# Patient Record
Sex: FEMALE | Race: WHITE | NOT HISPANIC OR LATINO | Employment: OTHER | URBAN - NONMETROPOLITAN AREA
[De-identification: names, ages, dates, MRNs, and addresses within clinical notes are randomized per-mention and may not be internally consistent; named-entity substitution may affect disease eponyms.]

---

## 2011-06-10 LAB — HM COLONOSCOPY: NORMAL

## 2017-01-12 ENCOUNTER — TRANSCRIBE ORDERS (OUTPATIENT)
Dept: ADMINISTRATIVE | Facility: HOSPITAL | Age: 62
End: 2017-01-12

## 2017-01-12 DIAGNOSIS — R94.31 ABNORMAL ELECTROCARDIOGRAM: Primary | ICD-10-CM

## 2017-01-12 DIAGNOSIS — I10 ESSENTIAL HYPERTENSION, MALIGNANT: ICD-10-CM

## 2017-01-12 DIAGNOSIS — R06.02 SHORTNESS OF BREATH: ICD-10-CM

## 2017-01-13 ENCOUNTER — OFFICE VISIT (OUTPATIENT)
Dept: FAMILY MEDICINE CLINIC | Facility: CLINIC | Age: 62
End: 2017-01-13

## 2017-01-13 VITALS
BODY MASS INDEX: 41.75 KG/M2 | OXYGEN SATURATION: 98 % | SYSTOLIC BLOOD PRESSURE: 117 MMHG | HEIGHT: 65 IN | DIASTOLIC BLOOD PRESSURE: 77 MMHG | HEART RATE: 98 BPM | WEIGHT: 250.6 LBS

## 2017-01-13 DIAGNOSIS — M17.11 ARTHRITIS OF RIGHT KNEE: Primary | ICD-10-CM

## 2017-01-13 PROCEDURE — 99212 OFFICE O/P EST SF 10 MIN: CPT | Performed by: FAMILY MEDICINE

## 2017-01-13 NOTE — PROGRESS NOTES
Preop visit for knee replacement    Cardiologist are doing a nuclear scan next week.  A1c and hyperglycemia related to steroid injections-will probably need insulin supplementation around surgery.  Okay to stop aspirin 7 days before surgery.    HEENT exam-negative, regular rhythm, chest clear, no lower extremity edema.    Sign consent for surgery if  cardiac clearance okay, and will need DVT prophylaxis.  Will probably need perioperative insulin.  Presently, increase Glucovance 5/500 to 2 twice a day

## 2017-01-13 NOTE — MR AVS SNAPSHOT
Maribeth Isbell   1/13/2017 12:30 PM   Office Visit    Dept Phone:  [phone]   Encounter #:  [id number]    Provider:  Jer Mccray MD   Department:  [facility]                Your Full Care Plan              Today's Medication Changes          These changes are accurate as of: 1/13/17 12:39 PM.  If you have any questions, ask your nurse or doctor.               Stop taking medication(s)listed here:     azithromycin 250 MG tablet   Commonly known as:  ZITHROMAX Z-BEVERLEY   Stopped by:  Jer Mccray MD           metroNIDAZOLE 500 MG tablet   Commonly known as:  FLAGYL   Stopped by:  Jer Mccray MD           vancomycin 125 MG capsule   Commonly known as:  VANCOCIN   Stopped by:  Jer Mccray MD                      Your Updated Medication List          This list is accurate as of: 1/13/17 12:39 PM.  Always use your most recent med list.                amLODIPine 5 MG tablet   Commonly known as:  NORVASC       aspirin 81 MG EC tablet       carbidopa-levodopa  MG per tablet   Commonly known as:  SINEMET       cetirizine 10 MG tablet   Commonly known as:  zyrTEC       clotrimazole-betamethasone 1-0.05 % cream   Commonly known as:  LOTRISONE       folic acid 400 MCG tablet   Commonly known as:  FOLVITE       glyBURIDE-metFORMIN 5-500 MG per tablet   Commonly known as:  GLUCOVANCE       KLOR-CON 10 MEQ CR tablet   Generic drug:  potassium chloride       levothyroxine 100 MCG tablet   Commonly known as:  SYNTHROID, LEVOTHROID       lisinopril 40 MG tablet   Commonly known as:  PRINIVIL,ZESTRIL       PRISTIQ 50 MG 24 hr tablet   Generic drug:  desvenlafaxine       raNITIdine 150 MG tablet   Commonly known as:  ZANTAC       rosuvastatin 10 MG tablet   Commonly known as:  CRESTOR       traMADol 50 MG tablet   Commonly known as:  ULTRAM       VISION-KRISTAL PRESERVE PO               Instructions     None    Patient  Instructions History      Upcoming Appointments     Visit Type Date Time Department    OFFICE VISIT 1/13/2017 12:30 PM [facility] PAD CARD NM INJ 1/16/2017  7:30 AM  PAD CARDIOLOGY    BH PAD CARD NM SCAN 1/16/2017  8:45 AM  PAD CARDIOLOGY    BH PAD CARD NM STRESS 1/16/2017  9:30 AM  PAD CARDIOLOGY    BH PAD CARD NM SCAN 1/16/2017 10:15 AM  PAD CARDIOLOGY    OFFICE VISIT 1/23/2017  1:45 PM [facility] GASTRO PAD      MyChart Signup     Our records indicate that you have an active [facility] [place] account.    You can view your After Visit Summary by going to [facility] and logging in with your [employer] username and password.  If you don't have a [employer] username and password but a parent or guardian has access to your record, the parent or guardian should login with their own [employer] username and password and access your record to view the After Visit Summary.    If you have questions, you can email [email or web address] or call [phone] to talk to our [employer] staff.  Remember, [employer] is NOT to be used for urgent needs.  For medical emergencies, dial 911.               Other Info from Your Visit           Your Appointments     Jan 16, 2017  7:30 AM CST   (Arrive by 7:00 AM CST)   BH PAD CARD NM INJ with PAD NM INJ ROOM   [facility] CARDIOLOGY ([place])    [address] [place] [id number] [phone]           No food or drink after midnight prior to your test. May have only a sip of water with allowed medications. (Please see list below of meds not allowed) No caffeine or chocolate 24 hours prior to you test. (this includes coffee, tea, soda, all decaffeinated beverages, cappuccino flavorings, noooz or vivarian, diet medications, excedrin, anacin or any energy drinks) Wear comfortable clothing and walking shoes. Bring your insurance cards with you. Bring all medications in their original bottles. If you are diabetic, do not take your diabetic  "medications after consulting with your primary care physician. If you take insulin, only take half the dose after consulting with your primary care physician. Please hold the following medications for 48 hours prior to your test after consulting with your primary care physician (acebutolo, aggrenox, atenolol, bisoprolol, betaxolol, betapace, calan, cardizem, carvadilol, coreg, corgard, corzide, dilacor xr, diltiazem, inderal, inderal la, isoptin, karlone, labetolol, levatol, nadolol, normodyne, penbutolol, pindolol, propanolol, sectral, sotalol, tenoretic, tiazic, timolol, bystolic, toprol xl, lopressor, metoprolol, trandata, verapamil, verelan, visken, zebeta, zisc, theophylline, jeanne-dur, sio-phyline, qulbron-t, primatene, persantine, dipyrimadiole)       To allow time for registration            Jan 16, 2017  8:45 AM CST    PAD CARD NM SCAN with PAD NM SCAN ROOM   [facility] CARDIOLOGY ([address] [address] [phone]            Jan 16, 2017  9:30 AM [facility] PAD CARD NM STRESS [place] with PAD STRESS LAB 1   [facility] CARDIOLOGY [address] [address] [phone]            Jan 16, 2017 10:15 AM [facility] PAD CARD NM SCAN with PAD NM SCAN ROOM   [facility] CARDIOLOGY [address] [address] [phone]            Jan 23, 2017  1:45 PM CST   Office Visit with YESSICA Monique   [facility] GASTRO[facility] (--)    [address] [place] [address] [address]  [facility] [address] [phone]           Arrive 15 minutes prior to appointment.              Allergies     No Known Allergies      Reason for Visit     Medical Clearance Right knee replacement      Vital Signs     Blood Pressure Pulse Height Weight Last Menstrual Period Oxygen Saturation    117/77 98 65\" (165.1 cm) 250 lb 9.6 oz (114 kg) (LMP Unknown) 98%    Body Mass Index Smoking Status                41.7 " kg/m2 Former Smoker

## 2017-01-16 ENCOUNTER — HOSPITAL ENCOUNTER (OUTPATIENT)
Dept: CARDIOLOGY | Facility: HOSPITAL | Age: 62
Discharge: HOME | End: 2017-01-16

## 2017-01-16 ENCOUNTER — HOSPITAL ENCOUNTER (OUTPATIENT)
Dept: CARDIOLOGY | Facility: HOSPITAL | Age: 62
Discharge: HOME | End: 2017-01-16
Admitting: INTERNAL MEDICINE

## 2017-01-16 VITALS — DIASTOLIC BLOOD PRESSURE: 76 MMHG | SYSTOLIC BLOOD PRESSURE: 127 MMHG | HEART RATE: 84 BPM

## 2017-01-16 DIAGNOSIS — R06.02 SHORTNESS OF BREATH: ICD-10-CM

## 2017-01-16 DIAGNOSIS — R94.31 ABNORMAL ELECTROCARDIOGRAM: ICD-10-CM

## 2017-01-16 DIAGNOSIS — I10 ESSENTIAL HYPERTENSION, MALIGNANT: ICD-10-CM

## 2017-01-16 PROCEDURE — 0 TECHNETIUM SESTAMIBI: Performed by: INTERNAL MEDICINE

## 2017-01-16 PROCEDURE — 93017 CV STRESS TEST TRACING ONLY: CPT

## 2017-01-16 PROCEDURE — 78452 HT MUSCLE IMAGE SPECT MULT: CPT | Performed by: INTERNAL MEDICINE

## 2017-01-16 PROCEDURE — 93018 CV STRESS TEST I&R ONLY: CPT | Performed by: INTERNAL MEDICINE

## 2017-01-16 PROCEDURE — A9500 TC99M SESTAMIBI: HCPCS | Performed by: INTERNAL MEDICINE

## 2017-01-16 PROCEDURE — 78452 HT MUSCLE IMAGE SPECT MULT: CPT

## 2017-01-16 PROCEDURE — 25010000002 REGADENOSON 0.4 MG/5ML SOLUTION: Performed by: INTERNAL MEDICINE

## 2017-01-16 RX ADMIN — Medication 1 DOSE: at 07:21

## 2017-01-16 RX ADMIN — Medication 1 DOSE: at 09:30

## 2017-01-16 RX ADMIN — REGADENOSON 0.4 MG: 0.08 INJECTION, SOLUTION INTRAVENOUS at 08:50

## 2017-01-18 LAB
BH CV STRESS BP STAGE 1: NORMAL
BH CV STRESS COMMENTS STAGE 1: NORMAL
BH CV STRESS DOSE REGADENOSON STAGE 1: 0.4
BH CV STRESS DURATION MIN STAGE 1: 0
BH CV STRESS DURATION SEC STAGE 1: 10
BH CV STRESS HR STAGE 1: 115
BH CV STRESS PROTOCOL 1: NORMAL
BH CV STRESS RECOVERY BP: NORMAL MMHG
BH CV STRESS RECOVERY HR: 98 BPM
BH CV STRESS STAGE 1: 1
LV EF NUC BP: 88 %
MAXIMAL PREDICTED HEART RATE: 159 BPM
PERCENT MAX PREDICTED HR: 72.33 %
STRESS BASELINE BP: NORMAL MMHG
STRESS BASELINE HR: 89 BPM
STRESS PERCENT HR: 85 %
STRESS POST EXERCISE DUR SEC: 10 SEC
STRESS POST PEAK BP: NORMAL MMHG
STRESS POST PEAK HR: 115 BPM
STRESS TARGET HR: 135 BPM

## 2017-02-17 ENCOUNTER — OFFICE VISIT (OUTPATIENT)
Dept: FAMILY MEDICINE CLINIC | Facility: CLINIC | Age: 62
End: 2017-02-17

## 2017-02-17 VITALS
HEIGHT: 65 IN | BODY MASS INDEX: 39.99 KG/M2 | OXYGEN SATURATION: 98 % | DIASTOLIC BLOOD PRESSURE: 60 MMHG | HEART RATE: 103 BPM | WEIGHT: 240 LBS | TEMPERATURE: 98.5 F | SYSTOLIC BLOOD PRESSURE: 112 MMHG

## 2017-02-17 DIAGNOSIS — R73.9 HYPERGLYCEMIA: Primary | ICD-10-CM

## 2017-02-17 PROCEDURE — 99212 OFFICE O/P EST SF 10 MIN: CPT | Performed by: FAMILY MEDICINE

## 2017-02-17 RX ORDER — OXYCODONE HCL 10 MG/1
10 TABLET, FILM COATED, EXTENDED RELEASE ORAL EVERY 4 HOURS PRN
COMMUNITY
End: 2018-04-18

## 2017-02-17 RX ORDER — MELATONIN 10 MG
TABLET, SUBLINGUAL SUBLINGUAL
COMMUNITY
End: 2018-01-29 | Stop reason: DRUGHIGH

## 2017-02-17 RX ORDER — INSULIN GLARGINE 100 [IU]/ML
INJECTION, SOLUTION SUBCUTANEOUS DAILY
COMMUNITY
End: 2017-03-10

## 2017-02-17 RX ORDER — BUMETANIDE 1 MG/1
1 TABLET ORAL DAILY
COMMUNITY
End: 2017-05-30 | Stop reason: SDUPTHER

## 2017-02-17 RX ORDER — NIFEDIPINE 60 MG/1
60 TABLET, FILM COATED, EXTENDED RELEASE ORAL DAILY
COMMUNITY
End: 2017-08-04 | Stop reason: SDUPTHER

## 2017-02-17 NOTE — PROGRESS NOTES
"Maribeth Aguerosavage    1955    Chief Complaint   Patient presents with   • Diabetes     Elevated blood sugar       Vitals:    02/17/17 1339   BP: 112/60   Pulse: 103   Temp: 98.5 °F (36.9 °C)   SpO2: 98%   Weight: 240 lb (109 kg)   Height: 65\" (165.1 cm)       Diabetes   She presents for her follow-up diabetic visit. She has type 2 diabetes mellitus. MedicAlert identification noted. Her disease course has been stable. There are no hypoglycemic associated symptoms. There are no hypoglycemic complications. Symptoms are stable. Her weight is stable. She is following a generally healthy diet. Home blood sugar record trend: Post right knee surgical hyperglycemia. An ACE inhibitor/angiotensin II receptor blocker is being taken. She does not see a podiatrist.Eye exam is current.       Review of Systems   Constitutional: Negative for fever.   Respiratory: Negative.    Cardiovascular: Negative.    Musculoskeletal:        Recent right knee replacement surgery       Past Medical History   Diagnosis Date   • Bell's palsy    • Cataract    • Colon polyps    • Depression    • Diabetes mellitus      type 2   • E coli infection    • Headache    • Hyperlipidemia    • Hypertension    • Hyperthyroidism    • Hypothyroidism    • Sleep apnea      wears cpap   • Tachycardia    • Visual impairment          Current Outpatient Prescriptions:   •  aspirin 81 MG EC tablet, Take 81 mg by mouth Daily., Disp: , Rfl:   •  bumetanide (BUMEX) 1 MG tablet, Take 1 mg by mouth Daily., Disp: , Rfl:   •  carbidopa-levodopa (SINEMET)  MG per tablet, TAKE 1-2 TABLETS BY MOUTH AT BEDTIME, Disp: , Rfl: 1  •  cetirizine (ZyrTEC) 10 MG tablet, Take 10 mg by mouth Daily., Disp: , Rfl:   •  Cholecalciferol (VITAMIN D3) 33993 UNITS tablet, Take  by mouth., Disp: , Rfl:   •  clotrimazole-betamethasone (LOTRISONE) 1-0.05 % cream, AS DIRECTED TOPICAL, Disp: , Rfl: 2  •  folic acid (FOLVITE) 400 MCG tablet, Take 400 mcg by mouth Daily., Disp: , Rfl:   •  " glyBURIDE-metFORMIN (GLUCOVANCE) 5-500 MG per tablet, TAKE 2 TABLETS BY MOUTH EVERY MORNING, AND 1 TABLET EVERY AFTERNOON OR EVENING AS DIRECTED, Disp: , Rfl: 1  •  insulin glargine (LANTUS) 100 UNIT/ML injection, Inject  under the skin Daily., Disp: , Rfl:   •  levothyroxine (SYNTHROID, LEVOTHROID) 100 MCG tablet, TAKE 1 TABLET BY MOUTH EVERY MORNING, Disp: , Rfl: 1  •  lisinopril (PRINIVIL,ZESTRIL) 40 MG tablet, TAKE 1 TABLET BY MOUTH EVERY DAY, Disp: , Rfl: 1  •  metFORMIN (GLUCOPHAGE) 500 MG tablet, Take 500 mg by mouth 2 (Two) Times a Day With Meals., Disp: , Rfl:   •  Multiple Vitamins-Minerals (VISION-KRISTAL PRESERVE PO), Take  by mouth Daily With Breakfast., Disp: , Rfl:   •  NIFEdipine CC (ADALAT CC) 60 MG 24 hr tablet, Take 60 mg by mouth Daily., Disp: , Rfl:   •  oxyCODONE (oxyCONTIN) 10 MG 12 hr tablet, Take 10 mg by mouth Every 4 (Four) Hours As Needed., Disp: , Rfl:   •  PRISTIQ 50 MG 24 hr tablet, TAKE 1 TABLET BY MOUTH EVERY MORNING, Disp: , Rfl: 3  •  ranitidine (ZANTAC) 150 MG tablet, Take 150 mg by mouth 2 (Two) Times a Day., Disp: , Rfl:   •  rosuvastatin (CRESTOR) 10 MG tablet, TAKE 1 TABLET (10 MG) BY ORAL ROUTE ONCE DAILY, Disp: , Rfl: 2  •  traMADol (ULTRAM) 50 MG tablet, TAKE 1 TABLET BY MOUTH 3 TIMES A DAY AS NEEDED FOR PAIN, Disp: , Rfl: 0    No Known Allergies    Patient Active Problem List   Diagnosis   • Acquired hypothyroidism   • Type 2 diabetes mellitus without complication   • Essential hypertension   • Recurrent major depressive disorder   • Osteoarthritis of multiple joints   • History of colon polyps   • Diarrhea   • Vomiting       Social History     Social History   • Marital status:      Spouse name: N/A   • Number of children: N/A   • Years of education: N/A     Social History Main Topics   • Smoking status: Former Smoker     Packs/day: 1.00     Years: 20.00     Types: Cigarettes     Quit date: 2015   • Smokeless tobacco: Never Used   • Alcohol use No   • Drug use: No   •  "Sexual activity: Not Asked     Other Topics Concern   • None     Social History Narrative       Past Surgical History   Procedure Laterality Date   • Tumor removal     •  section     • Knee surgery Left    • Hysterectomy     • Colonoscopy  06/10/2011   • Endoscopy N/A 2016     Procedure: ESOPHAGOGASTRODUODENOSCOPY WITH ANESTHESIA;  Surgeon: Kathy Berry MD;  Location: [facility] ENDOSCOPY;  Service:    • Colonoscopy N/A 2016     Procedure: COLONOSCOPY WITH ANESTHESIA;  Surgeon: Kathy Berry MD;  Location: [facility] ENDOSCOPY;  Service:        Physical Exam   Constitutional:   Morbidly obese   Cardiovascular: Normal rate and regular rhythm.    Pulmonary/Chest: Effort normal and breath sounds normal.   Vitals reviewed.      Vitals:    17 1339   BP: 112/60   Pulse: 103   Temp: 98.5 °F (36.9 °C)   SpO2: 98%   Weight: 240 lb (109 kg)   Height: 65\" (165.1 cm)       Maribeth was seen today for diabetes.    Diagnoses and all orders for this visit:    Hyperglycemia        Problem List Items Addressed This Visit     None      Visit Diagnoses     Hyperglycemia    -  Primary          3 weeks post right knee replacement-increase Glucovance 5 500 to 2 in the morning and 1 in the afternoon-continue Levemir 10 units daily at bedtime call sugars Monday      Sugar today is 213                  Jer Mccray MD  2017  "

## 2017-03-02 ENCOUNTER — OFFICE VISIT (OUTPATIENT)
Dept: FAMILY MEDICINE CLINIC | Facility: CLINIC | Age: 62
End: 2017-03-02

## 2017-03-02 VITALS
SYSTOLIC BLOOD PRESSURE: 118 MMHG | HEIGHT: 65 IN | TEMPERATURE: 98.1 F | HEART RATE: 100 BPM | DIASTOLIC BLOOD PRESSURE: 82 MMHG | WEIGHT: 241.4 LBS | BODY MASS INDEX: 40.22 KG/M2 | OXYGEN SATURATION: 98 %

## 2017-03-02 DIAGNOSIS — Z12.12 SCREENING FOR COLORECTAL CANCER: ICD-10-CM

## 2017-03-02 DIAGNOSIS — E55.9 UNSPECIFIED VITAMIN D DEFICIENCY: ICD-10-CM

## 2017-03-02 DIAGNOSIS — Z00.00 ROUTINE GENERAL MEDICAL EXAMINATION AT A HEALTH CARE FACILITY: Primary | ICD-10-CM

## 2017-03-02 DIAGNOSIS — Z23 NEED FOR PROPHYLACTIC VACCINATION AGAINST STREPTOCOCCUS PNEUMONIAE (PNEUMOCOCCUS): ICD-10-CM

## 2017-03-02 DIAGNOSIS — Z12.11 SCREENING FOR COLORECTAL CANCER: ICD-10-CM

## 2017-03-02 DIAGNOSIS — R73.9 HYPERGLYCEMIA: ICD-10-CM

## 2017-03-02 LAB
25(OH)D3+25(OH)D2 SERPL-MCNC: 29.8 NG/ML (ref 30–100)
ALBUMIN SERPL-MCNC: 3.9 G/DL (ref 3.5–5)
ALBUMIN/GLOB SERPL: 1.3 G/DL (ref 1.1–2.5)
ALP SERPL-CCNC: 97 U/L (ref 24–120)
ALT SERPL-CCNC: 29 U/L (ref 0–54)
AST SERPL-CCNC: 24 U/L (ref 7–45)
BASOPHILS # BLD AUTO: 0.03 10*3/MM3 (ref 0–0.2)
BASOPHILS NFR BLD AUTO: 0.4 % (ref 0–2)
BILIRUB BLD-MCNC: NEGATIVE MG/DL
BILIRUB SERPL-MCNC: 0.6 MG/DL (ref 0.1–1)
BUN SERPL-MCNC: 14 MG/DL (ref 5–21)
BUN/CREAT SERPL: 15.4 (ref 7–25)
CALCIUM SERPL-MCNC: 9.4 MG/DL (ref 8.4–10.4)
CHLORIDE SERPL-SCNC: 98 MMOL/L (ref 98–110)
CHOLEST SERPL-MCNC: 160 MG/DL (ref 130–200)
CLARITY, POC: CLEAR
CO2 SERPL-SCNC: 30 MMOL/L (ref 24–31)
COLOR UR: YELLOW
CREAT SERPL-MCNC: 0.91 MG/DL (ref 0.5–1.4)
EOSINOPHIL # BLD AUTO: 0.28 10*3/MM3 (ref 0–0.7)
EOSINOPHIL NFR BLD AUTO: 4.2 % (ref 0–4)
ERYTHROCYTE [DISTWIDTH] IN BLOOD BY AUTOMATED COUNT: 14 % (ref 12–15)
EXPIRATION DATE 2: NORMAL
EXPIRATION DATE 3: NORMAL
EXPIRATION DATE: NORMAL
GASTROCULT GAST QL: NEGATIVE
GLOBULIN SER CALC-MCNC: 2.9 GM/DL
GLUCOSE SERPL-MCNC: 187 MG/DL (ref 70–100)
GLUCOSE UR STRIP-MCNC: NEGATIVE MG/DL
HBA1C MFR BLD: 9.2 %
HCT VFR BLD AUTO: 43.2 % (ref 37–47)
HDLC SERPL-MCNC: 42 MG/DL
HEMOCCULT SP2 STL QL: NEGATIVE
HEMOCCULT SP3 STL QL: NEGATIVE
HGB BLD-MCNC: 13.7 G/DL (ref 12–16)
IMM GRANULOCYTES # BLD: 0.04 10*3/MM3 (ref 0–0.03)
IMM GRANULOCYTES NFR BLD: 0.6 % (ref 0–5)
KETONES UR QL: NEGATIVE
LDLC SERPL CALC-MCNC: 81 MG/DL (ref 0–99)
LEUKOCYTE EST, POC: NEGATIVE
LYMPHOCYTES # BLD AUTO: 2.16 10*3/MM3 (ref 0.72–4.86)
LYMPHOCYTES NFR BLD AUTO: 32 % (ref 15–45)
Lab: NORMAL
MCH RBC QN AUTO: 27.4 PG (ref 28–32)
MCHC RBC AUTO-ENTMCNC: 31.7 G/DL (ref 33–36)
MCV RBC AUTO: 86.4 FL (ref 82–98)
MONOCYTES # BLD AUTO: 0.49 10*3/MM3 (ref 0.19–1.3)
MONOCYTES NFR BLD AUTO: 7.3 % (ref 4–12)
NEUTROPHILS # BLD AUTO: 3.74 10*3/MM3 (ref 1.87–8.4)
NEUTROPHILS NFR BLD AUTO: 55.5 % (ref 39–78)
NITRITE UR-MCNC: NEGATIVE MG/ML
PH UR: 6 [PH] (ref 5–8)
PLATELET # BLD AUTO: 317 10*3/MM3 (ref 130–400)
POTASSIUM SERPL-SCNC: 3.9 MMOL/L (ref 3.5–5.3)
PROT SERPL-MCNC: 6.8 G/DL (ref 6.3–8.7)
PROT UR STRIP-MCNC: NEGATIVE MG/DL
RBC # BLD AUTO: 5 10*6/MM3 (ref 4.2–5.4)
RBC # UR STRIP: NEGATIVE /UL
SODIUM SERPL-SCNC: 138 MMOL/L (ref 135–145)
SP GR UR: 1.02 (ref 1–1.03)
T4 FREE SERPL-MCNC: 1.25 NG/DL (ref 0.78–2.19)
TRIGL SERPL-MCNC: 187 MG/DL (ref 0–149)
TSH SERPL DL<=0.005 MIU/L-ACNC: 4.33 MIU/ML (ref 0.47–4.68)
UROBILINOGEN UR QL: NORMAL
VLDLC SERPL CALC-MCNC: 37.4 MG/DL
WBC # BLD AUTO: 6.74 10*3/MM3 (ref 4.8–10.8)

## 2017-03-02 PROCEDURE — 99396 PREV VISIT EST AGE 40-64: CPT | Performed by: FAMILY MEDICINE

## 2017-03-02 PROCEDURE — 90670 PCV13 VACCINE IM: CPT | Performed by: FAMILY MEDICINE

## 2017-03-02 PROCEDURE — 90471 IMMUNIZATION ADMIN: CPT | Performed by: FAMILY MEDICINE

## 2017-03-02 PROCEDURE — 82270 OCCULT BLOOD FECES: CPT | Performed by: FAMILY MEDICINE

## 2017-03-02 PROCEDURE — 81003 URINALYSIS AUTO W/O SCOPE: CPT | Performed by: FAMILY MEDICINE

## 2017-03-02 NOTE — PATIENT INSTRUCTIONS
Diabetes Mellitus and Food  It is important for you to manage your blood sugar (glucose) level. Your blood glucose level can be greatly affected by what you eat. Eating healthier foods in the appropriate amounts throughout the day at about the same time each day will help you control your blood glucose level. It can also help slow or prevent worsening of your diabetes mellitus. Healthy eating may even help you improve the level of your blood pressure and reach or maintain a healthy weight.   General recommendations for healthful eating and cooking habits include:  · Eating meals and snacks regularly. Avoid going long periods of time without eating to lose weight.  · Eating a diet that consists mainly of plant-based foods, such as fruits, vegetables, nuts, legumes, and whole grains.  · Using low-heat cooking methods, such as baking, instead of high-heat cooking methods, such as deep frying.  Work with your dietitian to make sure you understand how to use the Nutrition Facts information on food labels.  HOW CAN FOOD AFFECT ME?  Carbohydrates  Carbohydrates affect your blood glucose level more than any other type of food. Your dietitian will help you determine how many carbohydrates to eat at each meal and teach you how to count carbohydrates. Counting carbohydrates is important to keep your blood glucose at a healthy level, especially if you are using insulin or taking certain medicines for diabetes mellitus.  Alcohol  Alcohol can cause sudden decreases in blood glucose (hypoglycemia), especially if you use insulin or take certain medicines for diabetes mellitus. Hypoglycemia can be a life-threatening condition. Symptoms of hypoglycemia (sleepiness, dizziness, and disorientation) are similar to symptoms of having too much alcohol.   If your health care provider has given you approval to drink alcohol, do so in moderation and use the following guidelines:  · Women should not have more than one drink per day, and men  should not have more than two drinks per day. One drink is equal to:    12 oz of beer.    5 oz of wine.    1½ oz of hard liquor.  · Do not drink on an empty stomach.  · Keep yourself hydrated. Have water, diet soda, or unsweetened iced tea.  · Regular soda, juice, and other mixers might contain a lot of carbohydrates and should be counted.  WHAT FOODS ARE NOT RECOMMENDED?  As you make food choices, it is important to remember that all foods are not the same. Some foods have fewer nutrients per serving than other foods, even though they might have the same number of calories or carbohydrates. It is difficult to get your body what it needs when you eat foods with fewer nutrients. Examples of foods that you should avoid that are high in calories and carbohydrates but low in nutrients include:  · Trans fats (most processed foods list trans fats on the Nutrition Facts label).  · Regular soda.  · Juice.  · Candy.  · Sweets, such as cake, pie, doughnuts, and cookies.  · Fried foods.  WHAT FOODS CAN I EAT?  Eat nutrient-rich foods, which will nourish your body and keep you healthy. The food you should eat also will depend on several factors, including:  · The calories you need.  · The medicines you take.  · Your weight.  · Your blood glucose level.  · Your blood pressure level.  · Your cholesterol level.  You should eat a variety of foods, including:  · Protein.    Lean cuts of meat.    Proteins low in saturated fats, such as fish, egg whites, and beans. Avoid processed meats.  · Fruits and vegetables.    Fruits and vegetables that may help control blood glucose levels, such as apples, mangoes, and yams.  · Dairy products.    Choose fat-free or low-fat dairy products, such as milk, yogurt, and cheese.  · Grains, bread, pasta, and rice.    Choose whole grain products, such as multigrain bread, whole oats, and brown rice. These foods may help control blood pressure.  · Fats.    Foods containing healthful fats, such as nuts,  avocado, olive oil, canola oil, and fish.  DOES EVERYONE WITH DIABETES MELLITUS HAVE THE SAME MEAL PLAN?  Because every person with diabetes mellitus is different, there is not one meal plan that works for everyone. It is very important that you meet with a dietitian who will help you create a meal plan that is just right for you.     This information is not intended to replace advice given to you by your health care provider. Make sure you discuss any questions you have with your health care provider.     Document Released: 09/14/2006 Document Revised: 01/08/2016 Document Reviewed: 11/14/2014  [employer] Interactive Patient Education ©2016 [employer] Inc[employer]  Exercising to Lose Weight  Exercising can help you to lose weight. In order to lose weight through exercise, you need to do vigorous-intensity exercise. You can tell that you are exercising with vigorous intensity if you are breathing very hard and fast and cannot hold a conversation while exercising.  Moderate-intensity exercise helps to maintain your current weight. You can tell that you are exercising at a moderate level if you have a higher heart rate and faster breathing, but you are still able to hold a conversation.  HOW OFTEN SHOULD I EXERCISE?  Choose an activity that you enjoy and set realistic goals. Your health care provider can help you to make an activity plan that works for you. Exercise regularly as directed by your health care provider. This may include:  · Doing resistance training twice each week, such as:    Push-ups.    Sit-ups.    Lifting weights.    Using resistance bands.  · Doing a given intensity of exercise for a given amount of time. Choose from these options:    150 minutes of moderate-intensity exercise every week.    75 minutes of vigorous-intensity exercise every week.    A mix of moderate-intensity and vigorous-intensity exercise every week.  Children, pregnant women, people who are out of shape, people who are overweight, and older  adults may need to consult a health care provider for individual recommendations. If you have any sort of medical condition, be sure to consult your health care provider before starting a new exercise program.  WHAT ARE SOME ACTIVITIES THAT CAN HELP ME TO LOSE WEIGHT?   · Walking at a rate of at least 4.5 miles an hour.  · Jogging or running at a rate of 5 miles per hour.  · Biking at a rate of at least 10 miles per hour.  · Lap swimming.  · Roller-skating or in-line skating.  · Cross-country skiing.  · Vigorous competitive sports, such as football, basketball, and soccer.  · Jumping rope.  · Aerobic dancing.  HOW CAN I BE MORE ACTIVE IN MY DAY-TO-DAY ACTIVITIES?  · Use the stairs instead of the elevator.  · Take a walk during your lunch break.  · If you drive, park your car farther away from work or school.  · If you take public transportation, get off one stop early and walk the rest of the way.  · Make all of your phone calls while standing up and walking around.  · Get up, stretch, and walk around every 30 minutes throughout the day.  WHAT GUIDELINES SHOULD I FOLLOW WHILE EXERCISING?  · Do not exercise so much that you hurt yourself, feel dizzy, or get very short of breath.  · Consult your health care provider prior to starting a new exercise program.  · Wear comfortable clothes and shoes with good support.  · Drink plenty of water while you exercise to prevent dehydration or heat stroke. Body water is lost during exercise and must be replaced.  · Work out until you breathe faster and your heart beats faster.     This information is not intended to replace advice given to you by your health care provider. Make sure you discuss any questions you have with your health care provider.     Document Released: 01/20/2012 Document Revised: 01/08/2016 Document Reviewed: 05/21/2015  [employer] Interactive Patient Education ©2016 Elsevier Inc.

## 2017-03-02 NOTE — PROGRESS NOTES
Subjective   Maribeth Isbell is a 61 y.o. female.     Diabetes   She presents for her follow-up diabetic visit. She has type 2 diabetes mellitus. MedicAlert identification noted. Her disease course has been stable. There are no hypoglycemic associated symptoms. There are no diabetic associated symptoms. There are no hypoglycemic complications. Symptoms are stable. There are no diabetic complications. Risk factors for coronary artery disease include dyslipidemia, family history, hypertension, post-menopausal and sedentary lifestyle. Current diabetic treatment includes insulin injections and oral agent (dual therapy). She is compliant with treatment all of the time. Her weight is stable. She is following a diabetic and generally healthy diet. When asked about meal planning, she reported none. She has not had a previous visit with a dietitian. She participates in exercise intermittently. There is no change in her home blood glucose trend. An ACE inhibitor/angiotensin II receptor blocker is being taken. She does not see a podiatrist.Eye exam is not current.        The following portions of the patient's history were reviewed and updated as appropriate: allergies, current medications, past family history, past medical history, past social history, past surgical history and problem list.    Review of Systems   Constitutional:        Recent knee replacement surgery   HENT:        Getting I exam   Eyes: Negative.    Respiratory: Negative.    Cardiovascular:        Sees cardiologist regularly   Gastrointestinal:        Colonoscopy is up-to-date   Endocrine: Negative.    Genitourinary: Negative.    Musculoskeletal:        Recent knee replacement   Skin: Negative.    Allergic/Immunologic: Negative.    Neurological: Negative.    Hematological: Negative.    Psychiatric/Behavioral:        Mood stable at this point       Objective   Physical Exam   Constitutional: She is oriented to person, place, and time.   Morbidly obese    HENT:   Head: Normocephalic.   Right Ear: External ear normal.   Left Ear: External ear normal.   Mouth/Throat: Oropharynx is clear and moist.   Eyes: Conjunctivae and EOM are normal. Pupils are equal, round, and reactive to light.   Neck: Normal range of motion. No thyromegaly present.   Cardiovascular: Normal rate, regular rhythm and normal heart sounds.    Pulmonary/Chest: Effort normal and breath sounds normal.   Abdominal: Soft. Bowel sounds are normal.   No enlargement of spleen or liver   Genitourinary:   Genitourinary Comments: Breasts no masses noted   Musculoskeletal: She exhibits no edema.   Lymphadenopathy:     She has no cervical adenopathy.   Neurological: She is alert and oriented to person, place, and time.   Skin: Skin is warm.   Psychiatric: She has a normal mood and affect. Her behavior is normal. Thought content normal.   Vitals reviewed.      Assessment/Plan   Problems Addressed this Visit     None      Visit Diagnoses     Routine general medical examination at a health care facility    -  Primary    Relevant Orders    TSH    T4, free    CBC & Differential    Comprehensive Metabolic Panel    Hemoglobin A1c    Lipid Panel    Hyperglycemia        Relevant Orders    Hemoglobin A1c    Screening for colorectal cancer        Relevant Orders    POC Occult Blood X 3, Stool (Completed)    Unspecified vitamin D deficiency        Relevant Orders    Vitamin D 25 Hydroxy    Need for prophylactic vaccination against Streptococcus pneumoniae (pneumococcus)        Relevant Medications    pneumococcal conj. 13-valent (PREVNAR-13) vaccine 0.5 mL (Start on 3/2/2017  9:30 AM)          Plan   mammogram, stools, Prevnar 13-C3 months

## 2017-03-03 LAB — MICROALBUMIN UR-MCNC: 13.6 UG/ML

## 2017-03-03 RX ORDER — LEVOTHYROXINE SODIUM 125 UG/1
125 CAPSULE ORAL DAILY
Qty: 90 CAPSULE | Refills: 1 | Status: SHIPPED | OUTPATIENT
Start: 2017-03-03 | End: 2017-08-04 | Stop reason: SDUPTHER

## 2017-03-03 NOTE — TELEPHONE ENCOUNTER
----- Message from Jer Mccray MD sent at 3/3/2017  7:30 AM CST -----  Sugar weight 2 high increase Lantus to 30 units daily at bedtime, stop Synthroid 100 go to 112–2 months fasting; call sugars in one week a.m. and 4 PM

## 2017-04-17 ENCOUNTER — OFFICE VISIT (OUTPATIENT)
Dept: GASTROENTEROLOGY | Facility: CLINIC | Age: 62
End: 2017-04-17

## 2017-04-17 VITALS
HEIGHT: 65 IN | WEIGHT: 241 LBS | HEART RATE: 85 BPM | SYSTOLIC BLOOD PRESSURE: 122 MMHG | BODY MASS INDEX: 40.15 KG/M2 | TEMPERATURE: 97.8 F | OXYGEN SATURATION: 90 % | DIASTOLIC BLOOD PRESSURE: 70 MMHG

## 2017-04-17 DIAGNOSIS — R11.2 NON-INTRACTABLE VOMITING WITH NAUSEA, UNSPECIFIED VOMITING TYPE: Primary | ICD-10-CM

## 2017-04-17 DIAGNOSIS — R19.7 DIARRHEA, UNSPECIFIED TYPE: ICD-10-CM

## 2017-04-17 PROCEDURE — 99213 OFFICE O/P EST LOW 20 MIN: CPT | Performed by: NURSE PRACTITIONER

## 2017-04-17 NOTE — PROGRESS NOTES
Chief Complaint:   Chief Complaint   Patient presents with   • Follow-up     Patient is following up after endo.         Patient ID: Maribeth Isbell is a 61 y.o. female     History of Present Illness:    This is a very pleasant 61-year-old female who is here to follow up today status post upper endoscopy performed on 16 for diarrhea, nausea with vomiting.  The patient was found to have normal findings.  Biopsies were taken and found to be normal.  Prior to the endoscopy the patient had received multiple antibiotics prior to her onset of symptomatology.  She had had C. difficile and was treated with vancomycin along with Amoxil and Flagyl it also showed Escherichia coli.     The patient tells me today that the last round of antibiotics seemed to have cleared everything up.  She states that she has no nausea or vomiting at all.  She states that the diarrhea has stopped.  She states that she is feeling much better she denies any dysphagia or epigastric pain she denies any fever or chills.  She denies any bright red blood per rectum or black tarry stools.  She denies any unintentional weight loss or loss of appetite    Past Medical History:   Diagnosis Date   • Bell's palsy    • Cataract    • Colon polyps    • Depression    • Diabetes mellitus     type 2   • E coli infection    • Headache    • Hyperlipidemia    • Hypertension    • Hyperthyroidism    • Hypothyroidism    • Sleep apnea     wears cpap   • Tachycardia    • Visual impairment        Past Surgical History:   Procedure Laterality Date   •  SECTION     • COLONOSCOPY  06/10/2011   • COLONOSCOPY N/A 2016    Procedure: COLONOSCOPY WITH ANESTHESIA;  Surgeon: Kathy Berry MD;  Location: [facility] ENDOSCOPY;  Service:    • ENDOSCOPY N/A 2016    Procedure: ESOPHAGOGASTRODUODENOSCOPY WITH ANESTHESIA;  Surgeon: Kathy Berry MD;  Location: [facility] ENDOSCOPY;  Service:    • HYSTERECTOMY     • KNEE SURGERY Left    • TUMOR REMOVAL           Current  Outpatient Prescriptions:   •  aspirin 81 MG EC tablet, Take 81 mg by mouth Daily., Disp: , Rfl:   •  bumetanide (BUMEX) 1 MG tablet, Take 1 mg by mouth Daily., Disp: , Rfl:   •  carbidopa-levodopa (SINEMET)  MG per tablet, TAKE 1-2 TABLETS BY MOUTH AT BEDTIME, Disp: , Rfl: 1  •  cetirizine (ZyrTEC) 10 MG tablet, Take 10 mg by mouth Daily., Disp: , Rfl:   •  Cholecalciferol (VITAMIN D3) 23680 UNITS tablet, Take  by mouth., Disp: , Rfl:   •  clotrimazole-betamethasone (LOTRISONE) 1-0.05 % cream, AS DIRECTED TOPICAL, Disp: , Rfl: 2  •  folic acid (FOLVITE) 400 MCG tablet, Take 400 mcg by mouth Daily., Disp: , Rfl:   •  glyBURIDE-metFORMIN (GLUCOVANCE) 5-500 MG per tablet, TAKE 2 TABLETS BY MOUTH EVERY MORNING, AND 1 TABLET EVERY AFTERNOON OR EVENING AS DIRECTED, Disp: , Rfl: 1  •  insulin detemir (LEVEMIR FLEXTOUCH) 100 UNIT/ML injection, Inject 40 Units under the skin Every Night., Disp: 3 pen, Rfl: 3  •  levothyroxine sodium (TIROSINT) 125 MCG capsule capsule, Take 1 capsule by mouth Daily., Disp: 90 capsule, Rfl: 1  •  lisinopril (PRINIVIL,ZESTRIL) 40 MG tablet, TAKE 1 TABLET BY MOUTH EVERY DAY, Disp: , Rfl: 1  •  metFORMIN (GLUCOPHAGE) 500 MG tablet, Take 500 mg by mouth 2 (Two) Times a Day With Meals., Disp: , Rfl:   •  Multiple Vitamins-Minerals (VISION-KRISTAL PRESERVE PO), Take  by mouth Daily With Breakfast., Disp: , Rfl:   •  NIFEdipine CC (ADALAT CC) 60 MG 24 hr tablet, Take 60 mg by mouth Daily., Disp: , Rfl:   •  oxyCODONE (oxyCONTIN) 10 MG 12 hr tablet, Take 10 mg by mouth Every 4 (Four) Hours As Needed., Disp: , Rfl:   •  PRISTIQ 50 MG 24 hr tablet, TAKE 1 TABLET BY MOUTH EVERY MORNING, Disp: , Rfl: 3  •  ranitidine (ZANTAC) 150 MG tablet, Take 150 mg by mouth 2 (Two) Times a Day., Disp: , Rfl:   •  rosuvastatin (CRESTOR) 10 MG tablet, TAKE 1 TABLET (10 MG) BY ORAL ROUTE ONCE DAILY, Disp: , Rfl: 2  •  traMADol (ULTRAM) 50 MG tablet, TAKE 1 TABLET BY MOUTH 3 TIMES A DAY AS NEEDED FOR PAIN, Disp: ,  "Rfl: 0    No Known Allergies    Social History     Social History   • Marital status:      Spouse name: N/A   • Number of children: N/A   • Years of education: N/A     Occupational History   • Not on file.     Social History Main Topics   • Smoking status: Former Smoker     Packs/day: 1.00     Years: 20.00     Types: Cigarettes     Quit date: 2015   • Smokeless tobacco: Never Used   • Alcohol use No   • Drug use: No   • Sexual activity: Not on file     Other Topics Concern   • Not on file     Social History Narrative       Family History   Problem Relation Age of Onset   • Heart disease Mother    • Diabetes Mother        Vitals:    04/17/17 0958   BP: 122/70   Pulse: 85   Temp: 97.8 °F (36.6 °C)   SpO2: 90%   Weight: 241 lb (109 kg)   Height: 65\" (165.1 cm)       Review of Systems:    General:    Present -feeling well   Skin:    Not Present-Rash   HEENT:     Not Present-Acute visual changes or Acute hearing changes   Neck :    Not Present- swollen glands   Genitourinary:      Not Present- burning, frequency, urgency hematuria, dysuria,   Cardiovascular:   Not Present-chest pain, palpitations, or pressure   Respiratory:   Not Present- shortness of breath or cough   Gastrointestinal:  Musculoskeletal:  Neurological:  Psychiatric:   Present as mentioned in the HP    Not Present. Recent gait disturbances.    Not Present-Seizures and weakness in extremities.    Not Present- Anxiety or Depression.       Physical Exam:    General Appearance:    Alert, cooperative, in no acute distress   Psych:    Mood appropriate    Eyes:          conjunctivae and sclerae normal, no   icterus, no pallor   ENMT:    Ears appear intact with no abnormalities noted oral mucosa moist   Neck:   No adenopathy, supple, trachea midline, no thyromegaly, no   carotid bruit, no JVD    Cardiovascular:    Regular rhythm and normal rate, normal S1 and S2, no            murmur, no gallop, no rub, no click   Gastrointestinal:     Inspection normal.  " Normal bowel sounds, no masses, no organomegaly, soft round non-tender, non-distended, no guarding, no rebound or tenderness. No hepatosplenomegaly.   Skin:   No bleeding, bruising or rash   Neurologic:   nonfocal       Lab Results   Component Value Date    WBC 6.74 03/02/2017    WBC 4.69 (L) 11/04/2016    HGB 13.7 03/02/2017    HGB 14.2 11/04/2016    HCT 43.2 03/02/2017    HCT 42.6 11/04/2016     03/02/2017     11/04/2016        Lab Results   Component Value Date     03/02/2017     11/04/2016    K 3.9 03/02/2017    K 4.2 11/04/2016    CL 98 03/02/2017    CL 99 11/04/2016    CO2 30.0 03/02/2017    CO2 30.0 11/04/2016    BUN 14 03/02/2017    BUN 14 11/04/2016    CREATININE 0.91 03/02/2017    CREATININE 0.79 11/04/2016    BILITOT 0.6 03/02/2017    BILITOT 0.6 11/04/2016    ALKPHOS 97 03/02/2017    ALKPHOS 75 11/04/2016    ALT 29 03/02/2017    ALT 35 11/04/2016    AST 24 03/02/2017    AST 29 11/04/2016    GLUCOSE 231 (H) 11/04/2016       No results found for: INR    Assessment and Plan:    Maribeth was seen today for follow-up.    Diagnoses and all orders for this visit:    Non-intractable vomiting with nausea, unspecified vomiting type  Resolved  Diarrhea, unspecified type  Resolved      There are no Patient Instructions on file for this visit.    Next follow-up appointment      EMR Dragon/Transcription disclaimer:  Much of this encounter note is an electronic transcription/translation of spoken language to printed text. The electronic translation of spoken language may permit erroneous, or at times, nonsensical words or phrases to be inadvertently transcribed; although I have reviewed the note for such errors, some may still exist.

## 2017-05-30 RX ORDER — BUMETANIDE 1 MG/1
1 TABLET ORAL DAILY
Qty: 90 TABLET | Refills: 1 | Status: SHIPPED | OUTPATIENT
Start: 2017-05-30 | End: 2017-12-08 | Stop reason: SDUPTHER

## 2017-06-05 ENCOUNTER — OFFICE VISIT (OUTPATIENT)
Dept: FAMILY MEDICINE CLINIC | Facility: CLINIC | Age: 62
End: 2017-06-05

## 2017-06-05 VITALS
SYSTOLIC BLOOD PRESSURE: 124 MMHG | DIASTOLIC BLOOD PRESSURE: 62 MMHG | HEART RATE: 89 BPM | OXYGEN SATURATION: 98 % | BODY MASS INDEX: 40.75 KG/M2 | WEIGHT: 244.6 LBS | HEIGHT: 65 IN | TEMPERATURE: 99.1 F

## 2017-06-05 DIAGNOSIS — R53.83 FATIGUE, UNSPECIFIED TYPE: ICD-10-CM

## 2017-06-05 DIAGNOSIS — E55.9 UNSPECIFIED VITAMIN D DEFICIENCY: ICD-10-CM

## 2017-06-05 DIAGNOSIS — E78.2 MIXED HYPERLIPIDEMIA: Primary | ICD-10-CM

## 2017-06-05 DIAGNOSIS — R73.9 HYPERGLYCEMIA: ICD-10-CM

## 2017-06-05 PROCEDURE — 99213 OFFICE O/P EST LOW 20 MIN: CPT | Performed by: FAMILY MEDICINE

## 2017-06-05 NOTE — PROGRESS NOTES
"Maribeth Isbell    1955    Chief Complaint   Patient presents with   • Follow-up     3 month       Vitals:    06/05/17 0824   BP: 124/62   BP Location: Right arm   Patient Position: Sitting   Cuff Size: Adult   Pulse: 89   Temp: 99.1 °F (37.3 °C)   TempSrc: Oral   SpO2: 98%   Weight: 244 lb 9.6 oz (111 kg)   Height: 65\" (165.1 cm)       Diabetes   She presents for her follow-up diabetic visit. She has type 2 diabetes mellitus. No MedicAlert identification noted. Her disease course has been fluctuating. There are no hypoglycemic associated symptoms. There are no diabetic associated symptoms. Pertinent negatives for diabetes include no polydipsia, no polyphagia and no polyuria. There are no hypoglycemic complications. Symptoms are stable. There are no diabetic complications. Risk factors for coronary artery disease include dyslipidemia, family history, obesity, post-menopausal and sedentary lifestyle. Current diabetic treatment includes oral agent (dual therapy) and insulin injections. She is compliant with treatment most of the time. Her weight is stable. She is following a generally healthy diet. When asked about meal planning, she reported none. She has not had a previous visit with a dietitian. She rarely participates in exercise. Her home blood glucose trend is fluctuating minimally. An ACE inhibitor/angiotensin II receptor blocker is being taken. She does not see a podiatrist.Eye exam is current.       Review of Systems   Endocrine: Negative for polydipsia, polyphagia and polyuria.       Past Medical History:   Diagnosis Date   • Bell's palsy    • Cataract    • Colon polyps    • Depression    • Diabetes mellitus     type 2   • E coli infection    • Headache    • Hyperlipidemia    • Hypertension    • Hyperthyroidism    • Hypothyroidism    • Sleep apnea     wears cpap   • Tachycardia    • Visual impairment          Current Outpatient Prescriptions:   •  aspirin 81 MG EC tablet, Take 81 mg by mouth Daily., " Disp: , Rfl:   •  bumetanide (BUMEX) 1 MG tablet, Take 1 tablet by mouth Daily., Disp: 90 tablet, Rfl: 1  •  carbidopa-levodopa (SINEMET)  MG per tablet, TAKE 1-2 TABLETS BY MOUTH AT BEDTIME, Disp: , Rfl: 1  •  cetirizine (ZyrTEC) 10 MG tablet, Take 10 mg by mouth Daily., Disp: , Rfl:   •  Cholecalciferol (VITAMIN D3) 71545 UNITS tablet, Take  by mouth., Disp: , Rfl:   •  clotrimazole-betamethasone (LOTRISONE) 1-0.05 % cream, AS DIRECTED TOPICAL, Disp: , Rfl: 2  •  folic acid (FOLVITE) 400 MCG tablet, Take 400 mcg by mouth Daily., Disp: , Rfl:   •  glyBURIDE-metFORMIN (GLUCOVANCE) 5-500 MG per tablet, TAKE 2 TABLETS BY MOUTH EVERY MORNING, AND 1 TABLET EVERY AFTERNOON OR EVENING AS DIRECTED, Disp: , Rfl: 1  •  insulin detemir (LEVEMIR FLEXTOUCH) 100 UNIT/ML injection, Inject 40 Units under the skin Every Night., Disp: 3 pen, Rfl: 3  •  levothyroxine sodium (TIROSINT) 125 MCG capsule capsule, Take 1 capsule by mouth Daily., Disp: 90 capsule, Rfl: 1  •  lisinopril (PRINIVIL,ZESTRIL) 40 MG tablet, TAKE 1 TABLET BY MOUTH EVERY DAY, Disp: , Rfl: 1  •  metFORMIN (GLUCOPHAGE) 500 MG tablet, Take 500 mg by mouth 2 (Two) Times a Day With Meals., Disp: , Rfl:   •  Multiple Vitamins-Minerals (VISION-KRISTAL PRESERVE PO), Take  by mouth Daily With Breakfast., Disp: , Rfl:   •  NIFEdipine CC (ADALAT CC) 60 MG 24 hr tablet, Take 60 mg by mouth Daily., Disp: , Rfl:   •  oxyCODONE (oxyCONTIN) 10 MG 12 hr tablet, Take 10 mg by mouth Every 4 (Four) Hours As Needed., Disp: , Rfl:   •  PRISTIQ 50 MG 24 hr tablet, TAKE 1 TABLET BY MOUTH EVERY MORNING, Disp: , Rfl: 3  •  ranitidine (ZANTAC) 150 MG tablet, Take 150 mg by mouth 2 (Two) Times a Day., Disp: , Rfl:   •  rosuvastatin (CRESTOR) 10 MG tablet, TAKE 1 TABLET (10 MG) BY ORAL ROUTE ONCE DAILY, Disp: , Rfl: 2  •  traMADol (ULTRAM) 50 MG tablet, TAKE 1 TABLET BY MOUTH 3 TIMES A DAY AS NEEDED FOR PAIN, Disp: , Rfl: 0    No Known Allergies    Patient Active Problem List   Diagnosis  "  • Acquired hypothyroidism   • Type 2 diabetes mellitus without complication   • Essential hypertension   • Recurrent major depressive disorder   • Osteoarthritis of multiple joints   • History of colon polyps   • Diarrhea   • Vomiting       Social History     Social History   • Marital status:      Spouse name: N/A   • Number of children: N/A   • Years of education: N/A     Social History Main Topics   • Smoking status: Former Smoker     Packs/day: 1.00     Years: 20.00     Types: Cigarettes     Quit date:    • Smokeless tobacco: Never Used   • Alcohol use No   • Drug use: No   • Sexual activity: Not Asked     Other Topics Concern   • None     Social History Narrative       Past Surgical History:   Procedure Laterality Date   •  SECTION     • COLONOSCOPY  06/10/2011   • COLONOSCOPY N/A 2016    Procedure: COLONOSCOPY WITH ANESTHESIA;  Surgeon: Kathy Berry MD;  Location: [facility] ENDOSCOPY;  Service:    • ENDOSCOPY N/A 2016    Procedure: ESOPHAGOGASTRODUODENOSCOPY WITH ANESTHESIA;  Surgeon: Kathy Berry MD;  Location: [facility] ENDOSCOPY;  Service:    • HYSTERECTOMY     • KNEE SURGERY Left    • TUMOR REMOVAL         Physical Exam   Constitutional: She is oriented to person, place, and time.   Morbidly obese   Cardiovascular: Normal rate and regular rhythm.    Pulmonary/Chest: Effort normal and breath sounds normal.   Musculoskeletal: She exhibits no edema.   Neurological: She is alert and oriented to person, place, and time.   Psychiatric: She has a normal mood and affect. Her behavior is normal. Judgment and thought content normal.   Vitals reviewed.      Vitals:    17 0824   BP: 124/62   BP Location: Right arm   Patient Position: Sitting   Cuff Size: Adult   Pulse: 89   Temp: 99.1 °F (37.3 °C)   TempSrc: Oral   SpO2: 98%   Weight: 244 lb 9.6 oz (111 kg)   Height: 65\" (165.1 cm)       Maribeth was seen today for follow-up.    Diagnoses and all orders for this visit:    Mixed " hyperlipidemia  -     Comprehensive Metabolic Panel  -     Lipid Panel    Fatigue, unspecified type  -     TSH  -     T4, free  -     CBC & Differential    Hyperglycemia  -     Hemoglobin A1c    Unspecified vitamin D deficiency  -     Vitamin D 25 Hydroxy        Problem List Items Addressed This Visit     None      Visit Diagnoses     Mixed hyperlipidemia    -  Primary    Relevant Orders    Comprehensive Metabolic Panel    Lipid Panel    Fatigue, unspecified type        Relevant Orders    TSH    T4, free    CBC & Differential    Hyperglycemia        Relevant Orders    Hemoglobin A1c    Unspecified vitamin D deficiency        Relevant Orders    Vitamin D 25 Hydroxy                  Yearly lab-complete physical in return                    Jer Mccray MD  6/5/2017

## 2017-06-06 LAB
25(OH)D3+25(OH)D2 SERPL-MCNC: 34.3 NG/ML (ref 30–100)
ALBUMIN SERPL-MCNC: 3.7 G/DL (ref 3.5–5)
ALBUMIN/GLOB SERPL: 1.4 G/DL (ref 1.1–2.5)
ALP SERPL-CCNC: 76 U/L (ref 24–120)
ALT SERPL-CCNC: 30 U/L (ref 0–54)
AST SERPL-CCNC: 26 U/L (ref 7–45)
BASOPHILS # BLD AUTO: 0.06 10*3/MM3 (ref 0–0.2)
BASOPHILS NFR BLD AUTO: 1 % (ref 0–2)
BILIRUB SERPL-MCNC: 1 MG/DL (ref 0.1–1)
BUN SERPL-MCNC: 16 MG/DL (ref 5–21)
BUN/CREAT SERPL: 17.4 (ref 7–25)
CALCIUM SERPL-MCNC: 9.3 MG/DL (ref 8.4–10.4)
CHLORIDE SERPL-SCNC: 102 MMOL/L (ref 98–110)
CHOLEST SERPL-MCNC: 137 MG/DL (ref 130–200)
CO2 SERPL-SCNC: 28 MMOL/L (ref 24–31)
CREAT SERPL-MCNC: 0.92 MG/DL (ref 0.5–1.4)
EOSINOPHIL # BLD AUTO: 0.81 10*3/MM3 (ref 0–0.7)
EOSINOPHIL NFR BLD AUTO: 14 % (ref 0–4)
ERYTHROCYTE [DISTWIDTH] IN BLOOD BY AUTOMATED COUNT: 14.1 % (ref 12–15)
GLOBULIN SER CALC-MCNC: 2.6 GM/DL
GLUCOSE SERPL-MCNC: 128 MG/DL (ref 70–100)
HBA1C MFR BLD: 7.1 %
HCT VFR BLD AUTO: 41.4 % (ref 37–47)
HDLC SERPL-MCNC: 41 MG/DL
HGB BLD-MCNC: 13.2 G/DL (ref 12–16)
IMM GRANULOCYTES # BLD: 0.01 10*3/MM3 (ref 0–0.03)
IMM GRANULOCYTES NFR BLD: 0.2 % (ref 0–5)
LDLC SERPL CALC-MCNC: 54 MG/DL (ref 0–99)
LYMPHOCYTES # BLD AUTO: 2.29 10*3/MM3 (ref 0.72–4.86)
LYMPHOCYTES NFR BLD AUTO: 39.6 % (ref 15–45)
MCH RBC QN AUTO: 26.7 PG (ref 28–32)
MCHC RBC AUTO-ENTMCNC: 31.9 G/DL (ref 33–36)
MCV RBC AUTO: 83.8 FL (ref 82–98)
MONOCYTES # BLD AUTO: 0.53 10*3/MM3 (ref 0.19–1.3)
MONOCYTES NFR BLD AUTO: 9.2 % (ref 4–12)
NEUTROPHILS # BLD AUTO: 2.09 10*3/MM3 (ref 1.87–8.4)
NEUTROPHILS NFR BLD AUTO: 36 % (ref 39–78)
PLATELET # BLD AUTO: 263 10*3/MM3 (ref 130–400)
POTASSIUM SERPL-SCNC: 4.9 MMOL/L (ref 3.5–5.3)
PROT SERPL-MCNC: 6.3 G/DL (ref 6.3–8.7)
RBC # BLD AUTO: 4.94 10*6/MM3 (ref 4.2–5.4)
SODIUM SERPL-SCNC: 140 MMOL/L (ref 135–145)
T4 FREE SERPL-MCNC: 1.41 NG/DL (ref 0.78–2.19)
TRIGL SERPL-MCNC: 210 MG/DL (ref 0–149)
TSH SERPL DL<=0.005 MIU/L-ACNC: 0.55 MIU/ML (ref 0.47–4.68)
VLDLC SERPL CALC-MCNC: 42 MG/DL
WBC # BLD AUTO: 5.79 10*3/MM3 (ref 4.8–10.8)

## 2017-06-09 RX ORDER — CARBIDOPA/LEVODOPA 25MG-250MG
TABLET ORAL
Qty: 180 TABLET | Refills: 1 | Status: SHIPPED | OUTPATIENT
Start: 2017-06-09 | End: 2017-12-07 | Stop reason: SDUPTHER

## 2017-07-24 DIAGNOSIS — Z79.4 TYPE 2 DIABETES MELLITUS WITHOUT COMPLICATION, WITH LONG-TERM CURRENT USE OF INSULIN (HCC): Primary | ICD-10-CM

## 2017-07-24 DIAGNOSIS — E11.9 TYPE 2 DIABETES MELLITUS WITHOUT COMPLICATION, WITH LONG-TERM CURRENT USE OF INSULIN (HCC): Primary | ICD-10-CM

## 2017-08-04 RX ORDER — LEVOTHYROXINE SODIUM 0.12 MG/1
125 TABLET ORAL DAILY
Qty: 90 TABLET | Refills: 1 | Status: SHIPPED | OUTPATIENT
Start: 2017-08-04 | End: 2018-01-26 | Stop reason: SDUPTHER

## 2017-08-04 RX ORDER — NIFEDIPINE 60 MG/1
60 TABLET, FILM COATED, EXTENDED RELEASE ORAL DAILY
Qty: 90 TABLET | Refills: 1 | Status: SHIPPED | OUTPATIENT
Start: 2017-08-04 | End: 2018-01-31 | Stop reason: SDUPTHER

## 2017-08-04 NOTE — TELEPHONE ENCOUNTER
PT IS REQUESTING 90 DAYS SUPPLY     NIFEDIPINE ER 60MG (1) QD    [facility]    SHE PICKED UP 30 DAYS SUPPLY THIS AM BUT WANTS QUANTITY CHANGED.

## 2017-08-28 RX ORDER — LISINOPRIL 40 MG/1
40 TABLET ORAL DAILY
Qty: 90 TABLET | Refills: 1 | Status: SHIPPED | OUTPATIENT
Start: 2017-08-28 | End: 2018-02-22 | Stop reason: SDUPTHER

## 2017-08-29 RX ORDER — DESVENLAFAXINE SUCCINATE 50 MG/1
50 TABLET, EXTENDED RELEASE ORAL DAILY
Qty: 90 TABLET | Refills: 3 | Status: SHIPPED | OUTPATIENT
Start: 2017-08-29 | End: 2018-08-20 | Stop reason: SDUPTHER

## 2017-10-12 ENCOUNTER — OFFICE VISIT (OUTPATIENT)
Dept: FAMILY MEDICINE CLINIC | Facility: CLINIC | Age: 62
End: 2017-10-12

## 2017-10-12 VITALS
HEIGHT: 65 IN | WEIGHT: 246.8 LBS | DIASTOLIC BLOOD PRESSURE: 68 MMHG | TEMPERATURE: 98.5 F | BODY MASS INDEX: 41.12 KG/M2 | SYSTOLIC BLOOD PRESSURE: 120 MMHG | OXYGEN SATURATION: 98 % | HEART RATE: 90 BPM

## 2017-10-12 DIAGNOSIS — Z23 NEED FOR INFLUENZA VACCINATION: ICD-10-CM

## 2017-10-12 DIAGNOSIS — E78.2 MIXED HYPERLIPIDEMIA: Primary | ICD-10-CM

## 2017-10-12 DIAGNOSIS — R73.9 HYPERGLYCEMIA: ICD-10-CM

## 2017-10-12 LAB
ALBUMIN SERPL-MCNC: 4.1 G/DL (ref 3.5–5)
ALBUMIN/GLOB SERPL: 1.6 G/DL (ref 1.1–2.5)
ALP SERPL-CCNC: 84 U/L (ref 24–120)
ALT SERPL-CCNC: 34 U/L (ref 0–54)
AST SERPL-CCNC: 32 U/L (ref 7–45)
BILIRUB SERPL-MCNC: 0.6 MG/DL (ref 0.1–1)
BUN SERPL-MCNC: 11 MG/DL (ref 5–21)
BUN/CREAT SERPL: 12.1 (ref 7–25)
CALCIUM SERPL-MCNC: 9.6 MG/DL (ref 8.4–10.4)
CHLORIDE SERPL-SCNC: 101 MMOL/L (ref 98–110)
CHOLEST SERPL-MCNC: 156 MG/DL (ref 130–200)
CO2 SERPL-SCNC: 29 MMOL/L (ref 24–31)
CREAT SERPL-MCNC: 0.91 MG/DL (ref 0.5–1.4)
GLOBULIN SER CALC-MCNC: 2.6 GM/DL
GLUCOSE SERPL-MCNC: 158 MG/DL (ref 70–100)
HBA1C MFR BLD: 7.2 %
HDLC SERPL-MCNC: 44 MG/DL
LDLC SERPL CALC-MCNC: 73 MG/DL (ref 0–99)
POTASSIUM SERPL-SCNC: 4.4 MMOL/L (ref 3.5–5.3)
PROT SERPL-MCNC: 6.7 G/DL (ref 6.3–8.7)
SODIUM SERPL-SCNC: 141 MMOL/L (ref 135–145)
TRIGL SERPL-MCNC: 197 MG/DL (ref 0–149)
VLDLC SERPL CALC-MCNC: 39.4 MG/DL

## 2017-10-12 PROCEDURE — G0008 ADMIN INFLUENZA VIRUS VAC: HCPCS | Performed by: FAMILY MEDICINE

## 2017-10-12 PROCEDURE — 99213 OFFICE O/P EST LOW 20 MIN: CPT | Performed by: FAMILY MEDICINE

## 2017-10-12 PROCEDURE — 90630 INFLUENZA VAC INTRADERMAL QUADRIVALENT: CPT | Performed by: FAMILY MEDICINE

## 2017-10-12 NOTE — PROGRESS NOTES
Subjective   Maribeth Isbell is a 62 y.o. female.     Hyperlipidemia   This is a chronic problem. The current episode started more than 1 year ago. The problem is uncontrolled. Recent lipid tests were reviewed and are high. Exacerbating diseases include diabetes and obesity. Pertinent negatives include no chest pain or myalgias. Current antihyperlipidemic treatment includes statins. The current treatment provides mild improvement of lipids. Compliance problems include adherence to diet and adherence to exercise.  Risk factors for coronary artery disease include diabetes mellitus, dyslipidemia, family history, obesity and post-menopausal.       The following portions of the patient's history were reviewed and updated as appropriate: allergies, current medications, past family history, past medical history, past social history, past surgical history and problem list.    Review of Systems   Cardiovascular: Negative for chest pain.   Musculoskeletal: Negative for myalgias.       Objective   Physical Exam   Constitutional: She is oriented to person, place, and time.   Morbidly obese   Cardiovascular: Normal rate and regular rhythm.    Pulmonary/Chest: Effort normal and breath sounds normal.   Neurological: She is alert and oriented to person, place, and time.   Psychiatric: She has a normal mood and affect. Her behavior is normal.   Vitals reviewed.      Assessment/Plan   Maribeth was seen today for follow-up.    Diagnoses and all orders for this visit:    Mixed hyperlipidemia  -     Comprehensive Metabolic Panel  -     Lipid Panel    Hyperglycemia  -     Hemoglobin A1c           Plan above plus flu shot

## 2017-12-07 RX ORDER — CARBIDOPA/LEVODOPA 25MG-250MG
TABLET ORAL
Qty: 180 TABLET | Refills: 1 | Status: SHIPPED | OUTPATIENT
Start: 2017-12-07 | End: 2018-06-01 | Stop reason: SDUPTHER

## 2017-12-08 RX ORDER — BUMETANIDE 1 MG/1
TABLET ORAL
Qty: 90 TABLET | Refills: 1 | Status: SHIPPED | OUTPATIENT
Start: 2017-12-08 | End: 2018-06-01 | Stop reason: SDUPTHER

## 2017-12-27 ENCOUNTER — OFFICE VISIT (OUTPATIENT)
Dept: FAMILY MEDICINE CLINIC | Facility: CLINIC | Age: 62
End: 2017-12-27

## 2017-12-27 VITALS
HEART RATE: 89 BPM | WEIGHT: 247.6 LBS | OXYGEN SATURATION: 98 % | DIASTOLIC BLOOD PRESSURE: 88 MMHG | TEMPERATURE: 98 F | BODY MASS INDEX: 41.25 KG/M2 | HEIGHT: 65 IN | SYSTOLIC BLOOD PRESSURE: 140 MMHG

## 2017-12-27 DIAGNOSIS — L03.012 CELLULITIS OF FINGER OF LEFT HAND: Primary | ICD-10-CM

## 2017-12-27 PROCEDURE — 99212 OFFICE O/P EST SF 10 MIN: CPT | Performed by: FAMILY MEDICINE

## 2017-12-27 RX ORDER — DOXYCYCLINE HYCLATE 100 MG/1
CAPSULE ORAL
Refills: 0 | COMMUNITY
Start: 2017-12-24 | End: 2018-01-02 | Stop reason: SDUPTHER

## 2017-12-27 NOTE — PROGRESS NOTES
Subjective   Maribeth Isbell is a 62 y.o. female.     Upper Extremity Issue   This is a new problem. The current episode started in the past 7 days. The problem occurs daily. The problem has been gradually improving. Associated symptoms comments: Cat bite left ring finger-cellulitis improving. She has tried heat (Antibiotics) for the symptoms. The treatment provided moderate relief.       The following portions of the patient's history were reviewed and updated as appropriate: allergies, current medications, past family history, past medical history, past social history, past surgical history and problem list.    Review of Systems   Musculoskeletal:        Left ring finger pain secondary to cat bite       Objective   Physical Exam   Constitutional: She is oriented to person, place, and time.   Morbidly obese   Neurological: She is alert and oriented to person, place, and time.   Skin:   Resolving cellulitis left ring finger-major improvement historically   Psychiatric: She has a normal mood and affect. Her behavior is normal.   Nursing note and vitals reviewed.      Assessment/Plan   Maribeth was seen today for follow-up.    Diagnoses and all orders for this visit:    Cellulitis of finger of left hand              plan continue doxycycline-may need a weeks more-call after therapy completed

## 2018-01-02 RX ORDER — DOXYCYCLINE HYCLATE 100 MG/1
100 CAPSULE ORAL 2 TIMES DAILY
Qty: 20 CAPSULE | Refills: 0 | Status: SHIPPED | OUTPATIENT
Start: 2018-01-02 | End: 2018-04-18

## 2018-01-02 NOTE — TELEPHONE ENCOUNTER
PT WAS ADVISED TO CONTINUE MED THERAPY & RX TO BE SENT TO PHARMACY THIS DATE. VOICED UNDERSTANDING.

## 2018-01-02 NOTE — TELEPHONE ENCOUNTER
LEFT RING FINGER INFECTION SOME BETTER-WILL TAKE LAST ANTIBIOTIC TOMORROW-SKIN IS FLUFFING OFF.    CAN YOU SEND IN RX?    DOXYCYCLINE HYCLATE 100MG (1)BID IS CURRENT MED THERAPY.       [facility]

## 2018-01-02 NOTE — TELEPHONE ENCOUNTER
MD CONTACTED OFFICE-ADVISED MESSAGE. PER MD PT TO CONTINUE MED THERAPY 10 MORE DAYS OF DOXYCYCLINE.    RX  SENT TO [facility].

## 2018-01-22 RX ORDER — GLYBURIDE-METFORMIN HYDROCHLORIDE 5; 500 MG/1; MG/1
TABLET ORAL
Qty: 270 TABLET | Refills: 0 | Status: SHIPPED | OUTPATIENT
Start: 2018-01-22 | End: 2018-04-20 | Stop reason: SDUPTHER

## 2018-01-26 RX ORDER — LEVOTHYROXINE SODIUM 0.12 MG/1
TABLET ORAL
Qty: 90 TABLET | Refills: 0 | Status: SHIPPED | OUTPATIENT
Start: 2018-01-26 | End: 2018-04-26 | Stop reason: SDUPTHER

## 2018-01-31 RX ORDER — NIFEDIPINE 60 MG/1
TABLET, FILM COATED, EXTENDED RELEASE ORAL
Qty: 90 TABLET | Refills: 0 | Status: SHIPPED | OUTPATIENT
Start: 2018-01-31 | End: 2018-02-05 | Stop reason: SDUPTHER

## 2018-02-05 RX ORDER — NIFEDIPINE 60 MG/1
TABLET, FILM COATED, EXTENDED RELEASE ORAL
Qty: 90 TABLET | Refills: 0 | Status: SHIPPED | OUTPATIENT
Start: 2018-02-05 | End: 2019-05-31 | Stop reason: SDUPTHER

## 2018-02-22 RX ORDER — LISINOPRIL 40 MG/1
TABLET ORAL
Qty: 30 TABLET | Refills: 0 | Status: SHIPPED | OUTPATIENT
Start: 2018-02-22 | End: 2018-03-25 | Stop reason: SDUPTHER

## 2018-02-22 NOTE — TELEPHONE ENCOUNTER
Left message advising patient that a 30 day supply of requested medication will be sent in however she needs to contact office and schedule a physical.  Physical is due 3/2/18.

## 2018-03-26 RX ORDER — LISINOPRIL 40 MG/1
TABLET ORAL
Qty: 30 TABLET | Refills: 0 | Status: SHIPPED | OUTPATIENT
Start: 2018-03-26 | End: 2018-04-21 | Stop reason: SDUPTHER

## 2018-04-15 DIAGNOSIS — E11.9 TYPE 2 DIABETES MELLITUS WITHOUT COMPLICATION, WITH LONG-TERM CURRENT USE OF INSULIN (HCC): ICD-10-CM

## 2018-04-15 DIAGNOSIS — Z79.4 TYPE 2 DIABETES MELLITUS WITHOUT COMPLICATION, WITH LONG-TERM CURRENT USE OF INSULIN (HCC): ICD-10-CM

## 2018-04-16 RX ORDER — FLURBIPROFEN SODIUM 0.3 MG/ML
SOLUTION/ DROPS OPHTHALMIC
Qty: 100 EACH | Refills: 2 | Status: SHIPPED | OUTPATIENT
Start: 2018-04-16 | End: 2018-09-17

## 2018-04-18 ENCOUNTER — OFFICE VISIT (OUTPATIENT)
Dept: FAMILY MEDICINE CLINIC | Facility: CLINIC | Age: 63
End: 2018-04-18

## 2018-04-18 VITALS
HEIGHT: 65 IN | SYSTOLIC BLOOD PRESSURE: 126 MMHG | HEART RATE: 90 BPM | DIASTOLIC BLOOD PRESSURE: 70 MMHG | BODY MASS INDEX: 42.69 KG/M2 | TEMPERATURE: 98.5 F | OXYGEN SATURATION: 98 % | WEIGHT: 256.2 LBS

## 2018-04-18 DIAGNOSIS — R73.9 HYPERGLYCEMIA: ICD-10-CM

## 2018-04-18 DIAGNOSIS — E55.9 MILD VITAMIN D DEFICIENCY: ICD-10-CM

## 2018-04-18 DIAGNOSIS — Z00.00 ANNUAL PHYSICAL EXAM: ICD-10-CM

## 2018-04-18 DIAGNOSIS — N95.1 MENOPAUSAL STATE: ICD-10-CM

## 2018-04-18 DIAGNOSIS — Z00.00 ROUTINE GENERAL MEDICAL EXAMINATION AT A HEALTH CARE FACILITY: Primary | ICD-10-CM

## 2018-04-18 DIAGNOSIS — Z12.12 SCREENING FOR MALIGNANT NEOPLASM OF THE RECTUM: ICD-10-CM

## 2018-04-18 DIAGNOSIS — Z12.39 BREAST SCREENING: ICD-10-CM

## 2018-04-18 LAB
BILIRUB BLD-MCNC: ABNORMAL MG/DL
CLARITY, POC: CLEAR
COLOR UR: YELLOW
GLUCOSE UR STRIP-MCNC: ABNORMAL MG/DL
KETONES UR QL: ABNORMAL
LEUKOCYTE EST, POC: NEGATIVE
NITRITE UR-MCNC: NEGATIVE MG/ML
PH UR: 6 [PH] (ref 5–8)
PROT UR STRIP-MCNC: ABNORMAL MG/DL
RBC # UR STRIP: NEGATIVE /UL
SP GR UR: 1.02 (ref 1–1.03)
UROBILINOGEN UR QL: NORMAL

## 2018-04-18 PROCEDURE — 81003 URINALYSIS AUTO W/O SCOPE: CPT | Performed by: FAMILY MEDICINE

## 2018-04-18 PROCEDURE — 99396 PREV VISIT EST AGE 40-64: CPT | Performed by: FAMILY MEDICINE

## 2018-04-18 RX ORDER — LAMOTRIGINE 25 MG/1
TABLET ORAL
Qty: 90 TABLET | Refills: 2 | Status: SHIPPED | OUTPATIENT
Start: 2018-04-18 | End: 2018-05-18 | Stop reason: SDUPTHER

## 2018-04-18 NOTE — PROGRESS NOTES
Emiliano Isbell is a 62 y.o. female.     Hypertension   This is a chronic problem. The current episode started more than 1 year ago. The problem has been resolved since onset. The problem is controlled. Associated symptoms include anxiety and malaise/fatigue. Pertinent negatives include no chest pain. There are no associated agents to hypertension. Risk factors for coronary artery disease include diabetes mellitus, dyslipidemia, obesity, post-menopausal state and sedentary lifestyle. Past treatments include ACE inhibitors, diuretics and calcium channel blockers. Current antihypertension treatment includes ACE inhibitors, calcium channel blockers and diuretics. The current treatment provides significant improvement. Compliance problems include exercise and diet.         The following portions of the patient's history were reviewed and updated as appropriate: allergies, current medications, past family history, past medical history, past social history, past surgical history and problem list.    Review of Systems   Constitutional: Positive for malaise/fatigue.   Eyes:        Advised she needs eye exam   Cardiovascular: Negative for chest pain and leg swelling.   Endocrine: Negative for polydipsia, polyphagia and polyuria.   Musculoskeletal: Negative for arthralgias and myalgias.   Psychiatric/Behavioral:        Mood is down-had anesthesia 6 weeks ago   All other systems reviewed and are negative.      Objective   Physical Exam   Constitutional: She is oriented to person, place, and time.   Morbid obesity   HENT:   Right Ear: External ear normal.   Left Ear: External ear normal.   Mouth/Throat: Oropharynx is clear and moist.   Eyes: EOM are normal. Pupils are equal, round, and reactive to light.   Neck: No thyromegaly present.   Good carotid pulses   Cardiovascular: Normal rate and regular rhythm.    Pulmonary/Chest: Effort normal and breath sounds normal.   No masses in breast noted   Abdominal: Soft.  Bowel sounds are normal. She exhibits no mass.   Musculoskeletal: She exhibits no edema.    Maribeth had a diabetic foot exam performed today.   During the foot exam she had a monofilament test not performed.  Vascular Status -  Her right foot exhibits abnormal foot vasculature . Her right foot exhibits no edema. Her left foot exhibits abnormal foot vasculature . Her left foot exhibits no edema.  Skin Integrity  -  Her right foot skin is not intact.  Her left foot skin is not intact..   Foot Structure and Biomechanics -  Her right foot has no hallux valgus, no pes cavus, no claw toes and no hammer toes present. Her left foot has no hallux valgus, no pes cavus, no claw toes and no hammer toes.  Lymphadenopathy:     She has no cervical adenopathy.   Neurological: She is alert and oriented to person, place, and time.   Skin: Skin is warm and dry. Capillary refill takes less than 2 seconds.   Psychiatric: Her behavior is normal. Judgment and thought content normal.   Mood is down   Nursing note and vitals reviewed.      Assessment/Plan   Problems Addressed this Visit     None      Visit Diagnoses     Routine general medical examination at a health care facility    -  Primary    Relevant Orders    TSH    T4, free    CBC & Differential    Comprehensive Metabolic Panel    Lipid Panel    Hepatitis C Antibody    Mild vitamin D deficiency        Relevant Orders    Vitamin D 25 Hydroxy    Hyperglycemia        Relevant Orders    Hemoglobin A1c    Annual physical exam                Plan-above-add Lamictal-return 1 month-go from 25-75 mg

## 2018-04-19 LAB
25(OH)D3+25(OH)D2 SERPL-MCNC: 32.3 NG/ML (ref 30–100)
ALBUMIN SERPL-MCNC: 4.3 G/DL (ref 3.5–5)
ALBUMIN/GLOB SERPL: 1.5 G/DL (ref 1.1–2.5)
ALP SERPL-CCNC: 92 U/L (ref 24–120)
ALT SERPL-CCNC: 34 U/L (ref 0–54)
AST SERPL-CCNC: 34 U/L (ref 7–45)
BASOPHILS # BLD AUTO: 0.06 10*3/MM3 (ref 0–0.2)
BASOPHILS NFR BLD AUTO: 0.8 % (ref 0–2)
BILIRUB SERPL-MCNC: 0.7 MG/DL (ref 0.1–1)
BUN SERPL-MCNC: 12 MG/DL (ref 5–21)
BUN/CREAT SERPL: 15.4 (ref 7–25)
CALCIUM SERPL-MCNC: 9.2 MG/DL (ref 8.4–10.4)
CHLORIDE SERPL-SCNC: 97 MMOL/L (ref 98–110)
CHOLEST SERPL-MCNC: 176 MG/DL (ref 130–200)
CO2 SERPL-SCNC: 25 MMOL/L (ref 24–31)
CREAT SERPL-MCNC: 0.78 MG/DL (ref 0.5–1.4)
EOSINOPHIL # BLD AUTO: 0.34 10*3/MM3 (ref 0–0.7)
EOSINOPHIL NFR BLD AUTO: 4.8 % (ref 0–4)
ERYTHROCYTE [DISTWIDTH] IN BLOOD BY AUTOMATED COUNT: 13.2 % (ref 12–15)
GFR SERPLBLD CREATININE-BSD FMLA CKD-EPI: 75 ML/MIN/1.73
GFR SERPLBLD CREATININE-BSD FMLA CKD-EPI: 91 ML/MIN/1.73
GLOBULIN SER CALC-MCNC: 2.8 GM/DL
GLUCOSE SERPL-MCNC: 217 MG/DL (ref 70–100)
HBA1C MFR BLD: 10.8 %
HCT VFR BLD AUTO: 46.9 % (ref 37–47)
HCV AB S/CO SERPL IA: <0.1 S/CO RATIO (ref 0–0.9)
HDLC SERPL-MCNC: 51 MG/DL
HGB BLD-MCNC: 14.9 G/DL (ref 12–16)
IMM GRANULOCYTES # BLD: 0.07 10*3/MM3 (ref 0–0.03)
IMM GRANULOCYTES NFR BLD: 1 % (ref 0–5)
LDLC SERPL CALC-MCNC: 82 MG/DL (ref 0–99)
LYMPHOCYTES # BLD AUTO: 2.65 10*3/MM3 (ref 0.72–4.86)
LYMPHOCYTES NFR BLD AUTO: 37.2 % (ref 15–45)
MCH RBC QN AUTO: 27.1 PG (ref 28–32)
MCHC RBC AUTO-ENTMCNC: 31.8 G/DL (ref 33–36)
MCV RBC AUTO: 85.3 FL (ref 82–98)
MICROALBUMIN UR-MCNC: 106 UG/ML
MONOCYTES # BLD AUTO: 0.46 10*3/MM3 (ref 0.19–1.3)
MONOCYTES NFR BLD AUTO: 6.5 % (ref 4–12)
NEUTROPHILS # BLD AUTO: 3.54 10*3/MM3 (ref 1.87–8.4)
NEUTROPHILS NFR BLD AUTO: 49.7 % (ref 39–78)
NRBC BLD AUTO-RTO: 0 /100 WBC (ref 0–0)
PLATELET # BLD AUTO: 324 10*3/MM3 (ref 130–400)
POTASSIUM SERPL-SCNC: 3.8 MMOL/L (ref 3.5–5.3)
PROT SERPL-MCNC: 7.1 G/DL (ref 6.3–8.7)
RBC # BLD AUTO: 5.5 10*6/MM3 (ref 4.2–5.4)
SODIUM SERPL-SCNC: 137 MMOL/L (ref 135–145)
T4 FREE SERPL-MCNC: 1.89 NG/DL (ref 0.78–2.19)
TRIGL SERPL-MCNC: 214 MG/DL (ref 0–149)
TSH SERPL DL<=0.005 MIU/L-ACNC: 4.71 MIU/ML (ref 0.47–4.68)
VLDLC SERPL CALC-MCNC: 42.8 MG/DL
WBC # BLD AUTO: 7.12 10*3/MM3 (ref 4.8–10.8)

## 2018-04-20 RX ORDER — GLYBURIDE-METFORMIN HYDROCHLORIDE 5; 500 MG/1; MG/1
2 TABLET ORAL 2 TIMES DAILY WITH MEALS
Qty: 360 TABLET | Refills: 1 | Status: SHIPPED | OUTPATIENT
Start: 2018-04-20 | End: 2018-08-16

## 2018-04-23 RX ORDER — INSULIN DETEMIR 100 [IU]/ML
40 INJECTION, SOLUTION SUBCUTANEOUS NIGHTLY
Qty: 27 PEN | Refills: 3 | Status: SHIPPED | OUTPATIENT
Start: 2018-04-23 | End: 2018-06-25 | Stop reason: SDUPTHER

## 2018-04-23 RX ORDER — LISINOPRIL 40 MG/1
TABLET ORAL
Qty: 90 TABLET | Refills: 3 | Status: SHIPPED | OUTPATIENT
Start: 2018-04-23 | End: 2018-06-05 | Stop reason: SDUPTHER

## 2018-04-25 DIAGNOSIS — Z12.12 SCREENING FOR MALIGNANT NEOPLASM OF THE RECTUM: ICD-10-CM

## 2018-04-26 LAB — HEMOCCULT STL QL IA: POSITIVE

## 2018-04-26 RX ORDER — LEVOTHYROXINE SODIUM 0.12 MG/1
TABLET ORAL
Qty: 90 TABLET | Refills: 3 | Status: SHIPPED | OUTPATIENT
Start: 2018-04-26 | End: 2018-05-01 | Stop reason: SDUPTHER

## 2018-04-30 DIAGNOSIS — Z12.39 BREAST SCREENING: ICD-10-CM

## 2018-04-30 DIAGNOSIS — N95.1 MENOPAUSAL STATE: ICD-10-CM

## 2018-05-01 RX ORDER — LEVOTHYROXINE SODIUM 0.12 MG/1
125 TABLET ORAL DAILY
Qty: 90 TABLET | Refills: 3 | Status: SHIPPED | OUTPATIENT
Start: 2018-05-01 | End: 2018-10-22 | Stop reason: SDUPTHER

## 2018-05-03 RX ORDER — NIFEDIPINE 60 MG/1
TABLET, FILM COATED, EXTENDED RELEASE ORAL
Qty: 90 TABLET | Refills: 3 | Status: SHIPPED | OUTPATIENT
Start: 2018-05-03 | End: 2018-05-18 | Stop reason: SDUPTHER

## 2018-05-18 ENCOUNTER — OFFICE VISIT (OUTPATIENT)
Dept: FAMILY MEDICINE CLINIC | Facility: CLINIC | Age: 63
End: 2018-05-18

## 2018-05-18 VITALS
TEMPERATURE: 98.7 F | HEIGHT: 65 IN | WEIGHT: 254.6 LBS | OXYGEN SATURATION: 92 % | BODY MASS INDEX: 42.42 KG/M2 | DIASTOLIC BLOOD PRESSURE: 74 MMHG | HEART RATE: 93 BPM | SYSTOLIC BLOOD PRESSURE: 116 MMHG

## 2018-05-18 DIAGNOSIS — R10.13 EPIGASTRIC PAIN: Primary | ICD-10-CM

## 2018-05-18 DIAGNOSIS — F33.9 RECURRENT MAJOR DEPRESSIVE DISORDER, REMISSION STATUS UNSPECIFIED (HCC): ICD-10-CM

## 2018-05-18 PROCEDURE — 99213 OFFICE O/P EST LOW 20 MIN: CPT | Performed by: FAMILY MEDICINE

## 2018-05-18 RX ORDER — LAMOTRIGINE 100 MG/1
TABLET ORAL
Qty: 90 TABLET | Refills: 3 | Status: SHIPPED | OUTPATIENT
Start: 2018-05-18 | End: 2018-09-17 | Stop reason: SDUPTHER

## 2018-05-18 NOTE — PROGRESS NOTES
Subjective   Maribeth Isbell is a 62 y.o. female.     Abdominal Pain   This is a recurrent problem. The current episode started yesterday. The onset quality is sudden. The problem occurs every several days. The problem has been gradually worsening. The pain is located in the epigastric region. The pain is moderate. The quality of the pain is colicky. The abdominal pain radiates to the epigastric region. Associated symptoms include vomiting. The pain is aggravated by certain positions. The pain is relieved by nothing. She has tried nothing for the symptoms.       The following portions of the patient's history were reviewed and updated as appropriate: allergies, current medications, past family history, past medical history, past social history, past surgical history and problem list.    Review of Systems   Gastrointestinal: Positive for abdominal pain and vomiting.        Endoscopy by Dr. Nunes showed normal esophagus stomach etc.   Endocrine:        Finger stick sugars better-in 2 months needs A1c and BMP   Psychiatric/Behavioral:        Lability of emotions better-increase Lamictal 200 mg daily at bedtime       Objective   Physical Exam   Constitutional: She is oriented to person, place, and time.   Morbidly obese   Cardiovascular: Normal rate and regular rhythm.    Pulmonary/Chest: Effort normal and breath sounds normal.   Abdominal: There is tenderness.   Neurological: She is alert and oriented to person, place, and time.   Psychiatric:   Mood is still labile   Nursing note and vitals reviewed.      Assessment/Plan   Maribeth was seen today for follow-up.    Diagnoses and all orders for this visit:    Epigastric pain  -     CBC & Differential  -     Comprehensive Metabolic Panel  -     Amylase  -     Lipase    Recurrent major depressive disorder, remission status unspecified    Other orders  -     lamoTRIgine (LaMICtal) 100 MG tablet; 1 daily at bedtime ×1 week then 2 daily at bedtime times one week then 3  daily at bedtime         Plan above plus discussed possibility of second opinion for electroshock therapy

## 2018-05-19 LAB
ALBUMIN SERPL-MCNC: 4.1 G/DL (ref 3.5–5)
ALBUMIN/GLOB SERPL: 1.6 G/DL (ref 1.1–2.5)
ALP SERPL-CCNC: 77 U/L (ref 24–120)
ALT SERPL-CCNC: 24 U/L (ref 0–54)
AMYLASE SERPL-CCNC: 47 U/L (ref 30–110)
AST SERPL-CCNC: 29 U/L (ref 7–45)
BASOPHILS # BLD AUTO: 0.1 10*3/MM3 (ref 0–0.2)
BASOPHILS NFR BLD AUTO: 1.3 % (ref 0–2)
BILIRUB SERPL-MCNC: 0.4 MG/DL (ref 0.1–1)
BUN SERPL-MCNC: 16 MG/DL (ref 5–21)
BUN/CREAT SERPL: 16.3 (ref 7–25)
CALCIUM SERPL-MCNC: 9.8 MG/DL (ref 8.4–10.4)
CHLORIDE SERPL-SCNC: 101 MMOL/L (ref 98–110)
CO2 SERPL-SCNC: 26 MMOL/L (ref 24–31)
CREAT SERPL-MCNC: 0.98 MG/DL (ref 0.5–1.4)
EOSINOPHIL # BLD AUTO: 0.69 10*3/MM3 (ref 0–0.7)
EOSINOPHIL NFR BLD AUTO: 9.2 % (ref 0–4)
ERYTHROCYTE [DISTWIDTH] IN BLOOD BY AUTOMATED COUNT: 13.7 % (ref 12–15)
GFR SERPLBLD CREATININE-BSD FMLA CKD-EPI: 58 ML/MIN/1.73
GFR SERPLBLD CREATININE-BSD FMLA CKD-EPI: 70 ML/MIN/1.73
GLOBULIN SER CALC-MCNC: 2.6 GM/DL
GLUCOSE SERPL-MCNC: 211 MG/DL (ref 70–100)
HCT VFR BLD AUTO: 46.8 % (ref 37–47)
HGB BLD-MCNC: 14.6 G/DL (ref 12–16)
IMM GRANULOCYTES # BLD: 0.04 10*3/MM3 (ref 0–0.03)
IMM GRANULOCYTES NFR BLD: 0.5 % (ref 0–5)
LIPASE SERPL-CCNC: 145 U/L (ref 23–203)
LYMPHOCYTES # BLD AUTO: 2.98 10*3/MM3 (ref 0.72–4.86)
LYMPHOCYTES NFR BLD AUTO: 39.8 % (ref 15–45)
MCH RBC QN AUTO: 27.3 PG (ref 28–32)
MCHC RBC AUTO-ENTMCNC: 31.2 G/DL (ref 33–36)
MCV RBC AUTO: 87.5 FL (ref 82–98)
MONOCYTES # BLD AUTO: 0.63 10*3/MM3 (ref 0.19–1.3)
MONOCYTES NFR BLD AUTO: 8.4 % (ref 4–12)
NEUTROPHILS # BLD AUTO: 3.04 10*3/MM3 (ref 1.87–8.4)
NEUTROPHILS NFR BLD AUTO: 40.8 % (ref 39–78)
NRBC BLD AUTO-RTO: 0 /100 WBC (ref 0–0)
PLATELET # BLD AUTO: 336 10*3/MM3 (ref 130–400)
POTASSIUM SERPL-SCNC: 4.7 MMOL/L (ref 3.5–5.3)
PROT SERPL-MCNC: 6.7 G/DL (ref 6.3–8.7)
RBC # BLD AUTO: 5.35 10*6/MM3 (ref 4.2–5.4)
SODIUM SERPL-SCNC: 141 MMOL/L (ref 135–145)
WBC # BLD AUTO: 7.48 10*3/MM3 (ref 4.8–10.8)

## 2018-05-21 RX ORDER — LISINOPRIL 40 MG/1
TABLET ORAL
Qty: 90 TABLET | Refills: 3 | Status: SHIPPED | OUTPATIENT
Start: 2018-05-21 | End: 2019-05-31 | Stop reason: SDUPTHER

## 2018-05-22 DIAGNOSIS — R10.13 EPIGASTRIC PAIN: ICD-10-CM

## 2018-05-30 ENCOUNTER — TELEPHONE (OUTPATIENT)
Dept: FAMILY MEDICINE CLINIC | Facility: CLINIC | Age: 63
End: 2018-05-30

## 2018-06-01 RX ORDER — BUMETANIDE 1 MG/1
TABLET ORAL
Qty: 90 TABLET | Refills: 3 | Status: SHIPPED | OUTPATIENT
Start: 2018-06-01 | End: 2019-05-23 | Stop reason: SDUPTHER

## 2018-06-01 RX ORDER — CARBIDOPA/LEVODOPA 25MG-250MG
TABLET ORAL
Qty: 180 TABLET | Refills: 3 | Status: SHIPPED | OUTPATIENT
Start: 2018-06-01 | End: 2019-05-08 | Stop reason: SDUPTHER

## 2018-06-05 ENCOUNTER — OFFICE VISIT (OUTPATIENT)
Dept: FAMILY MEDICINE CLINIC | Facility: CLINIC | Age: 63
End: 2018-06-05

## 2018-06-05 VITALS
HEIGHT: 65 IN | DIASTOLIC BLOOD PRESSURE: 68 MMHG | OXYGEN SATURATION: 98 % | HEART RATE: 76 BPM | TEMPERATURE: 98 F | WEIGHT: 246 LBS | SYSTOLIC BLOOD PRESSURE: 114 MMHG | BODY MASS INDEX: 40.98 KG/M2

## 2018-06-05 DIAGNOSIS — F41.9 ANXIETY: Primary | ICD-10-CM

## 2018-06-05 PROCEDURE — 99213 OFFICE O/P EST LOW 20 MIN: CPT | Performed by: FAMILY MEDICINE

## 2018-06-05 NOTE — PROGRESS NOTES
Subjective   Maribeth Isbell is a 62 y.o. female.     Anxiety   Presents for follow-up visit. Symptoms include depressed mood, nervous/anxious behavior and obsessions. Patient reports no suicidal ideas. Symptoms occur constantly. The severity of symptoms is moderate. The quality of sleep is fair. Nighttime awakenings: occasional.     Compliance with medications is %. Side effects of treatment include GI discomfort.       The following portions of the patient's history were reviewed and updated as appropriate: allergies, current medications, past family history, past medical history, past social history, past surgical history and problem list.    Review of Systems   Gastrointestinal: Positive for diarrhea and vomiting.        Patient has history of Escherichia coli infection-documented by endoscopy to 2 years ago in [place]-sent out for culture sensitivity and C. difficile   Psychiatric/Behavioral: Positive for dysphoric mood. Negative for suicidal ideas. The patient is nervous/anxious.        Objective   Physical Exam   Constitutional: She is oriented to person, place, and time.   Obese   Abdominal: Soft. She exhibits no mass. There is no tenderness.   Neurological: She is alert and oriented to person, place, and time.   Psychiatric: She has a normal mood and affect. Her behavior is normal. Judgment and thought content normal.    is with patient and notes there is some improvement in her mood the extremes are not as wide   Nursing note and vitals reviewed.      Assessment/Plan   Maribeth was seen today for follow-up.    Diagnoses and all orders for this visit:    Anxiety     Plan continue same medicine Lamictal 100+ Pristiq 50-sent out for stool samples for culture and C. difficile

## 2018-06-13 DIAGNOSIS — R19.7 DIARRHEA, UNSPECIFIED TYPE: Primary | ICD-10-CM

## 2018-07-06 ENCOUNTER — OFFICE VISIT (OUTPATIENT)
Dept: FAMILY MEDICINE CLINIC | Facility: CLINIC | Age: 63
End: 2018-07-06

## 2018-07-06 VITALS
TEMPERATURE: 98.9 F | WEIGHT: 240 LBS | SYSTOLIC BLOOD PRESSURE: 120 MMHG | HEART RATE: 95 BPM | DIASTOLIC BLOOD PRESSURE: 72 MMHG | BODY MASS INDEX: 39.99 KG/M2 | OXYGEN SATURATION: 96 % | HEIGHT: 65 IN

## 2018-07-06 DIAGNOSIS — F41.9 ANXIETY: Primary | ICD-10-CM

## 2018-07-06 PROCEDURE — 99213 OFFICE O/P EST LOW 20 MIN: CPT | Performed by: FAMILY MEDICINE

## 2018-07-06 RX ORDER — BUDESONIDE AND FORMOTEROL FUMARATE DIHYDRATE 160; 4.5 UG/1; UG/1
2 AEROSOL RESPIRATORY (INHALATION)
Qty: 1 INHALER | Refills: 12 | Status: SHIPPED | OUTPATIENT
Start: 2018-07-06 | End: 2019-05-31 | Stop reason: SDUPTHER

## 2018-07-06 NOTE — PATIENT INSTRUCTIONS
Low-Fiber Diet  Fiber is found in fruits, vegetables, and whole grains. A low-fiber diet restricts fibrous foods that are not digested in the small intestine. A diet containing about 10-15 grams of fiber per day is considered low fiber. Low-fiber diets may be used to:  · Promote healing and rest the bowel during intestinal flare-ups.  · Prevent blockage of a partially obstructed or narrowed gastrointestinal tract.  · Reduce fecal weight and volume.  · Slow the movement of feces.    You may be on a low-fiber diet as a transitional diet following surgery, after an injury (trauma), or because of a short (acute) or lifelong (chronic) illness. Your health care provider will determine the length of time you need to stay on this diet.  What do I need to know about a low-fiber diet?  Always check the fiber content on the packaging's Nutrition Facts label, especially on foods from the grains list. Ask your dietitian if you have questions about specific foods that are related to your condition, especially if the food is not listed below. In general, a low-fiber food will have less than 2 g of fiber.  What foods can I eat?  Grains  All breads and crackers made with white flour. Sweet rolls, doughnuts, waffles, pancakes, Swedish toast, bagels. Pretzels, Gissel toast, zwieback. Well-cooked cereals, such as cornmeal, farina, or cream cereals. Dry cereals that do not contain whole grains, fruit, or nuts, such as refined corn, wheat, rice, and oat cereals. Potatoes prepared any way without skins, plain pastas and noodles, refined white rice. Use white flour for baking and making sauces. Use allowed list of grains for casseroles, dumplings, and puddings.  Vegetables  Strained tomato and vegetable juices. Fresh lettuce, cucumber, spinach. Well-cooked (no skin or pulp) or canned vegetables, such as asparagus, bean sprouts, beets, carrots, green beans, mushrooms, potatoes, pumpkin, spinach, yellow squash, tomato sauce/puree, turnips,  yams, and zucchini. Keep servings limited to ½ cup.  Fruits  All fruit juices except prune juice. Cooked or canned fruits without skin and seeds, such as applesauce, apricots, cherries, fruit cocktail, grapefruit, grapes, mandarin oranges, melons, peaches, pears, pineapple, and plums. Fresh fruits without skin, such as apricots, avocados, bananas, melons, pineapple, nectarines, and peaches. Keep servings limited to ½ cup or 1 piece.  Meat and Other Protein Sources  Ground or well-cooked tender beef, ham, veal, lamb, pork, or poultry. Eggs, plain cheese. Fish, oysters, shrimp, lobster, and other seafood. Liver, organ meats. Smooth nut butters.  Dairy  All milk products and alternative dairy substitutes, such as soy, rice, almond, and coconut, not containing added whole nuts, seeds, or added fruit.  Beverages  Decaf coffee, fruit, and vegetable juices or smoothies (small amounts, with no pulp or skins, and with fruits from allowed list), sports drinks, herbal tea.  Condiments  Ketchup, mustard, vinegar, cream sauce, cheese sauce, cocoa powder. Spices in moderation, such as allspice, basil, bay leaves, celery powder or leaves, cinnamon, cumin powder, leslie powder, kalen, mace, marjoram, onion or garlic powder, oregano, paprika, parsley flakes, ground pepper, rosemary, maxine, savory, tarragon, thyme, and turmeric.  Sweets and Desserts  Plain cakes and cookies, pie made with allowed fruit, pudding, custard, cream pie. Gelatin, fruit, ice, sherbet, frozen ice pops. Ice cream, ice milk without nuts. Plain hard candy, honey, jelly, molasses, syrup, sugar, chocolate syrup, gumdrops, marshmallows. Limit overall sugar intake.  Fats and Oil  Margarine, butter, cream, mayonnaise, salad oils, plain salad dressings made from allowed foods. Choose healthy fats such as olive oil, canola oil, and omega-3 fatty acids (such as found in salmon or tuna) when possible.  Other  Bouillon, broth, or cream soups made from allowed foods. Any  strained soup. Casseroles or mixed dishes made with allowed foods.  The items listed above may not be a complete list of recommended foods or beverages. Contact your dietitian for more options.  What foods are not recommended?  Grains  All whole wheat and whole grain breads and crackers. Multigrains, rye, bran seeds, nuts, or coconut. Cereals containing whole grains, multigrains, bran, coconut, nuts, raisins. Cooked or dry oatmeal, steel-cut oats. Coarse wheat cereals, granola. Cereals advertised as high fiber. Potato skins. Whole grain pasta, wild or brown rice. Popcorn. Coconut flour. Bran, buckwheat, corn bread, multigrains, rye, wheat germ.  Vegetables  Fresh, cooked or canned vegetables, such as artichokes, asparagus, beet greens, broccoli, [place] sprouts, cabbage, celery, cauliflower, corn, eggplant, kale, legumes or beans, okra, peas, and tomatoes. Avoid large servings of any vegetables, especially raw vegetables.  Fruits  Fresh fruits, such as apples with or without skin, berries, cherries, figs, grapes, grapefruit, guavas, kiwis, mangoes, oranges, papayas, pears, persimmons, pineapple, and pomegranate. Prune juice and juices with pulp, stewed or dried prunes. Dried fruits, dates, raisins. Fruit seeds or skins. Avoid large servings of all fresh fruits.  Meats and Other Protein Sources  Tough, fibrous meats with gristle. [place] nut butter. Cheese made with seeds, nuts, or other foods not recommended. Nuts, seeds, legumes (beans, including baked beans), dried peas, beans, lentils.  Dairy  Yogurt or cheese that contains nuts, seeds, or added fruit.  Beverages  Fruit juices with high pulp, prune juice. Caffeinated coffee and teas.  Condiments  Coconut, maple syrup, pickles, olives.  Sweets and Desserts  Desserts, cookies, or candies that contain nuts or coconut, chunky peanut butter, dried fruits. Jams, preserves with seeds, marmalade. Large amounts of sugar and sweets. Any other dessert made with fruits from  the not recommended list.  Other  Soups made from vegetables that are not recommended or that contain other foods not recommended.  The items listed above may not be a complete list of foods and beverages to avoid. Contact your dietitian for more information.  This information is not intended to replace advice given to you by your health care provider. Make sure you discuss any questions you have with your health care provider.  Document Released: 06/09/2003 Document Revised: 05/25/2017 Document Reviewed: 11/10/2014  [employer] Interactive Patient Education © 2017 [employer]

## 2018-07-06 NOTE — PROGRESS NOTES
Subjective   Maribeth Isbell is a 62 y.o. female.     Anxiety   Presents for follow-up visit. Symptoms include depressed mood, excessive worry and nervous/anxious behavior. Patient reports no chest pain, shortness of breath or suicidal ideas. Symptoms occur occasionally. The severity of symptoms is mild. The quality of sleep is fair.     Compliance with medications is %.       The following portions of the patient's history were reviewed and updated as appropriate: allergies, current medications, past family history, past medical history, past social history, past surgical history and problem list.    Review of Systems   Respiratory: Positive for cough. Negative for shortness of breath.    Cardiovascular: Negative for chest pain.   Psychiatric/Behavioral: Negative for suicidal ideas. The patient is nervous/anxious.         Mood is improving on the above regimen       Objective   Physical Exam   Constitutional: She is oriented to person, place, and time.   Morbidly obese   HENT:   Mouth/Throat: Oropharynx is clear and moist.   Cardiovascular: Normal rate and regular rhythm.    Pulmonary/Chest: Effort normal. She has wheezes. She has rales.   Neurological: She is alert and oriented to person, place, and time.   Psychiatric: She has a normal mood and affect. Her behavior is normal. Judgment and thought content normal.   Nursing note and vitals reviewed.      Assessment/Plan   Maribeth was seen today for follow-up.    Diagnoses and all orders for this visit:    Anxiety    Other orders  -     budesonide-formoterol (SYMBICORT) 160-4.5 MCG/ACT inhaler; Inhale 2 puffs 2 (Two) Times a Day.         Mrs. better-having allergic to bronchial congestion C above-return 2 months for lab after the GI docs are through with her in July

## 2018-07-16 ENCOUNTER — OFFICE VISIT (OUTPATIENT)
Dept: GASTROENTEROLOGY | Facility: CLINIC | Age: 63
End: 2018-07-16

## 2018-07-16 VITALS
WEIGHT: 251 LBS | BODY MASS INDEX: 41.82 KG/M2 | DIASTOLIC BLOOD PRESSURE: 88 MMHG | HEART RATE: 92 BPM | HEIGHT: 65 IN | OXYGEN SATURATION: 99 % | SYSTOLIC BLOOD PRESSURE: 128 MMHG | TEMPERATURE: 98.1 F

## 2018-07-16 DIAGNOSIS — E08.00 DIABETES MELLITUS DUE TO UNDERLYING CONDITION, UNCONTROLLED, WITH HYPEROSMOLARITY WITHOUT COMA, WITH LONG-TERM CURRENT USE OF INSULIN (HCC): ICD-10-CM

## 2018-07-16 DIAGNOSIS — R19.7 DIARRHEA, UNSPECIFIED TYPE: ICD-10-CM

## 2018-07-16 DIAGNOSIS — R11.2 NON-INTRACTABLE VOMITING WITH NAUSEA, UNSPECIFIED VOMITING TYPE: Primary | ICD-10-CM

## 2018-07-16 DIAGNOSIS — Z79.4 DIABETES MELLITUS DUE TO UNDERLYING CONDITION, UNCONTROLLED, WITH HYPEROSMOLARITY WITHOUT COMA, WITH LONG-TERM CURRENT USE OF INSULIN (HCC): ICD-10-CM

## 2018-07-16 DIAGNOSIS — K31.84 GASTROPARESIS: ICD-10-CM

## 2018-07-16 DIAGNOSIS — K21.9 GASTROESOPHAGEAL REFLUX DISEASE, ESOPHAGITIS PRESENCE NOT SPECIFIED: ICD-10-CM

## 2018-07-16 PROCEDURE — 99214 OFFICE O/P EST MOD 30 MIN: CPT | Performed by: NURSE PRACTITIONER

## 2018-07-16 NOTE — PROGRESS NOTES
Chief Complaint:   Chief Complaint   Patient presents with   • Diarrhea     Patient is here today with complaints of diarrhea and vomiting.   • Vomiting         Patient ID: Maribeth Isbell is a 62 y.o. female     History of Present Illness:   4/17/17  This is a very pleasant 61-year-old female who is here to follow up today status post upper endoscopy performed on 12/1/16 for diarrhea, nausea with vomiting.  The EGD was found to have normal findings.  Biopsies were taken and found to be normal.  Prior to the endoscopy the patient had received multiple antibiotics prior to her onset of symptomatology.  She had had C. difficile and was treated with vancomycin along with Amoxil and Flagyl it also showed Escherichia coli.      The patient tells me today that the last round of antibiotics seemed to have cleared everything up.  She states that she has no nausea or vomiting at all.  She states that the diarrhea has stopped.  She states that she is feeling much better she denies any dysphagia or epigastric pain she denies any fever or chills.  She denies any bright red blood per rectum or black tarry stools.  She denies any unintentional weight loss or loss of appetite      The patient's last colonoscopy was performed on 6/10/11 by Dr. Cuauhtemoc Mahmood with findings of hemorrhoids only.    7/16/18  The patient returns today with complaints of the same things and she was seen for in 2016/2017.  She states that her symptoms seem to began to start again approximately 3 months after her last noted appointment here.  She states that she went to see her family physician Dr. mishra with complaints of nausea, vomiting and diarrhea.  She states that her diet was changed.  The patient states that her symptoms continued on and seem to worsen and in the previous 6 weeks she went back to see him and states that she had an ultrasound performed of her liver, gallbladder, kidneys, spleen and pancreas and were all found to be normal.  I do  not have results of this.  She does tell me that stool samples were taken and were found to be negative for pathogens.  I was able to review labs CBC and CMP along with amylase and lipase were found to be normal.  However the patient last hemoglobin A1c was 10.    The patient states that her nausea and vomiting is intermittent.  She states that she may have one to 2 episodes one day and the next day not have any at all.  She states that the diarrhea is of the same manner and occurs intermittently.  She states that she can note that eating is usually when her symptoms do occur however.  She states that she has taken quite a bit of Pepto-Bismol but with no relief.    The patient denies any  epigastric pain, dysphagia, pyrosis or hematemesis.  The patient denies any fever or chills.  Denies any melena or hematochezia.  Denies any unintentional weight loss or loss of appetite.      Past Medical History:   Diagnosis Date   • Bell's palsy    • Cataract    • Colon polyps    • Depression    • Diabetes mellitus (CMS/HCC)     type 2   • E coli infection    • Headache    • Hyperlipidemia    • Hypertension    • Hyperthyroidism    • Hypothyroidism    • Sleep apnea     wears cpap   • Tachycardia    • Visual impairment        Past Surgical History:   Procedure Laterality Date   •  SECTION     • COLONOSCOPY  06/10/2011    SCREENING-[facility] [place]DR DARLING   • COLONOSCOPY N/A 2016    Procedure: COLONOSCOPY WITH ANESTHESIA;  Surgeon: Kathy Berry MD;  Location: [facility] ENDOSCOPY;  Service:    • ENDOSCOPY N/A 2016    Procedure: ESOPHAGOGASTRODUODENOSCOPY WITH ANESTHESIA;  Surgeon: Kathy Berry MD;  Location: [facility] ENDOSCOPY;  Service:    • HYSTERECTOMY     • KNEE SURGERY Left    • TUMOR REMOVAL           Current Outpatient Prescriptions:   •  aspirin 81 MG EC tablet, Take 81 mg by mouth Daily., Disp: , Rfl:   •  B-D UF III MINI PEN NEEDLES 31G X 5 MM misc, USE TO TEST DAILY, Disp: 100 each, Rfl: 2  •  budesonide-formoterol  (SYMBICORT) 160-4.5 MCG/ACT inhaler, Inhale 2 puffs 2 (Two) Times a Day., Disp: 1 inhaler, Rfl: 12  •  bumetanide (BUMEX) 1 MG tablet, TAKE 1 TABLET BY MOUTH DAILY., Disp: 90 tablet, Rfl: 3  •  carbidopa-levodopa (SINEMET)  MG per tablet, TAKE 1-2 TABLETS BY MOUTH AT BEDTIME, Disp: 180 tablet, Rfl: 3  •  cetirizine (ZyrTEC) 10 MG tablet, Take 10 mg by mouth Daily., Disp: , Rfl:   •  cholecalciferol (VITAMIN D3) 1000 units tablet, Take 1,000 Units by mouth Daily., Disp: , Rfl:   •  desvenlafaxine (PRISTIQ) 50 MG 24 hr tablet, Take 1 tablet by mouth Daily., Disp: 90 tablet, Rfl: 3  •  folic acid (FOLVITE) 400 MCG tablet, Take 400 mcg by mouth Daily., Disp: , Rfl:   •  glyBURIDE-metFORMIN (GLUCOVANCE) 5-500 MG per tablet, Take 2 tablets by mouth 2 (Two) Times a Day With Meals., Disp: 360 tablet, Rfl: 1  •  insulin detemir (LEVEMIR FLEXTOUCH) 100 UNIT/ML injection, Inject 50 Units under the skin Every Night., Disp: 27 pen, Rfl: 3  •  lamoTRIgine (LaMICtal) 100 MG tablet, 1 daily at bedtime ×1 week then 2 daily at bedtime times one week then 3 daily at bedtime (Patient taking differently: Take 100 mg by mouth Every Night. 1 daily at bedtime ×1 week then 2 daily at bedtime times one week then 3 daily at bedtime), Disp: 90 tablet, Rfl: 3  •  levothyroxine (SYNTHROID, LEVOTHROID) 125 MCG tablet, Take 1 tablet by mouth Daily., Disp: 90 tablet, Rfl: 3  •  lisinopril (PRINIVIL,ZESTRIL) 40 MG tablet, TAKE 1 TABLET BY MOUTH EVERY DAY, Disp: 90 tablet, Rfl: 3  •  Multiple Vitamins-Minerals (VISION-KRISTAL PRESERVE PO), Take  by mouth Daily With Breakfast., Disp: , Rfl:   •  NIFEdipine CC (ADALAT CC) 60 MG 24 hr tablet, TAKE 1 TABLET BY MOUTH DAILY., Disp: 90 tablet, Rfl: 0  •  ranitidine (ZANTAC) 150 MG tablet, Take 150 mg by mouth 2 (Two) Times a Day., Disp: , Rfl:   •  rosuvastatin (CRESTOR) 10 MG tablet, TAKE 1 TABLET (10 MG) BY ORAL ROUTE ONCE DAILY, Disp: , Rfl: 2  •  traMADol (ULTRAM) 50 MG tablet, TAKE 1 TABLET BY MOUTH  "3 TIMES A DAY AS NEEDED FOR PAIN, Disp: , Rfl: 0    No Known Allergies    Social History     Social History   • Marital status:      Spouse name: N/A   • Number of children: N/A   • Years of education: N/A     Occupational History   • Not on file.     Social History Main Topics   • Smoking status: Former Smoker     Packs/day: 1.00     Years: 20.00     Types: Cigarettes     Quit date: 2015   • Smokeless tobacco: Never Used   • Alcohol use No   • Drug use: No   • Sexual activity: Defer     Other Topics Concern   • Not on file     Social History Narrative   • No narrative on file       Family History   Problem Relation Age of Onset   • Heart disease Mother    • Diabetes Mother    • No Known Problems Maternal Grandmother    • No Known Problems Maternal Grandfather    • No Known Problems Paternal Grandmother    • No Known Problems Paternal Grandfather    • Colon cancer Neg Hx    • Colon polyps Neg Hx        Vitals:    07/16/18 1333   BP: 128/88   Pulse: 92   Temp: 98.1 °F (36.7 °C)   SpO2: 99%   Weight: 114 kg (251 lb)   Height: 165.1 cm (65\")       Review of Systems:    General:    Present -feeling well   Skin:    Not Present-Rash   HEENT:     Not Present-Acute visual changes or Acute hearing changes   Neck :    Not Present- swollen glands   Genitourinary:      Not Present- burning, frequency, urgency hematuria, dysuria,   Cardiovascular:   Not Present-chest pain, palpitations, or pressure   Respiratory:   Not Present- shortness of breath or cough   Gastrointestinal:  Musculoskeletal:  Neurological:  Psychiatric:   Present as mentioned in the HP    Not Present. Recent gait disturbances.    Not Present-Seizures and weakness in extremities.    Not Present- Anxiety or Depression.       Physical Exam:    General Appearance:    Alert, cooperative, in no acute distress   Psych:    Mood appropriate    Eyes:          conjunctivae and sclerae normal, no   icterus, no pallor   ENMT:    Ears appear intact with no " abnormalities noted oral mucosa moist   Neck:   No adenopathy, supple, trachea midline, no thyromegaly, no   carotid bruit, no JVD    Cardiovascular:    Regular rhythm and normal rate, normal S1 and S2, no            murmur, no gallop, no rub, no click   Gastrointestinal:     Inspection normal.  Normal bowel sounds, no masses, no organomegaly, soft round non-tender, non-distended, no guarding, no rebound or tenderness. No hepatosplenomegaly.   Skin:   No bleeding, bruising or rash   Neurologic:   nonfocal       Lab Results   Component Value Date    WBC 7.12 04/18/2018    WBC 5.79 06/05/2017    WBC 6.74 03/02/2017    HGB 14.6 05/18/2018    HGB 14.9 04/18/2018    HGB 13.2 06/05/2017    HCT 46.8 05/18/2018    HCT 46.9 04/18/2018    HCT 41.4 06/05/2017     05/18/2018     04/18/2018     06/05/2017        Lab Results   Component Value Date     05/18/2018     04/18/2018     10/12/2017    K 4.7 05/18/2018    K 3.8 04/18/2018    K 4.4 10/12/2017     05/18/2018    CL 97 (L) 04/18/2018     10/12/2017    CO2 26.0 05/18/2018    CO2 25.0 04/18/2018    CO2 29.0 10/12/2017    BUN 16 05/18/2018    BUN 12 04/18/2018    BUN 11 10/12/2017    CREATININE 0.98 05/18/2018    CREATININE 0.78 04/18/2018    CREATININE 0.91 10/12/2017    BILITOT 0.4 05/18/2018    BILITOT 0.7 04/18/2018    BILITOT 0.6 10/12/2017    ALKPHOS 77 05/18/2018    ALKPHOS 92 04/18/2018    ALKPHOS 84 10/12/2017    ALT 24 05/18/2018    ALT 34 04/18/2018    ALT 34 10/12/2017    AST 29 05/18/2018    AST 34 04/18/2018    AST 32 10/12/2017    GLUCOSE 231 (H) 11/04/2016       No results found for: INR    Body mass index is 41.77 kg/m². Patient's Body mass index is 41.77 kg/m². BMI is above normal parameters. Recommendations include: nutrition counseling.    Assessment and Plan:  Assessment/Plan   Maribeth was seen today for diarrhea and vomiting.    Diagnoses and all orders for this visit:    Non-intractable vomiting with  nausea, unspecified vomiting type  -     Case Request; Standing  -     Case Request    Gastroesophageal reflux disease, esophagitis presence not specified  -     Case Request; Standing  -     Case Request    Diarrhea, unspecified type  -     Case Request; Standing  -     Case Request    Gastroparesis    Diabetes mellitus due to underlying condition, uncontrolled, with hyperosmolarity without coma, with long-term current use of insulin (CMS/[facility])    Other orders  -     Follow Anesthesia Guidelines / Standing Orders; Future  -     Implement Anesthesia Orders Day of Procedure; Standing  -     Obtain Informed Consent; Standing    Have scheduled the patient for an EGD for further evaluation.  I do however think that the patient's symptoms could be caused by her uncontrolled diabetes mellitus and cause gastroparesis.  I have advised her to follow-up with her PCP to possibly be referred to an endocrinologist as she states that her blood sugar has been very hard to control and she is on a very large dose of metformin..        The risks, benefits, and alternatives of endoscopy were reviewed with the patient today.  Risks including perforation, with or without dilation, possibly requiring surgery.  Additional risks include risk of bleeding.  There is also the risk of a drug reaction or problems with anesthesia.  This will be discussed with the further by the anesthesia team on the day of the procedure. The benefits include the diagnosis and management of disease of the upper digestive tract.  Alternatives to endoscopy include upper GI series, laboratory testing, radiographic evaluation, or no intervention.  The patient verbalizes understanding and agrees.       Patient Instructions   Gastroparesis  Gastroparesis, also called delayed gastric emptying, is a condition in which food takes longer than normal to empty from the stomach. The condition is usually long-lasting (chronic).  What are the causes?  This condition may be  caused by:  · An endocrine disorder, such as hypothyroidism or diabetes. Diabetes is the most common cause of this condition.  · A nervous system disease, such as Parkinson disease or multiple sclerosis.  · Cancer, infection, or surgery of the stomach or vagus nerve.  · A connective tissue disorder, such as scleroderma.  · Certain medicines.    In most cases, the cause is not known.  What increases the risk?  This condition is more likely to develop in:  · People with certain disorders, including endocrine disorders, eating disorders, amyloidosis, and scleroderma.  · People with certain diseases, including Parkinson disease or multiple sclerosis.  · People with cancer or infection of the stomach or vagus nerve.  · People who have had surgery on the stomach or vagus nerve.  · People who take certain medicines.  · Women.    What are the signs or symptoms?  Symptoms of this condition include:  · An early feeling of fullness when eating.  · Nausea.  · Weight loss.  · Vomiting.  · Heartburn.  · Abdominal bloating.  · Inconsistent blood glucose levels.  · Lack of appetite.  · Acid from the stomach coming up into the esophagus (gastroesophageal reflux).  · Spasms of the stomach.    Symptoms may come and go.  How is this diagnosed?  This condition is diagnosed with tests, such as:  · Tests that check how long it takes food to move through the stomach and intestines. These tests include:  ? Upper gastrointestinal (GI) series. In this test, X-rays of the intestines are taken after you drink a liquid. The liquid makes the intestines show up better on the X-rays.  ? Gastric emptying scintigraphy. In this test, scans are taken after you eat food that contains a small amount of radioactive material.  ? Wireless capsule GI monitoring system. This test involves swallowing a capsule that records information about movement through the stomach.  · Gastric manometry. This test measures electrical and muscular activity in the stomach.  It is done with a thin tube that is passed down the throat and into the stomach.  · Endoscopy. This test checks for abnormalities in the lining of the stomach. It is done with a long, thin tube that is passed down the throat and into the stomach.  · An ultrasound. This test can help rule out gallbladder disease or pancreatitis as a cause of your symptoms. It uses sound waves to take pictures of the inside of your body.    How is this treated?  There is no cure for gastroparesis. This condition may be managed with:  · Treatment of the underlying condition causing the gastroparesis.  · Lifestyle changes, including exercise and dietary changes. Dietary changes can include:  ? Changes in what and when you eat.  ? Eating smaller meals more often.  ? Eating low-fat foods.  ? Eating low-fiber forms of high-fiber foods, such as cooked vegetables instead of raw vegetables.  ? Having liquid foods in place of solid foods. Liquid foods are easier to digest.  · Medicines. These may be given to control nausea and vomiting and to stimulate stomach muscles.  · Getting food through a feeding tube. This may be done in severe cases.  · A gastric neurostimulator. This is a device that is inserted into the body with surgery. It helps improve stomach emptying and control nausea and vomiting.    Follow these instructions at home:  · Follow your health care provider's instructions about exercise and diet.  · Take medicines only as directed by your health care provider.  Contact a health care provider if:  · Your symptoms do not improve with treatment.  · You have new symptoms.  Get help right away if:  · You have severe abdominal pain that does not improve with treatment.  · You have nausea that does not go away.  · You cannot keep fluids down.  This information is not intended to replace advice given to you by your health care provider. Make sure you discuss any questions you have with your health care provider.  Document Released:  12/18/2006 Document Revised: 05/25/2017 Document Reviewed: 12/14/2015  [employer] Interactive Patient Education © 2018 [employer]        Next follow-up appointment          EMR Dragon/Transcription disclaimer:  Much of this encounter note is an electronic transcription/translation of spoken language to printed text. The electronic translation of spoken language may permit erroneous, or at times, nonsensical words or phrases to be inadvertently transcribed; although I have reviewed the note for such errors, some may still exist.

## 2018-07-16 NOTE — PATIENT INSTRUCTIONS
Gastroparesis  Gastroparesis, also called delayed gastric emptying, is a condition in which food takes longer than normal to empty from the stomach. The condition is usually long-lasting (chronic).  What are the causes?  This condition may be caused by:  · An endocrine disorder, such as hypothyroidism or diabetes. Diabetes is the most common cause of this condition.  · A nervous system disease, such as Parkinson disease or multiple sclerosis.  · Cancer, infection, or surgery of the stomach or vagus nerve.  · A connective tissue disorder, such as scleroderma.  · Certain medicines.    In most cases, the cause is not known.  What increases the risk?  This condition is more likely to develop in:  · People with certain disorders, including endocrine disorders, eating disorders, amyloidosis, and scleroderma.  · People with certain diseases, including Parkinson disease or multiple sclerosis.  · People with cancer or infection of the stomach or vagus nerve.  · People who have had surgery on the stomach or vagus nerve.  · People who take certain medicines.  · Women.    What are the signs or symptoms?  Symptoms of this condition include:  · An early feeling of fullness when eating.  · Nausea.  · Weight loss.  · Vomiting.  · Heartburn.  · Abdominal bloating.  · Inconsistent blood glucose levels.  · Lack of appetite.  · Acid from the stomach coming up into the esophagus (gastroesophageal reflux).  · Spasms of the stomach.    Symptoms may come and go.  How is this diagnosed?  This condition is diagnosed with tests, such as:  · Tests that check how long it takes food to move through the stomach and intestines. These tests include:  ? Upper gastrointestinal (GI) series. In this test, X-rays of the intestines are taken after you drink a liquid. The liquid makes the intestines show up better on the X-rays.  ? Gastric emptying scintigraphy. In this test, scans are taken after you eat food that contains a small amount of radioactive  material.  ? Wireless capsule GI monitoring system. This test involves swallowing a capsule that records information about movement through the stomach.  · Gastric manometry. This test measures electrical and muscular activity in the stomach. It is done with a thin tube that is passed down the throat and into the stomach.  · Endoscopy. This test checks for abnormalities in the lining of the stomach. It is done with a long, thin tube that is passed down the throat and into the stomach.  · An ultrasound. This test can help rule out gallbladder disease or pancreatitis as a cause of your symptoms. It uses sound waves to take pictures of the inside of your body.    How is this treated?  There is no cure for gastroparesis. This condition may be managed with:  · Treatment of the underlying condition causing the gastroparesis.  · Lifestyle changes, including exercise and dietary changes. Dietary changes can include:  ? Changes in what and when you eat.  ? Eating smaller meals more often.  ? Eating low-fat foods.  ? Eating low-fiber forms of high-fiber foods, such as cooked vegetables instead of raw vegetables.  ? Having liquid foods in place of solid foods. Liquid foods are easier to digest.  · Medicines. These may be given to control nausea and vomiting and to stimulate stomach muscles.  · Getting food through a feeding tube. This may be done in severe cases.  · A gastric neurostimulator. This is a device that is inserted into the body with surgery. It helps improve stomach emptying and control nausea and vomiting.    Follow these instructions at home:  · Follow your health care provider's instructions about exercise and diet.  · Take medicines only as directed by your health care provider.  Contact a health care provider if:  · Your symptoms do not improve with treatment.  · You have new symptoms.  Get help right away if:  · You have severe abdominal pain that does not improve with treatment.  · You have nausea that does  not go away.  · You cannot keep fluids down.  This information is not intended to replace advice given to you by your health care provider. Make sure you discuss any questions you have with your health care provider.  Document Released: 12/18/2006 Document Revised: 05/25/2017 Document Reviewed: 12/14/2015  [employer] Interactive Patient Education © 2018 [employer]

## 2018-07-17 PROBLEM — R11.2 NON-INTRACTABLE VOMITING WITH NAUSEA: Status: ACTIVE | Noted: 2018-07-17

## 2018-07-17 PROBLEM — K21.9 GASTROESOPHAGEAL REFLUX DISEASE: Status: ACTIVE | Noted: 2018-07-17

## 2018-07-30 ENCOUNTER — ANESTHESIA (OUTPATIENT)
Dept: GASTROENTEROLOGY | Facility: HOSPITAL | Age: 63
End: 2018-07-30

## 2018-07-30 ENCOUNTER — HOSPITAL ENCOUNTER (OUTPATIENT)
Facility: HOSPITAL | Age: 63
Setting detail: HOSPITAL OUTPATIENT SURGERY
Discharge: HOME | End: 2018-07-30
Attending: INTERNAL MEDICINE | Admitting: INTERNAL MEDICINE

## 2018-07-30 ENCOUNTER — ANESTHESIA EVENT (OUTPATIENT)
Dept: GASTROENTEROLOGY | Facility: HOSPITAL | Age: 63
End: 2018-07-30

## 2018-07-30 VITALS
OXYGEN SATURATION: 97 % | SYSTOLIC BLOOD PRESSURE: 121 MMHG | BODY MASS INDEX: 41.65 KG/M2 | HEART RATE: 80 BPM | HEIGHT: 65 IN | DIASTOLIC BLOOD PRESSURE: 73 MMHG | TEMPERATURE: 97.7 F | RESPIRATION RATE: 17 BRPM | WEIGHT: 250 LBS

## 2018-07-30 DIAGNOSIS — R19.7 DIARRHEA, UNSPECIFIED TYPE: ICD-10-CM

## 2018-07-30 DIAGNOSIS — R11.2 NON-INTRACTABLE VOMITING WITH NAUSEA, UNSPECIFIED VOMITING TYPE: ICD-10-CM

## 2018-07-30 DIAGNOSIS — K21.9 GASTROESOPHAGEAL REFLUX DISEASE, ESOPHAGITIS PRESENCE NOT SPECIFIED: ICD-10-CM

## 2018-07-30 PROCEDURE — 25010000002 PROPOFOL 10 MG/ML EMULSION: Performed by: NURSE ANESTHETIST, CERTIFIED REGISTERED

## 2018-07-30 PROCEDURE — 43239 EGD BIOPSY SINGLE/MULTIPLE: CPT | Performed by: INTERNAL MEDICINE

## 2018-07-30 PROCEDURE — 88305 TISSUE EXAM BY PATHOLOGIST: CPT | Performed by: INTERNAL MEDICINE

## 2018-07-30 RX ORDER — SODIUM CHLORIDE 0.9 % (FLUSH) 0.9 %
1-10 SYRINGE (ML) INJECTION AS NEEDED
Status: CANCELLED | OUTPATIENT
Start: 2018-07-30

## 2018-07-30 RX ORDER — PROPOFOL 10 MG/ML
VIAL (ML) INTRAVENOUS AS NEEDED
Status: DISCONTINUED | OUTPATIENT
Start: 2018-07-30 | End: 2018-07-30 | Stop reason: SURG

## 2018-07-30 RX ORDER — LIDOCAINE HYDROCHLORIDE 20 MG/ML
INJECTION, SOLUTION INFILTRATION; PERINEURAL AS NEEDED
Status: DISCONTINUED | OUTPATIENT
Start: 2018-07-30 | End: 2018-07-30 | Stop reason: SURG

## 2018-07-30 RX ORDER — SODIUM CHLORIDE 9 MG/ML
500 INJECTION, SOLUTION INTRAVENOUS CONTINUOUS PRN
Status: DISCONTINUED | OUTPATIENT
Start: 2018-07-30 | End: 2018-07-30 | Stop reason: HOSPADM

## 2018-07-30 RX ORDER — SODIUM CHLORIDE 9 MG/ML
100 INJECTION, SOLUTION INTRAVENOUS CONTINUOUS
Status: CANCELLED | OUTPATIENT
Start: 2018-07-30

## 2018-07-30 RX ORDER — SODIUM CHLORIDE 0.9 % (FLUSH) 0.9 %
3 SYRINGE (ML) INJECTION AS NEEDED
Status: DISCONTINUED | OUTPATIENT
Start: 2018-07-30 | End: 2018-07-30 | Stop reason: HOSPADM

## 2018-07-30 RX ADMIN — PROPOFOL 50 MG: 10 INJECTION, EMULSION INTRAVENOUS at 12:45

## 2018-07-30 RX ADMIN — PROPOFOL 50 MG: 10 INJECTION, EMULSION INTRAVENOUS at 12:43

## 2018-07-30 RX ADMIN — PROPOFOL 50 MG: 10 INJECTION, EMULSION INTRAVENOUS at 12:47

## 2018-07-30 RX ADMIN — LIDOCAINE HYDROCHLORIDE 0.5 ML: 10 INJECTION, SOLUTION EPIDURAL; INFILTRATION; INTRACAUDAL; PERINEURAL at 11:52

## 2018-07-30 RX ADMIN — LIDOCAINE HYDROCHLORIDE 40 MG: 20 INJECTION, SOLUTION INFILTRATION; PERINEURAL at 12:43

## 2018-07-30 RX ADMIN — SODIUM CHLORIDE 500 ML: 9 INJECTION, SOLUTION INTRAVENOUS at 11:52

## 2018-07-30 NOTE — ANESTHESIA POSTPROCEDURE EVALUATION
Patient: Maribeth Isbell    Procedure Summary     Date:  07/30/18 Room / Location:  [facility] ENDOSCOPY 6 / [facility] END[facility]COPY    Anesthesia Start:  1241 Anesthesia Stop:  1252    Procedure:  ESOPHAGOGASTRODUODENOSCOPY WITH ANESTHESIA (N/A ) Diagnosis:       Gastroesophageal reflux disease, esophagitis presence not specified      Diarrhea, unspecified type      Non-intractable vomiting with nausea, unspecified vomiting type      (Gastroesophageal reflux disease, esophagitis presence not specified [K21.9])      (Diarrhea, unspecified type [R19.7])      (Non-intractable vomiting with nausea, unspecified vomiting type [R11.2])    Surgeon:  Kathy Berry MD Provider:  Josh Banks CRNA    Anesthesia Type:  general ASA Status:  3          Anesthesia Type: general  Last vitals  BP   122/72 (07/30/18 1252)   Temp   97.7 °F (36.5 °C) (07/30/18 1134)   Pulse   90 (07/30/18 1252)   Resp   19 (07/30/18 1252)     SpO2   94 % (07/30/18 1252)     Post Anesthesia Care and Evaluation    Patient location during evaluation: PHASE II  Patient participation: complete - patient participated  Level of consciousness: awake  Pain score: 0  Pain management: adequate  Airway patency: patent  Anesthetic complications: No anesthetic complications  PONV Status: none  Cardiovascular status: acceptable  Respiratory status: acceptable  Hydration status: acceptable  No anesthesia care post op

## 2018-07-30 NOTE — ANESTHESIA PREPROCEDURE EVALUATION
Anesthesia Evaluation     no history of anesthetic complications:  NPO Solid Status: > 8 hours  NPO Liquid Status: > 8 hours           Airway   Mallampati: I  TM distance: >3 FB  Neck ROM: full  Dental          Pulmonary - normal exam    breath sounds clear to auscultation  (+) sleep apnea on CPAP,   (-) asthma, recent URI, not a smoker  Cardiovascular - normal exam  Exercise tolerance: good (4-7 METS)    Rhythm: regular  Rate: normal    (+) hypertension well controlled, hyperlipidemia,   (-) pacemaker, past MI, angina, cardiac stents, CABG      Neuro/Psych  (+) TIA (2010), psychiatric history Depression,     (-) seizures, CVA  GI/Hepatic/Renal/Endo    (+) morbid obesity, GERD,  diabetes mellitus, hypothyroidism,   (-) liver disease, no renal disease, hyperthyroidism    Musculoskeletal     Abdominal    Substance History      OB/GYN          Other                        Anesthesia Plan    ASA 3     general   total IV anesthesia  intravenous induction   Anesthetic plan and risks discussed with patient.

## 2018-07-31 LAB
CYTO UR: NORMAL
LAB AP CASE REPORT: NORMAL
LAB AP CLINICAL INFORMATION: NORMAL
PATH REPORT.FINAL DX SPEC: NORMAL
PATH REPORT.GROSS SPEC: NORMAL

## 2018-08-02 ENCOUNTER — TELEPHONE (OUTPATIENT)
Dept: GASTROENTEROLOGY | Facility: CLINIC | Age: 63
End: 2018-08-02

## 2018-08-02 NOTE — TELEPHONE ENCOUNTER
----- Message from Kathy Berry MD sent at 8/2/2018  7:19 AM CDT -----  I am writing to inform you that your endoscopy biopsies were good.     If you have any questions, please call our office.      Sincerely,        Kathy Berry MD

## 2018-08-10 ENCOUNTER — OFFICE VISIT (OUTPATIENT)
Dept: FAMILY MEDICINE CLINIC | Facility: CLINIC | Age: 63
End: 2018-08-10

## 2018-08-10 VITALS
DIASTOLIC BLOOD PRESSURE: 74 MMHG | BODY MASS INDEX: 39.99 KG/M2 | TEMPERATURE: 99 F | HEIGHT: 65 IN | WEIGHT: 240 LBS | OXYGEN SATURATION: 97 % | SYSTOLIC BLOOD PRESSURE: 120 MMHG | HEART RATE: 105 BPM

## 2018-08-10 DIAGNOSIS — R73.9 HYPERGLYCEMIA: Primary | ICD-10-CM

## 2018-08-10 DIAGNOSIS — R19.7 DIARRHEA, UNSPECIFIED TYPE: ICD-10-CM

## 2018-08-10 PROCEDURE — 99214 OFFICE O/P EST MOD 30 MIN: CPT | Performed by: FAMILY MEDICINE

## 2018-08-10 NOTE — PROGRESS NOTES
Subjective   Maribeth Isbell is a 62 y.o. female.     Diarrhea    This is a chronic problem. The current episode started more than 1 month ago. The problem occurs 5 to 10 times per day. The problem has been unchanged. The stool consistency is described as watery. The patient states that diarrhea does not awaken her from sleep. Pertinent negatives include no abdominal pain. Nothing aggravates the symptoms. Risk factors: Possible metformin. She has tried nothing for the symptoms. Recent gastroenterology evaluation-EGD negative       The following portions of the patient's history were reviewed and updated as appropriate: allergies, current medications, past family history, past medical history, past social history, past surgical history and problem list.    Review of Systems   Gastrointestinal: Positive for diarrhea. Negative for abdominal pain, anal bleeding and nausea.   Endocrine: Negative for polydipsia, polyphagia and polyuria.       Objective   Physical Exam   Constitutional: She is oriented to person, place, and time.   Obesity   Cardiovascular: Normal rate and regular rhythm.    Pulmonary/Chest: Effort normal.   Abdominal: She exhibits no distension and no mass. There is no tenderness.   Neurological: She is alert and oriented to person, place, and time.   Skin: Skin is warm and dry.   Psychiatric: She has a normal mood and affect. Her behavior is normal. Judgment and thought content normal.   Nursing note and vitals reviewed.      Assessment/Plan   Maribeth was seen today for follow-up.    Diagnoses and all orders for this visit:    Hyperglycemia  -     Ambulatory Referral to Endocrinology    Diarrhea, unspecified type       Plan-stop metformin-yogurt-diabetic referral

## 2018-08-16 ENCOUNTER — OFFICE VISIT (OUTPATIENT)
Dept: FAMILY MEDICINE CLINIC | Facility: CLINIC | Age: 63
End: 2018-08-16

## 2018-08-16 VITALS
HEIGHT: 65 IN | SYSTOLIC BLOOD PRESSURE: 120 MMHG | BODY MASS INDEX: 39.99 KG/M2 | HEART RATE: 67 BPM | OXYGEN SATURATION: 98 % | DIASTOLIC BLOOD PRESSURE: 70 MMHG | TEMPERATURE: 98.2 F | WEIGHT: 240 LBS

## 2018-08-16 DIAGNOSIS — R60.9 EDEMA, UNSPECIFIED TYPE: Primary | ICD-10-CM

## 2018-08-16 PROCEDURE — 99213 OFFICE O/P EST LOW 20 MIN: CPT | Performed by: FAMILY MEDICINE

## 2018-08-20 RX ORDER — DESVENLAFAXINE SUCCINATE 50 MG/1
50 TABLET, EXTENDED RELEASE ORAL DAILY
Qty: 90 TABLET | Refills: 3 | Status: SHIPPED | OUTPATIENT
Start: 2018-08-20 | End: 2019-05-31 | Stop reason: SDUPTHER

## 2018-08-29 RX ORDER — ROSUVASTATIN CALCIUM 10 MG/1
10 TABLET, COATED ORAL NIGHTLY
Qty: 90 TABLET | Refills: 0 | Status: SHIPPED | OUTPATIENT
Start: 2018-08-29 | End: 2018-11-26 | Stop reason: SDUPTHER

## 2018-08-29 NOTE — TELEPHONE ENCOUNTER
MED REFILL REQUEST      DR LITTLEJOHN ORIGINAL RX    CRESTOR 10MG (1) QHS    CPE 04.18.18-DUE 6 MOS MID October      [facility]

## 2018-09-07 RX ORDER — FLUCONAZOLE 150 MG/1
TABLET ORAL
Qty: 2 TABLET | Refills: 0 | Status: SHIPPED | OUTPATIENT
Start: 2018-09-07 | End: 2018-09-17 | Stop reason: ALTCHOICE

## 2018-09-07 NOTE — TELEPHONE ENCOUNTER
Patient is having vaginal itching, burning and swelling, no discharge.  Can you call in prescription or need to see?        Diflucan 151 now and 1 Monday-Monistat for the outside Over-The-Counter

## 2018-09-17 ENCOUNTER — OFFICE VISIT (OUTPATIENT)
Dept: ENDOCRINOLOGY | Facility: CLINIC | Age: 63
End: 2018-09-17

## 2018-09-17 VITALS
DIASTOLIC BLOOD PRESSURE: 76 MMHG | HEART RATE: 91 BPM | HEIGHT: 65 IN | BODY MASS INDEX: 40.08 KG/M2 | WEIGHT: 240.6 LBS | SYSTOLIC BLOOD PRESSURE: 124 MMHG | OXYGEN SATURATION: 94 %

## 2018-09-17 DIAGNOSIS — I15.2 HYPERTENSION ASSOCIATED WITH DIABETES (HCC): ICD-10-CM

## 2018-09-17 DIAGNOSIS — E11.65 TYPE 2 DIABETES MELLITUS WITH HYPERGLYCEMIA, WITH LONG-TERM CURRENT USE OF INSULIN (HCC): Primary | ICD-10-CM

## 2018-09-17 DIAGNOSIS — E11.65 UNCONTROLLED TYPE 2 DIABETES MELLITUS WITH HYPERGLYCEMIA, WITH LONG-TERM CURRENT USE OF INSULIN (HCC): Primary | ICD-10-CM

## 2018-09-17 DIAGNOSIS — Z79.4 TYPE 2 DIABETES MELLITUS WITH HYPERGLYCEMIA, WITH LONG-TERM CURRENT USE OF INSULIN (HCC): Primary | ICD-10-CM

## 2018-09-17 DIAGNOSIS — E10.69 MIXED DIABETIC HYPERLIPIDEMIA ASSOCIATED WITH TYPE 1 DIABETES MELLITUS (HCC): ICD-10-CM

## 2018-09-17 DIAGNOSIS — E78.2 MIXED DIABETIC HYPERLIPIDEMIA ASSOCIATED WITH TYPE 1 DIABETES MELLITUS (HCC): ICD-10-CM

## 2018-09-17 DIAGNOSIS — E11.59 HYPERTENSION ASSOCIATED WITH DIABETES (HCC): ICD-10-CM

## 2018-09-17 DIAGNOSIS — Z79.4 UNCONTROLLED TYPE 2 DIABETES MELLITUS WITH HYPERGLYCEMIA, WITH LONG-TERM CURRENT USE OF INSULIN (HCC): Primary | ICD-10-CM

## 2018-09-17 PROBLEM — R11.2 NON-INTRACTABLE VOMITING WITH NAUSEA: Status: RESOLVED | Noted: 2018-07-17 | Resolved: 2018-09-17

## 2018-09-17 PROCEDURE — 99204 OFFICE O/P NEW MOD 45 MIN: CPT | Performed by: INTERNAL MEDICINE

## 2018-09-17 PROCEDURE — 95250 CONT GLUC MNTR PHYS/QHP EQP: CPT | Performed by: INTERNAL MEDICINE

## 2018-09-17 RX ORDER — OMEPRAZOLE 40 MG/1
20 CAPSULE, DELAYED RELEASE ORAL DAILY
COMMUNITY
End: 2019-05-31 | Stop reason: SDUPTHER

## 2018-09-17 RX ORDER — LAMOTRIGINE 100 MG/1
100 TABLET ORAL NIGHTLY
Qty: 90 TABLET | Refills: 2 | Status: SHIPPED | OUTPATIENT
Start: 2018-09-17 | End: 2019-05-31 | Stop reason: SDUPTHER

## 2018-09-17 NOTE — PROGRESS NOTES
Maribeth Isbell is a 63 y.o. female seen by diabetes educator 09/17/2018 for insertion of Ann-Marie diagnostic continuous glucose monitor.     Sensor inserted in office. Instructed patient to wear sensor up to 14 days. Patient is to return to clinic in 2 weeks for sensor removal and results. Instructed patient to remove sensor if she has a CT scan, MRI, or X-ray, or if skin irritation occurs.     1. Carbohydrate Counting   I. Reviewed carbohydrate-containing foods, standard serving sizes, how to measure foods, and nutrition label reading   II. Provided patient with carbohydrate counting booklet   III. Patient demonstrated understanding by correctly calculating carbohydrate amounts for various meals    2. Humalog Dosing   I. 2 units for every 15 grams of carbohydrate eaten + 2:50 sliding scale   II. Patient demonstrated understanding by calculating correct dosage for example meals and blood sugars.     3. Levemir Dosing   I. Continue 50 units qd    II Reviewed titration: if fasting blood sugar is within 40 points of nighttime reading, no change to dose. If rising more than 40 points, increase by 3 units. If dropping more than 40 points, decrease by 3 units.    III. Switch to Toujeo if insurance covers it.     4. Trulicity   I. Explained this is a once weekly injection with a single dose pen.    II. Reviewed storage of Trulicity pens.    III. Demonstrated how to use pen.    IV. Discussed side effects (decreased appetite, nausea, vomiting). If vomiting occurs, stop Trulicity and call us.     Provided contact information.       Maye Garces MS, RD, LD, CDE

## 2018-09-17 NOTE — PROGRESS NOTES
" Maribeth Isbell is a 63 y.o. female who presents for  evaluation of   Chief Complaint   Patient presents with   • Diabetes     bs 343       Referring provider    Jer Mccray MD  [address] [phone]    Primary Care Provider    Jer Mccray MD    Duration 5 years    Timing - Diabetes is Constant    Quality -  poorly controlled    Severity -  moderate    Complications - TIA    Current symptoms/problems  increase appetite     Alleviating Factors: Compliance      Aggravating Factors :  None    Side Effects  metformin and sulfonylurea    Current diet  in general, a \"healthy\" diet      Current exercise none    Current monitoring regimen: home blood tests - checking 4 x daily   Home blood sugar records: 300-400    Hypoglycemia none    Past Medical History:   Diagnosis Date   • Bell's palsy    • Cataract    • Colon polyps    • Depression    • Diabetes mellitus (CMS/HCC)     type 2   • E coli infection    • Gastroesophageal reflux disease 7/17/2018   • Headache    • Hyperlipidemia    • Hypertension    • Hyperthyroidism    • Hypothyroidism    • Macular degeneration    • Sleep apnea     wears cpap   • Tachycardia    • Visual impairment      Family History   Problem Relation Age of Onset   • Heart disease Mother    • Diabetes Mother    • No Known Problems Maternal Grandmother    • No Known Problems Maternal Grandfather    • No Known Problems Paternal Grandmother    • No Known Problems Paternal Grandfather    • Colon cancer Neg Hx    • Colon polyps Neg Hx      Social History   Substance Use Topics   • Smoking status: Former Smoker     Packs/day: 1.00     Years: 20.00     Types: Cigarettes     Quit date: 2015   • Smokeless tobacco: Never Used   • Alcohol use No         Current Outpatient Prescriptions:   •  aspirin 81 MG EC tablet, Take 81 mg by mouth Daily., Disp: , Rfl:   •  B-D UF III MINI PEN NEEDLES 31G X 5 MM misc, USE TO TEST DAILY, Disp: 100 each, Rfl: 2  •  " budesonide-formoterol (SYMBICORT) 160-4.5 MCG/ACT inhaler, Inhale 2 puffs 2 (Two) Times a Day., Disp: 1 inhaler, Rfl: 12  •  bumetanide (BUMEX) 1 MG tablet, TAKE 1 TABLET BY MOUTH DAILY., Disp: 90 tablet, Rfl: 3  •  carbidopa-levodopa (SINEMET)  MG per tablet, TAKE 1-2 TABLETS BY MOUTH AT BEDTIME, Disp: 180 tablet, Rfl: 3  •  cetirizine (ZyrTEC) 10 MG tablet, Take 10 mg by mouth Daily., Disp: , Rfl:   •  cholecalciferol (VITAMIN D3) 1000 units tablet, Take 1,000 Units by mouth Daily., Disp: , Rfl:   •  desvenlafaxine (PRISTIQ) 50 MG 24 hr tablet, TAKE 1 TABLET BY MOUTH DAILY., Disp: 90 tablet, Rfl: 3  •  folic acid (FOLVITE) 400 MCG tablet, Take 400 mcg by mouth Daily., Disp: , Rfl:   •  insulin detemir (LEVEMIR FLEXTOUCH) 100 UNIT/ML injection, Inject 50 Units under the skin into the appropriate area as directed Every Night., Disp: 15 pen, Rfl: 3  •  lamoTRIgine (LaMICtal) 100 MG tablet, 1 daily at bedtime ×1 week then 2 daily at bedtime times one week then 3 daily at bedtime (Patient taking differently: Take 100 mg by mouth Every Night. 1 daily at bedtime ×1 week then 2 daily at bedtime times one week then 3 daily at bedtime), Disp: 90 tablet, Rfl: 3  •  levothyroxine (SYNTHROID, LEVOTHROID) 125 MCG tablet, Take 1 tablet by mouth Daily., Disp: 90 tablet, Rfl: 3  •  lisinopril (PRINIVIL,ZESTRIL) 40 MG tablet, TAKE 1 TABLET BY MOUTH EVERY DAY, Disp: 90 tablet, Rfl: 3  •  Multiple Vitamins-Minerals (VISION-KRISTAL PRESERVE PO), Take  by mouth Daily With Breakfast., Disp: , Rfl:   •  NIFEdipine CC (ADALAT CC) 60 MG 24 hr tablet, TAKE 1 TABLET BY MOUTH DAILY., Disp: 90 tablet, Rfl: 0  •  omeprazole (priLOSEC) 40 MG capsule, Take 20 mg by mouth Daily., Disp: , Rfl:   •  rosuvastatin (CRESTOR) 10 MG tablet, Take 1 tablet by mouth Every Night., Disp: 90 tablet, Rfl: 0    Review of Systems    Review of Systems   Constitutional: Positive for fatigue. Negative for activity change, appetite change, chills, diaphoresis,  "fever and unexpected weight change.   HENT: Negative for congestion, dental problem, drooling, ear discharge, ear pain, facial swelling, mouth sores, postnasal drip, rhinorrhea, sinus pressure, sore throat, tinnitus, trouble swallowing and voice change.    Eyes: Negative for photophobia, pain, discharge, redness, itching and visual disturbance.   Respiratory: Negative for apnea, cough, choking, chest tightness, shortness of breath, wheezing and stridor.    Cardiovascular: Negative for chest pain, palpitations and leg swelling.   Gastrointestinal: Negative for abdominal distention, abdominal pain, constipation, diarrhea, nausea and vomiting.   Endocrine: Negative for cold intolerance, heat intolerance, polydipsia, polyphagia and polyuria.   Genitourinary: Negative for decreased urine volume, difficulty urinating, dysuria, flank pain, frequency, hematuria and urgency.   Musculoskeletal: Negative for arthralgias, back pain, gait problem, joint swelling, myalgias, neck pain and neck stiffness.   Skin: Negative for color change, pallor, rash and wound.   Allergic/Immunologic: Negative for immunocompromised state.   Neurological: Positive for weakness. Negative for dizziness, tremors, seizures, syncope, facial asymmetry, speech difficulty, light-headedness, numbness and headaches.   Hematological: Negative for adenopathy.   Psychiatric/Behavioral: Negative for agitation, behavioral problems, confusion, decreased concentration, dysphoric mood, hallucinations, self-injury, sleep disturbance and suicidal ideas. The patient is not nervous/anxious and is not hyperactive.         Objective:   /76   Pulse 91   Ht 165.1 cm (65\")   Wt 109 kg (240 lb 9.6 oz)   LMP  (LMP Unknown)   SpO2 94%   BMI 40.04 kg/m²     Physical Exam   Constitutional: She is oriented to person, place, and time. She appears well-developed.   HENT:   Head: Normocephalic.   Right Ear: External ear normal.   Left Ear: External ear normal.   Nose: " Nose normal.   Eyes: Conjunctivae and EOM are normal. No scleral icterus.   Neck: Normal range of motion. Neck supple. No tracheal deviation present. No thyromegaly present.   Cardiovascular: Normal rate, regular rhythm, normal heart sounds and intact distal pulses.  Exam reveals no gallop and no friction rub.    No murmur heard.  Pulmonary/Chest: Effort normal and breath sounds normal. No stridor. No respiratory distress. She has no wheezes. She has no rales. She exhibits no tenderness.   Abdominal: Soft. Bowel sounds are normal. She exhibits no distension and no mass. There is no tenderness. There is no rebound and no guarding.   Musculoskeletal: Normal range of motion. She exhibits no tenderness or deformity.   Lymphadenopathy:     She has no cervical adenopathy.   Neurological: She is alert and oriented to person, place, and time. She displays normal reflexes. She exhibits normal muscle tone. Coordination normal.   Skin: No rash noted. No erythema. No pallor.   Psychiatric: She has a normal mood and affect. Her behavior is normal. Judgment and thought content normal.       Lab Review          Assessment/Plan       ICD-10-CM ICD-9-CM   1. Type 2 diabetes mellitus with hyperglycemia, with long-term current use of insulin (CMS/[facility]) E11.65 250.00    Z79.4 790.29     V58.67   2. Hypertension associated with diabetes (CMS/[facility]) E11.59 250.80    I10 401.9   3. Mixed diabetic hyperlipidemia associated with type 1 diabetes mellitus (CMS/[facility]) E10.69 250.81    E78.2 272.2         I reviewed and summarized records from Jer Mccray MD from 2018 and I reviewed / ordered labs.   From review of records :    Patient has type 2 diabetes with hyperglycemia    Glycemic Management:   Lab Results   Component Value Date    HGBA1C 10.80 04/18/2018    HGBA1C 7.20 10/12/2017    HGBA1C 7.10 06/05/2017     Lab Results   Component Value Date    GLUCOSE 231 (H) 11/04/2016    BUN 16 05/18/2018    CREATININE 0.98 05/18/2018     EGFRIFNONA 58 (L) 05/18/2018    EGFRIFAFRI 70 05/18/2018    BCR 16.3 05/18/2018    K 4.7 05/18/2018    CO2 26.0 05/18/2018    CALCIUM 9.8 05/18/2018    PROTENTOTREF 6.7 05/18/2018    ALBUMIN 4.10 05/18/2018    LABIL2 1.6 05/18/2018    AST 29 05/18/2018    ALT 24 05/18/2018    ANIONGAP 11.0 11/04/2016     Lab Results   Component Value Date    WBC 7.12 04/18/2018    HGB 14.6 05/18/2018    HCT 46.8 05/18/2018    MCV 87.5 05/18/2018     05/18/2018     Levemir 50 - change to Toujeo Max   Once your bedtime is in the 100s you should wake up within 40 points of what you went to bed     Start Trulicity 0.75 mg weekly     Start Humalog 2 units per carb and 2 per 50       Uncontrolled diabetes   Hyperglycemia    Insertion of continuous glucose monitor to define pattern    The continuous glucose monitor that was inserted is a moises glucose monitor        Lipid Management  No results found for: CHOL  Lab Results   Component Value Date    TRIG 214 (H) 04/18/2018    TRIG 197 (H) 10/12/2017    TRIG 210 (H) 06/05/2017     Lab Results   Component Value Date    HDL 51 04/18/2018    HDL 44 (L) 10/12/2017    HDL 41 (L) 06/05/2017     No components found for: LDLCALC  Lab Results   Component Value Date    LDL 82 04/18/2018    LDL 73 10/12/2017    LDL 54 06/05/2017     No results found for: LDLDIRECT    On crestor     Blood Pressure Management:    Vitals:    09/17/18 0757   BP: 124/76   Pulse: 91   SpO2: 94%     Lab Results   Component Value Date    GLUCOSE 231 (H) 11/04/2016    CALCIUM 9.8 05/18/2018     05/18/2018    K 4.7 05/18/2018    CO2 26.0 05/18/2018     05/18/2018    BUN 16 05/18/2018    CREATININE 0.98 05/18/2018    EGFRIFAFRI 70 05/18/2018    EGFRIFNONA 58 (L) 05/18/2018    BCR 16.3 05/18/2018    ANIONGAP 11.0 11/04/2016       On lisinopril         Microvascular Complication Monitoring:      Eye Exam Evaluation  Within 1 year   -----------    Last Microalbumin-Proteinuria Assessment    No results found for:  MALBCRERATIO    No results found for: UTPCR    -----------      Neuropathy, none          Weight Related:   Wt Readings from Last 3 Encounters:   09/17/18 109 kg (240 lb 9.6 oz)   08/16/18 109 kg (240 lb)   08/10/18 109 kg (240 lb)     Body mass index is 40.04 kg/m².        Diet interventions: moderate (500 kCal/d) deficit diet.      Bone Health    Lab Results   Component Value Date    CALCIUM 9.8 05/18/2018    QCKL82IK 32.3 04/18/2018       Thyroid Health    Lab Results   Component Value Date    TSH 4.710 (H) 04/18/2018    TSH 0.553 06/05/2017    TSH 4.330 03/02/2017      on levothyroxine 125 mcgs daily       Other Diabetes Related Aspects       No results found for: [id number]         No orders of the defined types were placed in this encounter.        A copy of my note was sent to Jer Mccray MD    Please see my above opinion and suggestions.

## 2018-09-18 ENCOUNTER — TELEPHONE (OUTPATIENT)
Dept: FAMILY MEDICINE CLINIC | Facility: CLINIC | Age: 63
End: 2018-09-18

## 2018-09-18 RX ORDER — INSULIN GLARGINE 100 [IU]/ML
100 INJECTION, SOLUTION SUBCUTANEOUS DAILY
Qty: 30 ML | Refills: 11 | Status: SHIPPED | OUTPATIENT
Start: 2018-09-18 | End: 2019-02-04

## 2018-09-18 NOTE — TELEPHONE ENCOUNTER
Lukas has called and said that her insurance will not cover the Insulin Lispro (HUMALOG  But will cover the Novolog. He needs a new RX sent in to [facility]. Also states they will not cover the Insulin Glargine (TOUJEO  But will cover either the Basaglar or the Levemir. She is already on the Levemir. So she needs a new Rx sent in for that as well. Call them at [phone]

## 2018-10-01 ENCOUNTER — OFFICE VISIT (OUTPATIENT)
Dept: ENDOCRINOLOGY | Facility: CLINIC | Age: 63
End: 2018-10-01

## 2018-10-01 VITALS
HEIGHT: 65 IN | DIASTOLIC BLOOD PRESSURE: 70 MMHG | BODY MASS INDEX: 39.99 KG/M2 | HEART RATE: 84 BPM | SYSTOLIC BLOOD PRESSURE: 124 MMHG | WEIGHT: 240 LBS

## 2018-10-01 DIAGNOSIS — E78.2 MIXED DIABETIC HYPERLIPIDEMIA ASSOCIATED WITH TYPE 1 DIABETES MELLITUS (HCC): ICD-10-CM

## 2018-10-01 DIAGNOSIS — E11.65 TYPE 2 DIABETES MELLITUS WITH HYPERGLYCEMIA, WITH LONG-TERM CURRENT USE OF INSULIN (HCC): Primary | ICD-10-CM

## 2018-10-01 DIAGNOSIS — E11.59 HYPERTENSION ASSOCIATED WITH DIABETES (HCC): ICD-10-CM

## 2018-10-01 DIAGNOSIS — E55.9 VITAMIN D DEFICIENCY: ICD-10-CM

## 2018-10-01 DIAGNOSIS — Z79.4 TYPE 2 DIABETES MELLITUS WITH HYPERGLYCEMIA, WITH LONG-TERM CURRENT USE OF INSULIN (HCC): Primary | ICD-10-CM

## 2018-10-01 DIAGNOSIS — E10.69 MIXED DIABETIC HYPERLIPIDEMIA ASSOCIATED WITH TYPE 1 DIABETES MELLITUS (HCC): ICD-10-CM

## 2018-10-01 DIAGNOSIS — I15.2 HYPERTENSION ASSOCIATED WITH DIABETES (HCC): ICD-10-CM

## 2018-10-01 DIAGNOSIS — E03.9 ACQUIRED HYPOTHYROIDISM: ICD-10-CM

## 2018-10-01 PROCEDURE — 95251 CONT GLUC MNTR ANALYSIS I&R: CPT | Performed by: NURSE PRACTITIONER

## 2018-10-01 PROCEDURE — 99214 OFFICE O/P EST MOD 30 MIN: CPT | Performed by: NURSE PRACTITIONER

## 2018-10-01 NOTE — PROGRESS NOTES
"  Subjective    Maribeth Isbell is a 63 y.o. female. she is here today for follow-up.    History of Present Illness       Primary Care Provider     Jer Mccray MD     Duration 5 years     Timing - Diabetes is Constant     Quality -  poorly controlled     Severity -  moderate     Complications - TIA     Current symptoms/problems  increase appetite      Alleviating Factors: Compliance       Aggravating Factors :  None     Side Effects  metformin and sulfonylurea     Current diet  in general, a \"healthy\" diet       Current exercise none     Current monitoring regimen: home blood tests - checking 4 x daily     Lab Results   Component Value Date    HGBA1C 10.80 2018       Home blood sugar records:     Ann-Marie average 180    No low sugar      Hypoglycemia none         The following portions of the patient's history were reviewed and updated as appropriate:   Past Medical History:   Diagnosis Date   • Bell's palsy    • Cataract    • Colon polyps    • Depression    • Diabetes mellitus (CMS/HCC)     type 2   • E coli infection    • Gastroesophageal reflux disease 2018   • Headache    • Hyperlipidemia    • Hypertension    • Hyperthyroidism    • Hypothyroidism    • Macular degeneration    • Sleep apnea     wears cpap   • Tachycardia    • Visual impairment      Past Surgical History:   Procedure Laterality Date   •  SECTION     • COLONOSCOPY  06/10/2011    SCREENING-[facility] [place]-DR DARLING   • COLONOSCOPY N/A 2016    Procedure: COLONOSCOPY WITH ANESTHESIA;  Surgeon: Kathy Berry MD;  Location: [facility] ENDOSCOPY;  Service:    • ENDOSCOPY N/A 2016    Procedure: ESOPHAGOGASTRODUODENOSCOPY WITH ANESTHESIA;  Surgeon: Kathy Berry MD;  Location: [facility] ENDOSCOPY;  Service:    • ENDOSCOPY N/A 2018    Procedure: ESOPHAGOGASTRODUODENOSCOPY WITH ANESTHESIA;  Surgeon: Kathy Berry MD;  Location: [facility] ENDOSCOPY;  Service: Gastroenterology   • HYSTERECTOMY     • KNEE SURGERY Left    • TUMOR REMOVAL "       Family History   Problem Relation Age of Onset   • Heart disease Mother    • Diabetes Mother    • No Known Problems Maternal Grandmother    • No Known Problems Maternal Grandfather    • No Known Problems Paternal Grandmother    • No Known Problems Paternal Grandfather    • Colon cancer Neg Hx    • Colon polyps Neg Hx      OB History     No data available        Current Outpatient Prescriptions   Medication Sig Dispense Refill   • aspirin 81 MG EC tablet Take 81 mg by mouth Daily.     • budesonide-formoterol (SYMBICORT) 160-4.5 MCG/ACT inhaler Inhale 2 puffs 2 (Two) Times a Day. 1 inhaler 12   • bumetanide (BUMEX) 1 MG tablet TAKE 1 TABLET BY MOUTH DAILY. 90 tablet 3   • carbidopa-levodopa (SINEMET)  MG per tablet TAKE 1-2 TABLETS BY MOUTH AT BEDTIME 180 tablet 3   • cetirizine (ZyrTEC) 10 MG tablet Take 10 mg by mouth Daily.     • cholecalciferol (VITAMIN D3) 1000 units tablet Take 1,000 Units by mouth Daily.     • desvenlafaxine (PRISTIQ) 50 MG 24 hr tablet TAKE 1 TABLET BY MOUTH DAILY. 90 tablet 3   • Dulaglutide 0.75 MG/0.5ML solution pen-injector Inject 0.75 mg under the skin into the appropriate area as directed 1 (One) Time Per Week. 4 pen 11   • folic acid (FOLVITE) 400 MCG tablet Take 400 mcg by mouth Daily.     • insulin aspart (novoLOG FLEXPEN) 100 UNIT/ML solution pen-injector sc pen Inject 30 Units under the skin into the appropriate area as directed 3 (Three) Times a Day With Meals. 30 mL 11   • Insulin Glargine (BASAGLAR KWIKPEN) 100 UNIT/ML injection pen Inject 100 Units under the skin into the appropriate area as directed Daily. 30 mL 11   • Insulin Pen Needle (B-D UF III MINI PEN NEEDLES) 31G X 5 MM misc Use 4 times daily  , ICD10 code is E11.9 120 each 11   • lamoTRIgine (LaMICtal) 100 MG tablet Take 1 tablet by mouth Every Night. 90 tablet 2   • levothyroxine (SYNTHROID, LEVOTHROID) 125 MCG tablet Take 1 tablet by mouth Daily. 90 tablet 3   • lisinopril (PRINIVIL,ZESTRIL) 40 MG tablet  TAKE 1 TABLET BY MOUTH EVERY DAY 90 tablet 3   • Multiple Vitamins-Minerals (VISION-KRISTAL PRESERVE PO) Take  by mouth Daily With Breakfast.     • NIFEdipine CC (ADALAT CC) 60 MG 24 hr tablet TAKE 1 TABLET BY MOUTH DAILY. 90 tablet 0   • omeprazole (priLOSEC) 40 MG capsule Take 20 mg by mouth Daily.     • rosuvastatin (CRESTOR) 10 MG tablet Take 1 tablet by mouth Every Night. 90 tablet 0     No current facility-administered medications for this visit.      No Known Allergies  Social History     Social History   • Marital status:      Social History Main Topics   • Smoking status: Former Smoker     Packs/day: 1.00     Years: 20.00     Types: Cigarettes     Quit date: 2015   • Smokeless tobacco: Never Used   • Alcohol use No   • Drug use: No   • Sexual activity: Defer     Other Topics Concern   • Not on file       Review of Systems  Review of Systems   Constitutional: Negative for activity change, appetite change, diaphoresis and fatigue.   HENT: Negative for facial swelling, sneezing, sore throat, tinnitus, trouble swallowing and voice change.    Eyes: Negative for photophobia, pain, discharge, redness, itching and visual disturbance.   Respiratory: Negative for apnea, cough, choking, chest tightness and shortness of breath.    Cardiovascular: Negative for chest pain, palpitations and leg swelling.   Gastrointestinal: Negative for abdominal distention, abdominal pain, constipation, diarrhea, nausea and vomiting.   Endocrine: Negative for cold intolerance, heat intolerance, polydipsia, polyphagia and polyuria.   Genitourinary: Negative for difficulty urinating, dysuria, frequency, hematuria and urgency.   Musculoskeletal: Negative for arthralgias, back pain, gait problem, joint swelling, myalgias, neck pain and neck stiffness.   Skin: Negative for color change, pallor, rash and wound.   Neurological: Negative for dizziness, tremors, weakness, light-headedness, numbness and headaches.   Hematological: Negative for  "adenopathy. Does not bruise/bleed easily.   Psychiatric/Behavioral: Negative for behavioral problems, confusion and sleep disturbance.        Objective    /70 (BP Location: Left arm, Patient Position: Sitting, Cuff Size: Adult)   Pulse 84   Ht 165.1 cm (65\")   Wt 109 kg (240 lb)   LMP  (LMP Unknown)   BMI 39.94 kg/m²   Physical Exam   Constitutional: She is oriented to person, place, and time. She appears well-developed and well-nourished. No distress.   HENT:   Head: Normocephalic and atraumatic.   Right Ear: External ear normal.   Left Ear: External ear normal.   Nose: Nose normal.   Eyes: Pupils are equal, round, and reactive to light. Conjunctivae and EOM are normal.   Neck: Normal range of motion. Neck supple. No tracheal deviation present. No thyromegaly present.   Cardiovascular: Normal rate, regular rhythm and normal heart sounds.    No murmur heard.  Pulmonary/Chest: Effort normal and breath sounds normal. No respiratory distress. She has no wheezes.   Abdominal: Soft. Bowel sounds are normal. There is no tenderness. There is no rebound and no guarding.   Musculoskeletal: Normal range of motion. She exhibits no edema, tenderness or deformity.   Neurological: She is alert and oriented to person, place, and time. No cranial nerve deficit.   Skin: Skin is warm and dry. No rash noted.   Psychiatric: She has a normal mood and affect. Her behavior is normal. Judgment and thought content normal.       Lab Review  Glucose (mg/dL)   Date Value   11/04/2016 231 (H)     Sodium (mmol/L)   Date Value   05/18/2018 141   04/18/2018 137   10/12/2017 141   11/04/2016 140     Potassium (mmol/L)   Date Value   05/18/2018 4.7   04/18/2018 3.8   10/12/2017 4.4   11/04/2016 4.2     Chloride (mmol/L)   Date Value   05/18/2018 101   04/18/2018 97 (L)   10/12/2017 101   11/04/2016 99     CO2 (mmol/L)   Date Value   11/04/2016 30.0     Total CO2 (mmol/L)   Date Value   05/18/2018 26.0   04/18/2018 25.0   10/12/2017 29.0 "     BUN (mg/dL)   Date Value   05/18/2018 16   04/18/2018 12   10/12/2017 11   11/04/2016 14     Creatinine (mg/dL)   Date Value   05/18/2018 0.98   04/18/2018 0.78   10/12/2017 0.91   11/04/2016 0.79     Hemoglobin A1C (%)   Date Value   04/18/2018 10.80   10/12/2017 7.20   06/05/2017 7.10     Triglycerides (mg/dL)   Date Value   04/18/2018 214 (H)   10/12/2017 197 (H)   06/05/2017 210 (H)     LDL Cholesterol  (mg/dL)   Date Value   04/18/2018 82   10/12/2017 73   06/05/2017 54       Assessment/Plan      1. Type 2 diabetes mellitus with hyperglycemia, with long-term current use of insulin (CMS/[facility])    2. Hypertension associated with diabetes (CMS/[facility])    3. Mixed diabetic hyperlipidemia associated with type 1 diabetes mellitus (CMS/[facility])    4. Acquired hypothyroidism    5. Vitamin D deficiency     .    Medications prescribed:  Outpatient Encounter Prescriptions as of 10/1/2018   Medication Sig Dispense Refill   • aspirin 81 MG EC tablet Take 81 mg by mouth Daily.     • budesonide-formoterol (SYMBICORT) 160-4.5 MCG/ACT inhaler Inhale 2 puffs 2 (Two) Times a Day. 1 inhaler 12   • bumetanide (BUMEX) 1 MG tablet TAKE 1 TABLET BY MOUTH DAILY. 90 tablet 3   • carbidopa-levodopa (SINEMET)  MG per tablet TAKE 1-2 TABLETS BY MOUTH AT BEDTIME 180 tablet 3   • cetirizine (ZyrTEC) 10 MG tablet Take 10 mg by mouth Daily.     • cholecalciferol (VITAMIN D3) 1000 units tablet Take 1,000 Units by mouth Daily.     • desvenlafaxine (PRISTIQ) 50 MG 24 hr tablet TAKE 1 TABLET BY MOUTH DAILY. 90 tablet 3   • Dulaglutide 0.75 MG/0.5ML solution pen-injector Inject 0.75 mg under the skin into the appropriate area as directed 1 (One) Time Per Week. 4 pen 11   • folic acid (FOLVITE) 400 MCG tablet Take 400 mcg by mouth Daily.     • insulin aspart (novoLOG FLEXPEN) 100 UNIT/ML solution pen-injector sc pen Inject 30 Units under the skin into the appropriate area as directed 3 (Three) Times a Day With Meals. 30 mL 11   • Insulin Glargine  (BASAGLAR KWIKPEN) 100 UNIT/ML injection pen Inject 100 Units under the skin into the appropriate area as directed Daily. 30 mL 11   • Insulin Pen Needle (B-D UF III MINI PEN NEEDLES) 31G X 5 MM misc Use 4 times daily  , ICD10 code is E11.9 120 each 11   • lamoTRIgine (LaMICtal) 100 MG tablet Take 1 tablet by mouth Every Night. 90 tablet 2   • levothyroxine (SYNTHROID, LEVOTHROID) 125 MCG tablet Take 1 tablet by mouth Daily. 90 tablet 3   • lisinopril (PRINIVIL,ZESTRIL) 40 MG tablet TAKE 1 TABLET BY MOUTH EVERY DAY 90 tablet 3   • Multiple Vitamins-Minerals (VISION-KRISTAL PRESERVE PO) Take  by mouth Daily With Breakfast.     • NIFEdipine CC (ADALAT CC) 60 MG 24 hr tablet TAKE 1 TABLET BY MOUTH DAILY. 90 tablet 0   • omeprazole (priLOSEC) 40 MG capsule Take 20 mg by mouth Daily.     • rosuvastatin (CRESTOR) 10 MG tablet Take 1 tablet by mouth Every Night. 90 tablet 0     No facility-administered encounter medications on file as of 10/1/2018.        Orders placed during this encounter include:  Orders Placed This Encounter   Procedures   • Comprehensive Metabolic Panel   • Hemoglobin A1c   • Lipid Panel   • Vitamin D 25 Hydroxy   • Vitamin B12   • Protein / Creatinine Ratio, Urine - Urine, Clean Catch   • Microalbumin / Creatinine Urine Ratio - Urine, Clean Catch   • TSH   • CBC & Differential     Order Specific Question:   Manual Differential     Answer:   No   Patient has type 2 diabetes with hyperglycemia     Glycemic Management:     Lab Results   Component Value Date    HGBA1C 10.80 04/18/2018            Levemir 50 - change to Toujeo Max ---- not covered by insurance     Once your bedtime is in the 100s you should wake up within 40 points of what you went to bed       Trulicity 0.75 mg weekly --- taking       Novolog  2 units per carb and 2 per 50         Ann-Marie downloaded and reviewed     Dated from Sept 17 - October 1, 2018     Average was 180    The last week average 136     No low sugar    Keep current regimen                 Lipid Management          Lab Results   Component Value Date     TRIG 214 (H) 04/18/2018     TRIG 197 (H) 10/12/2017     TRIG 210 (H) 06/05/2017            Lab Results   Component Value Date     HDL 51 04/18/2018     HDL 44 (L) 10/12/2017     HDL 41 (L) 06/05/2017              Lab Results   Component Value Date     LDL 82 04/18/2018     LDL 73 10/12/2017     LDL 54 06/05/2017           On crestor      Blood Pressure Management:            On lisinopril            Microvascular Complication Monitoring:       Eye Exam Evaluation  Within 1 year   -----------     Last Microalbumin-Proteinuria Assessment     No results found for: MALBCRERATIO     No results found for: UTPCR     -----------        Neuropathy, none            Weight Related:       Wt Readings from Last 3 Encounters:   09/17/18 109 kg (240 lb 9.6 oz)   08/16/18 109 kg (240 lb)   08/10/18 109 kg (240 lb)      Body mass index is 40.04 kg/m².           Diet interventions: moderate (500 kCal/d) deficit diet.        Bone Health           Lab Results   Component Value Date     CALCIUM 9.8 05/18/2018     OXNE11FH 32.3 04/18/2018         Thyroid Health           Lab Results   Component Value Date     TSH 4.710 (H) 04/18/2018     TSH 0.553 06/05/2017     TSH 4.330 03/02/2017       on levothyroxine 125 mcgs daily         Other Diabetes Related Aspects         No results found for: [id number]             4. Follow-up: Return in about 3 months (around 1/1/2019) for Recheck.

## 2018-10-10 RX ORDER — GLYBURIDE-METFORMIN HYDROCHLORIDE 5; 500 MG/1; MG/1
TABLET ORAL
Qty: 360 TABLET | Refills: 1 | OUTPATIENT
Start: 2018-10-10

## 2018-10-22 RX ORDER — LEVOTHYROXINE SODIUM 0.12 MG/1
125 TABLET ORAL DAILY
Qty: 90 TABLET | Refills: 1 | Status: SHIPPED | OUTPATIENT
Start: 2018-10-22 | End: 2019-05-31 | Stop reason: SDUPTHER

## 2018-11-26 RX ORDER — ROSUVASTATIN CALCIUM 10 MG/1
TABLET, COATED ORAL
Qty: 90 TABLET | Refills: 1 | Status: SHIPPED | OUTPATIENT
Start: 2018-11-26 | End: 2019-05-23 | Stop reason: SDUPTHER

## 2019-02-04 ENCOUNTER — OFFICE VISIT (OUTPATIENT)
Dept: ENDOCRINOLOGY | Facility: CLINIC | Age: 64
End: 2019-02-04

## 2019-02-04 VITALS
HEIGHT: 65 IN | DIASTOLIC BLOOD PRESSURE: 72 MMHG | BODY MASS INDEX: 42.49 KG/M2 | WEIGHT: 255 LBS | SYSTOLIC BLOOD PRESSURE: 126 MMHG

## 2019-02-04 DIAGNOSIS — E78.2 MIXED DIABETIC HYPERLIPIDEMIA ASSOCIATED WITH TYPE 2 DIABETES MELLITUS (HCC): ICD-10-CM

## 2019-02-04 DIAGNOSIS — E11.59 HYPERTENSION ASSOCIATED WITH DIABETES (HCC): ICD-10-CM

## 2019-02-04 DIAGNOSIS — E11.65 TYPE 2 DIABETES MELLITUS WITH HYPERGLYCEMIA, WITH LONG-TERM CURRENT USE OF INSULIN (HCC): Primary | ICD-10-CM

## 2019-02-04 DIAGNOSIS — E03.9 ACQUIRED HYPOTHYROIDISM: ICD-10-CM

## 2019-02-04 DIAGNOSIS — E11.69 MIXED DIABETIC HYPERLIPIDEMIA ASSOCIATED WITH TYPE 2 DIABETES MELLITUS (HCC): ICD-10-CM

## 2019-02-04 DIAGNOSIS — I15.2 HYPERTENSION ASSOCIATED WITH DIABETES (HCC): ICD-10-CM

## 2019-02-04 DIAGNOSIS — Z79.4 TYPE 2 DIABETES MELLITUS WITH HYPERGLYCEMIA, WITH LONG-TERM CURRENT USE OF INSULIN (HCC): Primary | ICD-10-CM

## 2019-02-04 DIAGNOSIS — E55.9 VITAMIN D DEFICIENCY: ICD-10-CM

## 2019-02-04 LAB
GLUCOSE BLDC GLUCOMTR-MCNC: 130 MG/DL (ref 70–130)
HBA1C MFR BLD: 5.8 %

## 2019-02-04 PROCEDURE — 99214 OFFICE O/P EST MOD 30 MIN: CPT | Performed by: INTERNAL MEDICINE

## 2019-02-04 PROCEDURE — 83036 HEMOGLOBIN GLYCOSYLATED A1C: CPT | Performed by: INTERNAL MEDICINE

## 2019-02-04 PROCEDURE — 82962 GLUCOSE BLOOD TEST: CPT | Performed by: INTERNAL MEDICINE

## 2019-02-04 RX ORDER — INSULIN GLARGINE 100 [IU]/ML
50 INJECTION, SOLUTION SUBCUTANEOUS DAILY
Qty: 5 PEN | Refills: 11 | Status: SHIPPED | OUTPATIENT
Start: 2019-02-04 | End: 2020-03-30 | Stop reason: SDUPTHER

## 2019-02-04 NOTE — PROGRESS NOTES
"  Subjective    Maribeth Isbell is a 63 y.o. female. she is here today for follow-up.    Diabetes   Pertinent negatives for hypoglycemia include no confusion, dizziness, headaches, pallor or tremors. Pertinent negatives for diabetes include no chest pain, no fatigue, no polydipsia, no polyphagia, no polyuria and no weakness.          Primary Care Provider     Jer Mccray MD     Duration 5 years     Timing - Diabetes is Constant     Quality -  now at [facility]al      Severity -  moderate     Complications - TIA     Current symptoms/problems  increase appetite      Alleviating Factors: Compliance       Aggravating Factors :  None     Side Effects  metformin and sulfonylurea     Current diet  in general, a \"healthy\" diet       Current exercise none     Current monitoring regimen: home blood tests - checking 4 x daily     Lab Results   Component Value Date    HGBA1C 5.8 2019       Home blood sugar records:     At goal      Hypoglycemia none         The following portions of the patient's history were reviewed and updated as appropriate:   Past Medical History:   Diagnosis Date   • Bell's palsy    • Cataract    • Colon polyps    • Depression    • Diabetes mellitus (CMS/HCC)     type 2   • E coli infection    • Gastroesophageal reflux disease 2018   • Headache    • Hyperlipidemia    • Hypertension    • Hyperthyroidism    • Hypothyroidism    • Macular degeneration    • Sleep apnea     wears cpap   • Tachycardia    • Visual impairment      Past Surgical History:   Procedure Laterality Date   •  SECTION     • COLONOSCOPY  06/10/2011    SCREENING-[facility]-DR DARLING   • COLONOSCOPY N/A 2016    Procedure: COLONOSCOPY WITH ANESTHESIA;  Surgeon: Kathy Berry MD;  Location: [facility] ENDOSCOPY;  Service:    • ENDOSCOPY N/A 2016    Procedure: ESOPHAGOGASTRODUODENOSCOPY WITH ANESTHESIA;  Surgeon: Kathy Berry MD;  Location: [facility] ENDOSCOPY;  Service:    • ENDOSCOPY N/A 2018    Procedure: " ESOPHAGOGASTRODUODENOSCOPY WITH ANESTHESIA;  Surgeon: Kathy Berry MD;  Location: [facility] ENDOSCOPY;  Service: Gastroenterology   • HYSTERECTOMY     • KNEE SURGERY Left    • TUMOR REMOVAL       Family History   Problem Relation Age of Onset   • Heart disease Mother    • Diabetes Mother    • No Known Problems Maternal Grandmother    • No Known Problems Maternal Grandfather    • No Known Problems Paternal Grandmother    • No Known Problems Paternal Grandfather    • Colon cancer Neg Hx    • Colon polyps Neg Hx      OB History     No data available        Current Outpatient Medications   Medication Sig Dispense Refill   • aspirin 81 MG EC tablet Take 81 mg by mouth Daily.     • budesonide-formoterol (SYMBICORT) 160-4.5 MCG/ACT inhaler Inhale 2 puffs 2 (Two) Times a Day. 1 inhaler 12   • bumetanide (BUMEX) 1 MG tablet TAKE 1 TABLET BY MOUTH DAILY. 90 tablet 3   • carbidopa-levodopa (SINEMET)  MG per tablet TAKE 1-2 TABLETS BY MOUTH AT BEDTIME 180 tablet 3   • cetirizine (ZyrTEC) 10 MG tablet Take 10 mg by mouth Daily.     • cholecalciferol (VITAMIN D3) 1000 units tablet Take 1,000 Units by mouth Daily.     • desvenlafaxine (PRISTIQ) 50 MG 24 hr tablet TAKE 1 TABLET BY MOUTH DAILY. 90 tablet 3   • Dulaglutide 0.75 MG/0.5ML solution pen-injector Inject 0.75 mg under the skin into the appropriate area as directed 1 (One) Time Per Week. 4 pen 11   • folic acid (FOLVITE) 400 MCG tablet Take 400 mcg by mouth Daily.     • insulin aspart (novoLOG FLEXPEN) 100 UNIT/ML solution pen-injector sc pen Inject 30 Units under the skin into the appropriate area as directed 3 (Three) Times a Day With Meals. 30 mL 11   • Insulin Glargine (BASAGLAR KWIKPEN) 100 UNIT/ML injection pen Inject 50 Units under the skin into the appropriate area as directed Daily. 5 pen 11   • Insulin Pen Needle (B-D UF III MINI PEN NEEDLES) 31G X 5 MM misc Use 4 times daily  , ICD10 code is E11.9 120 each 11   • lamoTRIgine (LaMICtal) 100 MG tablet Take 1  tablet by mouth Every Night. 90 tablet 2   • levothyroxine (SYNTHROID, LEVOTHROID) 125 MCG tablet Take 1 tablet by mouth Daily. 90 tablet 1   • lisinopril (PRINIVIL,ZESTRIL) 40 MG tablet TAKE 1 TABLET BY MOUTH EVERY DAY 90 tablet 3   • Multiple Vitamins-Minerals (VISION-KRISTAL PRESERVE PO) Take  by mouth Daily With Breakfast.     • NIFEdipine CC (ADALAT CC) 60 MG 24 hr tablet TAKE 1 TABLET BY MOUTH DAILY. 90 tablet 0   • omeprazole (priLOSEC) 40 MG capsule Take 20 mg by mouth Daily.     • rosuvastatin (CRESTOR) 10 MG tablet TAKE 1 TABLET BY MOUTH EVERY DAY AT NIGHT 90 tablet 1     No current facility-administered medications for this visit.      No Known Allergies  Social History     Socioeconomic History   • Marital status:      Spouse name: Not on file   • Number of children: Not on file   • Years of education: Not on file   • Highest education level: Not on file   Tobacco Use   • Smoking status: Former Smoker     Packs/day: 1.00     Years: 20.00     Pack years: 20.00     Types: Cigarettes     Last attempt to quit: 2015     Years since quittin.0   • Smokeless tobacco: Never Used   Substance and Sexual Activity   • Alcohol use: No   • Drug use: No   • Sexual activity: Defer       Review of Systems  Review of Systems   Constitutional: Negative for activity change, appetite change, diaphoresis and fatigue.   HENT: Negative for facial swelling, sneezing, sore throat, tinnitus, trouble swallowing and voice change.    Eyes: Negative for photophobia, pain, discharge, redness, itching and visual disturbance.   Respiratory: Negative for apnea, cough, choking, chest tightness and shortness of breath.    Cardiovascular: Negative for chest pain, palpitations and leg swelling.   Gastrointestinal: Negative for abdominal distention, abdominal pain, constipation, diarrhea, nausea and vomiting.   Endocrine: Negative for cold intolerance, heat intolerance, polydipsia, polyphagia and polyuria.   Genitourinary: Negative for  "difficulty urinating, dysuria, frequency, hematuria and urgency.   Musculoskeletal: Negative for arthralgias, back pain, gait problem, joint swelling, myalgias, neck pain and neck stiffness.   Skin: Negative for color change, pallor, rash and wound.   Neurological: Negative for dizziness, tremors, weakness, light-headedness, numbness and headaches.   Hematological: Negative for adenopathy. Does not bruise/bleed easily.   Psychiatric/Behavioral: Negative for behavioral problems, confusion and sleep disturbance.        Objective    /72   Ht 165.1 cm (65\")   Wt 116 kg (255 lb)   LMP  (LMP Unknown)   BMI 42.43 kg/m²   Physical Exam   Constitutional: She is oriented to person, place, and time. She appears well-developed and well-nourished. No distress.   HENT:   Head: Normocephalic and atraumatic.   Right Ear: External ear normal.   Left Ear: External ear normal.   Nose: Nose normal.   Eyes: Conjunctivae and EOM are normal. Pupils are equal, round, and reactive to light.   Neck: Normal range of motion. Neck supple. No tracheal deviation present. No thyromegaly present.   Cardiovascular: Normal rate, regular rhythm and normal heart sounds.   No murmur heard.  Pulmonary/Chest: Effort normal and breath sounds normal. No respiratory distress. She has no wheezes.   Abdominal: Soft. Bowel sounds are normal. There is no tenderness. There is no rebound and no guarding.   Musculoskeletal: Normal range of motion. She exhibits no edema, tenderness or deformity.   Neurological: She is alert and oriented to person, place, and time. No cranial nerve deficit.   Skin: Skin is warm and dry. No rash noted.   Psychiatric: She has a normal mood and affect. Her behavior is normal. Judgment and thought content normal.       Lab Review  Glucose (mg/dL)   Date Value   11/04/2016 231 (H)     Sodium (mmol/L)   Date Value   05/18/2018 141   04/18/2018 137   10/12/2017 141   11/04/2016 140     Potassium (mmol/L)   Date Value   05/18/2018 " 4.7   04/18/2018 3.8   10/12/2017 4.4   11/04/2016 4.2     Chloride (mmol/L)   Date Value   05/18/2018 101   04/18/2018 97 (L)   10/12/2017 101   11/04/2016 99     CO2 (mmol/L)   Date Value   11/04/2016 30.0     Total CO2 (mmol/L)   Date Value   05/18/2018 26.0   04/18/2018 25.0   10/12/2017 29.0     BUN (mg/dL)   Date Value   05/18/2018 16   04/18/2018 12   10/12/2017 11   11/04/2016 14     Creatinine (mg/dL)   Date Value   05/18/2018 0.98   04/18/2018 0.78   10/12/2017 0.91   11/04/2016 0.79     Hemoglobin A1C (%)   Date Value   02/04/2019 5.8   04/18/2018 10.80   10/12/2017 7.20   06/05/2017 7.10     Triglycerides (mg/dL)   Date Value   04/18/2018 214 (H)   10/12/2017 197 (H)   06/05/2017 210 (H)     LDL Cholesterol  (mg/dL)   Date Value   04/18/2018 82   10/12/2017 73   06/05/2017 54       Assessment/Plan      1. Type 2 diabetes mellitus with hyperglycemia, with long-term current use of insulin (CMS/[facility])    2. Hypertension associated with diabetes (CMS/[facility])    3. Mixed diabetic hyperlipidemia associated with type 2 diabetes mellitus (CMS/[facility])    4. Vitamin D deficiency     5. Acquired hypothyroidism    .    Medications prescribed:  Outpatient Encounter Medications as of 2/4/2019   Medication Sig Dispense Refill   • aspirin 81 MG EC tablet Take 81 mg by mouth Daily.     • budesonide-formoterol (SYMBICORT) 160-4.5 MCG/ACT inhaler Inhale 2 puffs 2 (Two) Times a Day. 1 inhaler 12   • bumetanide (BUMEX) 1 MG tablet TAKE 1 TABLET BY MOUTH DAILY. 90 tablet 3   • carbidopa-levodopa (SINEMET)  MG per tablet TAKE 1-2 TABLETS BY MOUTH AT BEDTIME 180 tablet 3   • cetirizine (ZyrTEC) 10 MG tablet Take 10 mg by mouth Daily.     • cholecalciferol (VITAMIN D3) 1000 units tablet Take 1,000 Units by mouth Daily.     • desvenlafaxine (PRISTIQ) 50 MG 24 hr tablet TAKE 1 TABLET BY MOUTH DAILY. 90 tablet 3   • Dulaglutide 0.75 MG/0.5ML solution pen-injector Inject 0.75 mg under the skin into the appropriate area as directed 1  (One) Time Per Week. 4 pen 11   • folic acid (FOLVITE) 400 MCG tablet Take 400 mcg by mouth Daily.     • insulin aspart (novoLOG FLEXPEN) 100 UNIT/ML solution pen-injector sc pen Inject 30 Units under the skin into the appropriate area as directed 3 (Three) Times a Day With Meals. 30 mL 11   • Insulin Glargine (BASAGLAR KWIKPEN) 100 UNIT/ML injection pen Inject 50 Units under the skin into the appropriate area as directed Daily. 5 pen 11   • Insulin Pen Needle (B-D UF III MINI PEN NEEDLES) 31G X 5 MM misc Use 4 times daily  , ICD10 code is E11.9 120 each 11   • lamoTRIgine (LaMICtal) 100 MG tablet Take 1 tablet by mouth Every Night. 90 tablet 2   • levothyroxine (SYNTHROID, LEVOTHROID) 125 MCG tablet Take 1 tablet by mouth Daily. 90 tablet 1   • lisinopril (PRINIVIL,ZESTRIL) 40 MG tablet TAKE 1 TABLET BY MOUTH EVERY DAY 90 tablet 3   • Multiple Vitamins-Minerals (VISION-KRISTAL PRESERVE PO) Take  by mouth Daily With Breakfast.     • NIFEdipine CC (ADALAT CC) 60 MG 24 hr tablet TAKE 1 TABLET BY MOUTH DAILY. 90 tablet 0   • omeprazole (priLOSEC) 40 MG capsule Take 20 mg by mouth Daily.     • rosuvastatin (CRESTOR) 10 MG tablet TAKE 1 TABLET BY MOUTH EVERY DAY AT NIGHT 90 tablet 1   • [DISCONTINUED] Insulin Glargine (BASAGLAR KWIKPEN) 100 UNIT/ML injection pen Inject 100 Units under the skin into the appropriate area as directed Daily. 30 mL 11     No facility-administered encounter medications on file as of 2/4/2019.        Orders placed during this encounter include:  Orders Placed This Encounter   Procedures   • POC Glucose   • POC Glycosylated Hemoglobin (Hb A1C)   Patient has type 2 diabetes with hyperglycemia     Glycemic Management:     Lab Results   Component Value Date    HGBA1C 5.8 02/04/2019    HGBA1C 10.80 04/18/2018    HGBA1C 7.20 10/12/2017     Lab Results   Component Value Date    MICROALBUR 106.0 04/18/2018    CREATININE 0.98 05/18/2018            Basaglar 50 - ( toujeo not covered )  This is holding  glucose steady while not eating        Trulicity 0.75 mg weekly --- taking       Novolog doing 20 units with meals, if low carb 15 units         Ann-Marie hurts so not using              Lipid Management    Lab Results   Component Value Date    CHLPL 176 04/18/2018    CHLPL 156 10/12/2017    CHLPL 137 06/05/2017     Lab Results   Component Value Date    TRIG 214 (H) 04/18/2018    TRIG 197 (H) 10/12/2017    TRIG 210 (H) 06/05/2017     Lab Results   Component Value Date    HDL 51 04/18/2018    HDL 44 (L) 10/12/2017    HDL 41 (L) 06/05/2017     Lab Results   Component Value Date    LDL 82 04/18/2018    LDL 73 10/12/2017    LDL 54 06/05/2017          On crestor 10 mg qhs       Blood Pressure Management:            On lisinopril            Microvascular Complication Monitoring:       Eye Exam Evaluation  Within 1 year   -----------   No results found for: MALBCRERATIO      -----------        Neuropathy, none            Weight Related:        Diet interventions: moderate (500 kCal/d) deficit diet.        Bone Health     Lab Results   Component Value Date    WGQZ36MN 32.3 04/18/2018    TGKQ56SF 34.3 06/05/2017    [id number] 29.8 (L) 03/02/2017          Thyroid Health     Lab Results   Component Value Date    TSH 4.710 (H) 04/18/2018    TSH 0.553 06/05/2017    TSH 4.330 03/02/2017        on levothyroxine 125 mcgs daily       No results found for: [id number]             4. Follow-up: No Follow-up on file.

## 2019-03-20 RX ORDER — ROSUVASTATIN CALCIUM 10 MG/1
10 TABLET, COATED ORAL DAILY
Status: ON HOLD | COMMUNITY
End: 2020-09-10

## 2019-03-20 RX ORDER — DESVENLAFAXINE SUCCINATE 50 MG/1
50 TABLET, EXTENDED RELEASE ORAL DAILY
Status: ON HOLD | COMMUNITY
End: 2020-09-10

## 2019-03-20 RX ORDER — CETIRIZINE HYDROCHLORIDE 10 MG/1
10 TABLET ORAL DAILY
COMMUNITY

## 2019-03-20 RX ORDER — GLYBURIDE-METFORMIN HYDROCHLORIDE 5; 500 MG/1; MG/1
1 TABLET ORAL 2 TIMES DAILY WITH MEALS
COMMUNITY
End: 2020-08-21 | Stop reason: SINTOL

## 2019-03-20 RX ORDER — BUMETANIDE 1 MG/1
1 TABLET ORAL DAILY
COMMUNITY
End: 2021-08-31 | Stop reason: SDUPTHER

## 2019-03-20 RX ORDER — LISINOPRIL 40 MG/1
40 TABLET ORAL DAILY
Status: ON HOLD | COMMUNITY
End: 2020-09-10

## 2019-03-20 RX ORDER — CARBIDOPA/LEVODOPA 25MG-250MG
1 TABLET ORAL 3 TIMES DAILY
Status: ON HOLD | COMMUNITY
End: 2020-09-10

## 2019-03-20 RX ORDER — LANOLIN ALCOHOL/MO/W.PET/CERES
400 CREAM (GRAM) TOPICAL DAILY
COMMUNITY

## 2019-03-20 RX ORDER — RANITIDINE 150 MG/1
150 TABLET ORAL 2 TIMES DAILY
COMMUNITY
End: 2020-08-21

## 2019-03-20 RX ORDER — NIFEDIPINE 60 MG/1
60 TABLET, FILM COATED, EXTENDED RELEASE ORAL DAILY
Status: ON HOLD | COMMUNITY
End: 2020-09-10

## 2019-03-20 RX ORDER — M-VIT,TX,IRON,MINS/CALC/FOLIC 27MG-0.4MG
1 TABLET ORAL DAILY
COMMUNITY

## 2019-05-09 RX ORDER — CARBIDOPA/LEVODOPA 25MG-250MG
TABLET ORAL
Qty: 180 TABLET | Refills: 0 | Status: SHIPPED | OUTPATIENT
Start: 2019-05-09 | End: 2019-05-31 | Stop reason: SDUPTHER

## 2019-05-23 RX ORDER — ROSUVASTATIN CALCIUM 10 MG/1
TABLET, COATED ORAL
Qty: 90 TABLET | Refills: 0 | Status: SHIPPED | OUTPATIENT
Start: 2019-05-23 | End: 2019-05-31 | Stop reason: SDUPTHER

## 2019-05-23 RX ORDER — BUMETANIDE 1 MG/1
TABLET ORAL
Qty: 90 TABLET | Refills: 0 | Status: SHIPPED | OUTPATIENT
Start: 2019-05-23 | End: 2019-05-31 | Stop reason: SDUPTHER

## 2019-05-31 ENCOUNTER — OFFICE VISIT (OUTPATIENT)
Dept: FAMILY MEDICINE CLINIC | Facility: CLINIC | Age: 64
End: 2019-05-31

## 2019-05-31 VITALS
WEIGHT: 261.4 LBS | BODY MASS INDEX: 43.55 KG/M2 | OXYGEN SATURATION: 95 % | DIASTOLIC BLOOD PRESSURE: 84 MMHG | HEIGHT: 65 IN | HEART RATE: 80 BPM | SYSTOLIC BLOOD PRESSURE: 126 MMHG

## 2019-05-31 DIAGNOSIS — E55.9 VITAMIN D DEFICIENCY: ICD-10-CM

## 2019-05-31 DIAGNOSIS — I15.2 HYPERTENSION ASSOCIATED WITH DIABETES (HCC): ICD-10-CM

## 2019-05-31 DIAGNOSIS — J30.2 SEASONAL ALLERGIES: ICD-10-CM

## 2019-05-31 DIAGNOSIS — G25.81 RLS (RESTLESS LEGS SYNDROME): ICD-10-CM

## 2019-05-31 DIAGNOSIS — R60.9 EDEMA, UNSPECIFIED TYPE: ICD-10-CM

## 2019-05-31 DIAGNOSIS — E11.65 TYPE 2 DIABETES MELLITUS WITH HYPERGLYCEMIA, WITH LONG-TERM CURRENT USE OF INSULIN (HCC): ICD-10-CM

## 2019-05-31 DIAGNOSIS — Z00.00 ANNUAL PHYSICAL EXAM: Primary | ICD-10-CM

## 2019-05-31 DIAGNOSIS — E03.9 ACQUIRED HYPOTHYROIDISM: ICD-10-CM

## 2019-05-31 DIAGNOSIS — E11.59 HYPERTENSION ASSOCIATED WITH DIABETES (HCC): ICD-10-CM

## 2019-05-31 DIAGNOSIS — K21.9 GASTROESOPHAGEAL REFLUX DISEASE WITHOUT ESOPHAGITIS: ICD-10-CM

## 2019-05-31 DIAGNOSIS — F33.1 MODERATE EPISODE OF RECURRENT MAJOR DEPRESSIVE DISORDER (HCC): ICD-10-CM

## 2019-05-31 DIAGNOSIS — E10.69 MIXED DIABETIC HYPERLIPIDEMIA ASSOCIATED WITH TYPE 1 DIABETES MELLITUS (HCC): ICD-10-CM

## 2019-05-31 DIAGNOSIS — E78.2 MIXED DIABETIC HYPERLIPIDEMIA ASSOCIATED WITH TYPE 1 DIABETES MELLITUS (HCC): ICD-10-CM

## 2019-05-31 DIAGNOSIS — Z79.4 TYPE 2 DIABETES MELLITUS WITH HYPERGLYCEMIA, WITH LONG-TERM CURRENT USE OF INSULIN (HCC): ICD-10-CM

## 2019-05-31 DIAGNOSIS — Z12.31 SCREENING MAMMOGRAM, ENCOUNTER FOR: ICD-10-CM

## 2019-05-31 LAB
BILIRUB BLD-MCNC: NEGATIVE MG/DL
CLARITY, POC: CLEAR
COLOR UR: YELLOW
GLUCOSE UR STRIP-MCNC: NEGATIVE MG/DL
KETONES UR QL: NEGATIVE
LEUKOCYTE EST, POC: NEGATIVE
NITRITE UR-MCNC: NEGATIVE MG/ML
PH UR: 6 [PH] (ref 5–8)
PROT UR STRIP-MCNC: ABNORMAL MG/DL
RBC # UR STRIP: NEGATIVE /UL
SP GR UR: 1.02 (ref 1–1.03)
UROBILINOGEN UR QL: NORMAL

## 2019-05-31 PROCEDURE — 99396 PREV VISIT EST AGE 40-64: CPT | Performed by: NURSE PRACTITIONER

## 2019-05-31 PROCEDURE — 81003 URINALYSIS AUTO W/O SCOPE: CPT | Performed by: NURSE PRACTITIONER

## 2019-05-31 RX ORDER — CARBIDOPA/LEVODOPA 25MG-250MG
1-2 TABLET ORAL
Qty: 180 TABLET | Refills: 0 | Status: SHIPPED | OUTPATIENT
Start: 2019-05-31 | End: 2019-11-06 | Stop reason: SDUPTHER

## 2019-05-31 RX ORDER — ERGOCALCIFEROL 1.25 MG/1
50000 CAPSULE ORAL WEEKLY
Qty: 12 CAPSULE | Refills: 1 | Status: SHIPPED | OUTPATIENT
Start: 2019-05-31 | End: 2019-05-31 | Stop reason: SDUPTHER

## 2019-05-31 RX ORDER — CETIRIZINE HYDROCHLORIDE 10 MG/1
10 TABLET ORAL DAILY
Qty: 90 TABLET | Refills: 1 | Status: SHIPPED | OUTPATIENT
Start: 2019-05-31

## 2019-05-31 RX ORDER — ERGOCALCIFEROL 1.25 MG/1
50000 CAPSULE ORAL WEEKLY
Qty: 12 CAPSULE | Refills: 1 | Status: SHIPPED | OUTPATIENT
Start: 2019-05-31 | End: 2020-05-19

## 2019-05-31 RX ORDER — LISINOPRIL 40 MG/1
40 TABLET ORAL DAILY
Qty: 90 TABLET | Refills: 1 | Status: SHIPPED | OUTPATIENT
Start: 2019-05-31 | End: 2020-01-10

## 2019-05-31 RX ORDER — LEVOTHYROXINE SODIUM 0.12 MG/1
125 TABLET ORAL DAILY
Qty: 90 TABLET | Refills: 1 | Status: SHIPPED | OUTPATIENT
Start: 2019-05-31 | End: 2020-01-10

## 2019-05-31 RX ORDER — BUDESONIDE AND FORMOTEROL FUMARATE DIHYDRATE 160; 4.5 UG/1; UG/1
2 AEROSOL RESPIRATORY (INHALATION)
Qty: 1 INHALER | Refills: 12 | Status: SHIPPED | OUTPATIENT
Start: 2019-05-31 | End: 2020-06-08

## 2019-05-31 RX ORDER — ROSUVASTATIN CALCIUM 10 MG/1
10 TABLET, COATED ORAL
Qty: 90 TABLET | Refills: 1 | Status: SHIPPED | OUTPATIENT
Start: 2019-05-31 | End: 2019-12-16

## 2019-05-31 RX ORDER — DESVENLAFAXINE SUCCINATE 50 MG/1
50 TABLET, EXTENDED RELEASE ORAL DAILY
Qty: 90 TABLET | Refills: 3 | Status: SHIPPED | OUTPATIENT
Start: 2019-05-31 | End: 2020-08-04

## 2019-05-31 RX ORDER — NIFEDIPINE 60 MG/1
60 TABLET, FILM COATED, EXTENDED RELEASE ORAL DAILY
Qty: 90 TABLET | Refills: 0 | Status: SHIPPED | OUTPATIENT
Start: 2019-05-31 | End: 2019-10-14 | Stop reason: SDUPTHER

## 2019-05-31 RX ORDER — OMEPRAZOLE 40 MG/1
40 CAPSULE, DELAYED RELEASE ORAL DAILY
Qty: 90 CAPSULE | Refills: 1 | Status: SHIPPED | OUTPATIENT
Start: 2019-05-31 | End: 2020-10-27 | Stop reason: SDUPTHER

## 2019-05-31 RX ORDER — LAMOTRIGINE 100 MG/1
100 TABLET ORAL NIGHTLY
Qty: 90 TABLET | Refills: 2 | Status: SHIPPED | OUTPATIENT
Start: 2019-05-31 | End: 2020-04-16

## 2019-05-31 RX ORDER — BUMETANIDE 1 MG/1
1 TABLET ORAL DAILY
Qty: 90 TABLET | Refills: 1 | Status: SHIPPED | OUTPATIENT
Start: 2019-05-31 | End: 2020-07-13

## 2019-05-31 NOTE — PROGRESS NOTES
Chief Complaint   Patient presents with   • Annual Exam     Fasting, Diabetic foot exam     Allergies   Allergen Reactions   • Metformin GI Intolerance       HPI: Maribeth Isbell is a 63 y.o. female presents today for an annual exam.  Has not had any hospitalizations, denies any falls, no suicidal or homicidal ideas.  Does admit to having depression every day. Denies chest pain, shortness of breath or headache.  Has a living will, says we have a copy but I can not find it. Says that she is having some back pain, denies injury.     Chronic Problems:  Seasonal allergies, edema stable with bumex, type 2 diabetes stable with basaglar and novolog.    Past Medical History:   Diagnosis Date   • Bell's palsy    • Cataract    • Colon polyps    • Depression    • Diabetes mellitus (CMS/HCC)     type 2   • E coli infection    • Gastroesophageal reflux disease 2018   • Headache    • Hyperlipidemia    • Hypertension    • Hyperthyroidism    • Hypothyroidism    • Macular degeneration    • Sleep apnea     wears cpap   • Tachycardia    • Visual impairment      Past Surgical History:   Procedure Laterality Date   •  SECTION     • COLONOSCOPY  06/10/2011    SCREENING-[facility] [place]-DR DARLING   • COLONOSCOPY N/A 2016    Procedure: COLONOSCOPY WITH ANESTHESIA;  Surgeon: Kathy Berry MD;  Location: [facility] ENDOSCOPY;  Service:    • ENDOSCOPY N/A 2016    Procedure: ESOPHAGOGASTRODUODENOSCOPY WITH ANESTHESIA;  Surgeon: Kathy Berry MD;  Location: [facility] ENDOSCOPY;  Service:    • ENDOSCOPY N/A 2018    Procedure: ESOPHAGOGASTRODUODENOSCOPY WITH ANESTHESIA;  Surgeon: Kathy Berry MD;  Location: [facility] ENDOSCOPY;  Service: Gastroenterology   • HYSTERECTOMY     • KNEE SURGERY Left    • TUMOR REMOVAL       Social History     Socioeconomic History   • Marital status:      Spouse name: Not on file   • Number of children: Not on file   • Years of education: Not on file   • Highest education level: Not on file    Tobacco Use   • Smoking status: Former Smoker     Packs/day: 1.00     Years: 20.00     Pack years: 20.00     Types: Cigarettes     Last attempt to quit: 2015     Years since quittin.4   • Smokeless tobacco: Never Used   Substance and Sexual Activity   • Alcohol use: No   • Drug use: No   • Sexual activity: Defer     Family History   Problem Relation Age of Onset   • Heart disease Mother    • Diabetes Mother    • No Known Problems Maternal Grandmother    • No Known Problems Maternal Grandfather    • No Known Problems Paternal Grandmother    • No Known Problems Paternal Grandfather    • Colon cancer Neg Hx    • Colon polyps Neg Hx        Current Outpatient Medications on File Prior to Visit   Medication Sig Dispense Refill   • aspirin 81 MG EC tablet Take 81 mg by mouth Daily.     • budesonide-formoterol (SYMBICORT) 160-4.5 MCG/ACT inhaler Inhale 2 puffs 2 (Two) Times a Day. 1 inhaler 12   • bumetanide (BUMEX) 1 MG tablet TAKE 1 TABLET BY MOUTH DAILY. 90 tablet 0   • carbidopa-levodopa (SINEMET)  MG per tablet TAKE 1-2 TABLETS BY MOUTH AT BEDTIME 180 tablet 0   • cetirizine (ZyrTEC) 10 MG tablet Take 10 mg by mouth Daily.     • cholecalciferol (VITAMIN D3) 1000 units tablet Take 1,000 Units by mouth Daily.     • desvenlafaxine (PRISTIQ) 50 MG 24 hr tablet TAKE 1 TABLET BY MOUTH DAILY. 90 tablet 3   • Dulaglutide 0.75 MG/0.5ML solution pen-injector Inject 0.75 mg under the skin into the appropriate area as directed 1 (One) Time Per Week. 4 pen 11   • folic acid (FOLVITE) 400 MCG tablet Take 400 mcg by mouth Daily.     • insulin aspart (novoLOG FLEXPEN) 100 UNIT/ML solution pen-injector sc pen Inject 30 Units under the skin into the appropriate area as directed 3 (Three) Times a Day With Meals. 30 mL 11   • Insulin Glargine (BASAGLAR KWIKPEN) 100 UNIT/ML injection pen Inject 50 Units under the skin into the appropriate area as directed Daily. 5 pen 11   • Insulin Pen Needle (B-D UF III MINI PEN NEEDLES) 31G  "X 5 MM misc Use 4 times daily  , ICD10 code is E11.9 120 each 11   • lamoTRIgine (LaMICtal) 100 MG tablet Take 1 tablet by mouth Every Night. 90 tablet 2   • levothyroxine (SYNTHROID, LEVOTHROID) 125 MCG tablet Take 1 tablet by mouth Daily. 90 tablet 1   • lisinopril (PRINIVIL,ZESTRIL) 40 MG tablet TAKE 1 TABLET BY MOUTH EVERY DAY 90 tablet 3   • Multiple Vitamins-Minerals (VISION-KRISTAL PRESERVE PO) Take  by mouth Daily With Breakfast.     • NIFEdipine CC (ADALAT CC) 60 MG 24 hr tablet TAKE 1 TABLET BY MOUTH DAILY. 90 tablet 0   • omeprazole (priLOSEC) 40 MG capsule Take 20 mg by mouth Daily.     • rosuvastatin (CRESTOR) 10 MG tablet TAKE 1 TABLET BY MOUTH EVERY DAY AT NIGHT 90 tablet 0     No current facility-administered medications on file prior to visit.         REVIEW OF SYMPTOMS: (Positives bolded)  General:  weight loss, fever, chills, night sweats, fatigue, appetite loss  HEENT:  blurry vision, eye pain, eye discharge, dry eyes, decreased vision,   Respiratory: shortness of breath, cough, hemoptysis, wheezing, pleurisy,   Cardiovascular:  chest pain, PND, palpitation, edema, orthopnea, syncope, swelling of extremities  Gastro: Nausea, vomiting, diarrhea, hematemesis, abdominal pain, constipation  Genito: hematuria, dysuria, glycosuria, hesitancy, frequency, incontinence  .Musco: arthralgia, myalgia  \"All other systems reviewed and negative, except as listed above.”    The following portions of the patient's history were reviewed and updated as appropriate: allergies, current medications, past family history, past medical history, past social history, past surgical history and problem list.    OBJECTIVE:  OBJECTIVE:  Constitutional:  NAD,  Oriented x 3, alert Posture-Not doubled over, Well developed and well nourished.  Patient is pleasant and cooperative with the interview and exam.    Integumentary: no rashes, ulcers or lesions. No edema.       Head/Neck: normocephalic and atraumatic.  No visible/palpable " lumps or pulsations.  Normal cervical ROM. Neck Supple.  Thyroid-No thyromegaly, no nodules    Eye: Bilaterally PERRLA, EOMI.  No discharge.  Upper and lower eyelids are normal. Sclera/conjunctiva normal without discharge. Cornea is normal and clear. Lens is normal.  Eyeball appears normal. No ciliary flushing, no conjunctival injection.    ENMT: Bilateral canals normal without erythema or discharge, no excessive cerumen. TM'S bilateral grey/pearly, normal light reflex and anatomy, No sinus tenderness along frontal/maxillary region, oral mucosa pink and moist. Dentition assessed and discussed appropriate oral care. Tongue normal midline.  no pharyngeal erythema, Uvula midline. No post nasal drip. No exudate.     BREAST:      CHEST/LUNG: Breath sounds normal throughout all lung fields.  No wheezes, rales, rhonchi.     CARDIOVASCULAR: normal, no bruits or abnormal pulsations. Regular rate and rhythm. No murmur noted in sitting, supine positions. no digital clubbing, cyanosis, edema, increased warmth.    ABDOMEN: normal and no visible pulsations. Normal contour. Bowel sounds normal, no abdominal bruits. Abdomen soft, non-tender, no rebound tenderness, no rigidity (guarding), no jar tenderness, no masses.  No hepatomegaly, no splenomegaly, no hernias.     Musculoskeletal: Generalized-No generalized swelling or edema of extremities, no digital clubbing or cyanosis, neurovascularly intact all four extremities.  Upper extremity- Symmetrical posture.  No visible deformity.  Normal sensation along medial and lateral upper extremity proximally and distally.  NO tenderness overlying shoulder, lateral/medial epicondyle.  5/5 and strength 5/5 bilateral UE.  Elbow palpated, no tenderness overlying olecranon.  Normal supination, pronation to active/passive ROM and to resisted rotation. Bicep insertion/tricep insertion appear normal without obvious pathology. Rotator cuff evaluated and intact.  Normal wrist ROM bilaterally.  "Normal hand movement, intrinsic muscles of hands normal. No tenderness to palpation of hands/wrists/elbows.  Lower extremity- Not tender to palpation, no pain, no swelling, edema or erythema of surrounding tissue, normal strength and tone.  Normal appearing hip ROM bilaterally without pain.  Knee ROM normal at 0-120 degrees. No tenderness overlying trochanters, no tenderness about patella, quad tendon, patellar tendon.  No tenderness at tibial tuberosity. Ankle normal ROM not tender to palpation along medial/lateral malleolus. Normal movement of toes, no tenderness bilateral feet/toes.  Normal foot type. Calves symmetrical.  Stretching demonstrated today.  Spine/Ribs- No deformities, masses or tenderness, no known fractures, normal strength, Normal ROM. Normal stability No tenderness along C/T/L spine.  Normal appearing ROM about spine.      Neuropsych:  Able to articulate well. Normal speech, normal rate, normal tone, normal use of language, volume and coherence.  no SI/HI, no hallucinations, delusions, obsessions.  Memory l intact, remote and recent memory intact.  Age appropriate fund of knowledge, concentration and attention span normal.    Lymphatic: Head/Neck- normal size and non tender to palpation. Axillary- Head and neck LN are normal size and non tender to palpation. Femoral and Inguinal- normal size and non tender to palpation.  /84 (BP Location: Right arm, Patient Position: Sitting, Cuff Size: Large Adult)   Pulse 80   Ht 165.1 cm (65\")   Wt 119 kg (261 lb 6.4 oz)   LMP  (LMP Unknown)   SpO2 95%   BMI 43.50 kg/m²          ASSESSMENT/PLAN  Maribeth was seen today for annual exam.    Diagnoses and all orders for this visit:    Annual physical exam  -     POC Urinalysis Dipstick, Multipro    Type 2 diabetes mellitus with hyperglycemia, with long-term current use of insulin (CMS/[facility])  -     Dulaglutide 0.75 MG/0.5ML solution pen-injector; Inject 0.75 mg under the skin into the appropriate area as " directed 1 (One) Time Per Week.    Hypertension associated with diabetes (CMS/[facility])  -     CBC & Differential  -     Comprehensive metabolic panel  -     lisinopril (PRINIVIL,ZESTRIL) 40 MG tablet; Take 1 tablet by mouth Daily.  -     NIFEdipine CC (ADALAT CC) 60 MG 24 hr tablet; Take 1 tablet by mouth Daily.    Acquired hypothyroidism  -     TSH  -     T4, free  -     levothyroxine (SYNTHROID, LEVOTHROID) 125 MCG tablet; Take 1 tablet by mouth Daily.    Mixed diabetic hyperlipidemia associated with type 1 diabetes mellitus (CMS/HCC)  -     Lipid panel  -     Microalbumin / Creatinine Urine Ratio - Urine, Clean Catch  -     rosuvastatin (CRESTOR) 10 MG tablet; Take 1 tablet by mouth every night at bedtime.    Moderate episode of recurrent major depressive disorder (CMS/[facility])  -     desvenlafaxine (PRISTIQ) 50 MG 24 hr tablet; Take 1 tablet by mouth Daily.  -     lamoTRIgine (LaMICtal) 100 MG tablet; Take 1 tablet by mouth Every Night.    Vitamin D deficiency  -     Discontinue: ergocalciferol (ERGOCALCIFEROL) 75195 units capsule; Take 1 capsule by mouth 1 (One) Time Per Week.  -     ergocalciferol (ERGOCALCIFEROL) 78797 units capsule; Take 1 capsule by mouth 1 (One) Time Per Week.    Gastroesophageal reflux disease without esophagitis  -     omeprazole (priLOSEC) 40 MG capsule; Take 1 capsule by mouth Daily.    Seasonal allergies  -     budesonide-formoterol (SYMBICORT) 160-4.5 MCG/ACT inhaler; Inhale 2 puffs 2 (Two) Times a Day.  -     cetirizine (zyrTEC) 10 MG tablet; Take 1 tablet by mouth Daily.    Edema, unspecified type  -     bumetanide (BUMEX) 1 MG tablet; Take 1 tablet by mouth Daily.    RLS (restless legs syndrome)  -     carbidopa-levodopa (SINEMET)  MG per tablet; Take 1-2 tablets by mouth every night at bedtime.    Screening mammogram, encounter for  -     Mammo Screening Digital Tomosynthesis Bilateral With CAD; Future    Health:  Exercise daily at least 30 minutes 3x week  Lose weight- decrease  calories in diet, eat healthier      Preventive:      Health Maintenance Summary      MAMMOGRAM: due today (Yearly)   04/30/2018  Completion MAMMO SCREENING BILATERAL W CAD   01/14/2016  Completion Done     Postponed - MEDICARE ANNUAL WELLNESS; postponed   (Yearly)   04/18/2018  Completion Done   03/02/2017  Completion Done     URINE MICROALBUMIN  (Yearly)   04/18/2018  Completion Done   04/18/2018  Completion MICROALBUMIN, URINE, RANDOM   03/02/2017  Completion MICROALBUMIN, URINE, RANDOM     Postponed - ZOSTER VACCINE: Postponed until 5/31/2020     No completion history exists for this topic.     INFLUENZA VACCINE   Next due on 8/1/2019    (Yearly - August to March)   11/30/2018  Completion Imm Admin: Flu Vaccine Quad PF >18YRS   11/30/2018  Completion SCANNED - INFLUENZA   10/12/2017  Completion Done   10/12/2017  Completion Imm Admin: Flu Vaccine Intradermal Quad 18-64YR   09/30/2016  Completion Done        HEMOGLOBIN A1C: Next due on 8/4/2019 (Every 6 Months)   02/04/2019  Completion POCT GLYCOSYLATED HEMOGLOBIN (HGB A1C)   04/18/2018  Completion Done   04/18/2018  Completion HEMOGLOBIN A1C   10/12/2017  Completion HEMOGLOBIN A1C   06/05/2017  Completion HEMOGLOBIN A1C        DIABETIC EYE EXAM: Next due on 5/28/2020 (Yearly)   05/28/2019  Completion SCANNED - EYE EXAM   04/27/2018  Completion Done   04/27/2018  Completion SCANNED - EYE EXAM   03/24/2017  Completion SCANNED - EYE EXAM     DIABETIC FOOT EXAM: today  Next due on 5/31/2020 (Yearly)   05/31/2019  Completion Done   04/18/2018  Completion Done   04/18/2018  Completion SmartData: WORKFLOW - DIABETES - DIABETIC FOOT EXAM PERFORMED   04/18/2018  Completion SmartData: WORKFLOW - DIABETES - DIABETIC FOOT EXAM PERFORMED     LIPID PANEL : today (Yearly)   05/31/2019  Completion Done   04/18/2018  Completion LIPID PANEL   10/12/2017  Completion LIPID PANEL   06/05/2017  Completion LIPID PANEL   03/02/2017  Completion LIPID PANEL     TDAP/TD VACCINES  next due  on 6/28/2023 06/28/2013  Completion Done   06/28/2013  Completion Imm Admin: Tdap   COLONOSCOPY   Next due on 12/1/2026    (Every 10 Years)   12/01/2016  Completion Surgical Procedure: COLONOSCOPY   06/10/2011  Completion Done - DR DARLING   06/10/2011  Completion SCANNED - COLONOSCOPY   06/10/2011  Completion HM COLONOSCOPY     PNEUMOCOCCAL VACCINE (19-64 MEDIUM RISK)  completed    (Series Information)   03/02/2017  Completion Imm Admin: Pneumococcal Conjugate 13-Valent (PCV13)   06/28/2013  Completion Done   06/28/2013  Completion Imm Admin: Pneumococcal Polysaccharide (PPSV23)     HEPATITIS C SCREENING   Completed    04/18/2018  Completion Done   04/18/2018  Completion HEPATITIS C ANTIBODY   Discontinued - PAP SMEAR   Discontinued    No completion history exists for this topic.      Patient Counseling:  --Nutrition: Stressed importance of moderation in sodium/caffeine intake, saturated fat and cholesterol, caloric balance, sufficient intake of fresh fruits, vegetables, fiber, calcium, iron,   --Exercise: Stressed the importance of regular exercise.   --Sexuality: Briefly discussed.  Not sexually active. Patient safety discussed.   --Injury prevention: Discussed safety belts, safety helmets, smoke detector, smoking near bedding or upholstery.   --Dental health: Discussed importance of regular tooth brushing, flossing, and dental visits.  --Immunizations reviewed.  --After hours service discussed with patient    Return in about 1 year (around 5/31/2020) for Annual physical.      Kimi Harris, DNP, APRN-BC  05/31/2019

## 2019-06-01 LAB
ALBUMIN SERPL-MCNC: 4.3 G/DL (ref 3.5–5.2)
ALBUMIN/CREAT UR: 1.6 MG/G CREAT (ref 0–30)
ALBUMIN/GLOB SERPL: 1.7 G/DL
ALP SERPL-CCNC: 79 U/L (ref 39–117)
ALT SERPL-CCNC: 14 U/L (ref 1–33)
AST SERPL-CCNC: 21 U/L (ref 1–32)
BASOPHILS # BLD AUTO: 0.06 10*3/MM3 (ref 0–0.2)
BASOPHILS NFR BLD AUTO: 1.1 % (ref 0–1.5)
BILIRUB SERPL-MCNC: 0.6 MG/DL (ref 0.2–1.2)
BUN SERPL-MCNC: 15 MG/DL (ref 8–23)
BUN/CREAT SERPL: 16.3 (ref 7–25)
CALCIUM SERPL-MCNC: 10 MG/DL (ref 8.6–10.5)
CHLORIDE SERPL-SCNC: 99 MMOL/L (ref 98–107)
CHOLEST SERPL-MCNC: 170 MG/DL (ref 0–200)
CO2 SERPL-SCNC: 27.2 MMOL/L (ref 22–29)
CREAT SERPL-MCNC: 0.92 MG/DL (ref 0.57–1)
CREAT UR-MCNC: 251.6 MG/DL
EOSINOPHIL # BLD AUTO: 0.24 10*3/MM3 (ref 0–0.4)
EOSINOPHIL NFR BLD AUTO: 4.2 % (ref 0.3–6.2)
ERYTHROCYTE [DISTWIDTH] IN BLOOD BY AUTOMATED COUNT: 13.7 % (ref 12.3–15.4)
GLOBULIN SER CALC-MCNC: 2.6 GM/DL
GLUCOSE SERPL-MCNC: 119 MG/DL (ref 65–99)
HCT VFR BLD AUTO: 49.5 % (ref 34–46.6)
HDLC SERPL-MCNC: 46 MG/DL (ref 40–60)
HGB BLD-MCNC: 14.9 G/DL (ref 12–15.9)
IMM GRANULOCYTES # BLD AUTO: 0.02 10*3/MM3 (ref 0–0.05)
IMM GRANULOCYTES NFR BLD AUTO: 0.4 % (ref 0–0.5)
LDLC SERPL CALC-MCNC: 77 MG/DL (ref 0–100)
LYMPHOCYTES # BLD AUTO: 2.4 10*3/MM3 (ref 0.7–3.1)
LYMPHOCYTES NFR BLD AUTO: 42.3 % (ref 19.6–45.3)
MCH RBC QN AUTO: 28.5 PG (ref 26.6–33)
MCHC RBC AUTO-ENTMCNC: 30.1 G/DL (ref 31.5–35.7)
MCV RBC AUTO: 94.8 FL (ref 79–97)
MICROALBUMIN UR-MCNC: 4.1 UG/ML
MONOCYTES # BLD AUTO: 0.6 10*3/MM3 (ref 0.1–0.9)
MONOCYTES NFR BLD AUTO: 10.6 % (ref 5–12)
NEUTROPHILS # BLD AUTO: 2.36 10*3/MM3 (ref 1.7–7)
NEUTROPHILS NFR BLD AUTO: 41.4 % (ref 42.7–76)
NRBC BLD AUTO-RTO: 0.2 /100 WBC (ref 0–0.2)
PLATELET # BLD AUTO: 298 10*3/MM3 (ref 140–450)
POTASSIUM SERPL-SCNC: 4.8 MMOL/L (ref 3.5–5.2)
PROT SERPL-MCNC: 6.9 G/DL (ref 6–8.5)
RBC # BLD AUTO: 5.22 10*6/MM3 (ref 3.77–5.28)
SODIUM SERPL-SCNC: 140 MMOL/L (ref 136–145)
T4 FREE SERPL-MCNC: 1.56 NG/DL (ref 0.93–1.7)
TRIGL SERPL-MCNC: 236 MG/DL (ref 0–150)
TSH SERPL DL<=0.005 MIU/L-ACNC: 0.71 MIU/ML (ref 0.27–4.2)
VLDLC SERPL CALC-MCNC: 47.2 MG/DL
WBC # BLD AUTO: 5.68 10*3/MM3 (ref 3.4–10.8)

## 2019-06-03 DIAGNOSIS — Z12.31 SCREENING MAMMOGRAM, ENCOUNTER FOR: ICD-10-CM

## 2019-06-05 DIAGNOSIS — E10.69 MIXED DIABETIC HYPERLIPIDEMIA ASSOCIATED WITH TYPE 1 DIABETES MELLITUS (HCC): Primary | ICD-10-CM

## 2019-06-05 DIAGNOSIS — E78.2 MIXED DIABETIC HYPERLIPIDEMIA ASSOCIATED WITH TYPE 1 DIABETES MELLITUS (HCC): Primary | ICD-10-CM

## 2019-07-29 ENCOUNTER — OFFICE VISIT (OUTPATIENT)
Dept: CARDIOLOGY | Age: 64
End: 2019-07-29
Payer: COMMERCIAL

## 2019-07-29 VITALS
BODY MASS INDEX: 44.48 KG/M2 | DIASTOLIC BLOOD PRESSURE: 76 MMHG | WEIGHT: 267 LBS | RESPIRATION RATE: 18 BRPM | HEIGHT: 65 IN | SYSTOLIC BLOOD PRESSURE: 118 MMHG

## 2019-07-29 DIAGNOSIS — I47.1 SVT (SUPRAVENTRICULAR TACHYCARDIA) (HCC): ICD-10-CM

## 2019-07-29 DIAGNOSIS — E78.2 MIXED DYSLIPIDEMIA: ICD-10-CM

## 2019-07-29 DIAGNOSIS — G47.33 OSA (OBSTRUCTIVE SLEEP APNEA): ICD-10-CM

## 2019-07-29 DIAGNOSIS — I10 ESSENTIAL HYPERTENSION: ICD-10-CM

## 2019-07-29 DIAGNOSIS — Z87.891 HISTORY OF TOBACCO ABUSE: ICD-10-CM

## 2019-07-29 DIAGNOSIS — E78.2 MIXED HYPERLIPIDEMIA: ICD-10-CM

## 2019-07-29 DIAGNOSIS — R94.31 ABNORMAL EKG: Primary | ICD-10-CM

## 2019-07-29 PROBLEM — E66.09 OBESITY DUE TO EXCESS CALORIES: Status: ACTIVE | Noted: 2019-07-29

## 2019-07-29 PROBLEM — I47.10 SVT (SUPRAVENTRICULAR TACHYCARDIA): Status: ACTIVE | Noted: 2019-07-29

## 2019-07-29 PROBLEM — E11.9 TYPE 2 DIABETES MELLITUS (HCC): Status: ACTIVE | Noted: 2019-07-29

## 2019-07-29 PROCEDURE — 93000 ELECTROCARDIOGRAM COMPLETE: CPT | Performed by: INTERNAL MEDICINE

## 2019-07-29 PROCEDURE — G8417 CALC BMI ABV UP PARAM F/U: HCPCS | Performed by: INTERNAL MEDICINE

## 2019-07-29 PROCEDURE — 4004F PT TOBACCO SCREEN RCVD TLK: CPT | Performed by: INTERNAL MEDICINE

## 2019-07-29 PROCEDURE — 99213 OFFICE O/P EST LOW 20 MIN: CPT | Performed by: INTERNAL MEDICINE

## 2019-07-29 PROCEDURE — G8427 DOCREV CUR MEDS BY ELIG CLIN: HCPCS | Performed by: INTERNAL MEDICINE

## 2019-07-29 PROCEDURE — 3017F COLORECTAL CA SCREEN DOC REV: CPT | Performed by: INTERNAL MEDICINE

## 2019-07-29 RX ORDER — DULAGLUTIDE 0.75 MG/.5ML
INJECTION, SOLUTION SUBCUTANEOUS
Status: ON HOLD | COMMUNITY
Start: 2019-05-17 | End: 2020-09-10

## 2019-07-29 RX ORDER — OMEPRAZOLE 40 MG/1
CAPSULE, DELAYED RELEASE ORAL
Refills: 1 | Status: ON HOLD | COMMUNITY
Start: 2019-05-31 | End: 2020-09-10

## 2019-07-29 RX ORDER — INSULIN ASPART 100 [IU]/ML
INJECTION, SOLUTION INTRAVENOUS; SUBCUTANEOUS
Refills: 11 | Status: ON HOLD | COMMUNITY
Start: 2019-07-18 | End: 2020-09-10

## 2019-07-29 RX ORDER — LAMOTRIGINE 100 MG/1
TABLET ORAL PRN
Refills: 2 | COMMUNITY
Start: 2019-07-19

## 2019-07-29 RX ORDER — LEVOTHYROXINE SODIUM 0.12 MG/1
TABLET ORAL
Refills: 1 | Status: ON HOLD | COMMUNITY
Start: 2019-07-19 | End: 2020-09-10

## 2019-09-04 ENCOUNTER — TELEPHONE (OUTPATIENT)
Dept: FAMILY MEDICINE CLINIC | Facility: CLINIC | Age: 64
End: 2019-09-04

## 2019-09-05 ENCOUNTER — RESULTS ENCOUNTER (OUTPATIENT)
Dept: FAMILY MEDICINE CLINIC | Facility: CLINIC | Age: 64
End: 2019-09-05

## 2019-09-05 DIAGNOSIS — E10.69 MIXED DIABETIC HYPERLIPIDEMIA ASSOCIATED WITH TYPE 1 DIABETES MELLITUS (HCC): ICD-10-CM

## 2019-09-05 DIAGNOSIS — E78.2 MIXED DIABETIC HYPERLIPIDEMIA ASSOCIATED WITH TYPE 1 DIABETES MELLITUS (HCC): ICD-10-CM

## 2019-09-17 LAB
BASOPHILS # BLD AUTO: 0.1 X10E3/UL (ref 0–0.2)
BASOPHILS NFR BLD AUTO: 1 %
CHOLEST SERPL-MCNC: NORMAL MG/DL
EOSINOPHIL # BLD AUTO: 0.2 X10E3/UL (ref 0–0.4)
EOSINOPHIL NFR BLD AUTO: 4 %
ERYTHROCYTE [DISTWIDTH] IN BLOOD BY AUTOMATED COUNT: 14.1 % (ref 12.3–15.4)
HCT VFR BLD AUTO: 42.5 % (ref 34–46.6)
HDLC SERPL-MCNC: NORMAL MG/DL
HGB BLD-MCNC: 14.6 G/DL (ref 11.1–15.9)
IMM GRANULOCYTES # BLD AUTO: 0 X10E3/UL (ref 0–0.1)
IMM GRANULOCYTES NFR BLD AUTO: 0 %
LYMPHOCYTES # BLD AUTO: 2.5 X10E3/UL (ref 0.7–3.1)
LYMPHOCYTES NFR BLD AUTO: 49 %
MCH RBC QN AUTO: 29.5 PG (ref 26.6–33)
MCHC RBC AUTO-ENTMCNC: 34.4 G/DL (ref 31.5–35.7)
MCV RBC AUTO: 86 FL (ref 79–97)
MONOCYTES # BLD AUTO: 0.4 X10E3/UL (ref 0.1–0.9)
MONOCYTES NFR BLD AUTO: 8 %
NEUTROPHILS # BLD AUTO: 2 X10E3/UL (ref 1.4–7)
NEUTROPHILS NFR BLD AUTO: 38 %
PLATELET # BLD AUTO: 309 X10E3/UL (ref 150–450)
RBC # BLD AUTO: 4.95 X10E6/UL (ref 3.77–5.28)
SPECIMEN STATUS: NORMAL
TRIGL SERPL-MCNC: NORMAL MG/DL
WBC # BLD AUTO: 5.1 X10E3/UL (ref 3.4–10.8)

## 2019-09-24 LAB
CHOLEST SERPL-MCNC: 183 MG/DL (ref 100–199)
HDLC SERPL-MCNC: 48 MG/DL
LDLC SERPL CALC-MCNC: 85 MG/DL (ref 0–99)
TRIGL SERPL-MCNC: 248 MG/DL (ref 0–149)
VLDLC SERPL CALC-MCNC: 50 MG/DL (ref 5–40)

## 2019-10-14 DIAGNOSIS — E11.59 HYPERTENSION ASSOCIATED WITH DIABETES (HCC): ICD-10-CM

## 2019-10-14 DIAGNOSIS — I15.2 HYPERTENSION ASSOCIATED WITH DIABETES (HCC): ICD-10-CM

## 2019-10-15 RX ORDER — NIFEDIPINE 60 MG/1
TABLET, FILM COATED, EXTENDED RELEASE ORAL
Qty: 90 TABLET | Refills: 0 | Status: SHIPPED | OUTPATIENT
Start: 2019-10-15 | End: 2020-01-10

## 2019-10-17 RX ORDER — INSULIN ASPART 100 [IU]/ML
INJECTION, SOLUTION INTRAVENOUS; SUBCUTANEOUS
Qty: 90 ML | Refills: 0 | Status: SHIPPED | OUTPATIENT
Start: 2019-10-17 | End: 2020-04-20 | Stop reason: SDUPTHER

## 2019-11-01 RX ORDER — DULAGLUTIDE 0.75 MG/.5ML
INJECTION, SOLUTION SUBCUTANEOUS
Qty: 6 PEN | Refills: 1 | Status: SHIPPED | OUTPATIENT
Start: 2019-11-01 | End: 2020-04-20 | Stop reason: SDUPTHER

## 2019-11-06 DIAGNOSIS — G25.81 RLS (RESTLESS LEGS SYNDROME): ICD-10-CM

## 2019-11-06 RX ORDER — CARBIDOPA/LEVODOPA 25MG-250MG
TABLET ORAL
Qty: 180 TABLET | Refills: 0 | Status: SHIPPED | OUTPATIENT
Start: 2019-11-06 | End: 2020-01-29

## 2019-12-02 DIAGNOSIS — E55.9 VITAMIN D DEFICIENCY: ICD-10-CM

## 2019-12-02 RX ORDER — ERGOCALCIFEROL 1.25 MG/1
CAPSULE ORAL
Qty: 12 CAPSULE | Refills: 1 | Status: SHIPPED | OUTPATIENT
Start: 2019-12-02 | End: 2020-02-17

## 2019-12-16 ENCOUNTER — OFFICE VISIT (OUTPATIENT)
Dept: ENDOCRINOLOGY | Facility: CLINIC | Age: 64
End: 2019-12-16

## 2019-12-16 VITALS
OXYGEN SATURATION: 96 % | SYSTOLIC BLOOD PRESSURE: 122 MMHG | DIASTOLIC BLOOD PRESSURE: 78 MMHG | HEART RATE: 91 BPM | BODY MASS INDEX: 44.98 KG/M2 | WEIGHT: 270.3 LBS

## 2019-12-16 DIAGNOSIS — E55.9 VITAMIN D DEFICIENCY: ICD-10-CM

## 2019-12-16 DIAGNOSIS — E11.65 TYPE 2 DIABETES MELLITUS WITH HYPERGLYCEMIA, WITH LONG-TERM CURRENT USE OF INSULIN (HCC): Primary | ICD-10-CM

## 2019-12-16 DIAGNOSIS — Z79.4 TYPE 2 DIABETES MELLITUS WITH HYPERGLYCEMIA, WITH LONG-TERM CURRENT USE OF INSULIN (HCC): Primary | ICD-10-CM

## 2019-12-16 DIAGNOSIS — I15.2 HYPERTENSION ASSOCIATED WITH DIABETES (HCC): ICD-10-CM

## 2019-12-16 DIAGNOSIS — N18.2 CKD (CHRONIC KIDNEY DISEASE), STAGE II: ICD-10-CM

## 2019-12-16 DIAGNOSIS — E78.2 MIXED DIABETIC HYPERLIPIDEMIA ASSOCIATED WITH TYPE 2 DIABETES MELLITUS (HCC): ICD-10-CM

## 2019-12-16 DIAGNOSIS — E03.9 ACQUIRED HYPOTHYROIDISM: ICD-10-CM

## 2019-12-16 DIAGNOSIS — E11.59 HYPERTENSION ASSOCIATED WITH DIABETES (HCC): ICD-10-CM

## 2019-12-16 DIAGNOSIS — E11.69 MIXED DIABETIC HYPERLIPIDEMIA ASSOCIATED WITH TYPE 2 DIABETES MELLITUS (HCC): ICD-10-CM

## 2019-12-16 PROCEDURE — 99214 OFFICE O/P EST MOD 30 MIN: CPT | Performed by: INTERNAL MEDICINE

## 2019-12-16 RX ORDER — PEN NEEDLE, DIABETIC 30 GX3/16"
1 NEEDLE, DISPOSABLE MISCELLANEOUS 4 TIMES DAILY
Qty: 360 EACH | Refills: 3 | Status: SHIPPED | OUTPATIENT
Start: 2019-12-16 | End: 2020-04-20 | Stop reason: SDUPTHER

## 2019-12-16 RX ORDER — ROSUVASTATIN CALCIUM 20 MG/1
20 TABLET, COATED ORAL NIGHTLY
Qty: 30 TABLET | Refills: 11 | Status: SHIPPED | OUTPATIENT
Start: 2019-12-16 | End: 2020-04-20 | Stop reason: SDUPTHER

## 2019-12-16 NOTE — PROGRESS NOTES
"  Subjective    Maribeth Isbell is a 64 y.o. female. she is here today for follow-up.    Diabetes   Pertinent negatives for hypoglycemia include no confusion, dizziness, headaches, pallor or tremors. Pertinent negatives for diabetes include no chest pain, no fatigue, no polydipsia, no polyphagia, no polyuria and no weakness.          Primary Care Provider     Jer Mccray MD     Duration 5 years     Timing - Diabetes is Constant     Quality -  now at [facility]      Severity -  moderate     Complications - TIA     Current symptoms/problems  increase appetite      Alleviating Factors: Compliance       Aggravating Factors :  None     Side Effects  metformin and sulfonylurea     Current diet  in general, a \"healthy\" diet       Current exercise none     Current monitoring regimen: home blood tests - checking 4 x daily     Lab Results   Component Value Date    HGBA1C 5.8 2019       Home blood sugar records:     At goal      Hypoglycemia none         The following portions of the patient's history were reviewed and updated as appropriate:   Past Medical History:   Diagnosis Date   • Bell's palsy    • Cataract    • Colon polyps    • Depression    • Diabetes mellitus (CMS/HCC)     type 2   • E coli infection    • Gastroesophageal reflux disease 2018   • Headache    • Hyperlipidemia    • Hypertension    • Hyperthyroidism    • Hypothyroidism    • Macular degeneration    • Sleep apnea     wears cpap   • Tachycardia    • Visual impairment      Past Surgical History:   Procedure Laterality Date   •  SECTION     • COLONOSCOPY  06/10/2011    SCREENING-[facility] [place]DR DARLING   • COLONOSCOPY N/A 2016    Procedure: COLONOSCOPY WITH ANESTHESIA;  Surgeon: Kathy Berry MD;  Location: [facility] ENDOSCOPY;  Service:    • ENDOSCOPY N/A 2016    Procedure: ESOPHAGOGASTRODUODENOSCOPY WITH ANESTHESIA;  Surgeon: Kathy Berry MD;  Location: [facility] ENDOSCOPY;  Service:    • ENDOSCOPY N/A 2018    Procedure: " ESOPHAGOGASTRODUODENOSCOPY WITH ANESTHESIA;  Surgeon: Kathy Berry MD;  Location: [facility] ENDOSCOPY;  Service: Gastroenterology   • HYSTERECTOMY     • KNEE SURGERY Left    • TUMOR REMOVAL       Family History   Problem Relation Age of Onset   • Heart disease Mother    • Diabetes Mother    • No Known Problems Maternal Grandmother    • No Known Problems Maternal Grandfather    • No Known Problems Paternal Grandmother    • No Known Problems Paternal Grandfather    • Colon cancer Neg Hx    • Colon polyps Neg Hx      OB History    None       Current Outpatient Medications   Medication Sig Dispense Refill   • aspirin 81 MG EC tablet Take 81 mg by mouth Daily.     • budesonide-formoterol (SYMBICORT) 160-4.5 MCG/ACT inhaler Inhale 2 puffs 2 (Two) Times a Day. 1 inhaler 12   • bumetanide (BUMEX) 1 MG tablet Take 1 tablet by mouth Daily. 90 tablet 1   • carbidopa-levodopa (SINEMET)  MG per tablet TAKE 1 TO 2 TABLETS BY MOUTH AT BEDTIME 180 tablet 0   • cetirizine (zyrTEC) 10 MG tablet Take 1 tablet by mouth Daily. 90 tablet 1   • cholecalciferol (VITAMIN D3) 1000 units tablet Take 1,000 Units by mouth Daily.     • desvenlafaxine (PRISTIQ) 50 MG 24 hr tablet Take 1 tablet by mouth Daily. 90 tablet 3   • ergocalciferol (ERGOCALCIFEROL) 95781 units capsule Take 1 capsule by mouth 1 (One) Time Per Week. 12 capsule 1   • folic acid (FOLVITE) 400 MCG tablet Take 400 mcg by mouth Daily.     • Insulin Glargine (BASAGLAR KWIKPEN) 100 UNIT/ML injection pen Inject 50 Units under the skin into the appropriate area as directed Daily. 5 pen 11   • Insulin Pen Needle (B-D UF III MINI PEN NEEDLES) 31G X 5 MM misc Use 4 times daily  , ICD10 code is E11.9 120 each 11   • lamoTRIgine (LaMICtal) 100 MG tablet Take 1 tablet by mouth Every Night. 90 tablet 2   • levothyroxine (SYNTHROID, LEVOTHROID) 125 MCG tablet Take 1 tablet by mouth Daily. 90 tablet 1   • lisinopril (PRINIVIL,ZESTRIL) 40 MG tablet Take 1 tablet by mouth Daily. 90 tablet  1   • Multiple Vitamins-Minerals (VISION-KRISTAL PRESERVE PO) Take  by mouth Daily With Breakfast.     • NIFEdipine CC (ADALAT CC) 60 MG 24 hr tablet TAKE 1 TABLET BY MOUTH EVERY DAY 90 tablet 0   • NOVOLOG FLEXPEN 100 UNIT/ML solution pen-injector sc pen INJECT 30 UNITS UNDER SKIN IN APPROPRIATE AREA AS DIRECTED THREE TIMES DAILY WITH MEALS 90 mL 0   • omeprazole (priLOSEC) 40 MG capsule Take 1 capsule by mouth Daily. 90 capsule 1   • rosuvastatin (CRESTOR) 10 MG tablet Take 1 tablet by mouth every night at bedtime. (Patient taking differently: Take 20 mg by mouth every night at bedtime.) 90 tablet 1   • TRULICITY 0.75 MG/0.5ML solution pen-injector INJECT 0.75 MG UNDER THE SKIN INTO THE APPROPRIATE AREA AS DIRECTED 1 (ONE) TIME PER WEEK. 6 pen 1   • vitamin D (ERGOCALCIFEROL) 1.25 MG (02927 UT) capsule capsule TAKE 1 CAPSULE BY MOUTH 1 (ONE) TIME PER WEEK. 12 capsule 1     No current facility-administered medications for this visit.      Allergies   Allergen Reactions   • Metformin GI Intolerance     Social History     Socioeconomic History   • Marital status:      Spouse name: Not on file   • Number of children: Not on file   • Years of education: Not on file   • Highest education level: Not on file   Tobacco Use   • Smoking status: Former Smoker     Packs/day: 1.00     Years: 20.00     Pack years: 20.00     Types: Cigarettes     Last attempt to quit: 2015     Years since quittin.9   • Smokeless tobacco: Never Used   Substance and Sexual Activity   • Alcohol use: No   • Drug use: No   • Sexual activity: Defer       Review of Systems  Review of Systems   Constitutional: Negative for activity change, appetite change, diaphoresis and fatigue.   HENT: Negative for facial swelling, sneezing, sore throat, tinnitus, trouble swallowing and voice change.    Eyes: Negative for photophobia, pain, discharge, redness, itching and visual disturbance.   Respiratory: Negative for apnea, cough, choking, chest tightness  and shortness of breath.    Cardiovascular: Negative for chest pain, palpitations and leg swelling.   Gastrointestinal: Negative for abdominal distention, abdominal pain, constipation, diarrhea, nausea and vomiting.   Endocrine: Negative for cold intolerance, heat intolerance, polydipsia, polyphagia and polyuria.   Genitourinary: Negative for difficulty urinating, dysuria, frequency, hematuria and urgency.   Musculoskeletal: Negative for arthralgias, back pain, gait problem, joint swelling, myalgias, neck pain and neck stiffness.   Skin: Negative for color change, pallor, rash and wound.   Neurological: Negative for dizziness, tremors, weakness, light-headedness, numbness and headaches.   Hematological: Negative for adenopathy. Does not bruise/bleed easily.   Psychiatric/Behavioral: Negative for behavioral problems, confusion and sleep disturbance.        Objective    /78   Pulse 91   Wt 123 kg (270 lb 4.8 oz)   LMP  (LMP Unknown)   SpO2 96%   BMI 44.98 kg/m²   Physical Exam   Constitutional: She is oriented to person, place, and time. She appears well-developed and well-nourished. No distress.   HENT:   Head: Normocephalic and atraumatic.   Right Ear: External ear normal.   Left Ear: External ear normal.   Nose: Nose normal.   Eyes: Pupils are equal, round, and reactive to light. Conjunctivae and EOM are normal.   Neck: Normal range of motion. Neck supple. No tracheal deviation present. No thyromegaly present.   Cardiovascular: Normal rate, regular rhythm and normal heart sounds.   No murmur heard.  Pulmonary/Chest: Effort normal and breath sounds normal. No respiratory distress. She has no wheezes.   Abdominal: Soft. Bowel sounds are normal. There is no tenderness. There is no rebound and no guarding.   Musculoskeletal: Normal range of motion. She exhibits no edema, tenderness or deformity.   Neurological: She is alert and oriented to person, place, and time. No cranial nerve deficit.   Skin: Skin is  warm and dry. No rash noted.   Psychiatric: She has a normal mood and affect. Her behavior is normal. Judgment and thought content normal.       Lab Review  Glucose (mg/dL)   Date Value   11/04/2016 231 (H)     Sodium (mmol/L)   Date Value   05/31/2019 140   05/18/2018 141   04/18/2018 137   11/04/2016 140     Potassium (mmol/L)   Date Value   05/31/2019 4.8   05/18/2018 4.7   04/18/2018 3.8   11/04/2016 4.2     Chloride (mmol/L)   Date Value   05/31/2019 99   05/18/2018 101   04/18/2018 97 (L)   11/04/2016 99     CO2 (mmol/L)   Date Value   11/04/2016 30.0     Total CO2 (mmol/L)   Date Value   05/31/2019 27.2   05/18/2018 26.0   04/18/2018 25.0     BUN (mg/dL)   Date Value   05/31/2019 15   05/18/2018 16   04/18/2018 12   11/04/2016 14     Creatinine (mg/dL)   Date Value   05/31/2019 0.92   05/18/2018 0.98   04/18/2018 0.78   11/04/2016 0.79     Hemoglobin A1C (%)   Date Value   02/04/2019 5.8   04/18/2018 10.80   10/12/2017 7.20   06/05/2017 7.10     Triglycerides   Date Value   09/23/2019 248 mg/dL (H)   09/16/2019 CANCELED   05/31/2019 236 mg/dL (H)     LDL Cholesterol  (mg/dL)   Date Value   09/23/2019 85   05/31/2019 77   04/18/2018 82       Assessment/Plan      1. Type 2 diabetes mellitus with hyperglycemia, with long-term current use of insulin (CMS/[facility])    2. Hypertension associated with diabetes (CMS/[facility])    3. Mixed diabetic hyperlipidemia associated with type 2 diabetes mellitus (CMS/[facility])    4. Vitamin D deficiency     5. Acquired hypothyroidism    .    Medications prescribed:  Outpatient Encounter Medications as of 12/16/2019   Medication Sig Dispense Refill   • aspirin 81 MG EC tablet Take 81 mg by mouth Daily.     • budesonide-formoterol (SYMBICORT) 160-4.5 MCG/ACT inhaler Inhale 2 puffs 2 (Two) Times a Day. 1 inhaler 12   • bumetanide (BUMEX) 1 MG tablet Take 1 tablet by mouth Daily. 90 tablet 1   • carbidopa-levodopa (SINEMET)  MG per tablet TAKE 1 TO 2 TABLETS BY MOUTH AT BEDTIME 180 tablet 0    • cetirizine (zyrTEC) 10 MG tablet Take 1 tablet by mouth Daily. 90 tablet 1   • cholecalciferol (VITAMIN D3) 1000 units tablet Take 1,000 Units by mouth Daily.     • desvenlafaxine (PRISTIQ) 50 MG 24 hr tablet Take 1 tablet by mouth Daily. 90 tablet 3   • ergocalciferol (ERGOCALCIFEROL) 60530 units capsule Take 1 capsule by mouth 1 (One) Time Per Week. 12 capsule 1   • folic acid (FOLVITE) 400 MCG tablet Take 400 mcg by mouth Daily.     • Insulin Glargine (BASAGLAR KWIKPEN) 100 UNIT/ML injection pen Inject 50 Units under the skin into the appropriate area as directed Daily. 5 pen 11   • Insulin Pen Needle (B-D UF III MINI PEN NEEDLES) 31G X 5 MM misc Use 4 times daily  , ICD10 code is E11.9 120 each 11   • lamoTRIgine (LaMICtal) 100 MG tablet Take 1 tablet by mouth Every Night. 90 tablet 2   • levothyroxine (SYNTHROID, LEVOTHROID) 125 MCG tablet Take 1 tablet by mouth Daily. 90 tablet 1   • lisinopril (PRINIVIL,ZESTRIL) 40 MG tablet Take 1 tablet by mouth Daily. 90 tablet 1   • Multiple Vitamins-Minerals (VISION-KRISTAL PRESERVE PO) Take  by mouth Daily With Breakfast.     • NIFEdipine CC (ADALAT CC) 60 MG 24 hr tablet TAKE 1 TABLET BY MOUTH EVERY DAY 90 tablet 0   • NOVOLOG FLEXPEN 100 UNIT/ML solution pen-injector sc pen INJECT 30 UNITS UNDER SKIN IN APPROPRIATE AREA AS DIRECTED THREE TIMES DAILY WITH MEALS 90 mL 0   • omeprazole (priLOSEC) 40 MG capsule Take 1 capsule by mouth Daily. 90 capsule 1   • rosuvastatin (CRESTOR) 10 MG tablet Take 1 tablet by mouth every night at bedtime. (Patient taking differently: Take 20 mg by mouth every night at bedtime.) 90 tablet 1   • TRULICITY 0.75 MG/0.5ML solution pen-injector INJECT 0.75 MG UNDER THE SKIN INTO THE APPROPRIATE AREA AS DIRECTED 1 (ONE) TIME PER WEEK. 6 pen 1   • vitamin D (ERGOCALCIFEROL) 1.25 MG (78514 UT) capsule capsule TAKE 1 CAPSULE BY MOUTH 1 (ONE) TIME PER WEEK. 12 capsule 1     No facility-administered encounter medications on file as of 12/16/2019.         Orders placed during this encounter include:  No orders of the defined types were placed in this encounter.  Patient has type 2 diabetes with hyperglycemia     Glycemic Management:     Lab Results   Component Value Date    HGBA1C 5.8 02/04/2019    HGBA1C 10.80 04/18/2018    HGBA1C 7.20 10/12/2017     Lab Results   Component Value Date    MICROALBUR 4.1 05/31/2019    CREATININE 0.92 05/31/2019            Basaglar 50 - ( toujeo not covered )  This is holding glucose steady while not eating        Trulicity 0.75 mg weekly --- taking       Novolog doing 20 units with meals, if low carb 15 units         Ann-Marie hurts so not using     Add invokana based on credence            Lipid Management    Lab Results   Component Value Date    CHLPL 183 09/23/2019    CHLPL CANCELED 09/16/2019    CHLPL 170 05/31/2019     Lab Results   Component Value Date    TRIG 248 (H) 09/23/2019    TRIG CANCELED 09/16/2019    TRIG 236 (H) 05/31/2019     Lab Results   Component Value Date    HDL 48 09/23/2019    HDL CANCELED 09/16/2019    HDL 46 05/31/2019     Lab Results   Component Value Date    LDL 85 09/23/2019    LDL 77 05/31/2019    LDL 82 04/18/2018          On crestor 10 mg qhs - increase to 20        Blood Pressure Management:            On lisinopril            Microvascular Complication Monitoring:       Eye Exam Evaluation  Within 1 year   -----------     Lab Results   Component Value Date    MALBCRERATIO 1.6 05/31/2019         -----------        Neuropathy, none            Weight Related:        Diet interventions: moderate (500 kCal/d) deficit diet.        Bone Health     Lab Results   Component Value Date    TNLC83NR 32.3 04/18/2018    EHGP37EV 34.3 06/05/2017    FTEE88NE 29.8 (L) 03/02/2017          Thyroid Health     Lab Results   Component Value Date    TSH 0.713 05/31/2019    TSH 4.710 (H) 04/18/2018    TSH 0.553 06/05/2017        on levothyroxine 125 mcgs daily       No results found for: [id number]             4. Follow-up:  No follow-ups on file.

## 2020-01-10 DIAGNOSIS — E11.59 HYPERTENSION ASSOCIATED WITH DIABETES (HCC): ICD-10-CM

## 2020-01-10 DIAGNOSIS — I15.2 HYPERTENSION ASSOCIATED WITH DIABETES (HCC): ICD-10-CM

## 2020-01-10 DIAGNOSIS — E03.9 ACQUIRED HYPOTHYROIDISM: ICD-10-CM

## 2020-01-10 RX ORDER — LEVOTHYROXINE SODIUM 0.12 MG/1
TABLET ORAL
Qty: 90 TABLET | Refills: 1 | Status: SHIPPED | OUTPATIENT
Start: 2020-01-10 | End: 2020-07-08

## 2020-01-10 RX ORDER — NIFEDIPINE 60 MG/1
TABLET, FILM COATED, EXTENDED RELEASE ORAL
Qty: 90 TABLET | Refills: 1 | Status: SHIPPED | OUTPATIENT
Start: 2020-01-10 | End: 2020-07-28

## 2020-01-10 RX ORDER — LISINOPRIL 40 MG/1
TABLET ORAL
Qty: 90 TABLET | Refills: 1 | Status: SHIPPED | OUTPATIENT
Start: 2020-01-10 | End: 2020-09-04

## 2020-01-29 DIAGNOSIS — G25.81 RLS (RESTLESS LEGS SYNDROME): ICD-10-CM

## 2020-01-29 RX ORDER — CARBIDOPA/LEVODOPA 25MG-250MG
TABLET ORAL
Qty: 180 TABLET | Refills: 1 | Status: SHIPPED | OUTPATIENT
Start: 2020-01-29 | End: 2020-08-03

## 2020-02-17 ENCOUNTER — OFFICE VISIT (OUTPATIENT)
Dept: FAMILY MEDICINE CLINIC | Facility: CLINIC | Age: 65
End: 2020-02-17

## 2020-02-17 VITALS
TEMPERATURE: 97.3 F | SYSTOLIC BLOOD PRESSURE: 130 MMHG | WEIGHT: 268.4 LBS | DIASTOLIC BLOOD PRESSURE: 57 MMHG | HEIGHT: 65 IN | BODY MASS INDEX: 44.72 KG/M2 | OXYGEN SATURATION: 97 % | HEART RATE: 88 BPM

## 2020-02-17 DIAGNOSIS — J01.00 ACUTE NON-RECURRENT MAXILLARY SINUSITIS: Primary | ICD-10-CM

## 2020-02-17 PROCEDURE — 99213 OFFICE O/P EST LOW 20 MIN: CPT | Performed by: NURSE PRACTITIONER

## 2020-02-17 PROCEDURE — 96372 THER/PROPH/DIAG INJ SC/IM: CPT | Performed by: NURSE PRACTITIONER

## 2020-02-17 RX ORDER — METHYLPREDNISOLONE ACETATE 40 MG/ML
40 INJECTION, SUSPENSION INTRA-ARTICULAR; INTRALESIONAL; INTRAMUSCULAR; SOFT TISSUE ONCE
Status: DISCONTINUED | OUTPATIENT
Start: 2020-02-17 | End: 2020-02-17

## 2020-02-17 RX ORDER — METHYLPREDNISOLONE SODIUM SUCCINATE 40 MG/ML
40 INJECTION, POWDER, LYOPHILIZED, FOR SOLUTION INTRAMUSCULAR; INTRAVENOUS ONCE
Status: DISCONTINUED | OUTPATIENT
Start: 2020-02-17 | End: 2020-02-17

## 2020-02-17 RX ORDER — METHYLPREDNISOLONE SODIUM SUCCINATE 40 MG/ML
40 INJECTION, POWDER, LYOPHILIZED, FOR SOLUTION INTRAMUSCULAR; INTRAVENOUS ONCE
Status: COMPLETED | OUTPATIENT
Start: 2020-02-17 | End: 2020-02-17

## 2020-02-17 RX ORDER — PREDNISONE 20 MG/1
TABLET ORAL
Qty: 7 TABLET | Refills: 0 | Status: SHIPPED | OUTPATIENT
Start: 2020-02-17 | End: 2020-06-16

## 2020-02-17 RX ORDER — AZITHROMYCIN 250 MG/1
TABLET, FILM COATED ORAL
Qty: 6 TABLET | Refills: 0 | Status: SHIPPED | OUTPATIENT
Start: 2020-02-17 | End: 2020-06-16

## 2020-02-17 RX ADMIN — METHYLPREDNISOLONE SODIUM SUCCINATE 40 MG: 40 INJECTION, POWDER, LYOPHILIZED, FOR SOLUTION INTRAMUSCULAR; INTRAVENOUS at 14:35

## 2020-02-17 NOTE — PROGRESS NOTES
CC: cough and congestion     History:  Maribeth Isbell is a 64 y.o. female who presents today for evaluation of the above problems.    URI    This is a new problem. The current episode started 1 to 4 weeks ago. The problem has been gradually worsening. There has been no fever. Associated symptoms include congestion, coughing, headaches, a sore throat and wheezing (with cough). She has tried nothing for the symptoms.     ROS:  Review of Systems   Constitutional: Positive for chills. Negative for fever.   HENT: Positive for congestion, sinus pressure and sore throat.    Respiratory: Positive for cough and wheezing (with cough).    Neurological: Positive for headaches.       Allergies   Allergen Reactions   • Metformin GI Intolerance     Past Medical History:   Diagnosis Date   • Bell's palsy    • Cataract    • Colon polyps    • Depression    • Diabetes mellitus (CMS/HCC)     type 2   • E coli infection    • Gastroesophageal reflux disease 2018   • Headache    • Hyperlipidemia    • Hypertension    • Hyperthyroidism    • Hypothyroidism    • Macular degeneration    • Sleep apnea     wears cpap   • Tachycardia    • Visual impairment      Past Surgical History:   Procedure Laterality Date   •  SECTION     • COLONOSCOPY  06/10/2011    SCREENING-[facility] [place]   • COLONOSCOPY N/A 2016    Procedure: COLONOSCOPY WITH ANESTHESIA;  Surgeon: Kathy Berry MD;  Location: [facility] ENDOSCOPY;  Service:    • ENDOSCOPY N/A 2016    Procedure: ESOPHAGOGASTRODUODENOSCOPY WITH ANESTHESIA;  Surgeon: Kathy Berry MD;  Location: [facility] ENDOSCOPY;  Service:    • ENDOSCOPY N/A 2018    Procedure: ESOPHAGOGASTRODUODENOSCOPY WITH ANESTHESIA;  Surgeon: Kathy Berry MD;  Location: [facility] ENDOSCOPY;  Service: Gastroenterology   • HYSTERECTOMY     • KNEE SURGERY Left    • TUMOR REMOVAL       Family History   Problem Relation Age of Onset   • Heart disease Mother    • Diabetes Mother    • No Known Problems Maternal  Grandmother    • No Known Problems Maternal Grandfather    • No Known Problems Paternal Grandmother    • No Known Problems Paternal Grandfather    • Colon cancer Neg Hx    • Colon polyps Neg Hx       reports that she quit smoking about 5 years ago. Her smoking use included cigarettes. She has a 20.00 pack-year smoking history. She has never used smokeless tobacco. She reports that she does not drink alcohol or use drugs.      Current Outpatient Medications:   •  aspirin 81 MG EC tablet, Take 81 mg by mouth Daily., Disp: , Rfl:   •  budesonide-formoterol (SYMBICORT) 160-4.5 MCG/ACT inhaler, Inhale 2 puffs 2 (Two) Times a Day., Disp: 1 inhaler, Rfl: 12  •  bumetanide (BUMEX) 1 MG tablet, Take 1 tablet by mouth Daily., Disp: 90 tablet, Rfl: 1  •  carbidopa-levodopa (SINEMET)  MG per tablet, TAKE 1 TO 2 TABLETS BY MOUTH EVERY DAY AT BEDTIME, Disp: 180 tablet, Rfl: 1  •  cetirizine (zyrTEC) 10 MG tablet, Take 1 tablet by mouth Daily., Disp: 90 tablet, Rfl: 1  •  desvenlafaxine (PRISTIQ) 50 MG 24 hr tablet, Take 1 tablet by mouth Daily., Disp: 90 tablet, Rfl: 3  •  ergocalciferol (ERGOCALCIFEROL) 00296 units capsule, Take 1 capsule by mouth 1 (One) Time Per Week., Disp: 12 capsule, Rfl: 1  •  folic acid (FOLVITE) 400 MCG tablet, Take 400 mcg by mouth Daily., Disp: , Rfl:   •  Insulin Glargine (BASAGLAR KWIKPEN) 100 UNIT/ML injection pen, Inject 50 Units under the skin into the appropriate area as directed Daily., Disp: 5 pen, Rfl: 11  •  Insulin Pen Needle (PEN NEEDLES) 32G X 4 MM misc, 1 each 4 (Four) Times a Day., Disp: 360 each, Rfl: 3  •  lamoTRIgine (LaMICtal) 100 MG tablet, Take 1 tablet by mouth Every Night., Disp: 90 tablet, Rfl: 2  •  levothyroxine (SYNTHROID, LEVOTHROID) 125 MCG tablet, TAKE 1 TABLET BY MOUTH EVERY DAY, Disp: 90 tablet, Rfl: 1  •  lisinopril (PRINIVIL,ZESTRIL) 40 MG tablet, TAKE 1 TABLET BY MOUTH EVERY DAY, Disp: 90 tablet, Rfl: 1  •  Multiple Vitamins-Minerals (VISION-KRISTAL PRESERVE PO),  "Take  by mouth Daily With Breakfast., Disp: , Rfl:   •  NIFEdipine CC (ADALAT CC) 60 MG 24 hr tablet, TAKE 1 TABLET BY MOUTH EVERY DAY, Disp: 90 tablet, Rfl: 1  •  NOVOLOG FLEXPEN 100 UNIT/ML solution pen-injector sc pen, INJECT 30 UNITS UNDER SKIN IN APPROPRIATE AREA AS DIRECTED THREE TIMES DAILY WITH MEALS, Disp: 90 mL, Rfl: 0  •  omeprazole (priLOSEC) 40 MG capsule, Take 1 capsule by mouth Daily., Disp: 90 capsule, Rfl: 1  •  rosuvastatin (CRESTOR) 20 MG tablet, Take 1 tablet by mouth Every Night., Disp: 30 tablet, Rfl: 11  •  TRULICITY 0.75 MG/0.5ML solution pen-injector, INJECT 0.75 MG UNDER THE SKIN INTO THE APPROPRIATE AREA AS DIRECTED 1 (ONE) TIME PER WEEK., Disp: 6 pen, Rfl: 1  •  azithromycin (ZITHROMAX) 250 MG tablet, Take 2 tablets the first day, then 1 tablet daily for 4 days., Disp: 6 tablet, Rfl: 0  •  predniSONE (DELTASONE) 20 MG tablet, Start on 2/18.  Take two tablets daily for 2 days and then one tablet daily until gone., Disp: 7 tablet, Rfl: 0    Current Facility-Administered Medications:   •  methylPREDNISolone acetate (DEPO-medrol) injection 40 mg, 40 mg, Intramuscular, Once, Gillian Mullen, APRN    OBJECTIVE:  /57 (BP Location: Right arm, Patient Position: Sitting, Cuff Size: Large Adult)   Pulse 88   Temp 97.3 °F (36.3 °C) (Temporal)   Ht 165.1 cm (65\")   Wt 122 kg (268 lb 6.4 oz)   LMP  (LMP Unknown)   SpO2 97%   BMI 44.66 kg/m²    Physical Exam   Constitutional: She is oriented to person, place, and time. Vital signs are normal. She appears well-developed and well-nourished.   HENT:   Right Ear: Tympanic membrane, external ear and ear canal normal.   Left Ear: Tympanic membrane, external ear and ear canal normal.   Mouth/Throat: Posterior oropharyngeal erythema present.   Mucosal erythema of nares   Cardiovascular: Normal rate and regular rhythm.   Pulmonary/Chest: Effort normal and breath sounds normal.   Neurological: She is alert and oriented to person, place, and time. "   Psychiatric: She has a normal mood and affect. Her behavior is normal.   Vitals reviewed.      Assessment/Plan    Maribeth was seen today for uri.    Diagnoses and all orders for this visit:    Acute non-recurrent maxillary sinusitis  -     methylPREDNISolone acetate (DEPO-medrol) injection 40 mg  -     azithromycin (ZITHROMAX) 250 MG tablet; Take 2 tablets the first day, then 1 tablet daily for 4 days.  -     predniSONE (DELTASONE) 20 MG tablet; Start on 2/18.  Take two tablets daily for 2 days and then one tablet daily until gone.        An After Visit Summary was printed and given to the patient at discharge.  Return if symptoms worsen or fail to improve.       YESSICA Mane 02/17/20    Electronically signed.

## 2020-02-17 NOTE — PROGRESS NOTES
Administered 40 mg of solumedrol into left dorsogluteal. Patient tolerated well, observed no reactions.

## 2020-02-20 DIAGNOSIS — E10.69 MIXED DIABETIC HYPERLIPIDEMIA ASSOCIATED WITH TYPE 1 DIABETES MELLITUS (HCC): ICD-10-CM

## 2020-02-20 DIAGNOSIS — E78.2 MIXED DIABETIC HYPERLIPIDEMIA ASSOCIATED WITH TYPE 1 DIABETES MELLITUS (HCC): ICD-10-CM

## 2020-02-20 RX ORDER — ROSUVASTATIN CALCIUM 10 MG/1
TABLET, COATED ORAL
Qty: 90 TABLET | Refills: 1 | OUTPATIENT
Start: 2020-02-20

## 2020-03-30 DIAGNOSIS — Z79.4 TYPE 2 DIABETES MELLITUS WITH HYPERGLYCEMIA, WITH LONG-TERM CURRENT USE OF INSULIN (HCC): Primary | ICD-10-CM

## 2020-03-30 DIAGNOSIS — E11.65 TYPE 2 DIABETES MELLITUS WITH HYPERGLYCEMIA, WITH LONG-TERM CURRENT USE OF INSULIN (HCC): Primary | ICD-10-CM

## 2020-03-30 RX ORDER — INSULIN GLARGINE 100 [IU]/ML
40 INJECTION, SOLUTION SUBCUTANEOUS DAILY
Qty: 12 PEN | Refills: 0 | Status: SHIPPED | OUTPATIENT
Start: 2020-03-30 | End: 2020-04-20 | Stop reason: SDUPTHER

## 2020-04-16 DIAGNOSIS — F33.1 MODERATE EPISODE OF RECURRENT MAJOR DEPRESSIVE DISORDER (HCC): ICD-10-CM

## 2020-04-16 RX ORDER — LAMOTRIGINE 100 MG/1
TABLET ORAL
Qty: 90 TABLET | Refills: 2 | Status: SHIPPED | OUTPATIENT
Start: 2020-04-16 | End: 2021-05-24

## 2020-04-20 ENCOUNTER — OFFICE VISIT (OUTPATIENT)
Dept: ENDOCRINOLOGY | Facility: CLINIC | Age: 65
End: 2020-04-20

## 2020-04-20 DIAGNOSIS — E11.59 HYPERTENSION ASSOCIATED WITH DIABETES (HCC): ICD-10-CM

## 2020-04-20 DIAGNOSIS — E55.9 VITAMIN D DEFICIENCY: ICD-10-CM

## 2020-04-20 DIAGNOSIS — E78.2 MIXED DIABETIC HYPERLIPIDEMIA ASSOCIATED WITH TYPE 2 DIABETES MELLITUS (HCC): ICD-10-CM

## 2020-04-20 DIAGNOSIS — Z79.4 TYPE 2 DIABETES MELLITUS WITH HYPERGLYCEMIA, WITH LONG-TERM CURRENT USE OF INSULIN (HCC): Primary | ICD-10-CM

## 2020-04-20 DIAGNOSIS — E11.69 MIXED DIABETIC HYPERLIPIDEMIA ASSOCIATED WITH TYPE 2 DIABETES MELLITUS (HCC): ICD-10-CM

## 2020-04-20 DIAGNOSIS — E11.65 TYPE 2 DIABETES MELLITUS WITH HYPERGLYCEMIA, WITH LONG-TERM CURRENT USE OF INSULIN (HCC): ICD-10-CM

## 2020-04-20 DIAGNOSIS — I15.2 HYPERTENSION ASSOCIATED WITH DIABETES (HCC): ICD-10-CM

## 2020-04-20 DIAGNOSIS — Z79.4 TYPE 2 DIABETES MELLITUS WITH HYPERGLYCEMIA, WITH LONG-TERM CURRENT USE OF INSULIN (HCC): ICD-10-CM

## 2020-04-20 DIAGNOSIS — E03.9 ACQUIRED HYPOTHYROIDISM: ICD-10-CM

## 2020-04-20 DIAGNOSIS — E11.65 TYPE 2 DIABETES MELLITUS WITH HYPERGLYCEMIA, WITH LONG-TERM CURRENT USE OF INSULIN (HCC): Primary | ICD-10-CM

## 2020-04-20 PROCEDURE — 99442 PR PHYS/QHP TELEPHONE EVALUATION 11-20 MIN: CPT | Performed by: INTERNAL MEDICINE

## 2020-04-20 RX ORDER — ROSUVASTATIN CALCIUM 20 MG/1
20 TABLET, COATED ORAL NIGHTLY
Qty: 90 TABLET | Refills: 3 | OUTPATIENT
Start: 2020-04-20 | End: 2020-12-23

## 2020-04-20 RX ORDER — INSULIN GLARGINE 100 [IU]/ML
40 INJECTION, SOLUTION SUBCUTANEOUS DAILY
Qty: 36 PEN | Refills: 0 | Status: SHIPPED | OUTPATIENT
Start: 2020-04-20 | End: 2020-09-30 | Stop reason: SDUPTHER

## 2020-04-20 RX ORDER — INSULIN GLARGINE 100 [IU]/ML
40 INJECTION, SOLUTION SUBCUTANEOUS DAILY
Qty: 12 PEN | Refills: 0 | Status: SHIPPED | OUTPATIENT
Start: 2020-04-20 | End: 2020-04-20

## 2020-04-20 RX ORDER — PEN NEEDLE, DIABETIC 30 GX3/16"
1 NEEDLE, DISPOSABLE MISCELLANEOUS 4 TIMES DAILY
Qty: 360 EACH | Refills: 3 | Status: SHIPPED | OUTPATIENT
Start: 2020-04-20 | End: 2020-10-19 | Stop reason: SDUPTHER

## 2020-04-20 NOTE — PROGRESS NOTES
"  Subjective    Maribeth Isbell is a 64 y.o. female. she is here today for follow-up.                  TELEPHONE VISIT    This was a Telephone Encounter. Benefits and Disadvantages of a Telephone Visit were discussed and accepted by patient. .  Patient agreed to receive service through Telephone Visit as patient is being compliant with social distancing recommendations imparted by CDC.     You have chosen to receive care through a telephone visit. Do you consent to use a telephone visit for your medical care today? Yes        Diabetes   Pertinent negatives for hypoglycemia include no confusion, dizziness, headaches, pallor or tremors. Pertinent negatives for diabetes include no chest pain, no fatigue, no polydipsia, no polyphagia, no polyuria and no weakness.          Primary Care Provider     Jer Mccray MD     Duration 5 years     Timing - Diabetes is Constant     Quality -  now at [facility]      Severity -  moderate     Complications - TIA     Current symptoms/problems  increase appetite      Alleviating Factors: Compliance       Aggravating Factors :  None     Side Effects  metformin and sulfonylurea     Current diet  in general, a \"healthy\" diet       Current exercise none     Current monitoring regimen: home blood tests - checking 4 x daily     Lab Results   Component Value Date    HGBA1C 5.8 02/04/2019       Home blood sugar records:     At goal      Hypoglycemia none         The following portions of the patient's history were reviewed and updated as appropriate:   Past Medical History:   Diagnosis Date   • Bell's palsy    • Cataract    • Colon polyps    • Depression    • Diabetes mellitus (CMS/HCC)     type 2   • E coli infection    • Gastroesophageal reflux disease 7/17/2018   • Headache    • Hyperlipidemia    • Hypertension    • Hyperthyroidism    • Hypothyroidism    • Macular degeneration    • Sleep apnea     wears cpap   • Tachycardia    • Visual impairment      Past Surgical History: "   Procedure Laterality Date   •  SECTION     • COLONOSCOPY  06/10/2011    SCREENING[place] [place]AR[place]   • COLONOSCOPY N/A 2016    Procedure: COLONOSCOPY WITH ANESTHESIA;  Surgeon: Kathy Berry MD;  Location: [facility] ENDOSCOPY;  Service:    • ENDOSCOPY N/A 2016    Procedure: ESOPHAGOGASTRODUODENOSCOPY WITH ANESTHESIA;  Surgeon: Kathy Berry MD;  Location: [facility] ENDOSCOPY;  Service:    • ENDOSCOPY N/A 2018    Procedure: ESOPHAGOGASTRODUODENOSCOPY WITH ANESTHESIA;  Surgeon: Kathy Berry MD;  Location: [facility] ENDOSCOPY;  Service: Gastroenterology   • HYSTERECTOMY     • KNEE SURGERY Left    • TUMOR REMOVAL       Family History   Problem Relation Age of Onset   • Heart disease Mother    • Diabetes Mother    • No Known Problems Maternal Grandmother    • No Known Problems Maternal Grandfather    • No Known Problems Paternal Grandmother    • No Known Problems Paternal Grandfather    • Colon cancer Neg Hx    • Colon polyps Neg Hx      OB History    None       Current Outpatient Medications   Medication Sig Dispense Refill   • aspirin 81 MG EC tablet Take 81 mg by mouth Daily.     • azithromycin (ZITHROMAX) 250 MG tablet Take 2 tablets the first day, then 1 tablet daily for 4 days. 6 tablet 0   • budesonide-formoterol (SYMBICORT) 160-4.5 MCG/ACT inhaler Inhale 2 puffs 2 (Two) Times a Day. 1 inhaler 12   • bumetanide (BUMEX) 1 MG tablet Take 1 tablet by mouth Daily. 90 tablet 1   • carbidopa-levodopa (SINEMET)  MG per tablet TAKE 1 TO 2 TABLETS BY MOUTH EVERY DAY AT BEDTIME 180 tablet 1   • cetirizine (zyrTEC) 10 MG tablet Take 1 tablet by mouth Daily. 90 tablet 1   • desvenlafaxine (PRISTIQ) 50 MG 24 hr tablet Take 1 tablet by mouth Daily. 90 tablet 3   • Dulaglutide (Trulicity) 0.75 MG/0.5ML solution pen-injector Inject 0.75 mg under the skin into the appropriate area as directed 1 (One) Time Per Week. 12 pen 1   • ergocalciferol (ERGOCALCIFEROL) 93215 units capsule Take 1 capsule by mouth 1  (One) Time Per Week. 12 capsule 1   • folic acid (FOLVITE) 400 MCG tablet Take 400 mcg by mouth Daily.     • insulin aspart (NovoLOG FlexPen) 100 UNIT/ML solution pen-injector sc pen 20 units TID 18 pen 0   • Insulin Glargine (BASAGLAR KWIKPEN) 100 UNIT/ML injection pen Inject 40 Units under the skin into the appropriate area as directed Daily. 36 pen 0   • Insulin Pen Needle (PEN NEEDLES) 32G X 4 MM misc 1 each 4 (Four) Times a Day. 360 each 3   • lamoTRIgine (LaMICtal) 100 MG tablet TAKE 1 TABLET BY MOUTH EVERY DAY AT NIGHT 90 tablet 2   • levothyroxine (SYNTHROID, LEVOTHROID) 125 MCG tablet TAKE 1 TABLET BY MOUTH EVERY DAY 90 tablet 1   • lisinopril (PRINIVIL,ZESTRIL) 40 MG tablet TAKE 1 TABLET BY MOUTH EVERY DAY 90 tablet 1   • Multiple Vitamins-Minerals (VISION-KRISTAL PRESERVE PO) Take  by mouth Daily With Breakfast.     • NIFEdipine CC (ADALAT CC) 60 MG 24 hr tablet TAKE 1 TABLET BY MOUTH EVERY DAY 90 tablet 1   • omeprazole (priLOSEC) 40 MG capsule Take 1 capsule by mouth Daily. 90 capsule 1   • predniSONE (DELTASONE) 20 MG tablet Start on .  Take two tablets daily for 2 days and then one tablet daily until gone. 7 tablet 0   • rosuvastatin (Crestor) 20 MG tablet Take 1 tablet by mouth Every Night. 90 tablet 3     No current facility-administered medications for this visit.      Allergies   Allergen Reactions   • Metformin GI Intolerance     Social History     Socioeconomic History   • Marital status:      Spouse name: Not on file   • Number of children: Not on file   • Years of education: Not on file   • Highest education level: Not on file   Tobacco Use   • Smoking status: Former Smoker     Packs/day: 1.00     Years: 20.00     Pack years: 20.00     Types: Cigarettes     Last attempt to quit: 2015     Years since quittin.3   • Smokeless tobacco: Never Used   Substance and Sexual Activity   • Alcohol use: No   • Drug use: No   • Sexual activity: Defer       Review of Systems  Review of Systems    Constitutional: Negative for activity change, appetite change, diaphoresis and fatigue.   HENT: Negative for facial swelling, sneezing, sore throat, tinnitus, trouble swallowing and voice change.    Eyes: Negative for photophobia, pain, discharge, redness, itching and visual disturbance.   Respiratory: Negative for apnea, cough, choking, chest tightness and shortness of breath.    Cardiovascular: Negative for chest pain, palpitations and leg swelling.   Gastrointestinal: Negative for abdominal distention, abdominal pain, constipation, diarrhea, nausea and vomiting.   Endocrine: Negative for cold intolerance, heat intolerance, polydipsia, polyphagia and polyuria.   Genitourinary: Negative for difficulty urinating, dysuria, frequency, hematuria and urgency.   Musculoskeletal: Negative for arthralgias, back pain, gait problem, joint swelling, myalgias, neck pain and neck stiffness.   Skin: Negative for color change, pallor, rash and wound.   Neurological: Negative for dizziness, tremors, weakness, light-headedness, numbness and headaches.   Hematological: Negative for adenopathy. Does not bruise/bleed easily.   Psychiatric/Behavioral: Negative for behavioral problems, confusion and sleep disturbance.        Objective    LMP  (LMP Unknown)   Physical Exam    Lab Review  Glucose (mg/dL)   Date Value   11/04/2016 231 (H)     Sodium (mmol/L)   Date Value   05/31/2019 140   05/18/2018 141   04/18/2018 137   11/04/2016 140     Potassium (mmol/L)   Date Value   05/31/2019 4.8   05/18/2018 4.7   04/18/2018 3.8   11/04/2016 4.2     Chloride (mmol/L)   Date Value   05/31/2019 99   05/18/2018 101   04/18/2018 97 (L)   11/04/2016 99     CO2 (mmol/L)   Date Value   11/04/2016 30.0     Total CO2 (mmol/L)   Date Value   05/31/2019 27.2   05/18/2018 26.0   04/18/2018 25.0     BUN (mg/dL)   Date Value   05/31/2019 15   05/18/2018 16   04/18/2018 12   11/04/2016 14     Creatinine (mg/dL)   Date Value   05/31/2019 0.92   05/18/2018  0.98   04/18/2018 0.78   11/04/2016 0.79     Hemoglobin A1C (%)   Date Value   02/04/2019 5.8   04/18/2018 10.80   10/12/2017 7.20   06/05/2017 7.10     Triglycerides   Date Value   09/23/2019 248 mg/dL (H)   09/16/2019 CANCELED   05/31/2019 236 mg/dL (H)     LDL Cholesterol  (mg/dL)   Date Value   09/23/2019 85   05/31/2019 77   04/18/2018 82       Assessment/Plan      1. Type 2 diabetes mellitus with hyperglycemia, with long-term current use of insulin (CMS/[facility])    2. Hypertension associated with diabetes (CMS/[facility])    3. Mixed diabetic hyperlipidemia associated with type 2 diabetes mellitus (CMS/[facility])    4. Vitamin D deficiency     5. Acquired hypothyroidism    .    Medications prescribed:  Outpatient Encounter Medications as of 4/20/2020   Medication Sig Dispense Refill   • aspirin 81 MG EC tablet Take 81 mg by mouth Daily.     • azithromycin (ZITHROMAX) 250 MG tablet Take 2 tablets the first day, then 1 tablet daily for 4 days. 6 tablet 0   • budesonide-formoterol (SYMBICORT) 160-4.5 MCG/ACT inhaler Inhale 2 puffs 2 (Two) Times a Day. 1 inhaler 12   • bumetanide (BUMEX) 1 MG tablet Take 1 tablet by mouth Daily. 90 tablet 1   • carbidopa-levodopa (SINEMET)  MG per tablet TAKE 1 TO 2 TABLETS BY MOUTH EVERY DAY AT BEDTIME 180 tablet 1   • cetirizine (zyrTEC) 10 MG tablet Take 1 tablet by mouth Daily. 90 tablet 1   • desvenlafaxine (PRISTIQ) 50 MG 24 hr tablet Take 1 tablet by mouth Daily. 90 tablet 3   • Dulaglutide (Trulicity) 0.75 MG/0.5ML solution pen-injector Inject 0.75 mg under the skin into the appropriate area as directed 1 (One) Time Per Week. 12 pen 1   • ergocalciferol (ERGOCALCIFEROL) 44634 units capsule Take 1 capsule by mouth 1 (One) Time Per Week. 12 capsule 1   • folic acid (FOLVITE) 400 MCG tablet Take 400 mcg by mouth Daily.     • insulin aspart (NovoLOG FlexPen) 100 UNIT/ML solution pen-injector sc pen 20 units TID 18 pen 0   • Insulin Pen Needle (PEN NEEDLES) 32G X 4 MM misc 1 each 4 (Four)  Times a Day. 360 each 3   • lamoTRIgine (LaMICtal) 100 MG tablet TAKE 1 TABLET BY MOUTH EVERY DAY AT NIGHT 90 tablet 2   • levothyroxine (SYNTHROID, LEVOTHROID) 125 MCG tablet TAKE 1 TABLET BY MOUTH EVERY DAY 90 tablet 1   • lisinopril (PRINIVIL,ZESTRIL) 40 MG tablet TAKE 1 TABLET BY MOUTH EVERY DAY 90 tablet 1   • Multiple Vitamins-Minerals (VISION-KRISTAL PRESERVE PO) Take  by mouth Daily With Breakfast.     • NIFEdipine CC (ADALAT CC) 60 MG 24 hr tablet TAKE 1 TABLET BY MOUTH EVERY DAY 90 tablet 1   • omeprazole (priLOSEC) 40 MG capsule Take 1 capsule by mouth Daily. 90 capsule 1   • predniSONE (DELTASONE) 20 MG tablet Start on 2/18.  Take two tablets daily for 2 days and then one tablet daily until gone. 7 tablet 0   • rosuvastatin (Crestor) 20 MG tablet Take 1 tablet by mouth Every Night. 90 tablet 3   • [DISCONTINUED] Insulin Glargine (BASAGLAR KWIKPEN) 100 UNIT/ML injection pen Inject 40 Units under the skin into the appropriate area as directed Daily. 12 pen 0   • [DISCONTINUED] Insulin Glargine (BASAGLAR KWIKPEN) 100 UNIT/ML injection pen Inject 40 Units under the skin into the appropriate area as directed Daily. 12 pen 0   • [DISCONTINUED] Insulin Pen Needle (PEN NEEDLES) 32G X 4 MM misc 1 each 4 (Four) Times a Day. 360 each 3   • [DISCONTINUED] NOVOLOG FLEXPEN 100 UNIT/ML solution pen-injector sc pen INJECT 30 UNITS UNDER SKIN IN APPROPRIATE AREA AS DIRECTED THREE TIMES DAILY WITH MEALS 90 mL 0   • [DISCONTINUED] rosuvastatin (CRESTOR) 20 MG tablet Take 1 tablet by mouth Every Night. 30 tablet 11   • [DISCONTINUED] TRULICITY 0.75 MG/0.5ML solution pen-injector INJECT 0.75 MG UNDER THE SKIN INTO THE APPROPRIATE AREA AS DIRECTED 1 (ONE) TIME PER WEEK. 6 pen 1     No facility-administered encounter medications on file as of 4/20/2020.        Orders placed during this encounter include:  No orders of the defined types were placed in this encounter.  Patient has type 2 diabetes with hyperglycemia     Glycemic  Management:     Lab Results   Component Value Date    HGBA1C 5.8 02/04/2019    HGBA1C 10.80 04/18/2018    HGBA1C 7.20 10/12/2017     Lab Results   Component Value Date    MICROALBUR 4.1 05/31/2019    CREATININE 0.92 05/31/2019            Basaglar 50 - ( toujeo not covered )  This is holding glucose steady while not eating        Trulicity 0.75 mg weekly --- taking       Novolog doing 20 units with meals, if low carb 15 units         Ann-Marie hurts so not using     invokana , not covered            Lipid Management    Lab Results   Component Value Date    CHLPL 183 09/23/2019    CHLPL CANCELED 09/16/2019    CHLPL 170 05/31/2019     Lab Results   Component Value Date    TRIG 248 (H) 09/23/2019    TRIG CANCELED 09/16/2019    TRIG 236 (H) 05/31/2019     Lab Results   Component Value Date    HDL 48 09/23/2019    HDL CANCELED 09/16/2019    HDL 46 05/31/2019     Lab Results   Component Value Date    LDL 85 09/23/2019    LDL 77 05/31/2019    LDL 82 04/18/2018          On crestor 10 mg qhs - increase to 20        Blood Pressure Management:            On lisinopril            Microvascular Complication Monitoring:       Eye Exam Evaluation  Within 1 year   -----------     Lab Results   Component Value Date    MALBCRERATIO 1.6 05/31/2019         -----------        Neuropathy, none            Weight Related:        Diet interventions: moderate (500 kCal/d) deficit diet.        Bone Health     Lab Results   Component Value Date    GOPS42UD 32.3 04/18/2018    JJZH41RZ 34.3 06/05/2017    BZDV92SE 29.8 (L) 03/02/2017          Thyroid Health     Lab Results   Component Value Date    TSH 0.713 05/31/2019    TSH 4.710 (H) 04/18/2018    TSH 0.553 06/05/2017        on levothyroxine 125 mcgs daily       No results found for: [id number]             4. Follow-up: No follow-ups on file.      I spent 15 minutes reviewing patient electronic chart , reviewing medications , past history , active problems.   I provided advice regarding management  of medical conditions, refilled prescriptions , ordered labs and arranged for future appointment.   Patient was advised to contact us if there were any unanswered questions or ongoing concerns.

## 2020-05-04 ENCOUNTER — TELEPHONE (OUTPATIENT)
Dept: ENDOCRINOLOGY | Facility: CLINIC | Age: 65
End: 2020-05-04

## 2020-05-04 NOTE — TELEPHONE ENCOUNTER
Says that her novolog flexpen needs to be sent is as a 90 day supply for her insurance to cover it. She uses [facility]

## 2020-05-19 DIAGNOSIS — E55.9 VITAMIN D DEFICIENCY: ICD-10-CM

## 2020-05-19 RX ORDER — ERGOCALCIFEROL 1.25 MG/1
CAPSULE ORAL
Qty: 12 CAPSULE | Refills: 1 | Status: SHIPPED | OUTPATIENT
Start: 2020-05-19 | End: 2020-09-16

## 2020-06-03 NOTE — TELEPHONE ENCOUNTER
Patient returned call.    Can be reached at [phone].   Patient was called regarding blood sugar readings.  Blood sugar better, increase levemir to 40 units.  Repeat labs in 6 weeks.

## 2020-06-07 DIAGNOSIS — J30.2 SEASONAL ALLERGIES: ICD-10-CM

## 2020-06-08 RX ORDER — BUDESONIDE AND FORMOTEROL FUMARATE DIHYDRATE 160; 4.5 UG/1; UG/1
AEROSOL RESPIRATORY (INHALATION)
Qty: 30.6 INHALER | Refills: 4 | Status: SHIPPED | OUTPATIENT
Start: 2020-06-08 | End: 2021-06-09

## 2020-06-16 ENCOUNTER — OFFICE VISIT (OUTPATIENT)
Dept: FAMILY MEDICINE CLINIC | Facility: CLINIC | Age: 65
End: 2020-06-16

## 2020-06-16 VITALS
DIASTOLIC BLOOD PRESSURE: 74 MMHG | HEART RATE: 83 BPM | WEIGHT: 268.2 LBS | HEIGHT: 64 IN | OXYGEN SATURATION: 98 % | SYSTOLIC BLOOD PRESSURE: 116 MMHG | TEMPERATURE: 97.9 F | BODY MASS INDEX: 45.79 KG/M2

## 2020-06-16 DIAGNOSIS — Z12.11 COLON CANCER SCREENING: ICD-10-CM

## 2020-06-16 DIAGNOSIS — Z12.39 BREAST CANCER SCREENING: ICD-10-CM

## 2020-06-16 DIAGNOSIS — Z79.4 TYPE 2 DIABETES MELLITUS WITH HYPERGLYCEMIA, WITH LONG-TERM CURRENT USE OF INSULIN (HCC): Primary | ICD-10-CM

## 2020-06-16 DIAGNOSIS — E03.9 ACQUIRED HYPOTHYROIDISM: ICD-10-CM

## 2020-06-16 DIAGNOSIS — E55.9 VITAMIN D DEFICIENCY: ICD-10-CM

## 2020-06-16 DIAGNOSIS — Z13.220 LIPID SCREENING: ICD-10-CM

## 2020-06-16 DIAGNOSIS — Z86.010 HISTORY OF COLON POLYPS: ICD-10-CM

## 2020-06-16 DIAGNOSIS — I15.2 HYPERTENSION ASSOCIATED WITH DIABETES (HCC): ICD-10-CM

## 2020-06-16 DIAGNOSIS — M15.9 OSTEOARTHRITIS OF MULTIPLE JOINTS, UNSPECIFIED OSTEOARTHRITIS TYPE: ICD-10-CM

## 2020-06-16 DIAGNOSIS — E11.59 HYPERTENSION ASSOCIATED WITH DIABETES (HCC): ICD-10-CM

## 2020-06-16 DIAGNOSIS — F33.1 MODERATE EPISODE OF RECURRENT MAJOR DEPRESSIVE DISORDER (HCC): ICD-10-CM

## 2020-06-16 DIAGNOSIS — K21.9 GASTROESOPHAGEAL REFLUX DISEASE, ESOPHAGITIS PRESENCE NOT SPECIFIED: ICD-10-CM

## 2020-06-16 DIAGNOSIS — R53.83 FATIGUE, UNSPECIFIED TYPE: ICD-10-CM

## 2020-06-16 DIAGNOSIS — E11.65 TYPE 2 DIABETES MELLITUS WITH HYPERGLYCEMIA, WITH LONG-TERM CURRENT USE OF INSULIN (HCC): Primary | ICD-10-CM

## 2020-06-16 LAB
BILIRUB BLD-MCNC: NEGATIVE MG/DL
CLARITY, POC: CLEAR
COLOR UR: YELLOW
GLUCOSE UR STRIP-MCNC: NEGATIVE MG/DL
KETONES UR QL: ABNORMAL
LEUKOCYTE EST, POC: NEGATIVE
NITRITE UR-MCNC: NEGATIVE MG/ML
PH UR: 5.5 [PH] (ref 5–8)
PROT UR STRIP-MCNC: NEGATIVE MG/DL
RBC # UR STRIP: NEGATIVE /UL
SP GR UR: 1.02 (ref 1–1.03)
UROBILINOGEN UR QL: NORMAL

## 2020-06-16 PROCEDURE — 99396 PREV VISIT EST AGE 40-64: CPT | Performed by: NURSE PRACTITIONER

## 2020-06-16 PROCEDURE — 81003 URINALYSIS AUTO W/O SCOPE: CPT | Performed by: NURSE PRACTITIONER

## 2020-06-16 NOTE — PROGRESS NOTES
CC: annual physical     History:  Maribeth Isbell is a 64 y.o. female who presents today for evaluation of the above problems.    130-150 on blood sugar typically. Sees Dr. Khan for endocrinology.  Mood has been a little worse recently due to not being able to see family and friends during pandemic, but otherwise denies any problems.  Weight has remained stable at 268.  She walks for exercise, but states she hasn't been as much since she isn't able to walk with friends. Admits that she hasn't been eating as well as she should either.       HPI  ROS:  Review of Systems   Respiratory: Negative for cough and shortness of breath.    Cardiovascular: Negative for chest pain.   Gastrointestinal: Negative for constipation and diarrhea.   Genitourinary: Negative for dysuria.   Neurological: Negative for light-headedness.   Psychiatric/Behavioral:        Increased depression due to pandemic       Allergies   Allergen Reactions   • Metformin GI Intolerance     Past Medical History:   Diagnosis Date   • Bell's palsy    • Cataract    • Colon polyps    • Depression    • Diabetes mellitus (CMS/HCC)     type 2   • E coli infection    • Gastroesophageal reflux disease 2018   • Headache    • Hyperlipidemia    • Hypertension    • Hyperthyroidism    • Hypothyroidism    • Macular degeneration    • Sleep apnea     wears cpap   • Tachycardia    • Visual impairment      Past Surgical History:   Procedure Laterality Date   •  SECTION     • COLONOSCOPY  06/10/2011    SCREENING-[facility] [place]DR [place]   • COLONOSCOPY N/A 2016    Procedure: COLONOSCOPY WITH ANESTHESIA;  Surgeon: Kathy Berry MD;  Location: [facility] ENDOSCOPY;  Service:    • ENDOSCOPY N/A 2016    Procedure: ESOPHAGOGASTRODUODENOSCOPY WITH ANESTHESIA;  Surgeon: Kathy Berry MD;  Location: [facility] ENDOSCOPY;  Service:    • ENDOSCOPY N/A 2018    Procedure: ESOPHAGOGASTRODUODENOSCOPY WITH ANESTHESIA;  Surgeon: Kathy Berry MD;  Location: [facility] ENDOSCOPY;   Service: Gastroenterology   • HYSTERECTOMY     • KNEE SURGERY Left    • TUMOR REMOVAL       Family History   Problem Relation Age of Onset   • Heart disease Mother    • Diabetes Mother    • No Known Problems Maternal Grandmother    • No Known Problems Maternal Grandfather    • No Known Problems Paternal Grandmother    • No Known Problems Paternal Grandfather    • Colon cancer Neg Hx    • Colon polyps Neg Hx       reports that she quit smoking about 5 years ago. Her smoking use included cigarettes. She has a 20.00 pack-year smoking history. She has never used smokeless tobacco. She reports that she does not drink alcohol or use drugs.      Current Outpatient Medications:   •  aspirin 81 MG EC tablet, Take 81 mg by mouth Daily., Disp: , Rfl:   •  bumetanide (BUMEX) 1 MG tablet, Take 1 tablet by mouth Daily., Disp: 90 tablet, Rfl: 1  •  carbidopa-levodopa (SINEMET)  MG per tablet, TAKE 1 TO 2 TABLETS BY MOUTH EVERY DAY AT BEDTIME, Disp: 180 tablet, Rfl: 1  •  cetirizine (zyrTEC) 10 MG tablet, Take 1 tablet by mouth Daily., Disp: 90 tablet, Rfl: 1  •  desvenlafaxine (PRISTIQ) 50 MG 24 hr tablet, Take 1 tablet by mouth Daily., Disp: 90 tablet, Rfl: 3  •  Dulaglutide (Trulicity) 0.75 MG/0.5ML solution pen-injector, Inject 0.75 mg under the skin into the appropriate area as directed 1 (One) Time Per Week., Disp: 12 pen, Rfl: 1  •  folic acid (FOLVITE) 400 MCG tablet, Take 400 mcg by mouth Daily., Disp: , Rfl:   •  insulin aspart (NovoLOG FlexPen) 100 UNIT/ML solution pen-injector sc pen, INJECT 20 UNITS 3 TIMES A DAY, Disp: 18 mL, Rfl: 11  •  Insulin Glargine (BASAGLAR KWIKPEN) 100 UNIT/ML injection pen, Inject 40 Units under the skin into the appropriate area as directed Daily., Disp: 36 pen, Rfl: 0  •  Insulin Pen Needle (PEN NEEDLES) 32G X 4 MM misc, 1 each 4 (Four) Times a Day., Disp: 360 each, Rfl: 3  •  lamoTRIgine (LaMICtal) 100 MG tablet, TAKE 1 TABLET BY MOUTH EVERY DAY AT NIGHT, Disp: 90 tablet, Rfl: 2  •   "levothyroxine (SYNTHROID, LEVOTHROID) 125 MCG tablet, TAKE 1 TABLET BY MOUTH EVERY DAY, Disp: 90 tablet, Rfl: 1  •  lisinopril (PRINIVIL,ZESTRIL) 40 MG tablet, TAKE 1 TABLET BY MOUTH EVERY DAY, Disp: 90 tablet, Rfl: 1  •  Multiple Vitamins-Minerals (VISION-KRISTAL PRESERVE PO), Take  by mouth Daily With Breakfast., Disp: , Rfl:   •  NIFEdipine CC (ADALAT CC) 60 MG 24 hr tablet, TAKE 1 TABLET BY MOUTH EVERY DAY, Disp: 90 tablet, Rfl: 1  •  omeprazole (priLOSEC) 40 MG capsule, Take 1 capsule by mouth Daily., Disp: 90 capsule, Rfl: 1  •  rosuvastatin (Crestor) 20 MG tablet, Take 1 tablet by mouth Every Night., Disp: 90 tablet, Rfl: 3  •  SYMBICORT 160-4.5 MCG/ACT inhaler, TAKE 2 PUFFS BY MOUTH TWICE A DAY, Disp: 30.6 inhaler, Rfl: 4  •  vitamin D (ERGOCALCIFEROL) 1.25 MG (29348 UT) capsule capsule, TAKE 1 CAPSULE BY MOUTH 1 (ONE) TIME PER WEEK., Disp: 12 capsule, Rfl: 1    OBJECTIVE:  /74 (BP Location: Left arm, Patient Position: Sitting, Cuff Size: Large Adult)   Pulse 83   Temp 97.9 °F (36.6 °C) (Temporal)   Ht 162.6 cm (64\")   Wt 122 kg (268 lb 3.2 oz)   LMP  (LMP Unknown)   SpO2 98%   Breastfeeding No   BMI 46.04 kg/m²    Physical Exam   Constitutional: She is oriented to person, place, and time. Vital signs are normal. She appears well-developed and well-nourished.   HENT:   Right Ear: Tympanic membrane, external ear and ear canal normal.   Left Ear: Tympanic membrane, external ear and ear canal normal.   Neck: Carotid bruit is not present.   Cardiovascular: Normal rate and regular rhythm.   Pulmonary/Chest: Effort normal and breath sounds normal. Right breast exhibits no inverted nipple, no mass, no nipple discharge, no skin change and no tenderness. Left breast exhibits no inverted nipple, no mass, no nipple discharge, no skin change and no tenderness.   Abdominal: Soft. Bowel sounds are normal. She exhibits no mass. There is no tenderness.    Maribeth had a diabetic foot exam performed today.   During " the foot exam she had a monofilament test not performed.  Neurological: She is alert and oriented to person, place, and time.   Psychiatric: She has a normal mood and affect. Her behavior is normal.   Vitals reviewed.      Assessment/Plan    Maribeth was seen today for annual exam.    Diagnoses and all orders for this visit:    Type 2 diabetes mellitus with hyperglycemia, with long-term current use of insulin (CMS/[facility])  -     Hemoglobin A1c  -     POC Urinalysis Dipstick, Multipro  -     Microalbumin / Creatinine Urine Ratio - Urine, Clean Catch    Hypertension associated with diabetes (CMS/[facility])  -     Comprehensive Metabolic Panel  -     POC Urinalysis Dipstick, Multipro  -     Microalbumin / Creatinine Urine Ratio - Urine, Clean Catch    Gastroesophageal reflux disease, esophagitis presence not specified  -     CBC & Differential  -     Comprehensive Metabolic Panel    History of colon polyps    Osteoarthritis of multiple joints, unspecified osteoarthritis type    Moderate episode of recurrent major depressive disorder (CMS/[facility])    Acquired hypothyroidism  -     TSH  -     T4, free    Vitamin D deficiency  -     Vitamin D 25 Hydroxy    Fatigue, unspecified type    Lipid screening  -     Lipid Panel    Breast cancer screening  -     Mammo Screening Bilateral With CAD; Future    Colon cancer screening  -     Cologuard - Stool, Per Rectum; Future    HEALTH MAINTENANCE  Cologuard ordered.  Diabetic foot exam performed and encouraged yearly eye exam.  A1C and lipid panel included with today's labs.     Patient was masked upon entering facility.  I wore N95 mask, with face shield and gloves.  MA wore N95 mask with face shield and gloves.        An After Visit Summary was printed and given to the patient at discharge.  Return in about 6 months (around 12/16/2020) for Next scheduled follow up.       YESSICA Mane 06/16/2020    Electronically signed.

## 2020-06-17 LAB
25(OH)D3+25(OH)D2 SERPL-MCNC: 41.6 NG/ML (ref 30–100)
ALBUMIN SERPL-MCNC: 4.7 G/DL (ref 3.5–5.2)
ALBUMIN/CREAT UR: 7 MG/G CREAT (ref 0–29)
ALBUMIN/GLOB SERPL: 2 G/DL
ALP SERPL-CCNC: 71 U/L (ref 39–117)
ALT SERPL-CCNC: 14 U/L (ref 1–33)
AST SERPL-CCNC: 21 U/L (ref 1–32)
BASOPHILS # BLD AUTO: 0.06 10*3/MM3 (ref 0–0.2)
BASOPHILS NFR BLD AUTO: 1 % (ref 0–1.5)
BILIRUB SERPL-MCNC: 0.5 MG/DL (ref 0.2–1.2)
BUN SERPL-MCNC: 15 MG/DL (ref 8–23)
BUN/CREAT SERPL: 13.5 (ref 7–25)
CALCIUM SERPL-MCNC: 9.4 MG/DL (ref 8.6–10.5)
CHLORIDE SERPL-SCNC: 99 MMOL/L (ref 98–107)
CHOLEST SERPL-MCNC: 173 MG/DL (ref 0–200)
CO2 SERPL-SCNC: 27.6 MMOL/L (ref 22–29)
CREAT SERPL-MCNC: 1.11 MG/DL (ref 0.57–1)
CREAT UR-MCNC: 314.4 MG/DL
EOSINOPHIL # BLD AUTO: 0.18 10*3/MM3 (ref 0–0.4)
EOSINOPHIL NFR BLD AUTO: 3.1 % (ref 0.3–6.2)
ERYTHROCYTE [DISTWIDTH] IN BLOOD BY AUTOMATED COUNT: 13.2 % (ref 12.3–15.4)
GLOBULIN SER CALC-MCNC: 2.3 GM/DL
GLUCOSE SERPL-MCNC: 104 MG/DL (ref 65–99)
HBA1C MFR BLD: 6.8 % (ref 4.8–5.6)
HCT VFR BLD AUTO: 45.7 % (ref 34–46.6)
HDLC SERPL-MCNC: 46 MG/DL (ref 40–60)
HGB BLD-MCNC: 14.9 G/DL (ref 12–15.9)
IMM GRANULOCYTES # BLD AUTO: 0.02 10*3/MM3 (ref 0–0.05)
IMM GRANULOCYTES NFR BLD AUTO: 0.3 % (ref 0–0.5)
LDLC SERPL CALC-MCNC: 81 MG/DL (ref 0–100)
LYMPHOCYTES # BLD AUTO: 2.73 10*3/MM3 (ref 0.7–3.1)
LYMPHOCYTES NFR BLD AUTO: 47.2 % (ref 19.6–45.3)
MCH RBC QN AUTO: 29.9 PG (ref 26.6–33)
MCHC RBC AUTO-ENTMCNC: 32.6 G/DL (ref 31.5–35.7)
MCV RBC AUTO: 91.8 FL (ref 79–97)
MICROALBUMIN UR-MCNC: 22.7 UG/ML
MONOCYTES # BLD AUTO: 0.49 10*3/MM3 (ref 0.1–0.9)
MONOCYTES NFR BLD AUTO: 8.5 % (ref 5–12)
NEUTROPHILS # BLD AUTO: 2.3 10*3/MM3 (ref 1.7–7)
NEUTROPHILS NFR BLD AUTO: 39.9 % (ref 42.7–76)
NRBC BLD AUTO-RTO: 0.2 /100 WBC (ref 0–0.2)
PLATELET # BLD AUTO: 298 10*3/MM3 (ref 140–450)
POTASSIUM SERPL-SCNC: 3.9 MMOL/L (ref 3.5–5.2)
PROT SERPL-MCNC: 7 G/DL (ref 6–8.5)
RBC # BLD AUTO: 4.98 10*6/MM3 (ref 3.77–5.28)
SODIUM SERPL-SCNC: 141 MMOL/L (ref 136–145)
T4 FREE SERPL-MCNC: 1.72 NG/DL (ref 0.93–1.7)
TRIGL SERPL-MCNC: 230 MG/DL (ref 0–150)
TSH SERPL DL<=0.005 MIU/L-ACNC: 0.34 UIU/ML (ref 0.27–4.2)
VLDLC SERPL CALC-MCNC: 46 MG/DL
WBC # BLD AUTO: 5.78 10*3/MM3 (ref 3.4–10.8)

## 2020-06-17 NOTE — PROGRESS NOTES
Labs ok but A1C is up to 6.8 from 5.8 last year.  Needs to start watching diet again!  Schedule 3 month follow up instead of 6 for recheck.

## 2020-07-02 ENCOUNTER — TELEPHONE (OUTPATIENT)
Dept: FAMILY MEDICINE CLINIC | Facility: CLINIC | Age: 65
End: 2020-07-02

## 2020-07-02 NOTE — TELEPHONE ENCOUNTER
Called to request copy of Mammo that was scd 6/19/2020 at [facility] hosp. Spoke with Kaylee-  pt was a no show an office would need to contact pt to get reschd please.

## 2020-07-08 DIAGNOSIS — E03.9 ACQUIRED HYPOTHYROIDISM: ICD-10-CM

## 2020-07-08 RX ORDER — LEVOTHYROXINE SODIUM 0.12 MG/1
TABLET ORAL
Qty: 90 TABLET | Refills: 1 | Status: SHIPPED | OUTPATIENT
Start: 2020-07-08 | End: 2020-12-30

## 2020-07-09 DIAGNOSIS — R19.5 POSITIVE COLORECTAL CANCER SCREENING USING COLOGUARD TEST: Primary | ICD-10-CM

## 2020-07-13 DIAGNOSIS — R60.9 EDEMA, UNSPECIFIED TYPE: ICD-10-CM

## 2020-07-13 RX ORDER — BUMETANIDE 1 MG/1
TABLET ORAL
Qty: 90 TABLET | Refills: 1 | Status: SHIPPED | OUTPATIENT
Start: 2020-07-13 | End: 2021-01-06

## 2020-07-27 NOTE — PROGRESS NOTES
Chief Complaint   Patient presents with   • GI Problem     positive cologuard- last colon 2016 by Dr. Mahmood- per pt due next year       PCP: Jer Mccray MD  REFER: Gillian Mullen, APRN    Subjective     HPI    She presents to office with positive cologaurd test from PCP office in .  Coarse is constant.  Length of time test has been positive is unknown.  Testing performed as part of routine physical.  She denies change in bowels.  Bowels described as moving without difficulty, no change. No abdominal pain.  No BRBPR.  No melena.  Weight is stable.  She has undergone previous colonoscopy evaluation. The patient states that she has had colonoscopy in the past with  3/2016. There is not a family history of colon cancer or colon polyps.    Past Medical History:   Diagnosis Date   • Bell's palsy    • Cataract    • Depression    • E coli infection    • Gastroesophageal reflux disease 2018   • Headache    • History of colon polyps    • Hyperlipidemia    • Hypertension    • Hyperthyroidism    • Hypothyroidism    • Macular degeneration    • Sleep apnea     wears cpap   • Tachycardia    • Type 2 diabetes mellitus (CMS/HCC)    • Visual impairment        Past Surgical History:   Procedure Laterality Date   •  SECTION     • COLONOSCOPY  06/10/2011    Dr. Mahmood-Internal hemorrhoids; Otherwise normal   • COLONOSCOPY N/A 2016    Colon not done this day-see endo report 2016   • COLONOSCOPY  [id number]    Dr. Mahmood-A single small hyperplastic polyp in the ascending colon   • ENDOSCOPY N/A 2016    Normal esophagus; Normal stomach; Normal first part of the duodenum and 2nd part of the duodenum-biopsied   • ENDOSCOPY N/A 2018    Normal esophagus; Normal stomach; Normal first portion of the duodenum and second portion of the duodenum-biopsied   • HYSTERECTOMY     • KNEE SURGERY Left    • TUMOR REMOVAL         Outpatient Medications Marked as Taking for the 20 encounter  (Office Visit) with Tana Braun APRN   Medication Sig Dispense Refill   • Albuterol Sulfate, sensor, 108 (90 Base) MCG/ACT aerosol powder  Inhale.     • amitriptyline (ELAVIL) 10 MG tablet Take 10 mg by mouth Every Night.     • amphetamine-dextroamphetamine (ADDERALL) 20 MG tablet Take 20 mg by mouth Daily.     • aspirin 81 MG EC tablet Take 81 mg by mouth Daily.     • aspirin-acetaminophen-caffeine (EXCEDRIN MIGRAINE) 250-250-65 MG per tablet Take 1 tablet by mouth Every 6 (Six) Hours As Needed for Headache.     • benzonatate (TESSALON) 100 MG capsule Take 100 mg by mouth 3 (Three) Times a Day As Needed for Cough.     • bumetanide (BUMEX) 1 MG tablet TAKE 1 TABLET BY MOUTH EVERY DAY 90 tablet 1   • busPIRone (BUSPAR) 5 MG tablet Take 5 mg by mouth 3 (Three) Times a Day.     • Butalbital-Acetaminophen  MG capsule Take  by mouth.     • carbidopa-levodopa (SINEMET)  MG per tablet TAKE 1 TO 2 TABLETS BY MOUTH EVERY DAY AT BEDTIME 180 tablet 1   • cetirizine (zyrTEC) 10 MG tablet Take 1 tablet by mouth Daily. 90 tablet 1   • citalopram (CeleXA) 20 MG tablet Take 20 mg by mouth Daily.     • desvenlafaxine (PRISTIQ) 50 MG 24 hr tablet Take 1 tablet by mouth Daily. 90 tablet 3   • Dulaglutide (Trulicity) 0.75 MG/0.5ML solution pen-injector Inject 0.75 mg under the skin into the appropriate area as directed 1 (One) Time Per Week. 12 pen 1   • EPINEPHrine (EpiPen 2-Kevin) 0.3 MG/0.3ML solution auto-injector injection      • Ferrous Gluconate (IRON 27 PO) Take  by mouth.     • folic acid (FOLVITE) 400 MCG tablet Take 400 mcg by mouth Daily.     • guanFACINE (TENEX) 2 MG tablet Take 2 mg by mouth Every Night.     • hydroCHLOROthiazide (HYDRODIURIL) 25 MG tablet Take 25 mg by mouth Daily.     • insulin aspart (NovoLOG FlexPen) 100 UNIT/ML solution pen-injector sc pen INJECT 20 UNITS 3 TIMES A DAY 18 mL 11   • Insulin Glargine (BASAGLAR KWIKPEN) 100 UNIT/ML injection pen Inject 40 Units under the skin into the  appropriate area as directed Daily. 36 pen 0   • Insulin Pen Needle (PEN NEEDLES) 32G X 4 MM misc 1 each 4 (Four) Times a Day. 360 each 3   • lamoTRIgine (LaMICtal) 100 MG tablet TAKE 1 TABLET BY MOUTH EVERY DAY AT NIGHT 90 tablet 2   • levothyroxine (SYNTHROID, LEVOTHROID) 200 MCG tablet Take 200 mcg by mouth Daily.     • lisinopril (PRINIVIL,ZESTRIL) 40 MG tablet TAKE 1 TABLET BY MOUTH EVERY DAY 90 tablet 1   • Melatonin Gummies 2.5 MG chewable tablet Chew.     • metFORMIN (GLUCOPHAGE) 1000 MG tablet Take 1,000 mg by mouth 2 (Two) Times a Day With Meals.     • montelukast (SINGULAIR) 10 MG tablet Take 10 mg by mouth Every Night.     • Multiple Vitamins-Minerals (HAIR SKIN NAILS PO) Take  by mouth.     • Multiple Vitamins-Minerals (VISION-KRISTAL PRESERVE PO) Take  by mouth Daily With Breakfast.     • omeprazole (priLOSEC) 40 MG capsule Take 1 capsule by mouth Daily. 90 capsule 1   • rosuvastatin (Crestor) 20 MG tablet Take 1 tablet by mouth Every Night. 90 tablet 3   • SYMBICORT 160-4.5 MCG/ACT inhaler TAKE 2 PUFFS BY MOUTH TWICE A DAY 30.6 inhaler 4   • varenicline (CHANTIX) 0.5 MG tablet Take 0.5 mg by mouth 2 (Two) Times a Day.     • vitamin D (ERGOCALCIFEROL) 1.25 MG (95843 UT) capsule capsule TAKE 1 CAPSULE BY MOUTH 1 (ONE) TIME PER WEEK. 12 capsule 1       Allergies   Allergen Reactions   • Metformin GI Intolerance       Social History     Socioeconomic History   • Marital status:      Spouse name: Not on file   • Number of children: Not on file   • Years of education: Not on file   • Highest education level: Not on file   Tobacco Use   • Smoking status: Former Smoker     Packs/day: 1.00     Years: 20.00     Pack years: 20.00     Types: Cigarettes     Last attempt to quit: 2015     Years since quittin.5   • Smokeless tobacco: Never Used   Substance and Sexual Activity   • Alcohol use: No   • Drug use: No   • Sexual activity: Defer       Family History   Problem Relation Age of Onset   • Heart disease  "Mother    • Diabetes Mother    • No Known Problems Maternal Grandmother    • No Known Problems Maternal Grandfather    • No Known Problems Paternal Grandmother    • No Known Problems Paternal Grandfather    • Colon cancer Neg Hx    • Colon polyps Neg Hx        Review of Systems   Constitutional: Negative for fever and unexpected weight change.   HENT: Negative for hearing loss.    Eyes: Negative for visual disturbance.   Respiratory: Negative for cough.    Cardiovascular: Negative for chest pain.   Gastrointestinal:        See HPI   Endocrine: Negative for cold intolerance and heat intolerance.   Genitourinary: Negative for dysuria.   Musculoskeletal: Negative for arthralgias.   Skin: Negative for rash.   Neurological: Negative for seizures.   Psychiatric/Behavioral: Negative for hallucinations.       Objective     Vitals:    07/28/20 0945   BP: 138/78   Pulse: 84   Temp: 97.3 °F (36.3 °C)   SpO2: 98%   Weight: 121 kg (266 lb)   Height: 165.1 cm (65\")     Body mass index is 44.26 kg/m².    Physical Exam   Constitutional: She is oriented to person, place, and time. She appears well-developed and well-nourished.   HENT:   Mouth/Throat: Oropharynx is clear and moist.   Eyes: EOM are normal.   Cardiovascular: Normal rate, regular rhythm and normal heart sounds. Exam reveals no gallop and no friction rub.   No murmur heard.  Pulmonary/Chest: Effort normal and breath sounds normal. She has no wheezes. She has no rales.   Abdominal: Soft. Bowel sounds are normal. She exhibits no distension. There is no hepatosplenomegaly. There is no tenderness. There is no rigidity, no rebound and no guarding.   Musculoskeletal: Normal range of motion. She exhibits no edema, tenderness or deformity.   Neurological: She is alert and oriented to person, place, and time.   Skin: Skin is warm and dry. No rash noted.   Psychiatric: She has a normal mood and affect. Her behavior is normal. Judgment and thought content normal.   Vitals " reviewed.      Imaging Results (Most Recent)     None          Body mass index is 44.26 kg/m².    Assessment/Plan     Maribeth was seen today for gi problem.    Diagnoses and all orders for this visit:    Positive colorectal cancer screening using Cologuard test  -     Case Request; Standing  -     Case Request    History of adenomatous polyp of colon  -     Case Request; Standing  -     Case Request    Other orders  -     Follow Anesthesia Guidelines / Protocol; Future  -     Obtain Informed Consent; Future  -     sodium-potassium-magnesium sulfates (Suprep Bowel Prep Kit) 17.5-3.13-1.6 GM/177ML solution oral solution; Take 1 bottle by mouth Every 12 (Twelve) Hours. Split dose prep as directed by office instructions provided.  2 bottles = one kit.        COLONOSCOPY WITH ANESTHESIA (N/A)      I have explained a positive cologuard indicates the presence of DNA/hgb in stool that is associated with colon polyps or colon cancer.  There is a chance the test is a false positive with that chance being higher for people over the age of 65. This is due to normal changes in DNA associated with getting older (AGA).  Due to the positive result of the Cologuard I recommend pt undergoing colonoscopy.  Colonoscopy remains the gold standard for detection of colon polyps/colon cancer.      All risks, benefits, alternatives, and indications of colonoscopy procedure have been discussed with the patient. Risks to include perforation of the colon requiring possible surgery or colostomy, risk of bleeding from biopsies or removal of colon tissue, possibility of missing a colon polyp or cancer, or adverse drug reaction.  Benefits to include the diagnosis and management of disease of the colon and rectum.  Pt verbalizes understanding and agrees to proceed with procedure.      Patient's Body mass index is 44.26 kg/m². BMI is above normal parameters. Recommendations include: nutrition counseling.      There are no Patient Instructions on file  for this visit.

## 2020-07-28 ENCOUNTER — OFFICE VISIT (OUTPATIENT)
Dept: GASTROENTEROLOGY | Facility: CLINIC | Age: 65
End: 2020-07-28

## 2020-07-28 ENCOUNTER — TELEPHONE (OUTPATIENT)
Dept: CARDIOLOGY | Age: 65
End: 2020-07-28

## 2020-07-28 ENCOUNTER — TRANSCRIBE ORDERS (OUTPATIENT)
Dept: ADMINISTRATIVE | Facility: HOSPITAL | Age: 65
End: 2020-07-28

## 2020-07-28 VITALS
WEIGHT: 266 LBS | OXYGEN SATURATION: 98 % | SYSTOLIC BLOOD PRESSURE: 138 MMHG | HEIGHT: 65 IN | HEART RATE: 84 BPM | DIASTOLIC BLOOD PRESSURE: 78 MMHG | BODY MASS INDEX: 44.32 KG/M2 | TEMPERATURE: 97.3 F

## 2020-07-28 DIAGNOSIS — E11.59 HYPERTENSION ASSOCIATED WITH DIABETES (HCC): ICD-10-CM

## 2020-07-28 DIAGNOSIS — Z86.010 HISTORY OF ADENOMATOUS POLYP OF COLON: ICD-10-CM

## 2020-07-28 DIAGNOSIS — I15.2 HYPERTENSION ASSOCIATED WITH DIABETES (HCC): ICD-10-CM

## 2020-07-28 DIAGNOSIS — R19.5 POSITIVE COLORECTAL CANCER SCREENING USING COLOGUARD TEST: Primary | ICD-10-CM

## 2020-07-28 DIAGNOSIS — Z01.818 PRE-OP TESTING: Primary | ICD-10-CM

## 2020-07-28 PROBLEM — Z86.0101 HISTORY OF ADENOMATOUS POLYP OF COLON: Status: ACTIVE | Noted: 2020-07-28

## 2020-07-28 PROCEDURE — 99213 OFFICE O/P EST LOW 20 MIN: CPT | Performed by: NURSE PRACTITIONER

## 2020-07-28 RX ORDER — SODIUM, POTASSIUM,MAG SULFATES 17.5-3.13G
1 SOLUTION, RECONSTITUTED, ORAL ORAL EVERY 12 HOURS
Qty: 2 BOTTLE | Refills: 0 | Status: ON HOLD | OUTPATIENT
Start: 2020-07-28 | End: 2020-08-06

## 2020-07-28 RX ORDER — LEVOTHYROXINE SODIUM 0.2 MG/1
200 TABLET ORAL DAILY
Status: ON HOLD | COMMUNITY
End: 2020-08-06

## 2020-07-28 RX ORDER — BENZONATATE 100 MG/1
100 CAPSULE ORAL 3 TIMES DAILY PRN
Status: ON HOLD | COMMUNITY
End: 2020-08-06

## 2020-07-28 RX ORDER — VARENICLINE TARTRATE 0.5 MG/1
0.5 TABLET, FILM COATED ORAL 2 TIMES DAILY
Status: ON HOLD | COMMUNITY
End: 2020-08-06

## 2020-07-28 RX ORDER — BUSPIRONE HYDROCHLORIDE 5 MG/1
5 TABLET ORAL 3 TIMES DAILY
Status: ON HOLD | COMMUNITY
End: 2020-08-06

## 2020-07-28 RX ORDER — HYDROCHLOROTHIAZIDE 25 MG/1
25 TABLET ORAL DAILY
Status: ON HOLD | COMMUNITY
End: 2020-08-06

## 2020-07-28 RX ORDER — AMITRIPTYLINE HYDROCHLORIDE 10 MG/1
10 TABLET, FILM COATED ORAL NIGHTLY
Status: ON HOLD | COMMUNITY
End: 2020-08-06

## 2020-07-28 RX ORDER — NIFEDIPINE 60 MG/1
TABLET, FILM COATED, EXTENDED RELEASE ORAL
Qty: 90 TABLET | Refills: 3 | Status: SHIPPED | OUTPATIENT
Start: 2020-07-28 | End: 2021-07-21

## 2020-07-28 RX ORDER — DEXTROAMPHETAMINE SACCHARATE, AMPHETAMINE ASPARTATE, DEXTROAMPHETAMINE SULFATE AND AMPHETAMINE SULFATE 5; 5; 5; 5 MG/1; MG/1; MG/1; MG/1
20 TABLET ORAL DAILY
Status: ON HOLD | COMMUNITY
End: 2020-08-06

## 2020-07-28 RX ORDER — ACETAMINOPHEN, ASPIRIN AND CAFFEINE 250; 250; 65 MG/1; MG/1; MG/1
1 TABLET, FILM COATED ORAL EVERY 6 HOURS PRN
Status: ON HOLD | COMMUNITY
End: 2020-08-06

## 2020-07-28 RX ORDER — BUTALBITAL AND ACETAMINOPHEN 50; 300 MG/1; MG/1
CAPSULE ORAL
Status: ON HOLD | COMMUNITY
End: 2020-08-06

## 2020-07-28 RX ORDER — EPINEPHRINE 0.3 MG/.3ML
INJECTION SUBCUTANEOUS
Status: ON HOLD | COMMUNITY
End: 2020-08-06

## 2020-07-28 RX ORDER — MELATONIN 2.5 MG
TABLET,CHEWABLE ORAL
Status: ON HOLD | COMMUNITY
End: 2020-08-06

## 2020-07-28 RX ORDER — GUANFACINE 2 MG/1
2 TABLET ORAL NIGHTLY
Status: ON HOLD | COMMUNITY
End: 2020-08-06

## 2020-07-28 RX ORDER — MONTELUKAST SODIUM 10 MG/1
10 TABLET ORAL NIGHTLY
Status: ON HOLD | COMMUNITY
End: 2020-08-06

## 2020-07-28 RX ORDER — CITALOPRAM 20 MG/1
20 TABLET ORAL DAILY
Status: ON HOLD | COMMUNITY
End: 2020-08-06

## 2020-07-28 NOTE — TELEPHONE ENCOUNTER
Malena Trammell [facility]     Caller: Unspecified (Yesterday,  1:27 PM)               Patient has a stress test at 9:15 and an appointment at 6. Please reschedule the appointment. The patient will not be done with the stress test in time for the appointment. Called LVM trying to get the DSE moved sooner so we do not have to r/s her apt with dr hwang.  Per the ma

## 2020-07-29 NOTE — TELEPHONE ENCOUNTER
Called and spoke to the pt, we moved this apt due to the pt having a procedure and needed to move.  Send the pt to scheduling to get the testing r/s

## 2020-08-03 ENCOUNTER — LAB (OUTPATIENT)
Dept: LAB | Facility: HOSPITAL | Age: 65
End: 2020-08-03

## 2020-08-03 DIAGNOSIS — G25.81 RLS (RESTLESS LEGS SYNDROME): ICD-10-CM

## 2020-08-03 PROCEDURE — C9803 HOPD COVID-19 SPEC COLLECT: HCPCS | Performed by: INTERNAL MEDICINE

## 2020-08-03 PROCEDURE — U0003 INFECTIOUS AGENT DETECTION BY NUCLEIC ACID (DNA OR RNA); SEVERE ACUTE RESPIRATORY SYNDROME CORONAVIRUS 2 (SARS-COV-2) (CORONAVIRUS DISEASE [COVID-19]), AMPLIFIED PROBE TECHNIQUE, MAKING USE OF HIGH THROUGHPUT TECHNOLOGIES AS DESCRIBED BY CMS-2020-01-R: HCPCS | Performed by: INTERNAL MEDICINE

## 2020-08-03 RX ORDER — CARBIDOPA/LEVODOPA 25MG-250MG
TABLET ORAL
Qty: 180 TABLET | Refills: 3 | Status: SHIPPED | OUTPATIENT
Start: 2020-08-03 | End: 2021-07-21

## 2020-08-04 DIAGNOSIS — F33.1 MODERATE EPISODE OF RECURRENT MAJOR DEPRESSIVE DISORDER (HCC): ICD-10-CM

## 2020-08-04 LAB
COVID LABCORP PRIORITY: NORMAL
SARS-COV-2 RNA RESP QL NAA+PROBE: NOT DETECTED

## 2020-08-04 RX ORDER — DESVENLAFAXINE SUCCINATE 50 MG/1
TABLET, EXTENDED RELEASE ORAL
Qty: 90 TABLET | Refills: 3 | Status: SHIPPED | OUTPATIENT
Start: 2020-08-04 | End: 2021-08-09

## 2020-08-06 ENCOUNTER — ANESTHESIA (OUTPATIENT)
Dept: GASTROENTEROLOGY | Facility: HOSPITAL | Age: 65
End: 2020-08-06

## 2020-08-06 ENCOUNTER — ANESTHESIA EVENT (OUTPATIENT)
Dept: GASTROENTEROLOGY | Facility: HOSPITAL | Age: 65
End: 2020-08-06

## 2020-08-06 ENCOUNTER — HOSPITAL ENCOUNTER (OUTPATIENT)
Facility: HOSPITAL | Age: 65
Setting detail: HOSPITAL OUTPATIENT SURGERY
Discharge: HOME | End: 2020-08-06
Attending: INTERNAL MEDICINE | Admitting: INTERNAL MEDICINE

## 2020-08-06 VITALS
BODY MASS INDEX: 43.32 KG/M2 | TEMPERATURE: 96.9 F | RESPIRATION RATE: 12 BRPM | DIASTOLIC BLOOD PRESSURE: 88 MMHG | SYSTOLIC BLOOD PRESSURE: 118 MMHG | WEIGHT: 260 LBS | HEART RATE: 66 BPM | OXYGEN SATURATION: 97 % | HEIGHT: 65 IN

## 2020-08-06 DIAGNOSIS — R19.5 POSITIVE COLORECTAL CANCER SCREENING USING COLOGUARD TEST: ICD-10-CM

## 2020-08-06 DIAGNOSIS — Z86.010 HISTORY OF ADENOMATOUS POLYP OF COLON: ICD-10-CM

## 2020-08-06 PROCEDURE — 88305 TISSUE EXAM BY PATHOLOGIST: CPT | Performed by: INTERNAL MEDICINE

## 2020-08-06 PROCEDURE — 25010000002 PROPOFOL 10 MG/ML EMULSION: Performed by: NURSE ANESTHETIST, CERTIFIED REGISTERED

## 2020-08-06 PROCEDURE — 45385 COLONOSCOPY W/LESION REMOVAL: CPT | Performed by: INTERNAL MEDICINE

## 2020-08-06 RX ORDER — SODIUM CHLORIDE 0.9 % (FLUSH) 0.9 %
10 SYRINGE (ML) INJECTION AS NEEDED
Status: DISCONTINUED | OUTPATIENT
Start: 2020-08-06 | End: 2020-08-06 | Stop reason: HOSPADM

## 2020-08-06 RX ORDER — PROPOFOL 10 MG/ML
VIAL (ML) INTRAVENOUS AS NEEDED
Status: DISCONTINUED | OUTPATIENT
Start: 2020-08-06 | End: 2020-08-06 | Stop reason: SURG

## 2020-08-06 RX ORDER — SODIUM CHLORIDE 9 MG/ML
500 INJECTION, SOLUTION INTRAVENOUS CONTINUOUS PRN
Status: DISCONTINUED | OUTPATIENT
Start: 2020-08-06 | End: 2020-08-06 | Stop reason: HOSPADM

## 2020-08-06 RX ADMIN — PROPOFOL 50 MG: 10 INJECTION, EMULSION INTRAVENOUS at 10:10

## 2020-08-06 RX ADMIN — PROPOFOL 50 MG: 10 INJECTION, EMULSION INTRAVENOUS at 10:05

## 2020-08-06 RX ADMIN — PROPOFOL 50 MG: 10 INJECTION, EMULSION INTRAVENOUS at 10:17

## 2020-08-06 RX ADMIN — PROPOFOL 50 MG: 10 INJECTION, EMULSION INTRAVENOUS at 10:20

## 2020-08-06 RX ADMIN — PROPOFOL 50 MG: 10 INJECTION, EMULSION INTRAVENOUS at 10:26

## 2020-08-06 RX ADMIN — PROPOFOL 50 MG: 10 INJECTION, EMULSION INTRAVENOUS at 10:13

## 2020-08-06 RX ADMIN — PROPOFOL 50 MG: 10 INJECTION, EMULSION INTRAVENOUS at 10:31

## 2020-08-06 RX ADMIN — PROPOFOL 100 MG: 10 INJECTION, EMULSION INTRAVENOUS at 10:01

## 2020-08-06 RX ADMIN — PROPOFOL 50 MG: 10 INJECTION, EMULSION INTRAVENOUS at 10:22

## 2020-08-06 RX ADMIN — SODIUM CHLORIDE 500 ML: 9 INJECTION, SOLUTION INTRAVENOUS at 09:38

## 2020-08-06 NOTE — INTERVAL H&P NOTE
H&P updated. The patient was examined and the following changes are noted:        Multiple polyps removed in the past.

## 2020-08-06 NOTE — ANESTHESIA PREPROCEDURE EVALUATION
Anesthesia Evaluation     Patient summary reviewed and Nursing notes reviewed   no history of anesthetic complications:  NPO Solid Status: > 8 hours  NPO Liquid Status: > 2 hours           Airway   Mallampati: I  TM distance: >3 FB  Neck ROM: full  Dental          Pulmonary - normal exam    breath sounds clear to auscultation  (+) sleep apnea on CPAP,   (-) asthma, recent URI, not a smoker  Cardiovascular - normal exam  Exercise tolerance: good (4-7 METS)    Rhythm: regular  Rate: normal    (+) hypertension well controlled, hyperlipidemia,   (-) pacemaker, past MI, angina, cardiac stents, CABG      Neuro/Psych  (+) TIA (2010), psychiatric history Depression,     (-) seizures, CVA  GI/Hepatic/Renal/Endo    (+) morbid obesity, GERD,  diabetes mellitus, thyroid problem hypothyroidism  (-) liver disease, no renal disease    Musculoskeletal     Abdominal    Substance History      OB/GYN          Other   arthritis,                      Anesthesia Plan    ASA 3     MAC     intravenous induction     Anesthetic plan, all risks, benefits, and alternatives have been provided, discussed and informed consent has been obtained with: patient.

## 2020-08-06 NOTE — ANESTHESIA POSTPROCEDURE EVALUATION
Patient: Maribeth Isbell    Procedure Summary     Date:  08/06/20 Room / Location:   [facility] /  [facility]    Anesthesia Start:  1000 Anesthesia Stop:  1035    Procedure:  COLONOSCOPY WITH ANESTHESIA (N/A ) Diagnosis:       Positive colorectal cancer screening using Cologuard test      History of adenomatous polyp of colon      (Positive colorectal cancer screening using Cologuard test [R19.5])      (History of adenomatous polyp of colon [Z86.010])    Surgeon:  Kathy Berry MD Provider:  Ryan Cruz CRNA    Anesthesia Type:  MAC ASA Status:  3          Anesthesia Type: MAC    Vitals  Vitals Value Taken Time   /78 8/6/2020 10:38 AM   Temp     Pulse 77 8/6/2020 10:39 AM   Resp 22 8/6/2020 10:38 AM   SpO2 96 % 8/6/2020 10:39 AM   Vitals shown include unvalidated device data.        Post Anesthesia Care and Evaluation    Patient location during evaluation: PHASE II  Patient participation: complete - patient participated  Level of consciousness: awake and alert  Pain score: 0  Pain management: adequate  Airway patency: patent  Anesthetic complications: No anesthetic complications  PONV Status: none  Cardiovascular status: acceptable and stable  Respiratory status: acceptable  Hydration status: acceptable

## 2020-08-07 LAB
CYTO UR: NORMAL
LAB AP CASE REPORT: NORMAL
PATH REPORT.FINAL DX SPEC: NORMAL
PATH REPORT.GROSS SPEC: NORMAL

## 2020-08-19 ENCOUNTER — HOSPITAL ENCOUNTER (OUTPATIENT)
Dept: NON INVASIVE DIAGNOSTICS | Age: 65
Discharge: HOME | End: 2020-08-19
Payer: COMMERCIAL

## 2020-08-19 VITALS
BODY MASS INDEX: 43.27 KG/M2 | WEIGHT: 260 LBS | HEART RATE: 79 BPM | SYSTOLIC BLOOD PRESSURE: 100 MMHG | DIASTOLIC BLOOD PRESSURE: 67 MMHG

## 2020-08-19 PROCEDURE — 93350 STRESS TTE ONLY: CPT

## 2020-08-19 PROCEDURE — 2580000003 HC RX 258: Performed by: INTERNAL MEDICINE

## 2020-08-19 PROCEDURE — 6360000002 HC RX W HCPCS: Performed by: INTERNAL MEDICINE

## 2020-08-19 RX ORDER — DOBUTAMINE HYDROCHLORIDE 200 MG/100ML
10 INJECTION INTRAVENOUS CONTINUOUS PRN
Status: DISCONTINUED | OUTPATIENT
Start: 2020-08-19 | End: 2020-08-20 | Stop reason: HOSPADM

## 2020-08-19 RX ORDER — SODIUM CHLORIDE 9 MG/ML
INJECTION, SOLUTION INTRAVENOUS
Status: COMPLETED | OUTPATIENT
Start: 2020-08-19 | End: 2020-08-19

## 2020-08-19 RX ORDER — SODIUM CHLORIDE 9 MG/ML
INJECTION, SOLUTION INTRAVENOUS CONTINUOUS
Status: DISCONTINUED | OUTPATIENT
Start: 2020-08-19 | End: 2020-08-21 | Stop reason: HOSPADM

## 2020-08-19 RX ADMIN — SODIUM CHLORIDE: 9 INJECTION, SOLUTION INTRAVENOUS at 11:01

## 2020-08-19 RX ADMIN — SODIUM CHLORIDE: 9 INJECTION, SOLUTION INTRAVENOUS at 10:06

## 2020-08-19 RX ADMIN — DOBUTAMINE HYDROCHLORIDE 10 MCG/KG/MIN: 200 INJECTION INTRAVENOUS at 10:06

## 2020-08-21 ENCOUNTER — TELEPHONE (OUTPATIENT)
Dept: CARDIOLOGY | Age: 65
End: 2020-08-21

## 2020-08-21 ENCOUNTER — OFFICE VISIT (OUTPATIENT)
Dept: CARDIOLOGY | Age: 65
End: 2020-08-21
Payer: COMMERCIAL

## 2020-08-21 VITALS
HEIGHT: 65 IN | BODY MASS INDEX: 44.98 KG/M2 | HEART RATE: 76 BPM | SYSTOLIC BLOOD PRESSURE: 136 MMHG | DIASTOLIC BLOOD PRESSURE: 82 MMHG | WEIGHT: 270 LBS

## 2020-08-21 PROCEDURE — G8427 DOCREV CUR MEDS BY ELIG CLIN: HCPCS | Performed by: INTERNAL MEDICINE

## 2020-08-21 PROCEDURE — 99213 OFFICE O/P EST LOW 20 MIN: CPT | Performed by: INTERNAL MEDICINE

## 2020-08-21 PROCEDURE — 1036F TOBACCO NON-USER: CPT | Performed by: INTERNAL MEDICINE

## 2020-08-21 PROCEDURE — G8417 CALC BMI ABV UP PARAM F/U: HCPCS | Performed by: INTERNAL MEDICINE

## 2020-08-21 PROCEDURE — 3017F COLORECTAL CA SCREEN DOC REV: CPT | Performed by: INTERNAL MEDICINE

## 2020-08-21 RX ORDER — BUDESONIDE AND FORMOTEROL FUMARATE DIHYDRATE 160; 4.5 UG/1; UG/1
2 AEROSOL RESPIRATORY (INHALATION) 2 TIMES DAILY
Status: ON HOLD | COMMUNITY
End: 2020-09-10

## 2020-08-21 NOTE — TELEPHONE ENCOUNTER
Anjelica requests that [email or web address] and [place] return their call. The best time to reach her is Anytime. [facility]. Thank you.

## 2020-08-21 NOTE — PROGRESS NOTES
26-year-old lady with history of combined hyperlipidemia, hypertension, SVT, tobacco abuse, strong familial coronary disease, and diabetes returns for follow-up after dobutamine stress testing. History of 2 brothers a sister and her mother manifesting coronary disease in their 62s. She has had longstanding hypertension and combined hyperlipidemia, smoked until a few years ago, and has a 4-year history of diagnosed type 2 diabetes mellitus. She is sedentary and with that understanding relates no change in exercise tolerance, undue dyspnea, or episodic discomfort suspicious for angina. She recently underwent dobutamine stress testing at which time she developed profound hypotension requiring fluid resuscitation while her EKG and echo demonstrated no evidence of ischemia with however some throat discomfort. Review of systems reveals the fact that she has had 4 knee surgeries [ultimately bilateral replacements] and is had some depression over the past year leading to weight gain. On exam today she carries 270 pounds in a 5 foot 5 inch frame. Pressure is 136/82 pulse 76. Very pleasant obese middle-aged lady. EOMs full, sclerae and conjunctiva normal. PERRLA. Mask in place. Trachea midline with no neck masses. Assessment of internal jugular veins reveals no elevation of central venous pressure at 45 degrees. Carotid pulses normal without delay or bruit. Thyroid normal to palpation. Chest exam reveals normal respiratory effort, no abnormal breath sounds and normal expiratory phase. No skin lesions seen. PMI normal.  Distant heart sounds. Obese abdomen normal bowel sounds without palpable mass or bruit. No clubbing or acrocyanosis. trace pretibial edema  General motor strength appears to be within normal limits. Normal range of motion with normal gait. Alert, oriented x 3, memory and cognition normal as reflected by history and conversation. Assessment/plan:  1.   Abnormal dobutamine stress echo -while she had

## 2020-08-21 NOTE — TELEPHONE ENCOUNTER
Called and spoke with patient, have [facility] scheduled for 09/10/2020 at 12pm with arrival of 1000. Patient is to be NPO after midnight. Patient instructed to arrive through front entrance of hospital and make immediate left. Patient advised they can have one person with them but they both must wear a mask. Patient advised may take morning medications with sip of water. Also advised patient must have COVID testing completed on 09/04/2020 by 11am at [facility]. Advised patient that they will be able to proceed with procedure as long as test results are negative. Patient made aware that if testing is not resulted evening prior to procedure that they may have to be rescheduled and possibly retested. Given instructions on where to go and to self quarantine between testing and procedure. Patient does not have IV dye allergy. Patient verbally understood. Called and spoke to [facility] in cath lab on 08/21/2020 and scheduled procedure.

## 2020-08-28 ENCOUNTER — FLU SHOT (OUTPATIENT)
Dept: FAMILY MEDICINE CLINIC | Facility: CLINIC | Age: 65
End: 2020-08-28

## 2020-08-28 DIAGNOSIS — Z23 NEED FOR INFLUENZA VACCINATION: ICD-10-CM

## 2020-08-28 PROCEDURE — 90686 IIV4 VACC NO PRSV 0.5 ML IM: CPT | Performed by: NURSE PRACTITIONER

## 2020-08-28 PROCEDURE — 90471 IMMUNIZATION ADMIN: CPT | Performed by: NURSE PRACTITIONER

## 2020-09-04 ENCOUNTER — OFFICE VISIT (OUTPATIENT)
Age: 65
End: 2020-09-04

## 2020-09-04 VITALS — TEMPERATURE: 97.9 F | OXYGEN SATURATION: 96 % | HEART RATE: 67 BPM

## 2020-09-04 DIAGNOSIS — E11.59 HYPERTENSION ASSOCIATED WITH DIABETES (HCC): ICD-10-CM

## 2020-09-04 DIAGNOSIS — I15.2 HYPERTENSION ASSOCIATED WITH DIABETES (HCC): ICD-10-CM

## 2020-09-04 RX ORDER — LISINOPRIL 40 MG/1
TABLET ORAL
Qty: 90 TABLET | Refills: 1 | Status: SHIPPED | OUTPATIENT
Start: 2020-09-04 | End: 2021-02-04

## 2020-09-04 NOTE — PROGRESS NOTES
Patient was not seen/assessed by provider in the flu clinic today. COVID swab was order in compliance with requirement for testing prior to surgery or invasive procedure. Nasopharyngeal swab was collected and ordered through gravity vendor.

## 2020-09-05 LAB — SARS-COV-2, NAA: NOT DETECTED

## 2020-09-10 ENCOUNTER — HOSPITAL ENCOUNTER (OUTPATIENT)
Dept: CARDIAC CATH/INVASIVE PROCEDURES | Age: 65
Discharge: HOME | End: 2020-09-10
Attending: INTERNAL MEDICINE | Admitting: INTERNAL MEDICINE
Payer: COMMERCIAL

## 2020-09-10 VITALS
OXYGEN SATURATION: 95 % | HEART RATE: 59 BPM | BODY MASS INDEX: 43.32 KG/M2 | HEIGHT: 65 IN | WEIGHT: 260 LBS | SYSTOLIC BLOOD PRESSURE: 112 MMHG | DIASTOLIC BLOOD PRESSURE: 97 MMHG | RESPIRATION RATE: 18 BRPM | TEMPERATURE: 97.1 F

## 2020-09-10 LAB
ALBUMIN SERPL-MCNC: 4.5 G/DL (ref 3.5–5.2)
ALP BLD-CCNC: 75 U/L (ref 35–104)
ALT SERPL-CCNC: 12 U/L (ref 5–33)
ANION GAP SERPL CALCULATED.3IONS-SCNC: 12 MMOL/L (ref 7–19)
AST SERPL-CCNC: 22 U/L (ref 5–32)
BILIRUB SERPL-MCNC: 0.5 MG/DL (ref 0.2–1.2)
BUN BLDV-MCNC: 15 MG/DL (ref 8–23)
CALCIUM SERPL-MCNC: 9.1 MG/DL (ref 8.8–10.2)
CHLORIDE BLD-SCNC: 99 MMOL/L (ref 98–111)
CO2: 29 MMOL/L (ref 22–29)
CREAT SERPL-MCNC: 1.1 MG/DL (ref 0.5–0.9)
GFR AFRICAN AMERICAN: >59
GFR NON-AFRICAN AMERICAN: 50
GLUCOSE BLD-MCNC: 154 MG/DL (ref 74–109)
HCT VFR BLD CALC: 45.5 % (ref 37–47)
HEMOGLOBIN: 15.3 G/DL (ref 12–16)
MCH RBC QN AUTO: 29.1 PG (ref 27–31)
MCHC RBC AUTO-ENTMCNC: 33.6 G/DL (ref 33–37)
MCV RBC AUTO: 86.7 FL (ref 81–99)
PDW BLD-RTO: 12.4 % (ref 11.5–14.5)
PLATELET # BLD: 264 K/UL (ref 130–400)
PMV BLD AUTO: 9.7 FL (ref 9.4–12.3)
POTASSIUM SERPL-SCNC: 4.3 MMOL/L (ref 3.5–5)
RBC # BLD: 5.25 M/UL (ref 4.2–5.4)
SODIUM BLD-SCNC: 140 MMOL/L (ref 136–145)
TOTAL PROTEIN: 7.4 G/DL (ref 6.6–8.7)
WBC # BLD: 4.8 K/UL (ref 4.8–10.8)

## 2020-09-10 PROCEDURE — C1725 CATH, TRANSLUMIN NON-LASER: HCPCS

## 2020-09-10 PROCEDURE — 93458 L HRT ARTERY/VENTRICLE ANGIO: CPT | Performed by: INTERNAL MEDICINE

## 2020-09-10 PROCEDURE — 99152 MOD SED SAME PHYS/QHP 5/>YRS: CPT

## 2020-09-10 PROCEDURE — 80053 COMPREHEN METABOLIC PANEL: CPT

## 2020-09-10 PROCEDURE — C1769 GUIDE WIRE: HCPCS

## 2020-09-10 PROCEDURE — 92928 PRQ TCAT PLMT NTRAC ST 1 LES: CPT

## 2020-09-10 PROCEDURE — 6360000004 HC RX CONTRAST MEDICATION: Performed by: INTERNAL MEDICINE

## 2020-09-10 PROCEDURE — 92928 PRQ TCAT PLMT NTRAC ST 1 LES: CPT | Performed by: INTERNAL MEDICINE

## 2020-09-10 PROCEDURE — 93005 ELECTROCARDIOGRAM TRACING: CPT | Performed by: INTERNAL MEDICINE

## 2020-09-10 PROCEDURE — 99153 MOD SED SAME PHYS/QHP EA: CPT

## 2020-09-10 PROCEDURE — 6370000000 HC RX 637 (ALT 250 FOR IP)

## 2020-09-10 PROCEDURE — 2580000003 HC RX 258: Performed by: INTERNAL MEDICINE

## 2020-09-10 PROCEDURE — 93458 L HRT ARTERY/VENTRICLE ANGIO: CPT

## 2020-09-10 PROCEDURE — C1887 CATHETER, GUIDING: HCPCS

## 2020-09-10 PROCEDURE — 99152 MOD SED SAME PHYS/QHP 5/>YRS: CPT | Performed by: INTERNAL MEDICINE

## 2020-09-10 PROCEDURE — 6360000002 HC RX W HCPCS

## 2020-09-10 PROCEDURE — 36415 COLL VENOUS BLD VENIPUNCTURE: CPT

## 2020-09-10 PROCEDURE — C1874 STENT, COATED/COV W/DEL SYS: HCPCS

## 2020-09-10 PROCEDURE — 2500000003 HC RX 250 WO HCPCS

## 2020-09-10 PROCEDURE — 6370000000 HC RX 637 (ALT 250 FOR IP): Performed by: INTERNAL MEDICINE

## 2020-09-10 PROCEDURE — 2709999900 HC NON-CHARGEABLE SUPPLY

## 2020-09-10 PROCEDURE — 85027 COMPLETE CBC AUTOMATED: CPT

## 2020-09-10 PROCEDURE — C1894 INTRO/SHEATH, NON-LASER: HCPCS

## 2020-09-10 RX ORDER — PEN NEEDLE, DIABETIC 30 GX3/16"
1 NEEDLE, DISPOSABLE MISCELLANEOUS 4 TIMES DAILY
COMMUNITY
Start: 2020-04-20

## 2020-09-10 RX ORDER — ROSUVASTATIN CALCIUM 20 MG/1
20 TABLET, COATED ORAL NIGHTLY
COMMUNITY
Start: 2020-04-20 | End: 2022-05-25

## 2020-09-10 RX ORDER — CLOPIDOGREL BISULFATE 75 MG/1
75 TABLET ORAL DAILY
Qty: 90 TABLET | Refills: 3 | Status: SHIPPED | OUTPATIENT
Start: 2020-09-10 | End: 2021-08-27

## 2020-09-10 RX ORDER — SODIUM CHLORIDE 0.9 % (FLUSH) 0.9 %
10 SYRINGE (ML) INJECTION EVERY 12 HOURS SCHEDULED
Status: DISCONTINUED | OUTPATIENT
Start: 2020-09-10 | End: 2020-09-10 | Stop reason: HOSPADM

## 2020-09-10 RX ORDER — SODIUM CHLORIDE 9 MG/ML
INJECTION, SOLUTION INTRAVENOUS CONTINUOUS
Status: DISCONTINUED | OUTPATIENT
Start: 2020-09-10 | End: 2020-09-10 | Stop reason: HOSPADM

## 2020-09-10 RX ORDER — LISINOPRIL 40 MG/1
40 TABLET ORAL DAILY
COMMUNITY
Start: 2020-09-04

## 2020-09-10 RX ORDER — NIFEDIPINE 60 MG/1
TABLET, FILM COATED, EXTENDED RELEASE ORAL
COMMUNITY
Start: 2020-07-28 | End: 2021-08-31 | Stop reason: SDUPTHER

## 2020-09-10 RX ORDER — NITROGLYCERIN 0.4 MG/1
0.4 TABLET SUBLINGUAL EVERY 5 MIN PRN
Status: DISCONTINUED | OUTPATIENT
Start: 2020-09-10 | End: 2020-09-10 | Stop reason: HOSPADM

## 2020-09-10 RX ORDER — METOPROLOL SUCCINATE 25 MG/1
25 TABLET, EXTENDED RELEASE ORAL DAILY
Qty: 30 TABLET | Refills: 3 | Status: SHIPPED | OUTPATIENT
Start: 2020-09-10 | End: 2021-01-04

## 2020-09-10 RX ORDER — BUDESONIDE AND FORMOTEROL FUMARATE DIHYDRATE 160; 4.5 UG/1; UG/1
AEROSOL RESPIRATORY (INHALATION)
COMMUNITY
Start: 2020-06-08

## 2020-09-10 RX ORDER — CARBIDOPA/LEVODOPA 25MG-250MG
TABLET ORAL
COMMUNITY
Start: 2020-08-03

## 2020-09-10 RX ORDER — OMEPRAZOLE 40 MG/1
40 CAPSULE, DELAYED RELEASE ORAL DAILY
COMMUNITY
Start: 2019-05-31

## 2020-09-10 RX ORDER — SODIUM CHLORIDE 0.9 % (FLUSH) 0.9 %
10 SYRINGE (ML) INJECTION PRN
Status: DISCONTINUED | OUTPATIENT
Start: 2020-09-10 | End: 2020-09-10 | Stop reason: HOSPADM

## 2020-09-10 RX ORDER — INSULIN GLARGINE 100 [IU]/ML
40 INJECTION, SOLUTION SUBCUTANEOUS DAILY
COMMUNITY
Start: 2020-04-20

## 2020-09-10 RX ORDER — DESVENLAFAXINE 50 MG/1
TABLET, EXTENDED RELEASE ORAL
COMMUNITY
Start: 2020-08-04

## 2020-09-10 RX ORDER — ASPIRIN 325 MG
325 TABLET ORAL ONCE
Status: COMPLETED | OUTPATIENT
Start: 2020-09-10 | End: 2020-09-10

## 2020-09-10 RX ORDER — ACETAMINOPHEN 325 MG/1
650 TABLET ORAL EVERY 4 HOURS PRN
Status: DISCONTINUED | OUTPATIENT
Start: 2020-09-10 | End: 2020-09-10 | Stop reason: HOSPADM

## 2020-09-10 RX ORDER — ONDANSETRON 2 MG/ML
4 INJECTION INTRAMUSCULAR; INTRAVENOUS EVERY 6 HOURS PRN
Status: DISCONTINUED | OUTPATIENT
Start: 2020-09-10 | End: 2020-09-10 | Stop reason: HOSPADM

## 2020-09-10 RX ORDER — LEVOTHYROXINE SODIUM 0.12 MG/1
TABLET ORAL
COMMUNITY
Start: 2020-07-08

## 2020-09-10 RX ADMIN — SODIUM CHLORIDE: 9 INJECTION, SOLUTION INTRAVENOUS at 10:31

## 2020-09-10 RX ADMIN — ASPIRIN 325 MG ORAL TABLET 325 MG: 325 PILL ORAL at 10:32

## 2020-09-10 RX ADMIN — IOPAMIDOL 180 ML: 612 INJECTION, SOLUTION INTRAVENOUS at 13:03

## 2020-09-10 ASSESSMENT — ENCOUNTER SYMPTOMS
ABDOMINAL DISTENTION: 0
WHEEZING: 0
BACK PAIN: 0
SHORTNESS OF BREATH: 0
VOMITING: 0
CONSTIPATION: 0
COUGH: 0
BLOOD IN STOOL: 0
DIARRHEA: 0
EYE DISCHARGE: 0

## 2020-09-10 NOTE — H&P
Patient:  Bucky Gottlieb                  1955  MRN: [id number]    PROBLEM LIST:    Patient Active Problem List    Diagnosis Date Noted    SVT (supraventricular tachycardia) ([facility] 07/29/2019     Priority: Low    History of tobacco abuse 07/29/2019     Priority: Low    Essential hypertension 07/29/2019     Priority: Low    Mixed dyslipidemia 07/29/2019     Priority: Low    Obesity due to excess calories 07/29/2019     Priority: Low    Type 2 diabetes mellitus ([facility] 07/29/2019     Priority: Low    SILVANO (obstructive sleep apnea) 07/29/2019     Priority: Low     Overview Note:     CPAP intolerance           PRESENTATION: Bucky Gottlieb is a 72y.o. year old female who presents with history of hypertension, hyperlipidemia, active tobacco use, diabetes mellitus with recent dobutamine stress echo with significant hypotension with dobutamine infusion associated with clinical symptoms referred for cardiac catheterization. REVIEW OF SYSTEMS:  Review of Systems   Constitutional: Negative for activity change, fatigue and fever. HENT: Negative for ear pain, hearing loss and tinnitus. Eyes: Negative for discharge and visual disturbance. Respiratory: Negative for cough, shortness of breath and wheezing. Cardiovascular: Negative for chest pain, palpitations and leg swelling. Gastrointestinal: Negative for abdominal distention, blood in stool, constipation, diarrhea and vomiting. Endocrine: Negative for cold intolerance, heat intolerance, polydipsia and polyuria. Genitourinary: Negative for dysuria and hematuria. Musculoskeletal: Negative for arthralgias, back pain and myalgias. Skin: Negative for pallor and rash. Neurological: Negative for seizures, syncope, weakness and headaches. Psychiatric/Behavioral: Negative for behavioral problems and dysphoric mood. Past Medical History:  No past medical history on file. Past Surgical History:  No past surgical history on file.     Medications succinate (PRISTIQ) 50 MG TB24 extended release tablet Take 50 mg by mouth daily    Historical Provider, MD   Insulin Detemir (LEVEMIR FLEXPEN SC) Inject into the skin    Historical Provider, MD       Allergies:  Metformin and related    Past Social History:  Social History     Socioeconomic History    Marital status:      Spouse name: Not on file    Number of children: Not on file    Years of education: Not on file    Highest education level: Not on file   Occupational History    Not on file   Social Needs    Financial resource strain: Not on file    Food insecurity     Worry: Not on file     Inability: Not on file    Transportation needs     Medical: Not on file     Non-medical: Not on file   Tobacco Use    Smoking status: Former Smoker     Packs/day: 0.50     Years: 8.00     Pack years: 4.00     Last attempt to quit: 2013     Years since quittin.1    Smokeless tobacco: Never Used   Substance and Sexual Activity    Alcohol use: Not Currently    Drug use: Never    Sexual activity: Not on file   Lifestyle    Physical activity     Days per week: Not on file     Minutes per session: Not on file    Stress: Not on file   Relationships    Social connections     Talks on phone: Not on file     Gets together: Not on file     Attends Je[employer] witness service: Not on file     Active member of club or organization: Not on file     Attends meetings of clubs or organizations: Not on file     Relationship status: Not on file    Intimate partner violence     Fear of current or ex partner: Not on file     Emotionally abused: Not on file     Physically abused: Not on file     Forced sexual activity: Not on file   Other Topics Concern    Not on file   Social History Narrative    Not on file       Family History:   No family history on file. Physical Exam:    Vitals: There were no vitals taken for this visit.   24HR INTAKE/OUTPUT:  No intake or output data in the 24 hours ending 09/10/20 0810    Physical Exam  Constitutional:       General: She is not in acute distress. Appearance: She is not diaphoretic. HENT:      Mouth/Throat:      Pharynx: No oropharyngeal exudate. Eyes:      General: No scleral icterus. Right eye: No discharge. Left eye: No discharge. Neck:      Thyroid: No thyromegaly. Vascular: No JVD. Cardiovascular:      Rate and Rhythm: Normal rate and regular rhythm. No extrasystoles are present. Heart sounds: Normal heart sounds, S1 normal and S2 normal. No murmur. No systolic murmur. No diastolic murmur. No friction rub. No gallop. No S3 or S4 sounds. Pulmonary:      Effort: Pulmonary effort is normal. No respiratory distress. Breath sounds: Normal breath sounds. No wheezing or rales. Chest:      Chest wall: No tenderness. Abdominal:      General: Bowel sounds are normal. There is no distension. Palpations: Abdomen is soft. There is no mass. Tenderness: There is no abdominal tenderness. There is no guarding or rebound. Hernia: No hernia is present. Musculoskeletal: Normal range of motion. Skin:     General: Skin is warm. Coloration: Skin is not pale. Findings: No rash. Neurological:      Mental Status: She is alert and oriented to person, place, and time. Cranial Nerves: No cranial nerve deficit. Deep Tendon Reflexes: Reflexes normal.         LAB DATA:  CBC: No results for input(s): WBC, HGB, PLT in the last 72 hours. BMP:  No results for input(s): NA, K, CL, CO2, BUN, CREATININE, GLUCOSE in the last 72 hours. Hepatic: No results for input(s): AST, ALT, ALB, BILITOT, ALKPHOS in the last 72 hours. CK, CKMB, Troponin: @LABRCNT (CKTOTAL:3, CKMB:3, TROPONINI:3)@  Pro-BNP: No results for input(s): BNP in the last 72 hours. Lipids: No results for input(s): CHOL, HDL in the last 72 hours.     Invalid input(s): LDL  ABGs: No results for input(s): PHART, JLJ7ELJ, PO2ART, VBE6STO, BEART, HGBAE, Q1FJKGCB, CARBOXHGBART, 02THERAPY in the last 72 hours. INR: No results for input(s): INR in the last 72 hours. A1c:Invalid input(s): HEMOGLOBIN A1C  URINALYSIS: No results found for: NITRU, WBCUA, BACTERIA, RBCUA, BLOODU, SPECGRAV, GLUCOSEU  -----------------------------------------------------------------  IMAGING:  No orders to display   ECHOCARDIOGRAM STRESS TEST:ECHOCARDIOGRAM PHARMACOLOGICAL STRESS TEST (aka   DO. Study Location: Echo Lab  Technical Quality: Adequate visualization     Patient Status: Outpatient     Rhythm: Within normal limits HR: 87 bpm BP: 103/69 mmHg     Indications:Abnormal ECG.     Conclusions      Summary   Submaximal dobutamine echocardiographic stress test at 77% predicted   maximum, test stopped prematurely because of hypotension with dobutamine. Throat discomfort described by patient did not sound ischemic. Electrocardiographic response at the submaximal level is negative for   ischemia. The echocardiographic response to submaximal exercise is   negative for ischemia. Test was supervised by Dr. Arturo Lock. Recommendation   Clinical correlation is advised. Signature      ----------------------------------------------------------------   Electronically signed by Lisa Arvizu MD(Interpreting   physician) on 08/19/2020 12:31 PM   ----------------------------------------------------------------      Rest      ECG   Normal sinus rhythm. First degree AV block. Nonspecific ST-T changes. Standing HR:82 bpm      Stress      Stress Type: Pharmacologic      Peak HR: 120 bpm                          HR Response: See below   Peak BP: 137/60 mmHg                      BP Response: See below   Predicted HR: 156 bpm                     HR BP Product: 26252   % of predicted HR: 77                     Max Infusion: 50 mcg/kg/min   Test Duration: 13:25 min   Reason for Termination: Hypotension      Stress Interpretation      Throat pain with dobutamine infusion 7/10.  Relieved with rest. Hypotensive blood pressure response to dobutamine. Blood pressure dropped   to 60/40. Dobutamine stopped. Target heart rate not achieved. 500 mL NS   bolus given and normalization of blood pressure. No significant ST T wave   changes with dobutamine. ECG portion is negative for ischemia, submaximal   stress with patient achieving 120 bpm, 77% predicted maximum. Following exercise, there was noted to be uniform increase in   contractility without echocardiographic evidence of myocardial ischemia. Results      Global LVEF (rest): Normal (LVEF >50%)      Global LVEF (stress): Normal (LVEF >50%)      ECG   No ST or T wave changes, negative for ischemia. Arrhythmias   No rhythm abnormality. Occasional PVC's. Symptoms   Throat pain 7/10 with dobutamine infusion. Relieved with recovery. Assessment and Recommendations: This is a 72y.o. year old female with past medical history of hypertension, diabetes mellitus, active tobacco use, abnormal dobutamine stress echo with hypertensive response associated with clinical symptoms being referred for cardiac catheterization. Risks, benefits, alternatives of cardiac catheterization/PCI discussed with the patient and full informed consent obtained.   Acceptable Mallampati score  Consent for moderate conscious sedation  ASA 3            Electronically signed by Cornell Chacon MD on 9/10/2020 at 8:10 AM

## 2020-09-11 LAB
EKG P AXIS: 39 DEGREES
EKG P-R INTERVAL: 292 MS
EKG Q-T INTERVAL: 380 MS
EKG QRS DURATION: 74 MS
EKG QTC CALCULATION (BAZETT): 422 MS
EKG T AXIS: 25 DEGREES

## 2020-09-11 NOTE — CONSULTS
Cardiac Rehab MI/PTCA/Stent education packet was sent to the patient's address on record. Handouts included were titled; \"Home Instructions Following a Cardiac Event\", \"Cardiac Home Exercise Program - Phase I\", \"Risk Factors for Heart Disease and Stroke\" and \"Cardiac Diet/Low Cholesterol\". Patient was instructed to contact [facility] or the hospital nearest their residence for the opportunity to enroll in Phase II Outpatient Cardiac Rehab.

## 2020-09-16 ENCOUNTER — RESULTS ENCOUNTER (OUTPATIENT)
Dept: FAMILY MEDICINE CLINIC | Facility: CLINIC | Age: 65
End: 2020-09-16

## 2020-09-16 ENCOUNTER — OFFICE VISIT (OUTPATIENT)
Dept: FAMILY MEDICINE CLINIC | Facility: CLINIC | Age: 65
End: 2020-09-16

## 2020-09-16 VITALS
OXYGEN SATURATION: 97 % | WEIGHT: 267.2 LBS | TEMPERATURE: 95.9 F | BODY MASS INDEX: 44.52 KG/M2 | HEART RATE: 65 BPM | DIASTOLIC BLOOD PRESSURE: 79 MMHG | SYSTOLIC BLOOD PRESSURE: 127 MMHG | HEIGHT: 65 IN

## 2020-09-16 DIAGNOSIS — E11.65 TYPE 2 DIABETES MELLITUS WITH HYPERGLYCEMIA, WITH LONG-TERM CURRENT USE OF INSULIN (HCC): Primary | ICD-10-CM

## 2020-09-16 DIAGNOSIS — Z79.4 TYPE 2 DIABETES MELLITUS WITH HYPERGLYCEMIA, WITH LONG-TERM CURRENT USE OF INSULIN (HCC): ICD-10-CM

## 2020-09-16 DIAGNOSIS — Z23 ENCOUNTER FOR IMMUNIZATION: ICD-10-CM

## 2020-09-16 DIAGNOSIS — Z79.4 TYPE 2 DIABETES MELLITUS WITH HYPERGLYCEMIA, WITH LONG-TERM CURRENT USE OF INSULIN (HCC): Primary | ICD-10-CM

## 2020-09-16 DIAGNOSIS — E11.65 TYPE 2 DIABETES MELLITUS WITH HYPERGLYCEMIA, WITH LONG-TERM CURRENT USE OF INSULIN (HCC): ICD-10-CM

## 2020-09-16 PROCEDURE — 99214 OFFICE O/P EST MOD 30 MIN: CPT | Performed by: NURSE PRACTITIONER

## 2020-09-16 PROCEDURE — 90732 PPSV23 VACC 2 YRS+ SUBQ/IM: CPT | Performed by: NURSE PRACTITIONER

## 2020-09-16 PROCEDURE — 90471 IMMUNIZATION ADMIN: CPT | Performed by: NURSE PRACTITIONER

## 2020-09-16 RX ORDER — MELATONIN
1000 DAILY
Status: ON HOLD | COMMUNITY
End: 2021-09-20

## 2020-09-16 RX ORDER — METOPROLOL SUCCINATE 25 MG/1
25 TABLET, EXTENDED RELEASE ORAL DAILY
COMMUNITY
Start: 2020-09-10

## 2020-09-16 RX ORDER — CLOPIDOGREL BISULFATE 75 MG/1
75 TABLET ORAL DAILY
Status: ON HOLD | COMMUNITY
Start: 2020-09-10 | End: 2021-09-20

## 2020-09-16 NOTE — PROGRESS NOTES
CC: 3 month follow up diabetes    History:  Maribeth Isbell is a 65 y.o. female who presents today for evaluation of the above problems.      Patient is here for a 3 month check on her diabetes.  Her last A1C 3 months ago was 6.8, up from 5.8.  She reports that she has recently had cardiac stenting about a week ago after an abnormal stress test. She also had a colonoscopy done by Dr. Berry and reports that she had many polyps removed, however, they were all benign.  She also notes that she never had her mammogram done due to COVID and a family emergency and she needs to get this set up as well.       HPI  ROS:  Review of Systems   Constitutional: Negative for fever.   Respiratory: Negative for shortness of breath.    Cardiovascular: Negative for chest pain.       Allergies   Allergen Reactions   • Metformin GI Intolerance     Past Medical History:   Diagnosis Date   • Bell's palsy    • Cataract    • Depression    • E coli infection    • Gastroesophageal reflux disease 2018   • Headache    • History of colon polyps    • Hyperlipidemia    • Hypertension    • Hyperthyroidism    • Hypothyroidism    • Macular degeneration    • Sleep apnea     wears cpap   • Tachycardia    • Type 2 diabetes mellitus (CMS/HCC)    • Visual impairment      Past Surgical History:   Procedure Laterality Date   •  SECTION     • COLONOSCOPY  06/10/2011    Dr. Mahmood-Internal hemorrhoids; Otherwise normal   • COLONOSCOPY N/A 2016    Colon not done this day-see endo report 2016   • COLONOSCOPY  302    Dr. Mahmood-A single small hyperplastic polyp in the ascending colon   • COLONOSCOPY N/A 2020    Procedure: COLONOSCOPY WITH ANESTHESIA;  Surgeon: Kathy Berry MD;  Location: [facility] ENDOSCOPY;  Service: Gastroenterology;  Laterality: N/A;  preop; positive cologuard  postop; poylps   PCP Taiwo Diggs MD   • ENDOSCOPY N/A 2016    Normal esophagus; Normal stomach; Normal first part of the duodenum and  2nd part of the duodenum-biopsied   • ENDOSCOPY N/A 7/30/2018    Normal esophagus; Normal stomach; Normal first portion of the duodenum and second portion of the duodenum-biopsied   • HYSTERECTOMY     • KNEE SURGERY Left    • TUMOR REMOVAL       Family History   Problem Relation Age of Onset   • Heart disease Mother    • Diabetes Mother    • No Known Problems Maternal Grandmother    • No Known Problems Maternal Grandfather    • No Known Problems Paternal Grandmother    • No Known Problems Paternal Grandfather    • Colon cancer Neg Hx    • Colon polyps Neg Hx       reports that she quit smoking about 5 years ago. Her smoking use included cigarettes. She has a 20.00 pack-year smoking history. She has never used smokeless tobacco. She reports that she does not drink alcohol or use drugs.      Current Outpatient Medications:   •  aspirin 81 MG EC tablet, Take 81 mg by mouth Daily., Disp: , Rfl:   •  bumetanide (BUMEX) 1 MG tablet, TAKE 1 TABLET BY MOUTH EVERY DAY, Disp: 90 tablet, Rfl: 1  •  carbidopa-levodopa (SINEMET)  MG per tablet, TAKE 1 TO 2 TABLETS BY MOUTH EVERY DAY AT BEDTIME, Disp: 180 tablet, Rfl: 3  •  cetirizine (zyrTEC) 10 MG tablet, Take 1 tablet by mouth Daily., Disp: 90 tablet, Rfl: 1  •  cholecalciferol (VITAMIN D3) 25 MCG (1000 UT) tablet, Take 1,000 Units by mouth Daily., Disp: , Rfl:   •  clopidogrel (PLAVIX) 75 MG tablet, Take 75 mg by mouth Daily., Disp: , Rfl:   •  desvenlafaxine (PRISTIQ) 50 MG 24 hr tablet, TAKE 1 TABLET BY MOUTH EVERY DAY, Disp: 90 tablet, Rfl: 3  •  Dulaglutide (Trulicity) 0.75 MG/0.5ML solution pen-injector, Inject 0.75 mg under the skin into the appropriate area as directed 1 (One) Time Per Week., Disp: 12 pen, Rfl: 1  •  folic acid (FOLVITE) 400 MCG tablet, Take 400 mcg by mouth Daily., Disp: , Rfl:   •  insulin aspart (NovoLOG FlexPen) 100 UNIT/ML solution pen-injector sc pen, INJECT 30 UNITS 3 TIMES DAILY, Disp: 9 pen, Rfl: 3  •  Insulin Glargine (BASAGLAR KWIKPEN)  "100 UNIT/ML injection pen, Inject 40 Units under the skin into the appropriate area as directed Daily., Disp: 36 pen, Rfl: 0  •  lamoTRIgine (LaMICtal) 100 MG tablet, TAKE 1 TABLET BY MOUTH EVERY DAY AT NIGHT, Disp: 90 tablet, Rfl: 2  •  levothyroxine (SYNTHROID, LEVOTHROID) 125 MCG tablet, TAKE 1 TABLET BY MOUTH EVERY DAY, Disp: 90 tablet, Rfl: 1  •  lisinopril (PRINIVIL,ZESTRIL) 40 MG tablet, TAKE 1 TABLET BY MOUTH EVERY DAY, Disp: 90 tablet, Rfl: 1  •  metoprolol succinate XL (TOPROL-XL) 25 MG 24 hr tablet, Take 25 mg by mouth Daily., Disp: , Rfl:   •  Multiple Vitamins-Minerals (HAIR SKIN NAILS PO), Take  by mouth., Disp: , Rfl:   •  Multiple Vitamins-Minerals (VISION-KRISTAL PRESERVE PO), Take  by mouth Daily With Breakfast., Disp: , Rfl:   •  NIFEdipine CC (ADALAT CC) 60 MG 24 hr tablet, TAKE 1 TABLET BY MOUTH EVERY DAY, Disp: 90 tablet, Rfl: 3  •  omeprazole (priLOSEC) 40 MG capsule, Take 1 capsule by mouth Daily., Disp: 90 capsule, Rfl: 1  •  rosuvastatin (Crestor) 20 MG tablet, Take 1 tablet by mouth Every Night., Disp: 90 tablet, Rfl: 3  •  SYMBICORT 160-4.5 MCG/ACT inhaler, TAKE 2 PUFFS BY MOUTH TWICE A DAY, Disp: 30.6 inhaler, Rfl: 4  •  Insulin Pen Needle (PEN NEEDLES) 32G X 4 MM misc, 1 each 4 (Four) Times a Day., Disp: 360 each, Rfl: 3    OBJECTIVE:  /79 (BP Location: Left arm, Patient Position: Sitting, Cuff Size: Large Adult)   Pulse 65   Temp 95.9 °F (35.5 °C) (Temporal)   Ht 165.1 cm (65\")   Wt 121 kg (267 lb 3.2 oz)   LMP  (LMP Unknown)   SpO2 97%   Breastfeeding No   BMI 44.46 kg/m²    Physical Exam  Vitals signs reviewed.   Constitutional:       Appearance: She is well-developed.   Cardiovascular:      Rate and Rhythm: Normal rate and regular rhythm.   Pulmonary:      Effort: Pulmonary effort is normal.      Breath sounds: Normal breath sounds.   Neurological:      Mental Status: She is alert and oriented to person, place, and time.   Psychiatric:         Behavior: Behavior normal. "         Assessment/Plan    Maribeth was seen today for diabetes.    Diagnoses and all orders for this visit:    Type 2 diabetes mellitus with hyperglycemia, with long-term current use of insulin (CMS/[facility])  -     Hemoglobin A1c; Future  -     Comprehensive Metabolic Panel    Encounter for immunization  -     Pneumococcal Polysaccharide Vaccine 23-Valent Greater Than or Equal To 3yo Subcutaneous / IM    Will reschedule mammogram.      An After Visit Summary was printed and given to the patient at discharge.  Return in about 3 months (around 12/16/2020) for Recheck.       YESSICA Mane 09/16/2020    Electronically signed.

## 2020-09-17 LAB
ALBUMIN SERPL-MCNC: 4.5 G/DL (ref 3.5–5.2)
ALBUMIN/GLOB SERPL: 2.3 G/DL
ALP SERPL-CCNC: 75 U/L (ref 39–117)
ALT SERPL-CCNC: 18 U/L (ref 1–33)
AST SERPL-CCNC: 21 U/L (ref 1–32)
BILIRUB SERPL-MCNC: 0.3 MG/DL (ref 0–1.2)
BUN SERPL-MCNC: 16 MG/DL (ref 8–23)
BUN/CREAT SERPL: 14.4 (ref 7–25)
CALCIUM SERPL-MCNC: 9.2 MG/DL (ref 8.6–10.5)
CHLORIDE SERPL-SCNC: 102 MMOL/L (ref 98–107)
CO2 SERPL-SCNC: 27.6 MMOL/L (ref 22–29)
CREAT SERPL-MCNC: 1.11 MG/DL (ref 0.57–1)
GLOBULIN SER CALC-MCNC: 2 GM/DL
GLUCOSE SERPL-MCNC: 94 MG/DL (ref 65–99)
POTASSIUM SERPL-SCNC: 4.7 MMOL/L (ref 3.5–5.2)
PROT SERPL-MCNC: 6.5 G/DL (ref 6–8.5)
SODIUM SERPL-SCNC: 142 MMOL/L (ref 136–145)

## 2020-09-23 LAB — HBA1C MFR BLD: 6.9 % (ref 4.8–5.6)

## 2020-09-23 NOTE — PROGRESS NOTES
A1C is up to 6.9.  Sees endocrinology in October.  Please advise patient of result. Will let endocrinology make any changes at her visit with them, but she does need to be working on her diet in the meantime.  If she has questions about this I will be happy to help.

## 2020-09-29 ENCOUNTER — TELEPHONE (OUTPATIENT)
Dept: ENDOCRINOLOGY | Facility: CLINIC | Age: 65
End: 2020-09-29

## 2020-09-29 RX ORDER — DULAGLUTIDE 0.75 MG/.5ML
INJECTION, SOLUTION SUBCUTANEOUS
Qty: 4 PEN | Refills: 11 | Status: SHIPPED | OUTPATIENT
Start: 2020-09-29 | End: 2020-10-19

## 2020-09-30 DIAGNOSIS — E11.65 TYPE 2 DIABETES MELLITUS WITH HYPERGLYCEMIA, WITH LONG-TERM CURRENT USE OF INSULIN (HCC): ICD-10-CM

## 2020-09-30 DIAGNOSIS — Z79.4 TYPE 2 DIABETES MELLITUS WITH HYPERGLYCEMIA, WITH LONG-TERM CURRENT USE OF INSULIN (HCC): ICD-10-CM

## 2020-09-30 RX ORDER — INSULIN GLARGINE 100 [IU]/ML
40 INJECTION, SOLUTION SUBCUTANEOUS DAILY
Qty: 36 PEN | Refills: 0 | Status: SHIPPED | OUTPATIENT
Start: 2020-09-30 | End: 2020-10-19 | Stop reason: SDUPTHER

## 2020-10-08 ENCOUNTER — OFFICE VISIT (OUTPATIENT)
Dept: CARDIOLOGY | Age: 65
End: 2020-10-08
Payer: COMMERCIAL

## 2020-10-08 VITALS
BODY MASS INDEX: 44.98 KG/M2 | SYSTOLIC BLOOD PRESSURE: 126 MMHG | HEART RATE: 80 BPM | WEIGHT: 270 LBS | HEIGHT: 65 IN | DIASTOLIC BLOOD PRESSURE: 86 MMHG

## 2020-10-08 PROCEDURE — 4040F PNEUMOC VAC/ADMIN/RCVD: CPT | Performed by: NURSE PRACTITIONER

## 2020-10-08 PROCEDURE — 1123F ACP DISCUSS/DSCN MKR DOCD: CPT | Performed by: NURSE PRACTITIONER

## 2020-10-08 PROCEDURE — G8417 CALC BMI ABV UP PARAM F/U: HCPCS | Performed by: NURSE PRACTITIONER

## 2020-10-08 PROCEDURE — G8484 FLU IMMUNIZE NO ADMIN: HCPCS | Performed by: NURSE PRACTITIONER

## 2020-10-08 PROCEDURE — 1036F TOBACCO NON-USER: CPT | Performed by: NURSE PRACTITIONER

## 2020-10-08 PROCEDURE — 1090F PRES/ABSN URINE INCON ASSESS: CPT | Performed by: NURSE PRACTITIONER

## 2020-10-08 PROCEDURE — G8427 DOCREV CUR MEDS BY ELIG CLIN: HCPCS | Performed by: NURSE PRACTITIONER

## 2020-10-08 PROCEDURE — 3017F COLORECTAL CA SCREEN DOC REV: CPT | Performed by: NURSE PRACTITIONER

## 2020-10-08 PROCEDURE — 99214 OFFICE O/P EST MOD 30 MIN: CPT | Performed by: NURSE PRACTITIONER

## 2020-10-08 PROCEDURE — G8400 PT W/DXA NO RESULTS DOC: HCPCS | Performed by: NURSE PRACTITIONER

## 2020-10-08 NOTE — PATIENT INSTRUCTIONS
New instructions for today:    You will need to stay on Plavix and aspirin for one year after stent placed - through 9/10/21. After that date, you can stop Plavix and continue low dose enteric coated aspirin once daily. Do not schedule elective surgical procedures or invasive until after 9/10/21. Patient Instructions:  Continue current medications as prescribed. Always keep a current medication list. Bring your medications to every office visit. Continue to follow up with primary care provider for non cardiac medical problems. Call the office with any problems, questions or concerns at [phone]. If you have been asked to keep a blood pressure log, do so for 2 weeks. Call the office to report readings to the triage nurse at [phone]. Follow up with cardiologist as scheduled. The following educational material has been included in this after visit summary for your review: Life simple 7. Heart health. Heart disease. Life simple 7  1) Manage blood pressure - high blood pressure is a major risk factor for heart disease and stroke. Keeping blood pressure in health range reduces strain on your heart, arteries and kidneys. Blood pressure goal is less than 130/80. 2) Control cholesterol - contributes to plaque, which can clog arteries and lead to heart disease and stroke. When you control your cholesterol you are giving your arteries their best chance to remain clear. It is recommended that you get cholesterol lab work done once a year. 3) Reduce blood sugar - most of the food we eat is turning into glucose or blood sugar that our body uses for energy. Over time, high levels of blood sugar can damage your heart, kidneys, eyes and nerves. 4) Get active - living an active life is one of the most rewarding gifts you can give yourself and those you love. Simply put, daily physical activity increases your length and quality of life. Strive to exercise 15 minutes most days of the week.   5)  Eat better - A healthy diet is one of your best weapons for fighting cardiovascular disease. When you eat a heart healthy diet, you improve your chances for feeling good and staying healthy for life. 6)  Lose weight - when you shed extra fat an unnecessary pounds, you reduce the burden on your hear, lungs, blood vessels and skeleton. You give yourself the gift of active living, you lower your blood pressure and help yourself feel better. 7) Stop smoking - cigarette smokers have a higher risk of developing cardiovascular disease. If  You smoke, quitting is the best thing you can do for your health. Check American Heart Association on line for more information on Life's Simple 7 and tips for healthy living. A Healthy Heart: Care Instructions  Your Care Instructions     Coronary artery disease, also called heart disease, occurs when a substance called plaque builds up in the vessels that supply oxygen-rich blood to your heart muscle. This can narrow the blood vessels and reduce blood flow. A heart attack happens when blood flow is completely blocked. A high-fat diet, smoking, and other factors increase the risk of heart disease. Your doctor has found that you have a chance of having heart disease. You can do lots of things to keep your heart healthy. It may not be easy, but you can change your diet, exercise more, and quit smoking. These steps really work to lower your chance of heart disease. Follow-up care is a key part of your treatment and safety. Be sure to make and go to all appointments, and call your doctor if you are having problems. It's also a good idea to know your test results and keep a list of the medicines you take. How can you care for yourself at home? Diet  · Use less salt when you cook and eat. This helps lower your blood pressure. Taste food before salting. Add only a little salt when you think you need it. With time, your taste buds will adjust to less salt.   · Eat fewer snack items, fast foods, canned soups, and other high-salt, high-fat, processed foods. · Read food labels and try to avoid saturated and trans fats. They increase your risk of heart disease by raising cholesterol levels. · Limit the amount of solid fat-butter, margarine, and shortening-you eat. Use olive, peanut, or canola oil when you cook. Bake, broil, and steam foods instead of frying them. · Eat a variety of fruit and vegetables every day. Dark green, deep orange, red, or yellow fruits and vegetables are especially good for you. Examples include spinach, carrots, peaches, and berries. · Foods high in fiber can reduce your cholesterol and provide important vitamins and minerals. High-fiber foods include whole-grain cereals and breads, oatmeal, beans, brown rice, citrus fruits, and apples. · Eat lean proteins. Heart-healthy proteins include seafood, lean meats and poultry, eggs, beans, peas, nuts, seeds, and soy products. · Limit drinks and foods with added sugar. These include candy, desserts, and soda pop. Lifestyle changes  · If your doctor recommends it, get more exercise. Walking is a good choice. Bit by bit, increase the amount you walk every day. Try for at least 30 minutes on most days of the week. You also may want to swim, bike, or do other activities. · Do not smoke. If you need help quitting, talk to your doctor about stop-smoking programs and medicines. These can increase your chances of quitting for good. Quitting smoking may be the most important step you can take to protect your heart. It is never too late to quit. · Limit alcohol to 2 drinks a day for men and 1 drink a day for women. Too much alcohol can cause health problems. · Manage other health problems such as diabetes, high blood pressure, and high cholesterol. If you think you may have a problem with alcohol or drug use, talk to your doctor. Medicines  · Take your medicines exactly as prescribed.  Call your doctor if you think you are having a problem with your medicine. · If your doctor recommends aspirin, take the amount directed each day. Make sure you take aspirin and not another kind of pain reliever, such as acetaminophen (Tylenol). When should you call for help? [id number] if you have symptoms of a heart attack. These may include:  · Chest pain or pressure, or a strange feeling in the chest.  · Sweating. · Shortness of breath. · Pain, pressure, or a strange feeling in the back, neck, jaw, or upper belly or in one or both shoulders or arms. · Lightheadedness or sudden weakness. · A fast or irregular heartbeat. After you call 911, the  may tell you to chew 1 adult-strength or 2 to 4 low-dose aspirin. Wait for an ambulance. Do not try to drive yourself. Watch closely for changes in your health, and be sure to contact your doctor if you have any problems. Where can you learn more? Go to https://DosYogures.[employer]. org and sign in to your [employer] account. Enter C009 in the [employer] box to learn more about \"A Healthy Heart: Care Instructions. \"     If you do not have an account, please click on the \"Sign Up Now\" link. Current as of: December 16, 2019               Content Version: 12.5  © 8233-0736 [email or web address]. Care instructions adapted under license by [facility] ([facility]). If you have questions about a medical condition or this instruction, always ask your healthcare professional. [place] [address] any warranty or liability for your use of this information. Patient Education        Learning About Coronary Artery Disease (CAD)  What is coronary artery disease? Coronary artery disease (CAD) occurs when plaque builds up in the arteries that bring oxygen-rich blood to your heart. Plaque is a fatty substance made of cholesterol, calcium, and other substances in the blood. This process is called hardening of the arteries, or atherosclerosis.   What happens when you have coronary artery disease? · Plaque may narrow the coronary arteries. Narrowed arteries cause poor blood flow. This can lead to angina symptoms such as chest pain or discomfort. If blood flow is completely blocked, you could have a heart attack. · You can slow CAD and reduce the risk of future problems by making changes in your lifestyle. These include quitting smoking and eating heart-healthy foods. · Treatments for CAD, along with changes in your lifestyle, can help you live a longer and healthier life. How can you prevent coronary artery disease? · Do not smoke. It may be the best thing you can do to prevent heart disease. If you need help quitting, talk to your doctor about stop-smoking programs and medicines. These can increase your chances of quitting for good. · Be active. Get at least 30 minutes of exercise on most days of the week. Walking is a good choice. You also may want to do other activities, such as running, swimming, cycling, or playing tennis or team sports. · Eat heart-healthy foods. Eat more fruits and vegetables and less foods that contain saturated and trans fats. Limit alcohol, sodium, and sweets. · Stay at a healthy weight. Lose weight if you need to. · Manage other health problems such as diabetes, high blood pressure, and high cholesterol. How is coronary artery disease treated? · Your doctor will suggest that you make lifestyle changes. For example, your doctor may ask you to eat healthy foods, quit smoking, lose extra weight, and be more active. · You will have to take medicines. · Your doctor may suggest a procedure to open narrowed or blocked arteries. This is called angioplasty. Or your doctor may suggest using healthy blood vessels to create detours around narrowed or blocked arteries. This is called bypass surgery. Follow-up care is a key part of your treatment and safety. Be sure to make and go to all appointments, and call your doctor if you are having problems.  It's also a good idea to know your test results and keep a list of the medicines you take. Where can you learn more? Go to https://chpepiceweb.inMotionNow. org and sign in to your [employer] account. Enter [phone] in the [facility] box to learn more about \"Learning About Coronary Artery Disease (CAD). \"     If you do not have an account, please click on the \"Sign Up Now\" link. Current as of: December 16, 2019               Content Version: 12.6  © 0072-9333 [employer]. Care instructions adapted under license by [facility] ([facility]). If you have questions about a medical condition or this instruction, always ask your healthcare professional. [address] any warranty or liability for your use of this information.

## 2020-10-08 NOTE — PROGRESS NOTES
[facility] [place], [place]. [address] [address] [address] [facility]  [phone] office  [phone] fax      OFFICE VISIT:  10/8/2020    David Salinas - : 1955  Reason For Visit:  Juni Hampton is a 72 y.o. female who is here for Post-Op Check; Coronary Artery Disease; and Hypertension    History:   Diagnosis Orders   1. Coronary artery disease involving native coronary artery of native heart without angina pectoris     2. Abnormal EKG     3. History of coronary artery stent placement      9/10/20 Successful PCI to proximal LAD (3.5 x 9 mm resolute integrity) utilizing   drug-eluting stent - Dr. Shelia Banerjee   4. Hyperlipidemia, unspecified hyperlipidemia type       The patient presents today for cardiology hospital follow up after cath with CHRISTIANNE to proximal LAD on 9/10/20. The patient report feeling very well. She continues on DAPT with no bleeding issue. She rides a stationary bike a few times per day and walks. The patient has chosen not to do cardiac rehab program.  Her BP is well controlled. Last HgA1c was 6.9 in September and LDL 81. No report of chest pain. BP is well controlled on current regimen. The patient stopped smoking 6 years ago. She reports a very strong family hx of CAD. Subjective  Anjelica denies exertional chest pain, shortness of breath, orthopnea, paroxysmal nocturnal dyspnea, syncope, presyncope, sensed arrhythmia, edema and fatigue. The patient denies numbness or weakness to suggest cerebrovascular accident or transient ischemic attack.      Davdi Salinas has the following history as recorded in [facility]:  Patient Active Problem List   Diagnosis Code    SVT (supraventricular tachycardia) ([facility] I47.1    History of tobacco abuse Z87.891    Essential hypertension I10    Mixed dyslipidemia E78.2    Obesity due to excess calories E66.09    Type 2 diabetes mellitus ([facility] [address] E11.9    SILVANO (obstructive sleep apnea) G47.33    Unstable angina (HCC) I20.0     Past Medical History:   Diagnosis Date    Arthritis     Cerebral artery occlusion with cerebral infarction ([facility] [facility]     Diabetes mellitus ([facility]     Hyperlipidemia     Hypertension      Past Surgical History:   Procedure Laterality Date     SECTION      JOINT REPLACEMENT      TUBAL LIGATION       History reviewed. No pertinent family history.   Social History     Tobacco Use    Smoking status: Former Smoker     Packs/day: 0.50     Years: 8.00     Pack years: 4.00     Last attempt to quit: 2013     Years since quittin.2    Smokeless tobacco: Never Used   Substance Use Topics    Alcohol use: Not Currently      Current Outpatient Medications   Medication Sig Dispense Refill    rosuvastatin (CRESTOR) 20 MG tablet Take 20 mg by mouth nightly      omeprazole (PRILOSEC) 40 MG delayed release capsule Take 40 mg by mouth daily      NIFEdipine (ADALAT CC) 60 MG extended release tablet TAKE 1 TABLET BY MOUTH EVERY DAY      lisinopril (PRINIVIL;ZESTRIL) 40 MG tablet Take 40 mg by mouth daily      levothyroxine (SYNTHROID) 125 MCG tablet TAKE 1 TABLET BY MOUTH EVERY DAY      insulin aspart (NOVOLOG) 100 UNIT/ML injection pen INJECT 30 UNITS 3 TIMES DAILY      Dulaglutide 0.75 MG/0.5ML SOPN Inject 0.75 mg into the skin once a week      desvenlafaxine succinate (PRISTIQ) 50 MG TB24 extended release tablet TAKE 1 TABLET BY MOUTH EVERY DAY      carbidopa-levodopa (SINEMET)  MG per tablet TAKE 1 TO 2 TABLETS BY MOUTH EVERY DAY AT BEDTIME      budesonide-formoterol (SYMBICORT) 160-4.5 MCG/ACT AERO TAKE 2 PUFFS BY MOUTH TWICE A DAY      insulin glargine (BASAGLAR KWIKPEN) 100 UNIT/ML injection pen Inject 40 Units into the skin daily      Insulin Pen Needle (PEN NEEDLES) 32G X 4 MM MISC 1 each by NOT APPLICABLE route 4 times daily      clopidogrel (PLAVIX) 75 MG tablet Take 1 tablet by mouth daily 90 tablet 3    metoprolol succinate (TOPROL XL) 25 MG extended release tablet Take 1 tablet by mouth daily 30 tablet 3    lamoTRIgine (LAMICTAL) 100 MG tablet as needed   2    Multiple Vitamins-Minerals (THERAPEUTIC MULTIVITAMIN-MINERALS) tablet Take 1 tablet by mouth daily      vitamin D (CHOLECALCIFEROL) 1000 UNIT TABS tablet Take 1,000 Units by mouth daily      folic acid (FOLVITE) 323 MCG tablet Take 400 mcg by mouth daily      cetirizine (ZYRTEC) 10 MG tablet Take 10 mg by mouth daily      Multiple Vitamins-Minerals (PRESERVISION AREDS 2+MULTI VIT PO) Take by mouth      aspirin 81 MG tablet Take 81 mg by mouth daily      bumetanide (BUMEX) 1 MG tablet Take 1 mg by mouth daily      Insulin Detemir (LEVEMIR FLEXPEN SC) Inject into the skin       No current facility-administered medications for this visit. Allergies: Metformin and related    Review of Systems  Constitutional - no appetite change, or unexpected weight change. No fever, chills or diaphoresis. No significant change in activity level or new onset of fatigue. HEENT - no significant rhinorrhea or epistaxis. No tinnitus or significant hearing loss. Eyes - no sudden vision change or amaurosis. No corneal arcus, xantholasma, subconjunctival hemorrhage or discharge. Respiratory - no significant wheezing, stridor, apnea or cough. No dyspnea on exertion or shortness of air. Cardiovascular - no exertional chest pain to suggest myocardial ischemia. No orthopnea or PND. No sensation of sustained arrythmia. No occurrence of slow heart rate. No palpitations. No claudication. Gastrointestinal - no abdominal swelling or pain. No blood in stool. No severe constipation, diarrhea, nausea, or vomiting. Genitourinary - no dysuria, frequency, or urgency. No flank pain or hematuria. Musculoskeletal - no back pain or myalgia. No problems with gait. Extremities - no clubbing, cyanosis or extremity edema. Skin - no color change or rash. No pallor. No new surgical incision.    Neurologic - no speech declined. Continue other current medications as directed. Continue to follow up with primary care provider for non cardiac medical problems. Call the office with any problems, questions or concerns at [phone]. Cardiology follow up: 2 months. Educational included in patient instructions. Heart health. CAD.      MAICO Marie

## 2020-10-13 ENCOUNTER — TELEPHONE (OUTPATIENT)
Dept: ENDOCRINOLOGY | Facility: CLINIC | Age: 65
End: 2020-10-13

## 2020-10-19 ENCOUNTER — OFFICE VISIT (OUTPATIENT)
Dept: ENDOCRINOLOGY | Facility: CLINIC | Age: 65
End: 2020-10-19

## 2020-10-19 VITALS
WEIGHT: 269.2 LBS | HEIGHT: 65 IN | RESPIRATION RATE: 20 BRPM | SYSTOLIC BLOOD PRESSURE: 122 MMHG | OXYGEN SATURATION: 96 % | HEART RATE: 92 BPM | BODY MASS INDEX: 44.85 KG/M2 | DIASTOLIC BLOOD PRESSURE: 92 MMHG

## 2020-10-19 DIAGNOSIS — E11.69 MIXED DIABETIC HYPERLIPIDEMIA ASSOCIATED WITH TYPE 2 DIABETES MELLITUS (HCC): ICD-10-CM

## 2020-10-19 DIAGNOSIS — I15.2 HYPERTENSION ASSOCIATED WITH DIABETES (HCC): ICD-10-CM

## 2020-10-19 DIAGNOSIS — Z79.4 TYPE 2 DIABETES MELLITUS WITH HYPERGLYCEMIA, WITH LONG-TERM CURRENT USE OF INSULIN (HCC): Primary | ICD-10-CM

## 2020-10-19 DIAGNOSIS — E03.9 ACQUIRED HYPOTHYROIDISM: ICD-10-CM

## 2020-10-19 DIAGNOSIS — E55.9 VITAMIN D DEFICIENCY: ICD-10-CM

## 2020-10-19 DIAGNOSIS — E78.2 MIXED DIABETIC HYPERLIPIDEMIA ASSOCIATED WITH TYPE 2 DIABETES MELLITUS (HCC): ICD-10-CM

## 2020-10-19 DIAGNOSIS — E11.59 HYPERTENSION ASSOCIATED WITH DIABETES (HCC): ICD-10-CM

## 2020-10-19 DIAGNOSIS — E11.65 TYPE 2 DIABETES MELLITUS WITH HYPERGLYCEMIA, WITH LONG-TERM CURRENT USE OF INSULIN (HCC): Primary | ICD-10-CM

## 2020-10-19 LAB — HBA1C MFR BLD: 6.6 %

## 2020-10-19 PROCEDURE — 99214 OFFICE O/P EST MOD 30 MIN: CPT | Performed by: INTERNAL MEDICINE

## 2020-10-19 PROCEDURE — 83036 HEMOGLOBIN GLYCOSYLATED A1C: CPT | Performed by: INTERNAL MEDICINE

## 2020-10-19 RX ORDER — INSULIN GLARGINE 100 [IU]/ML
45 INJECTION, SOLUTION SUBCUTANEOUS DAILY
Qty: 14 PEN | Refills: 3 | Status: SHIPPED | OUTPATIENT
Start: 2020-10-19 | End: 2021-12-02

## 2020-10-19 RX ORDER — INSULIN ASPART 100 [IU]/ML
INJECTION, SOLUTION INTRAVENOUS; SUBCUTANEOUS
Qty: 27 PEN | Refills: 3 | Status: SHIPPED | OUTPATIENT
Start: 2020-10-19 | End: 2021-12-02

## 2020-10-19 RX ORDER — PEN NEEDLE, DIABETIC 30 GX3/16"
1 NEEDLE, DISPOSABLE MISCELLANEOUS 4 TIMES DAILY
Qty: 360 EACH | Refills: 3 | Status: SHIPPED | OUTPATIENT
Start: 2020-10-19 | End: 2022-01-20

## 2020-10-19 NOTE — PROGRESS NOTES
"  Subjective    Maribeth Isbell is a 65 y.o. female. she is here today for follow-up.           Diabetes  Pertinent negatives for hypoglycemia include no confusion, dizziness, headaches, pallor or tremors. Pertinent negatives for diabetes include no chest pain, no fatigue, no polydipsia, no polyphagia, no polyuria and no weakness.          Primary Care Provider     Jer Mccray MD     Duration 5 years     Timing - Diabetes is Constant     Quality -  now at [facility]      Severity -  moderate     Complications - TIA    CAD , stent       Current symptoms/problems  increase appetite      Alleviating Factors: Compliance       Aggravating Factors :  None     Side Effects  metformin and sulfonylurea     Current diet  in general, a \"healthy\" diet       Current exercise none     Current monitoring regimen: home blood tests - checking 4 x daily     Lab Results   Component Value Date    HGBA1C 6.90 (H) 2020       Home blood sugar records:     At goal      Hypoglycemia none         The following portions of the patient's history were reviewed and updated as appropriate:   Past Medical History:   Diagnosis Date   • Bell's palsy    • Cataract    • Depression    • E coli infection    • Gastroesophageal reflux disease 2018   • Headache    • History of colon polyps    • Hyperlipidemia    • Hypertension    • Hyperthyroidism    • Hypothyroidism    • Macular degeneration    • Sleep apnea     wears cpap   • Tachycardia    • Type 2 diabetes mellitus (CMS/HCC)    • Visual impairment      Past Surgical History:   Procedure Laterality Date   •  SECTION     • COLONOSCOPY  06/10/2011    Dr. Mahmood-Internal hemorrhoids; Otherwise normal   • COLONOSCOPY N/A 2016    Colon not done this day-see endo report 2016   • COLONOSCOPY  [id number]    Dr. Mahmood-A single small hyperplastic polyp in the ascending colon   • COLONOSCOPY N/A 2020    Procedure: COLONOSCOPY WITH ANESTHESIA;  Surgeon: Kathy Berry MD; "  Location: [facility] ENDOSCOPY;  Service: Gastroenterology;  Laterality: N/A;  preop; positive cologuard  postop; poylps   PCP Taiwo Diggs MD   • ENDOSCOPY N/A 12/1/2016    Normal esophagus; Normal stomach; Normal first part of the duodenum and 2nd part of the duodenum-biopsied   • ENDOSCOPY N/A 7/30/2018    Normal esophagus; Normal stomach; Normal first portion of the duodenum and second portion of the duodenum-biopsied   • HYSTERECTOMY     • KNEE SURGERY Left    • TUMOR REMOVAL       Family History   Problem Relation Age of Onset   • Heart disease Mother    • Diabetes Mother    • No Known Problems Maternal Grandmother    • No Known Problems Maternal Grandfather    • No Known Problems Paternal Grandmother    • No Known Problems Paternal Grandfather    • Colon cancer Neg Hx    • Colon polyps Neg Hx      OB History    No obstetric history on file.       Current Outpatient Medications   Medication Sig Dispense Refill   • aspirin 81 MG EC tablet Take 81 mg by mouth Daily.     • bumetanide (BUMEX) 1 MG tablet TAKE 1 TABLET BY MOUTH EVERY DAY 90 tablet 1   • carbidopa-levodopa (SINEMET)  MG per tablet TAKE 1 TO 2 TABLETS BY MOUTH EVERY DAY AT BEDTIME 180 tablet 3   • cetirizine (zyrTEC) 10 MG tablet Take 1 tablet by mouth Daily. 90 tablet 1   • cholecalciferol (VITAMIN D3) 25 MCG (1000 UT) tablet Take 1,000 Units by mouth Daily.     • clopidogrel (PLAVIX) 75 MG tablet Take 75 mg by mouth Daily.     • desvenlafaxine (PRISTIQ) 50 MG 24 hr tablet TAKE 1 TABLET BY MOUTH EVERY DAY 90 tablet 3   • folic acid (FOLVITE) 400 MCG tablet Take 400 mcg by mouth Daily.     • insulin aspart (NovoLOG FlexPen) 100 UNIT/ML solution pen-injector sc pen INJECT 30 UNITS 3 TIMES DAILY 9 pen 3   • Insulin Glargine (BASAGLAR KWIKPEN) 100 UNIT/ML injection pen Inject 40 Units under the skin into the appropriate area as directed Daily. 36 pen 0   • Insulin Pen Needle (PEN NEEDLES) 32G X 4 MM misc 1 each 4 (Four) Times a Day. 360  each 3   • lamoTRIgine (LaMICtal) 100 MG tablet TAKE 1 TABLET BY MOUTH EVERY DAY AT NIGHT 90 tablet 2   • levothyroxine (SYNTHROID, LEVOTHROID) 125 MCG tablet TAKE 1 TABLET BY MOUTH EVERY DAY 90 tablet 1   • lisinopril (PRINIVIL,ZESTRIL) 40 MG tablet TAKE 1 TABLET BY MOUTH EVERY DAY 90 tablet 1   • metoprolol succinate XL (TOPROL-XL) 25 MG 24 hr tablet Take 25 mg by mouth Daily.     • Multiple Vitamins-Minerals (HAIR SKIN NAILS PO) Take  by mouth.     • Multiple Vitamins-Minerals (VISION-KRISTAL PRESERVE PO) Take  by mouth Daily With Breakfast.     • NIFEdipine CC (ADALAT CC) 60 MG 24 hr tablet TAKE 1 TABLET BY MOUTH EVERY DAY 90 tablet 3   • omeprazole (priLOSEC) 40 MG capsule Take 1 capsule by mouth Daily. 90 capsule 1   • rosuvastatin (Crestor) 20 MG tablet Take 1 tablet by mouth Every Night. 90 tablet 3   • SYMBICORT 160-4.5 MCG/ACT inhaler TAKE 2 PUFFS BY MOUTH TWICE A DAY 30.6 inhaler 4   • Trulicity 0.75 MG/0.5ML solution pen-injector INJECT 0.75 MG UNDER THE SKIN INTO THE APPROPRIATE AREA AS DIRECTED 1 (ONE) TIME PER WEEK. 4 pen 11     No current facility-administered medications for this visit.      Allergies   Allergen Reactions   • Metformin GI Intolerance     Social History     Socioeconomic History   • Marital status:      Spouse name: Not on file   • Number of children: Not on file   • Years of education: Not on file   • Highest education level: Not on file   Tobacco Use   • Smoking status: Former Smoker     Packs/day: 1.00     Years: 20.00     Pack years: 20.00     Types: Cigarettes     Quit date:      Years since quittin.8   • Smokeless tobacco: Never Used   Substance and Sexual Activity   • Alcohol use: No   • Drug use: No   • Sexual activity: Defer       Review of Systems  Review of Systems   Constitutional: Negative for activity change, appetite change, diaphoresis and fatigue.   HENT: Negative for facial swelling, sneezing, sore throat, tinnitus, trouble swallowing and voice change.   "  Eyes: Negative for photophobia, pain, discharge, redness, itching and visual disturbance.   Respiratory: Negative for apnea, cough, choking, chest tightness and shortness of breath.    Cardiovascular: Negative for chest pain, palpitations and leg swelling.   Gastrointestinal: Negative for abdominal distention, abdominal pain, constipation, diarrhea, nausea and vomiting.   Endocrine: Negative for cold intolerance, heat intolerance, polydipsia, polyphagia and polyuria.   Genitourinary: Negative for difficulty urinating, dysuria, frequency, hematuria and urgency.   Musculoskeletal: Negative for arthralgias, back pain, gait problem, joint swelling, myalgias, neck pain and neck stiffness.   Skin: Negative for color change, pallor, rash and wound.   Neurological: Negative for dizziness, tremors, weakness, light-headedness, numbness and headaches.   Hematological: Negative for adenopathy. Does not bruise/bleed easily.   Psychiatric/Behavioral: Negative for behavioral problems, confusion and sleep disturbance.        Objective    Ht 165.1 cm (65\")   LMP  (LMP Unknown)   BMI 44.46 kg/m²     Physical Exam  Constitutional:       General: She is not in acute distress.     Appearance: Normal appearance. She is normal weight. She is not ill-appearing, toxic-appearing or diaphoretic.   HENT:      Head: Normocephalic and atraumatic.      Right Ear: External ear normal.      Left Ear: External ear normal.      Nose: Nose normal. No congestion or rhinorrhea.      Mouth/Throat:      Mouth: Mucous membranes are moist.   Eyes:      General: No scleral icterus.        Right eye: No discharge.         Left eye: No discharge.      Extraocular Movements: Extraocular movements intact.      Conjunctiva/sclera: Conjunctivae normal.   Neck:      Musculoskeletal: Normal range of motion and neck supple. No neck rigidity or muscular tenderness.      Vascular: No carotid bruit.   Cardiovascular:      Rate and Rhythm: Normal rate and regular " rhythm.      Pulses: Normal pulses.      Heart sounds: Normal heart sounds. No murmur.   Pulmonary:      Effort: Pulmonary effort is normal. No respiratory distress.      Breath sounds: Normal breath sounds. No stridor. No wheezing, rhonchi or rales.   Chest:      Chest wall: No tenderness.   Abdominal:      General: There is no distension.      Palpations: Abdomen is soft.   Musculoskeletal: Normal range of motion.         General: No swelling or tenderness.   Lymphadenopathy:      Cervical: No cervical adenopathy.   Skin:     Coloration: Skin is not jaundiced or pale.      Findings: No bruising, erythema, lesion or rash.   Neurological:      General: No focal deficit present.      Mental Status: She is alert and oriented to person, place, and time.      Cranial Nerves: No cranial nerve deficit.      Motor: No weakness.   Psychiatric:         Mood and Affect: Mood normal.         Behavior: Behavior normal.         Thought Content: Thought content normal.         Judgment: Judgment normal.         Lab Review  Glucose (mg/dL)   Date Value   09/10/2020 154 (H)   11/04/2016 231 (H)     Sodium (mmol/L)   Date Value   09/16/2020 142   09/10/2020 140   06/16/2020 141   05/31/2019 140   11/04/2016 140     Potassium (mmol/L)   Date Value   09/16/2020 4.7   09/10/2020 4.3   06/16/2020 3.9   05/31/2019 4.8   11/04/2016 4.2     Chloride (mmol/L)   Date Value   09/16/2020 102   09/10/2020 99   06/16/2020 99   05/31/2019 99   11/04/2016 99     CO2 (mmol/L)   Date Value   09/10/2020 29   11/04/2016 30.0     Total CO2 (mmol/L)   Date Value   09/16/2020 27.6   06/16/2020 27.6   05/31/2019 27.2     BUN (mg/dL)   Date Value   09/16/2020 16   09/10/2020 15   06/16/2020 15   05/31/2019 15   11/04/2016 14     Creatinine (mg/dL)   Date Value   09/16/2020 1.11 (H)   09/10/2020 1.1 (H)   06/16/2020 1.11 (H)   05/31/2019 0.92   11/04/2016 0.79     Hemoglobin A1C (%)   Date Value   09/22/2020 6.90 (H)   06/16/2020 6.80 (H)   02/04/2019 5.8    04/18/2018 10.80     Triglycerides   Date Value   06/16/2020 230 mg/dL (H)   09/23/2019 248 mg/dL (H)   09/16/2019 CANCELED     LDL Cholesterol  (mg/dL)   Date Value   06/16/2020 81   09/23/2019 85   05/31/2019 77       Assessment/Plan      1. Type 2 diabetes mellitus with hyperglycemia, with long-term current use of insulin (CMS/[facility])    2. Hypertension associated with diabetes (CMS/[facility])    3. Mixed diabetic hyperlipidemia associated with type 2 diabetes mellitus (CMS/[facility])    4. Vitamin D deficiency     .    Medications prescribed:  Outpatient Encounter Medications as of 10/19/2020   Medication Sig Dispense Refill   • aspirin 81 MG EC tablet Take 81 mg by mouth Daily.     • bumetanide (BUMEX) 1 MG tablet TAKE 1 TABLET BY MOUTH EVERY DAY 90 tablet 1   • carbidopa-levodopa (SINEMET)  MG per tablet TAKE 1 TO 2 TABLETS BY MOUTH EVERY DAY AT BEDTIME 180 tablet 3   • cetirizine (zyrTEC) 10 MG tablet Take 1 tablet by mouth Daily. 90 tablet 1   • cholecalciferol (VITAMIN D3) 25 MCG (1000 UT) tablet Take 1,000 Units by mouth Daily.     • clopidogrel (PLAVIX) 75 MG tablet Take 75 mg by mouth Daily.     • desvenlafaxine (PRISTIQ) 50 MG 24 hr tablet TAKE 1 TABLET BY MOUTH EVERY DAY 90 tablet 3   • folic acid (FOLVITE) 400 MCG tablet Take 400 mcg by mouth Daily.     • insulin aspart (NovoLOG FlexPen) 100 UNIT/ML solution pen-injector sc pen INJECT 30 UNITS 3 TIMES DAILY 9 pen 3   • Insulin Glargine (BASAGLAR KWIKPEN) 100 UNIT/ML injection pen Inject 40 Units under the skin into the appropriate area as directed Daily. 36 pen 0   • Insulin Pen Needle (PEN NEEDLES) 32G X 4 MM misc 1 each 4 (Four) Times a Day. 360 each 3   • lamoTRIgine (LaMICtal) 100 MG tablet TAKE 1 TABLET BY MOUTH EVERY DAY AT NIGHT 90 tablet 2   • levothyroxine (SYNTHROID, LEVOTHROID) 125 MCG tablet TAKE 1 TABLET BY MOUTH EVERY DAY 90 tablet 1   • lisinopril (PRINIVIL,ZESTRIL) 40 MG tablet TAKE 1 TABLET BY MOUTH EVERY DAY 90 tablet 1   • metoprolol  succinate XL (TOPROL-XL) 25 MG 24 hr tablet Take 25 mg by mouth Daily.     • Multiple Vitamins-Minerals (HAIR SKIN NAILS PO) Take  by mouth.     • Multiple Vitamins-Minerals (VISION-KRISTAL PRESERVE PO) Take  by mouth Daily With Breakfast.     • NIFEdipine CC (ADALAT CC) 60 MG 24 hr tablet TAKE 1 TABLET BY MOUTH EVERY DAY 90 tablet 3   • omeprazole (priLOSEC) 40 MG capsule Take 1 capsule by mouth Daily. 90 capsule 1   • rosuvastatin (Crestor) 20 MG tablet Take 1 tablet by mouth Every Night. 90 tablet 3   • SYMBICORT 160-4.5 MCG/ACT inhaler TAKE 2 PUFFS BY MOUTH TWICE A DAY 30.6 inhaler 4   • Trulicity 0.75 MG/0.5ML solution pen-injector INJECT 0.75 MG UNDER THE SKIN INTO THE APPROPRIATE AREA AS DIRECTED 1 (ONE) TIME PER WEEK. 4 pen 11     No facility-administered encounter medications on file as of 10/19/2020.        Orders placed during this encounter include:  No orders of the defined types were placed in this encounter.  Patient has type 2 diabetes with hyperglycemia     Glycemic Management:     Lab Results   Component Value Date    HGBA1C 6.90 (H) 09/22/2020    HGBA1C 6.80 (H) 06/16/2020    HGBA1C 5.8 02/04/2019     Lab Results   Component Value Date    MICROALBUR 22.7 06/16/2020    CREATININE 1.11 (H) 09/16/2020            Basaglar 50 - ( toujeo not covered ),  Some lows while not eating , decrease to 45       Trulicity 0.75 mg weekly --- increase to 1.5 mg weekly next refill       Novolog doing 20 units with meals, if low carb 15 units         Ann-Marie hurts so not using     invokana , not covered            Lipid Management    Lab Results   Component Value Date    CHLPL 173 06/16/2020    CHLPL 183 09/23/2019    CHLPL CANCELED 09/16/2019     Lab Results   Component Value Date    TRIG 230 (H) 06/16/2020    TRIG 248 (H) 09/23/2019    TRIG CANCELED 09/16/2019     Lab Results   Component Value Date    HDL 46 06/16/2020    HDL 48 09/23/2019    HDL CANCELED 09/16/2019     Lab Results   Component Value Date    LDL 81  06/16/2020    LDL 85 09/23/2019    LDL 77 05/31/2019          On crestor 10 mg qhs - increase to 20        Blood Pressure Management:            On lisinopril            Microvascular Complication Monitoring:       Eye Exam Evaluation  Within 1 year   -----------     Lab Results   Component Value Date    MALBCRERATIO 7 06/16/2020    MALBCRERATIO 1.6 05/31/2019         -----------        Neuropathy, none            Weight Related:        Diet interventions: moderate (500 kCal/d) deficit diet.        Bone Health     Lab Results   Component Value Date    NXTX75CO 41.6 06/16/2020    NTNC83ZI 32.3 04/18/2018    OSAV15MY 34.3 06/05/2017          Thyroid Health     Lab Results   Component Value Date    TSH 0.338 06/16/2020    TSH 0.713 05/31/2019    TSH 4.710 (H) 04/18/2018        on levothyroxine 125 mcgs daily       No results found for: [id number]             4. Follow-up: No follow-ups on file.

## 2020-10-27 DIAGNOSIS — K21.9 GASTROESOPHAGEAL REFLUX DISEASE WITHOUT ESOPHAGITIS: ICD-10-CM

## 2020-10-27 RX ORDER — OMEPRAZOLE 40 MG/1
40 CAPSULE, DELAYED RELEASE ORAL DAILY
Qty: 90 CAPSULE | Refills: 1 | Status: SHIPPED | OUTPATIENT
Start: 2020-10-27 | End: 2021-03-05 | Stop reason: SDUPTHER

## 2020-10-27 NOTE — TELEPHONE ENCOUNTER
Patient called requesting rx for omeprazole 40 mg. hospitals has been buying OTC but will be cheaper if rx sent to [facility]

## 2020-11-01 DIAGNOSIS — E55.9 VITAMIN D DEFICIENCY: ICD-10-CM

## 2020-11-02 RX ORDER — ERGOCALCIFEROL 1.25 MG/1
CAPSULE ORAL
Qty: 12 CAPSULE | Refills: 1 | OUTPATIENT
Start: 2020-11-02

## 2020-11-03 RX ORDER — ERGOCALCIFEROL 1.25 MG/1
50000 CAPSULE ORAL WEEKLY
Qty: 4 CAPSULE | Refills: 3 | Status: SHIPPED | OUTPATIENT
Start: 2020-11-03 | End: 2021-01-21

## 2020-11-03 NOTE — TELEPHONE ENCOUNTER
Patient called requesting refill on Vit D 50,000.  Medication had been discontinued in med list as therapy completed.  Patient states she take weekly supplement and not daily supplement.

## 2020-11-06 ENCOUNTER — OFFICE VISIT (OUTPATIENT)
Dept: FAMILY MEDICINE CLINIC | Facility: CLINIC | Age: 65
End: 2020-11-06

## 2020-11-06 ENCOUNTER — TELEPHONE (OUTPATIENT)
Dept: FAMILY MEDICINE CLINIC | Facility: CLINIC | Age: 65
End: 2020-11-06

## 2020-11-06 VITALS — TEMPERATURE: 98.4 F | DIASTOLIC BLOOD PRESSURE: 80 MMHG | SYSTOLIC BLOOD PRESSURE: 122 MMHG

## 2020-11-06 DIAGNOSIS — J40 BRONCHITIS: Primary | ICD-10-CM

## 2020-11-06 PROCEDURE — G2025 DIS SITE TELE SVCS RHC/FQHC: HCPCS | Performed by: NURSE PRACTITIONER

## 2020-11-06 RX ORDER — PREDNISONE 10 MG/1
TABLET ORAL
Qty: 21 TABLET | Refills: 0 | Status: SHIPPED | OUTPATIENT
Start: 2020-11-06 | End: 2020-12-07

## 2020-11-06 RX ORDER — AZITHROMYCIN 250 MG/1
TABLET, FILM COATED ORAL
Qty: 6 TABLET | Refills: 0 | Status: SHIPPED | OUTPATIENT
Start: 2020-11-06 | End: 2020-12-07

## 2020-11-06 NOTE — PROGRESS NOTES
CC:  Cough, sore throat, URI    History:  Maribeth Isbell is a 65 y.o. female who presents today for evaluation of the above problems.      Symptoms started 4-5 days ago.   Started with sinus congestion and then rhinorrhea.  Has a cough, greenish phlegm.  No fever. Only has headache when coughing.    No shortness of breath. No change in taste or smell. No fever. Has been a little more sore than normal in the mornings when she gets up, but this resolves after a short time.       HPI  ROS:  Review of Systems   Constitutional: Positive for appetite change. Negative for chills and fever.   HENT: Positive for congestion and rhinorrhea.    Respiratory: Positive for cough. Negative for shortness of breath and wheezing.    Neurological: Negative for headaches.       Allergies   Allergen Reactions   • Metformin GI Intolerance     Past Medical History:   Diagnosis Date   • Bell's palsy    • Cataract    • Depression    • E coli infection    • Gastroesophageal reflux disease 2018   • Headache    • History of colon polyps    • Hyperlipidemia    • Hypertension    • Hyperthyroidism    • Hypothyroidism    • Macular degeneration    • Sleep apnea     wears cpap   • Tachycardia    • Type 2 diabetes mellitus (CMS/HCC)    • Visual impairment      Past Surgical History:   Procedure Laterality Date   •  SECTION     • COLONOSCOPY  06/10/2011    Dr. Mahmood-Internal hemorrhoids; Otherwise normal   • COLONOSCOPY N/A 2016    Colon not done this day-see endo report 2016   • COLONOSCOPY  302    Dr. Mahmood-A single small hyperplastic polyp in the ascending colon   • COLONOSCOPY N/A 2020    Procedure: COLONOSCOPY WITH ANESTHESIA;  Surgeon: Kathy Berry MD;  Location: [facility] ENDOSCOPY;  Service: Gastroenterology;  Laterality: N/A;  preop; positive cologuard  postop; poylps   PCP Taiwo Diggs MD   • ENDOSCOPY N/A 2016    Normal esophagus; Normal stomach; Normal first part of the duodenum and 2nd  part of the duodenum-biopsied   • ENDOSCOPY N/A 7/30/2018    Normal esophagus; Normal stomach; Normal first portion of the duodenum and second portion of the duodenum-biopsied   • HYSTERECTOMY     • KNEE SURGERY Left    • TUMOR REMOVAL       Family History   Problem Relation Age of Onset   • Heart disease Mother    • Diabetes Mother    • No Known Problems Maternal Grandmother    • No Known Problems Maternal Grandfather    • No Known Problems Paternal Grandmother    • No Known Problems Paternal Grandfather    • Colon cancer Neg Hx    • Colon polyps Neg Hx       reports that she quit smoking about 5 years ago. Her smoking use included cigarettes. She has a 20.00 pack-year smoking history. She has never used smokeless tobacco. She reports that she does not drink alcohol or use drugs.      Current Outpatient Medications:   •  aspirin 81 MG EC tablet, Take 81 mg by mouth Daily., Disp: , Rfl:   •  bumetanide (BUMEX) 1 MG tablet, TAKE 1 TABLET BY MOUTH EVERY DAY, Disp: 90 tablet, Rfl: 1  •  carbidopa-levodopa (SINEMET)  MG per tablet, TAKE 1 TO 2 TABLETS BY MOUTH EVERY DAY AT BEDTIME, Disp: 180 tablet, Rfl: 3  •  cetirizine (zyrTEC) 10 MG tablet, Take 1 tablet by mouth Daily., Disp: 90 tablet, Rfl: 1  •  cholecalciferol (VITAMIN D3) 25 MCG (1000 UT) tablet, Take 1,000 Units by mouth Daily., Disp: , Rfl:   •  clopidogrel (PLAVIX) 75 MG tablet, Take 75 mg by mouth Daily., Disp: , Rfl:   •  desvenlafaxine (PRISTIQ) 50 MG 24 hr tablet, TAKE 1 TABLET BY MOUTH EVERY DAY, Disp: 90 tablet, Rfl: 3  •  Dulaglutide 1.5 MG/0.5ML solution pen-injector, Inject 1.5 mg under the skin into the appropriate area as directed 1 (One) Time Per Week., Disp: 12 pen, Rfl: 3  •  folic acid (FOLVITE) 400 MCG tablet, Take 400 mcg by mouth Daily., Disp: , Rfl:   •  insulin aspart (NovoLOG FlexPen) 100 UNIT/ML solution pen-injector sc pen, INJECT 30 UNITS 3 TIMES DAILY, Disp: 27 pen, Rfl: 3  •  Insulin Glargine (BASAGLAR KWIKPEN) 100 UNIT/ML  injection pen, Inject 45 Units under the skin into the appropriate area as directed Daily., Disp: 14 pen, Rfl: 3  •  lamoTRIgine (LaMICtal) 100 MG tablet, TAKE 1 TABLET BY MOUTH EVERY DAY AT NIGHT, Disp: 90 tablet, Rfl: 2  •  levothyroxine (SYNTHROID, LEVOTHROID) 125 MCG tablet, TAKE 1 TABLET BY MOUTH EVERY DAY, Disp: 90 tablet, Rfl: 1  •  lisinopril (PRINIVIL,ZESTRIL) 40 MG tablet, TAKE 1 TABLET BY MOUTH EVERY DAY, Disp: 90 tablet, Rfl: 1  •  metoprolol succinate XL (TOPROL-XL) 25 MG 24 hr tablet, Take 25 mg by mouth Daily., Disp: , Rfl:   •  Multiple Vitamins-Minerals (HAIR SKIN NAILS PO), Take  by mouth., Disp: , Rfl:   •  Multiple Vitamins-Minerals (VISION-KRISTAL PRESERVE PO), Take  by mouth Daily With Breakfast., Disp: , Rfl:   •  NIFEdipine CC (ADALAT CC) 60 MG 24 hr tablet, TAKE 1 TABLET BY MOUTH EVERY DAY, Disp: 90 tablet, Rfl: 3  •  omeprazole (priLOSEC) 40 MG capsule, Take 1 capsule by mouth Daily., Disp: 90 capsule, Rfl: 1  •  rosuvastatin (Crestor) 20 MG tablet, Take 1 tablet by mouth Every Night., Disp: 90 tablet, Rfl: 3  •  SYMBICORT 160-4.5 MCG/ACT inhaler, TAKE 2 PUFFS BY MOUTH TWICE A DAY, Disp: 30.6 inhaler, Rfl: 4  •  vitamin D (ERGOCALCIFEROL) 1.25 MG (78429 UT) capsule capsule, Take 1 capsule by mouth 1 (One) Time Per Week., Disp: 4 capsule, Rfl: 3  •  azithromycin (Zithromax) 250 MG tablet, Take 2 tablets the first day, then 1 tablet daily for 4 days., Disp: 6 tablet, Rfl: 0  •  Insulin Pen Needle (Pen Needles) 32G X 4 MM misc, 1 each 4 (Four) Times a Day., Disp: 360 each, Rfl: 3  •  predniSONE (DELTASONE) 10 MG (21) dose pack, Use as directed on package, Disp: 21 tablet, Rfl: 0    OBJECTIVE:  /80 Comment: pt reported  Temp 98.4 °F (36.9 °C) Comment: pt reported  LMP  (LMP Unknown)   Breastfeeding No    Physical Exam  Psychiatric:         Mood and Affect: Mood normal.         Speech: Speech normal.         Behavior: Behavior normal.         Assessment/Plan    Diagnoses and all orders for  this visit:    1. Bronchitis (Primary)  -     azithromycin (Zithromax) 250 MG tablet; Take 2 tablets the first day, then 1 tablet daily for 4 days.  Dispense: 6 tablet; Refill: 0  -     predniSONE (DELTASONE) 10 MG (21) dose pack; Use as directed on package  Dispense: 21 tablet; Refill: 0      This visit has been rescheduled as a phone visit to comply with patient safety concerns in accordance with CDC recommendations. Total time of discussion was 7 minutes.      An After Visit Summary was printed and given to the patient at discharge.  Return if symptoms worsen or fail to improve.       YESSICA Mane 11/6/2020    Electronically signed.

## 2020-12-07 ENCOUNTER — OFFICE VISIT (OUTPATIENT)
Dept: FAMILY MEDICINE CLINIC | Facility: CLINIC | Age: 65
End: 2020-12-07

## 2020-12-07 VITALS
HEIGHT: 65 IN | WEIGHT: 263.4 LBS | DIASTOLIC BLOOD PRESSURE: 82 MMHG | SYSTOLIC BLOOD PRESSURE: 132 MMHG | TEMPERATURE: 97.5 F | BODY MASS INDEX: 43.89 KG/M2 | OXYGEN SATURATION: 98 % | HEART RATE: 78 BPM

## 2020-12-07 DIAGNOSIS — E11.65 TYPE 2 DIABETES MELLITUS WITH HYPERGLYCEMIA, WITH LONG-TERM CURRENT USE OF INSULIN (HCC): Primary | ICD-10-CM

## 2020-12-07 DIAGNOSIS — E03.9 ACQUIRED HYPOTHYROIDISM: ICD-10-CM

## 2020-12-07 DIAGNOSIS — F33.1 MODERATE EPISODE OF RECURRENT MAJOR DEPRESSIVE DISORDER (HCC): ICD-10-CM

## 2020-12-07 DIAGNOSIS — E78.2 MIXED HYPERLIPIDEMIA: ICD-10-CM

## 2020-12-07 DIAGNOSIS — Z79.4 TYPE 2 DIABETES MELLITUS WITH HYPERGLYCEMIA, WITH LONG-TERM CURRENT USE OF INSULIN (HCC): Primary | ICD-10-CM

## 2020-12-07 PROBLEM — I47.1 SVT (SUPRAVENTRICULAR TACHYCARDIA): Status: ACTIVE | Noted: 2019-07-29

## 2020-12-07 PROBLEM — I10 ESSENTIAL HYPERTENSION: Status: ACTIVE | Noted: 2019-07-29

## 2020-12-07 PROBLEM — I20.0 UNSTABLE ANGINA (HCC): Status: ACTIVE | Noted: 2020-12-07

## 2020-12-07 PROBLEM — Z87.891 HISTORY OF TOBACCO ABUSE: Status: ACTIVE | Noted: 2019-07-29

## 2020-12-07 PROBLEM — E66.09 OBESITY DUE TO EXCESS CALORIES: Status: ACTIVE | Noted: 2019-07-29

## 2020-12-07 PROBLEM — I47.10 SVT (SUPRAVENTRICULAR TACHYCARDIA): Status: ACTIVE | Noted: 2019-07-29

## 2020-12-07 PROBLEM — G47.33 OSA (OBSTRUCTIVE SLEEP APNEA): Status: ACTIVE | Noted: 2019-07-29

## 2020-12-07 PROCEDURE — 99213 OFFICE O/P EST LOW 20 MIN: CPT | Performed by: NURSE PRACTITIONER

## 2020-12-07 NOTE — PROGRESS NOTES
CC: 6 month check     History:  Maribeth Isbell is a 65 y.o. female who presents today for evaluation of the above problems.    Reports that she is doing well. No current complaints.  BP is well controlled. Weight is down 6 pounds since October.  Continues seeing endocrinology for her diabetes. Last A1C was 6.9.        HPI  ROS:  Review of Systems   Constitutional: Negative for fever.   Respiratory: Negative for shortness of breath.    Cardiovascular: Negative for chest pain.   Neurological: Negative for dizziness, light-headedness and headaches.       Allergies   Allergen Reactions   • Metformin GI Intolerance     Past Medical History:   Diagnosis Date   • Bell's palsy    • Cataract    • Depression    • E coli infection    • Gastroesophageal reflux disease 2018   • Headache    • History of colon polyps    • Hyperlipidemia    • Hypertension    • Hyperthyroidism    • Hypothyroidism    • Macular degeneration    • Obesity due to excess calories 2019   • Sleep apnea     wears cpap   • Tachycardia    • Type 2 diabetes mellitus (CMS/HCC)    • Visual impairment      Past Surgical History:   Procedure Laterality Date   •  SECTION     • COLONOSCOPY  06/10/2011    Dr. Mahmood-Internal hemorrhoids; Otherwise normal   • COLONOSCOPY N/A 2016    Colon not done this day-see endo report 2016   • COLONOSCOPY  [id number]    Dr. Mahmood-A single small hyperplastic polyp in the ascending colon   • COLONOSCOPY N/A 2020    Procedure: COLONOSCOPY WITH ANESTHESIA;  Surgeon: Kathy Berry MD;  Location: [facility] ENDOSCOPY;  Service: Gastroenterology;  Laterality: N/A;  preop; positive cologuard  postop; poylps   PCP Taiwo Diggs MD   • ENDOSCOPY N/A 2016    Normal esophagus; Normal stomach; Normal first part of the duodenum and 2nd part of the duodenum-biopsied   • ENDOSCOPY N/A 2018    Normal esophagus; Normal stomach; Normal first portion of the duodenum and second portion of the  duodenum-biopsied   • HYSTERECTOMY     • KNEE SURGERY Left    • TUMOR REMOVAL       Family History   Problem Relation Age of Onset   • Heart disease Mother    • Diabetes Mother    • No Known Problems Maternal Grandmother    • No Known Problems Maternal Grandfather    • No Known Problems Paternal Grandmother    • No Known Problems Paternal Grandfather    • Colon cancer Neg Hx    • Colon polyps Neg Hx       reports that she quit smoking about 5 years ago. Her smoking use included cigarettes. She has a 20.00 pack-year smoking history. She has never used smokeless tobacco. She reports that she does not drink alcohol or use drugs.      Current Outpatient Medications:   •  aspirin 81 MG EC tablet, Take 81 mg by mouth Daily., Disp: , Rfl:   •  bumetanide (BUMEX) 1 MG tablet, TAKE 1 TABLET BY MOUTH EVERY DAY, Disp: 90 tablet, Rfl: 1  •  carbidopa-levodopa (SINEMET)  MG per tablet, TAKE 1 TO 2 TABLETS BY MOUTH EVERY DAY AT BEDTIME, Disp: 180 tablet, Rfl: 3  •  cetirizine (zyrTEC) 10 MG tablet, Take 1 tablet by mouth Daily., Disp: 90 tablet, Rfl: 1  •  cholecalciferol (VITAMIN D3) 25 MCG (1000 UT) tablet, Take 1,000 Units by mouth Daily., Disp: , Rfl:   •  clopidogrel (PLAVIX) 75 MG tablet, Take 75 mg by mouth Daily., Disp: , Rfl:   •  desvenlafaxine (PRISTIQ) 50 MG 24 hr tablet, TAKE 1 TABLET BY MOUTH EVERY DAY, Disp: 90 tablet, Rfl: 3  •  Dulaglutide 1.5 MG/0.5ML solution pen-injector, Inject 1.5 mg under the skin into the appropriate area as directed 1 (One) Time Per Week., Disp: 12 pen, Rfl: 3  •  folic acid (FOLVITE) 400 MCG tablet, Take 400 mcg by mouth Daily., Disp: , Rfl:   •  insulin aspart (NovoLOG FlexPen) 100 UNIT/ML solution pen-injector sc pen, INJECT 30 UNITS 3 TIMES DAILY (Patient taking differently: INJECT 25 UNITS 3 TIMES DAILY), Disp: 27 pen, Rfl: 3  •  Insulin Glargine (BASAGLAR KWIKPEN) 100 UNIT/ML injection pen, Inject 45 Units under the skin into the appropriate area as directed Daily., Disp: 14  "pen, Rfl: 3  •  lamoTRIgine (LaMICtal) 100 MG tablet, TAKE 1 TABLET BY MOUTH EVERY DAY AT NIGHT, Disp: 90 tablet, Rfl: 2  •  levothyroxine (SYNTHROID, LEVOTHROID) 125 MCG tablet, TAKE 1 TABLET BY MOUTH EVERY DAY, Disp: 90 tablet, Rfl: 1  •  lisinopril (PRINIVIL,ZESTRIL) 40 MG tablet, TAKE 1 TABLET BY MOUTH EVERY DAY, Disp: 90 tablet, Rfl: 1  •  metoprolol succinate XL (TOPROL-XL) 25 MG 24 hr tablet, Take 25 mg by mouth Daily., Disp: , Rfl:   •  Multiple Vitamins-Minerals (HAIR SKIN NAILS PO), Take  by mouth., Disp: , Rfl:   •  Multiple Vitamins-Minerals (VISION-KRISTAL PRESERVE PO), Take  by mouth Daily With Breakfast., Disp: , Rfl:   •  NIFEdipine CC (ADALAT CC) 60 MG 24 hr tablet, TAKE 1 TABLET BY MOUTH EVERY DAY, Disp: 90 tablet, Rfl: 3  •  omeprazole (priLOSEC) 40 MG capsule, Take 1 capsule by mouth Daily., Disp: 90 capsule, Rfl: 1  •  rosuvastatin (Crestor) 20 MG tablet, Take 1 tablet by mouth Every Night., Disp: 90 tablet, Rfl: 3  •  SYMBICORT 160-4.5 MCG/ACT inhaler, TAKE 2 PUFFS BY MOUTH TWICE A DAY, Disp: 30.6 inhaler, Rfl: 4  •  vitamin D (ERGOCALCIFEROL) 1.25 MG (34912 UT) capsule capsule, Take 1 capsule by mouth 1 (One) Time Per Week., Disp: 4 capsule, Rfl: 3  •  Insulin Pen Needle (Pen Needles) 32G X 4 MM misc, 1 each 4 (Four) Times a Day., Disp: 360 each, Rfl: 3    OBJECTIVE:  /82 (BP Location: Left arm, Patient Position: Sitting, Cuff Size: Adult)   Pulse 78   Temp 97.5 °F (36.4 °C) (Temporal)   Ht 165.1 cm (65\")   Wt 119 kg (263 lb 6.4 oz)   LMP  (LMP Unknown)   SpO2 98%   Breastfeeding No   BMI 43.83 kg/m²    Physical Exam  Vitals signs reviewed.   Constitutional:       Appearance: She is well-developed.   Cardiovascular:      Rate and Rhythm: Normal rate and regular rhythm.   Pulmonary:      Effort: Pulmonary effort is normal.      Breath sounds: Normal breath sounds.   Neurological:      Mental Status: She is alert and oriented to person, place, and time.   Psychiatric:         " Behavior: Behavior normal.         Assessment/Plan    Diagnoses and all orders for this visit:    1. Type 2 diabetes mellitus with hyperglycemia, with long-term current use of insulin (CMS/[facility]) (Primary)    2. Acquired hypothyroidism    3. Moderate episode of recurrent major depressive disorder (CMS/[facility])    4. Mixed hyperlipidemia    Will have labs drawn per endocrinology order. No changes needed today. COVID precautions stressed to patient.       An After Visit Summary was printed and given to the patient at discharge.  Return in about 6 months (around 6/7/2021) for Annual physical.       YESSICA Mane 12/7/2020    Electronically signed.

## 2020-12-23 RX ORDER — ROSUVASTATIN CALCIUM 20 MG/1
TABLET, COATED ORAL
Qty: 90 TABLET | Refills: 3 | Status: SHIPPED | OUTPATIENT
Start: 2020-12-23 | End: 2021-12-02

## 2020-12-30 DIAGNOSIS — E03.9 ACQUIRED HYPOTHYROIDISM: ICD-10-CM

## 2020-12-30 RX ORDER — LEVOTHYROXINE SODIUM 0.12 MG/1
TABLET ORAL
Qty: 90 TABLET | Refills: 1 | Status: SHIPPED | OUTPATIENT
Start: 2020-12-30 | End: 2021-08-13

## 2021-01-04 RX ORDER — METOPROLOL SUCCINATE 25 MG/1
TABLET, EXTENDED RELEASE ORAL
Qty: 90 TABLET | Refills: 1 | Status: SHIPPED | OUTPATIENT
Start: 2021-01-04 | End: 2021-08-19

## 2021-01-06 DIAGNOSIS — R60.9 EDEMA, UNSPECIFIED TYPE: ICD-10-CM

## 2021-01-06 RX ORDER — BUMETANIDE 1 MG/1
TABLET ORAL
Qty: 90 TABLET | Refills: 1 | Status: SHIPPED | OUTPATIENT
Start: 2021-01-06 | End: 2021-03-29 | Stop reason: CLARIF

## 2021-01-21 RX ORDER — ERGOCALCIFEROL 1.25 MG/1
50000 CAPSULE ORAL WEEKLY
Qty: 12 CAPSULE | Refills: 1 | Status: SHIPPED | OUTPATIENT
Start: 2021-01-21 | End: 2021-07-07

## 2021-02-03 DIAGNOSIS — E11.59 HYPERTENSION ASSOCIATED WITH DIABETES (HCC): ICD-10-CM

## 2021-02-03 DIAGNOSIS — I15.2 HYPERTENSION ASSOCIATED WITH DIABETES (HCC): ICD-10-CM

## 2021-02-04 RX ORDER — LISINOPRIL 40 MG/1
TABLET ORAL
Qty: 90 TABLET | Refills: 1 | Status: SHIPPED | OUTPATIENT
Start: 2021-02-04 | End: 2021-08-09

## 2021-02-09 ENCOUNTER — TELEPHONE (OUTPATIENT)
Dept: FAMILY MEDICINE CLINIC | Facility: CLINIC | Age: 66
End: 2021-02-09

## 2021-02-09 NOTE — TELEPHONE ENCOUNTER
Patient has called, she stated that pharmacy informed her that they could not fill script omeprazole (priLOSEC) 40 MG capsule due to insurance not covering.     Patient is requesting a call back, as she stated this was the only script that has helped her.    Please call and advise: [phone]    She requested a vm be left if unable to answer.

## 2021-02-12 NOTE — TELEPHONE ENCOUNTER
Patient states insurance will only cover 90 days.  Requesting that insurance be contacted.      Spoke with Srinivasan at [facility].      [phone] Pharmacy help desk    Spoke with Nidhi at [facility].    Approved for 36 months.    Advised Srinivasan at [facility].    Advised patient.

## 2021-03-05 DIAGNOSIS — K21.9 GASTROESOPHAGEAL REFLUX DISEASE WITHOUT ESOPHAGITIS: ICD-10-CM

## 2021-03-07 RX ORDER — OMEPRAZOLE 40 MG/1
CAPSULE, DELAYED RELEASE ORAL
Qty: 90 CAPSULE | Refills: 1 | Status: SHIPPED | OUTPATIENT
Start: 2021-03-07 | End: 2021-09-10

## 2021-03-08 ENCOUNTER — TELEPHONE (OUTPATIENT)
Dept: CARDIOLOGY CLINIC | Age: 66
End: 2021-03-08

## 2021-03-09 ENCOUNTER — OFFICE VISIT (OUTPATIENT)
Dept: CARDIOLOGY CLINIC | Age: 66
End: 2021-03-09
Payer: COMMERCIAL

## 2021-03-09 VITALS
BODY MASS INDEX: 43.32 KG/M2 | DIASTOLIC BLOOD PRESSURE: 70 MMHG | OXYGEN SATURATION: 98 % | HEART RATE: 72 BPM | HEIGHT: 65 IN | WEIGHT: 260 LBS | SYSTOLIC BLOOD PRESSURE: 120 MMHG

## 2021-03-09 DIAGNOSIS — I25.10 CORONARY ARTERY DISEASE INVOLVING NATIVE CORONARY ARTERY OF NATIVE HEART WITHOUT ANGINA PECTORIS: Primary | ICD-10-CM

## 2021-03-09 DIAGNOSIS — I47.1 PSVT (PAROXYSMAL SUPRAVENTRICULAR TACHYCARDIA) (HCC): ICD-10-CM

## 2021-03-09 DIAGNOSIS — Z95.5 HISTORY OF CORONARY ARTERY STENT PLACEMENT: ICD-10-CM

## 2021-03-09 DIAGNOSIS — E78.2 MIXED HYPERLIPIDEMIA: ICD-10-CM

## 2021-03-09 DIAGNOSIS — I10 ESSENTIAL HYPERTENSION: ICD-10-CM

## 2021-03-09 PROCEDURE — G8400 PT W/DXA NO RESULTS DOC: HCPCS | Performed by: NURSE PRACTITIONER

## 2021-03-09 PROCEDURE — 1123F ACP DISCUSS/DSCN MKR DOCD: CPT | Performed by: NURSE PRACTITIONER

## 2021-03-09 PROCEDURE — G8427 DOCREV CUR MEDS BY ELIG CLIN: HCPCS | Performed by: NURSE PRACTITIONER

## 2021-03-09 PROCEDURE — 4040F PNEUMOC VAC/ADMIN/RCVD: CPT | Performed by: NURSE PRACTITIONER

## 2021-03-09 PROCEDURE — 99213 OFFICE O/P EST LOW 20 MIN: CPT | Performed by: NURSE PRACTITIONER

## 2021-03-09 PROCEDURE — G8484 FLU IMMUNIZE NO ADMIN: HCPCS | Performed by: NURSE PRACTITIONER

## 2021-03-09 PROCEDURE — 1090F PRES/ABSN URINE INCON ASSESS: CPT | Performed by: NURSE PRACTITIONER

## 2021-03-09 PROCEDURE — 1036F TOBACCO NON-USER: CPT | Performed by: NURSE PRACTITIONER

## 2021-03-09 PROCEDURE — G8417 CALC BMI ABV UP PARAM F/U: HCPCS | Performed by: NURSE PRACTITIONER

## 2021-03-09 PROCEDURE — 3017F COLORECTAL CA SCREEN DOC REV: CPT | Performed by: NURSE PRACTITIONER

## 2021-03-09 NOTE — PATIENT INSTRUCTIONS
New instructions for today:  You will need to stay on Plavix and aspirin for one year after stent placed - through 9/10/21. After that date, you can stop Plavix and continue low dose enteric coated aspirin once daily. Do not schedule elective surgical procedures or invasive until after 9/10/21. Patient Instructions:  Continue current medications as prescribed. Always keep a current medication list. Bring your medications to every office visit. Continue to follow up with primary care provider for non cardiac medical problems. Call the office with any problems, questions or concerns at [phone]. If you have been asked to keep a blood pressure log, do so for 2 weeks. Call the office to report readings to the triage nurse at [phone]. Follow up with cardiologist as scheduled. The following educational material has been included in this after visit summary for your review: Life simple 7. Heart health. Life simple 7  1) Manage blood pressure - high blood pressure is a major risk factor for heart disease and stroke. Keeping blood pressure in health range reduces strain on your heart, arteries and kidneys. Blood pressure goal is less than 130/80. 2) Control cholesterol - contributes to plaque, which can clog arteries and lead to heart disease and stroke. When you control your cholesterol you are giving your arteries their best chance to remain clear. It is recommended that you get cholesterol lab work done once a year. 3) Reduce blood sugar - most of the food we eat is turning into glucose or blood sugar that our body uses for energy. Over time, high levels of blood sugar can damage your heart, kidneys, eyes and nerves. 4) Get active - living an active life is one of the most rewarding gifts you can give yourself and those you love. Simply put, daily physical activity increases your length and quality of life. Strive to exercise 15 minutes most days of the week.   5)  Eat better - A healthy diet is one of your best weapons for fighting cardiovascular disease. When you eat a heart healthy diet, you improve your chances for feeling good and staying healthy for life. 6)  Lose weight - when you shed extra fat an unnecessary pounds, you reduce the burden on your hear, lungs, blood vessels and skeleton. You give yourself the gift of active living, you lower your blood pressure and help yourself feel better. 7) Stop smoking - cigarette smokers have a higher risk of developing cardiovascular disease. If  You smoke, quitting is the best thing you can do for your health. Check American Heart Association on line for more information on Life's Simple 7 and tips for healthy living. A Healthy Heart: Care Instructions  Your Care Instructions     Coronary artery disease, also called heart disease, occurs when a substance called plaque builds up in the vessels that supply oxygen-rich blood to your heart muscle. This can narrow the blood vessels and reduce blood flow. A heart attack happens when blood flow is completely blocked. A high-fat diet, smoking, and other factors increase the risk of heart disease. Your doctor has found that you have a chance of having heart disease. You can do lots of things to keep your heart healthy. It may not be easy, but you can change your diet, exercise more, and quit smoking. These steps really work to lower your chance of heart disease. Follow-up care is a key part of your treatment and safety. Be sure to make and go to all appointments, and call your doctor if you are having problems. It's also a good idea to know your test results and keep a list of the medicines you take. How can you care for yourself at home? Diet  · Use less salt when you cook and eat. This helps lower your blood pressure. Taste food before salting. Add only a little salt when you think you need it. With time, your taste buds will adjust to less salt.   · Eat fewer snack items, fast foods, canned soups, and other high-salt, high-fat, processed foods. · Read food labels and try to avoid saturated and trans fats. They increase your risk of heart disease by raising cholesterol levels. · Limit the amount of solid fat-butter, margarine, and shortening-you eat. Use olive, peanut, or canola oil when you cook. Bake, broil, and steam foods instead of frying them. · Eat a variety of fruit and vegetables every day. Dark green, deep orange, red, or yellow fruits and vegetables are especially good for you. Examples include spinach, carrots, peaches, and berries. · Foods high in fiber can reduce your cholesterol and provide important vitamins and minerals. High-fiber foods include whole-grain cereals and breads, oatmeal, beans, brown rice, citrus fruits, and apples. · Eat lean proteins. Heart-healthy proteins include seafood, lean meats and poultry, eggs, beans, peas, nuts, seeds, and soy products. · Limit drinks and foods with added sugar. These include candy, desserts, and soda pop. Lifestyle changes  · If your doctor recommends it, get more exercise. Walking is a good choice. Bit by bit, increase the amount you walk every day. Try for at least 30 minutes on most days of the week. You also may want to swim, bike, or do other activities. · Do not smoke. If you need help quitting, talk to your doctor about stop-smoking programs and medicines. These can increase your chances of quitting for good. Quitting smoking may be the most important step you can take to protect your heart. It is never too late to quit. · Limit alcohol to 2 drinks a day for men and 1 drink a day for women. Too much alcohol can cause health problems. · Manage other health problems such as diabetes, high blood pressure, and high cholesterol. If you think you may have a problem with alcohol or drug use, talk to your doctor. Medicines  · Take your medicines exactly as prescribed.  Call your doctor if you think you are having a problem with your medicine. · If your doctor recommends aspirin, take the amount directed each day. Make sure you take aspirin and not another kind of pain reliever, such as acetaminophen (Tylenol). When should you call for help? [id number] if you have symptoms of a heart attack. These may include:  · Chest pain or pressure, or a strange feeling in the chest.  · Sweating. · Shortness of breath. · Pain, pressure, or a strange feeling in the back, neck, jaw, or upper belly or in one or both shoulders or arms. · Lightheadedness or sudden weakness. · A fast or irregular heartbeat. After you call 911, the  may tell you to chew 1 adult-strength or 2 to 4 low-dose aspirin. Wait for an ambulance. Do not try to drive yourself. Watch closely for changes in your health, and be sure to contact your doctor if you have any problems. Where can you learn more? Go to https://ResiModel.wripl. org and sign in to your VetCloud account. Enter F318 in the MeMed box to learn more about \"A Healthy Heart: Care Instructions. \"     If you do not have an account, please click on the \"Sign Up Now\" link. Current as of: December 16, 2019               Content Version: 12.5  © 9465-8804 Healthwise, Incorporated. Care instructions adapted under license by [facility] ([facility]). If you have questions about a medical condition or this instruction, always ask your healthcare professional. [place] [address] any warranty or liability for your use of this information.

## 2021-03-09 NOTE — PROGRESS NOTES
[facility], [place]. [address], [place] [address] [facility]  [phone] office  [phone] fax      OFFICE VISIT:  3/9/2021    Krystle Barker - : 1955  Reason For Visit:  Farzana Fontenot is a 72 y.o. female who is here for 6 Month Follow-Up (and also stent placement in September. Patient complains of some SOA sometimes. ), Coronary Artery Disease, and Hypertension    History:   Diagnosis Orders   1. Coronary artery disease involving native coronary artery of native heart without angina pectoris     2. Essential hypertension     3. PSVT (paroxysmal supraventricular tachycardia) ([facility] [address]     4. Mixed hyperlipidemia     5. History of coronary artery stent placement       9/10/20 Successful PCI to proximal LAD (3.5 x 9 mm resolute integrity) utilizing   drug-eluting stent (Dr. Izzy Guzman)     The patient presents today for cardiology follow up. The patient reports doing well. Her primary complaint today is hip pain. The patient denies symptoms to suggest myocardial ischemia, heart failure or arrhythmia. BP is well controlled on current regimen. The patient's PCP monitors cholesterol. Last HgA1c 5.7. The patient does not smoke. Subjective  Anjelica denies exertional chest pain, shortness of breath, orthopnea, paroxysmal nocturnal dyspnea, syncope, presyncope, sensed arrhythmia, edema and fatigue. The patient denies numbness or weakness to suggest cerebrovascular accident or transient ischemic attack.     Krystle Barker has the following history as recorded in [facility]:  Patient Active Problem List   Diagnosis Code    SVT (supraventricular tachycardia) ([address] I47.1    History of tobacco abuse Z87.891    Essential hypertension I10    Mixed dyslipidemia E78.2    Obesity due to excess calories E66.09    Type 2 diabetes mellitus ([address] E11.9    SILVANO (obstructive sleep apnea) G47.33    Unstable angina (HCC) I20.0     Past Medical History: Diagnosis Date    Arthritis     Cerebral artery occlusion with cerebral infarction ([facility] [address]     Diabetes mellitus ([facility] [address]     Hyperlipidemia     Hypertension      Past Surgical History:   Procedure Laterality Date     SECTION      JOINT REPLACEMENT      TUBAL LIGATION       History reviewed. No pertinent family history.   Social History     Tobacco Use    Smoking status: Former Smoker     Packs/day: 0.50     Years: 8.00     Pack years: 4.00     Quit date: 2013     Years since quittin.6    Smokeless tobacco: Never Used   Substance Use Topics    Alcohol use: Not Currently      Current Outpatient Medications   Medication Sig Dispense Refill    metoprolol succinate (TOPROL XL) 25 MG extended release tablet TAKE 1 TABLET BY MOUTH EVERY DAY 90 tablet 1    rosuvastatin (CRESTOR) 20 MG tablet Take 20 mg by mouth nightly      omeprazole (PRILOSEC) 40 MG delayed release capsule Take 40 mg by mouth daily      NIFEdipine (ADALAT CC) 60 MG extended release tablet TAKE 1 TABLET BY MOUTH EVERY DAY      lisinopril (PRINIVIL;ZESTRIL) 40 MG tablet Take 40 mg by mouth daily      levothyroxine (SYNTHROID) 125 MCG tablet TAKE 1 TABLET BY MOUTH EVERY DAY      insulin aspart (NOVOLOG) 100 UNIT/ML injection pen INJECT 30 UNITS 3 TIMES DAILY      Dulaglutide 0.75 MG/0.5ML SOPN Inject 0.75 mg into the skin once a week      desvenlafaxine succinate (PRISTIQ) 50 MG TB24 extended release tablet TAKE 1 TABLET BY MOUTH EVERY DAY      carbidopa-levodopa (SINEMET)  MG per tablet TAKE 1 TO 2 TABLETS BY MOUTH EVERY DAY AT BEDTIME      budesonide-formoterol (SYMBICORT) 160-4.5 MCG/ACT AERO TAKE 2 PUFFS BY MOUTH TWICE A DAY      insulin glargine (BASAGLAR KWIKPEN) 100 UNIT/ML injection pen Inject 40 Units into the skin daily      Insulin Pen Needle (PEN NEEDLES) 32G X 4 MM MISC 1 each by NOT APPLICABLE route 4 times daily      clopidogrel (PLAVIX) 75 MG tablet Take 1 tablet by mouth daily 90 tablet 3    lamoTRIgine (LAMICTAL) 100 MG tablet as needed   2    Multiple Vitamins-Minerals (THERAPEUTIC MULTIVITAMIN-MINERALS) tablet Take 1 tablet by mouth daily      vitamin D (CHOLECALCIFEROL) 1000 UNIT TABS tablet Take 1,000 Units by mouth daily      folic acid (FOLVITE) 322 MCG tablet Take 400 mcg by mouth daily      cetirizine (ZYRTEC) 10 MG tablet Take 10 mg by mouth daily      Multiple Vitamins-Minerals (PRESERVISION AREDS 2+MULTI VIT PO) Take by mouth      aspirin 81 MG tablet Take 81 mg by mouth daily      bumetanide (BUMEX) 1 MG tablet Take 1 mg by mouth daily       No current facility-administered medications for this visit. Allergies: Metformin and related    Review of Systems  Constitutional  no appetite change, or unexpected weight change. No fever, chills or diaphoresis. No significant change in activity level or new onset of fatigue. HEENT  no significant rhinorrhea or epistaxis. No tinnitus or significant hearing loss. Eyes  no sudden vision change or amaurosis. No corneal arcus, xantholasma, subconjunctival hemorrhage or discharge. Respiratory  no significant wheezing, stridor, apnea or cough. No dyspnea on exertion or shortness of air. Cardiovascular  no exertional chest pain to suggest myocardial ischemia. No orthopnea or PND. No sensation of sustained arrythmia. No occurrence of slow heart rate. No palpitations. No claudication. Gastrointestinal  no abdominal swelling or pain. No blood in stool. No severe constipation, diarrhea, nausea, or vomiting. Genitourinary  no dysuria, frequency, or urgency. No flank pain or hematuria. Musculoskeletal  no back pain or myalgia. No problems with gait. + hip pain. Extremities - no clubbing, cyanosis or extremity edema. Skin  no color change or rash. No pallor. No new surgical incision. Neurologic  no speech difficulty, facial asymmetry or lateralizing weakness. No seizures, presyncope or syncope.   No significant dizziness. Hematologic  no easy bruising or excessive bleeding. Psychiatric  no severe anxiety or insomnia. No confusion. All other review of systems are negative. Objective  Vital Signs - /70   Pulse 72   Ht 5' 5\" (1.651 m)   Wt 260 lb (117.9 kg)   SpO2 98%   BMI 43.27 kg/m²   General - Anjelica is alert, cooperative, and pleasant. Well groomed. No acute distress. Body habitus - Body mass index is 43.27 kg/m². HEENT  Head is normocephalic. No circumoral cyanosis. Dentition is normal.  EYES -   Lids normal without ptosis. No discharge, edema or subconjunctival hemorrhage. Neck - Symmetrical without apparent mass or lymphadenopathy. Respiratory - Normal respiratory effort without use of accessory muscles. Ausculatation reveals vesicular breath sounds without crackles, wheezes, rub or rhonchi. Cardiovascular  No jugular venous distention. Auscultation reveals regular rate and rhythm. No audible clicks, gallop or rub. No murmur. No lower extremity varicosities. No carotid bruits. Abdominal -  No visible distention, mass or pulsations. Extremities - No clubbing or cyanosis. No statis dermatitis or ulcers. No edema. Musculoskeletal -   No Osler's nodes. No kyphosis or scoliosis. Gait is even and regular without limp or shuffle. Ambulates without assistance. Skin -  Warm and dry; no rash or pallor. No new surgical wound. Neurological - No focal neurological deficits. Thought processes coherent. No apparent tremor. Oriented to person, place and time. Psychiatric -  Appropriate affect and mood. Data reviewed:  9/10/20 cath - Dr. Marvel Greenfield   Normal LV ejection fraction. Successful PCI to proximal LAD (3.5 x 9 mm resolute integrity) utilizing   drug-eluting stent. Recommendations    Medical management. Risk factor modification.     Signatures    ----------------------------------------------------------------   Electronically signed by Andria Durbin with any problems, questions or concerns at [phone]. Cardiology follow up: 6 months Dr. Lary Parekh. Educational included in patient instructions. Heart health.      MAICO Srinivasan

## 2021-03-16 ENCOUNTER — OFFICE VISIT (OUTPATIENT)
Dept: FAMILY MEDICINE CLINIC | Facility: CLINIC | Age: 66
End: 2021-03-16

## 2021-03-16 VITALS
WEIGHT: 258 LBS | RESPIRATION RATE: 20 BRPM | BODY MASS INDEX: 42.99 KG/M2 | DIASTOLIC BLOOD PRESSURE: 78 MMHG | OXYGEN SATURATION: 95 % | SYSTOLIC BLOOD PRESSURE: 112 MMHG | HEIGHT: 65 IN | HEART RATE: 88 BPM | TEMPERATURE: 97.2 F

## 2021-03-16 DIAGNOSIS — E66.01 MORBIDLY OBESE (HCC): ICD-10-CM

## 2021-03-16 DIAGNOSIS — R52 PAIN: Primary | ICD-10-CM

## 2021-03-16 DIAGNOSIS — F33.1 MODERATE EPISODE OF RECURRENT MAJOR DEPRESSIVE DISORDER (HCC): ICD-10-CM

## 2021-03-16 DIAGNOSIS — I47.1 PSVT (PAROXYSMAL SUPRAVENTRICULAR TACHYCARDIA) (HCC): ICD-10-CM

## 2021-03-16 DIAGNOSIS — Z79.4 TYPE 2 DIABETES MELLITUS WITH HYPERGLYCEMIA, WITH LONG-TERM CURRENT USE OF INSULIN (HCC): ICD-10-CM

## 2021-03-16 DIAGNOSIS — E11.65 TYPE 2 DIABETES MELLITUS WITH HYPERGLYCEMIA, WITH LONG-TERM CURRENT USE OF INSULIN (HCC): ICD-10-CM

## 2021-03-16 PROCEDURE — 99213 OFFICE O/P EST LOW 20 MIN: CPT | Performed by: FAMILY MEDICINE

## 2021-03-16 NOTE — PROGRESS NOTES
Subjective   Maribeth Isbell is a 65 y.o. female.     65-year-old female with right ankle pain    Pain  This is a new problem. The current episode started in the past 7 days. The problem occurs daily. The problem has been gradually improving. Pertinent negatives include no chest pain. Associated symptoms comments: Sprain right ankle about 10 days ago. The symptoms are aggravated by walking. She has tried nothing for the symptoms.       The following portions of the patient's history were reviewed and updated as appropriate: allergies, current medications, past family history, past medical history, past social history, past surgical history and problem list.    Review of Systems   Constitutional:        Morbidly obese-patient is losing weight   Cardiovascular: Negative for chest pain and leg swelling.        Coronary stent placed 6 months ago   Skin:        ring appearing blister  left heel-no tenia in between toes       Objective   Physical Exam  Vitals and nursing note reviewed.   Constitutional:       Appearance: She is obese.   Cardiovascular:      Rate and Rhythm: Normal rate and regular rhythm.   Pulmonary:      Effort: Pulmonary effort is normal.      Breath sounds: Normal breath sounds.   Musculoskeletal:         General: Swelling and deformity present.      Comments: Right ankle-lateral malleolus swelling-no point tenderness to suggest fracture   Skin:     Comments: Left heel dime size blister-observation-do not believe it is tenia   Neurological:      General: No focal deficit present.      Mental Status: She is alert and oriented to person, place, and time.   Psychiatric:         Mood and Affect: Mood normal.         Behavior: Behavior normal.         Thought Content: Thought content normal.         Judgment: Judgment normal.         Assessment/Plan   Diagnoses and all orders for this visit:    1. Pain (Primary)    2. Morbidly obese (CMS/HCC)    3. Moderate episode of recurrent major depressive disorder  (CMS/[facility])    4. Type 2 diabetes mellitus with hyperglycemia, with long-term current use of insulin (CMS/[facility])    5. PSVT (paroxysmal supraventricular tachycardia) (CMS/[facility])       Plan treatment right ankle sprain Ace protection-alcohol for blister--continue weight loss effort for morbid obesity

## 2021-03-29 DIAGNOSIS — R60.9 EDEMA, UNSPECIFIED TYPE: ICD-10-CM

## 2021-03-29 RX ORDER — FUROSEMIDE 40 MG/1
40 TABLET ORAL DAILY
Qty: 90 TABLET | Refills: 1 | Status: SHIPPED | OUTPATIENT
Start: 2021-03-29 | End: 2021-09-10

## 2021-03-29 NOTE — TELEPHONE ENCOUNTER
Med request change   Bumetanide 1 mg    Alternatives Furosemide 40 mg, Torsemide 20 mg    Verbal per Dr. Mccray Furosemide 40 mg

## 2021-04-02 ENCOUNTER — TELEPHONE (OUTPATIENT)
Dept: FAMILY MEDICINE CLINIC | Facility: CLINIC | Age: 66
End: 2021-04-02

## 2021-04-02 DIAGNOSIS — M25.552 HIP PAIN, BILATERAL: Primary | ICD-10-CM

## 2021-04-02 DIAGNOSIS — M25.551 HIP PAIN, BILATERAL: Primary | ICD-10-CM

## 2021-04-02 DIAGNOSIS — M54.50 LUMBAR PAIN: ICD-10-CM

## 2021-04-02 NOTE — TELEPHONE ENCOUNTER
Patient wanting referral for bilateral hip and lumbar pain.  She states the other day she woke up and couldn't move her right leg for a couple minutes and her  told her she needs to see Ortho.    Please advise.

## 2021-04-02 NOTE — TELEPHONE ENCOUNTER
PATIENTS  CALLED IN REQUESTING A REFERRAL BE SENT OVER TO [facility] FOR THE PATIENT     FAX NUMBER     PLEASE CALL AND ADVISE   OF THE APPOINTMENT  [phone]

## 2021-04-15 DIAGNOSIS — R60.9 EDEMA, UNSPECIFIED TYPE: ICD-10-CM

## 2021-04-15 RX ORDER — BUMETANIDE 1 MG/1
TABLET ORAL
Qty: 90 TABLET | Refills: 1 | OUTPATIENT
Start: 2021-04-15

## 2021-04-19 ENCOUNTER — OFFICE VISIT (OUTPATIENT)
Dept: ENDOCRINOLOGY | Facility: CLINIC | Age: 66
End: 2021-04-19

## 2021-04-19 VITALS
BODY MASS INDEX: 43.58 KG/M2 | HEART RATE: 68 BPM | DIASTOLIC BLOOD PRESSURE: 68 MMHG | HEIGHT: 65 IN | SYSTOLIC BLOOD PRESSURE: 112 MMHG | OXYGEN SATURATION: 97 % | WEIGHT: 261.6 LBS

## 2021-04-19 DIAGNOSIS — E11.9 WELL CONTROLLED TYPE 2 DIABETES MELLITUS (HCC): Primary | ICD-10-CM

## 2021-04-19 DIAGNOSIS — E03.9 ACQUIRED HYPOTHYROIDISM: ICD-10-CM

## 2021-04-19 DIAGNOSIS — N18.31 STAGE 3A CHRONIC KIDNEY DISEASE (HCC): ICD-10-CM

## 2021-04-19 DIAGNOSIS — E11.59 HYPERTENSION ASSOCIATED WITH DIABETES (HCC): ICD-10-CM

## 2021-04-19 DIAGNOSIS — E78.2 MIXED DIABETIC HYPERLIPIDEMIA ASSOCIATED WITH TYPE 2 DIABETES MELLITUS (HCC): ICD-10-CM

## 2021-04-19 DIAGNOSIS — E11.69 MIXED DIABETIC HYPERLIPIDEMIA ASSOCIATED WITH TYPE 2 DIABETES MELLITUS (HCC): ICD-10-CM

## 2021-04-19 DIAGNOSIS — I15.2 HYPERTENSION ASSOCIATED WITH DIABETES (HCC): ICD-10-CM

## 2021-04-19 DIAGNOSIS — E55.9 VITAMIN D DEFICIENCY: ICD-10-CM

## 2021-04-19 PROBLEM — E10.69 MIXED DIABETIC HYPERLIPIDEMIA ASSOCIATED WITH TYPE 1 DIABETES MELLITUS: Status: RESOLVED | Noted: 2018-09-17 | Resolved: 2021-04-19

## 2021-04-19 PROBLEM — R19.5 POSITIVE COLORECTAL CANCER SCREENING USING COLOGUARD TEST: Status: RESOLVED | Noted: 2020-07-28 | Resolved: 2021-04-19

## 2021-04-19 PROBLEM — Z87.891 HISTORY OF TOBACCO ABUSE: Status: RESOLVED | Noted: 2019-07-29 | Resolved: 2021-04-19

## 2021-04-19 PROBLEM — Z79.4 TYPE 2 DIABETES MELLITUS WITH HYPERGLYCEMIA, WITH LONG-TERM CURRENT USE OF INSULIN (HCC): Status: RESOLVED | Noted: 2018-09-17 | Resolved: 2021-04-19

## 2021-04-19 PROBLEM — I10 ESSENTIAL HYPERTENSION: Status: RESOLVED | Noted: 2019-07-29 | Resolved: 2021-04-19

## 2021-04-19 PROBLEM — E11.65 TYPE 2 DIABETES MELLITUS WITH HYPERGLYCEMIA, WITH LONG-TERM CURRENT USE OF INSULIN: Status: RESOLVED | Noted: 2018-09-17 | Resolved: 2021-04-19

## 2021-04-19 PROBLEM — I20.0 UNSTABLE ANGINA: Status: RESOLVED | Noted: 2020-12-07 | Resolved: 2021-04-19

## 2021-04-19 LAB — HBA1C MFR BLD: 8.6 %

## 2021-04-19 PROCEDURE — 99214 OFFICE O/P EST MOD 30 MIN: CPT | Performed by: INTERNAL MEDICINE

## 2021-04-19 RX ORDER — EMPAGLIFLOZIN 10 MG/1
1 TABLET, FILM COATED ORAL DAILY
Qty: 30 TABLET | Refills: 11 | Status: SHIPPED | OUTPATIENT
Start: 2021-04-19 | End: 2021-07-22 | Stop reason: SDUPTHER

## 2021-04-19 RX ORDER — PROCHLORPERAZINE 25 MG/1
SUPPOSITORY RECTAL AS NEEDED
Qty: 9 EACH | Refills: 3 | Status: SHIPPED | OUTPATIENT
Start: 2021-04-19

## 2021-04-19 RX ORDER — PROCHLORPERAZINE 25 MG/1
1 SUPPOSITORY RECTAL ONCE
Qty: 1 EACH | Refills: 3 | Status: SHIPPED | OUTPATIENT
Start: 2021-04-19 | End: 2021-04-19

## 2021-04-19 RX ORDER — PROCHLORPERAZINE 25 MG/1
1 SUPPOSITORY RECTAL ONCE
Qty: 1 EACH | Refills: 1 | Status: SHIPPED | OUTPATIENT
Start: 2021-04-19 | End: 2021-04-19

## 2021-04-19 RX ORDER — DULAGLUTIDE 3 MG/.5ML
3 INJECTION, SOLUTION SUBCUTANEOUS WEEKLY
Qty: 12 PEN | Refills: 3 | Status: SHIPPED | OUTPATIENT
Start: 2021-04-19 | End: 2021-10-25

## 2021-04-19 RX ORDER — FLUCONAZOLE 150 MG/1
TABLET ORAL
Qty: 2 TABLET | Refills: 6 | Status: SHIPPED | OUTPATIENT
Start: 2021-04-19 | End: 2021-06-18

## 2021-04-19 NOTE — PROGRESS NOTES
"  Subjective    Maribeth Isbell is a 65 y.o. female. she is here today for follow-up of diabetes       Diabetes        Duration Age 55 years       Complications - TIA    CAD , stent 2020        Alleviating Factors: Compliance         Side Effects  metformin and sulfonylurea     Current diet  in general, a \"healthy\" diet       Current exercise none     Current monitoring regimen: home blood tests - checking 4 x daily     Lab Results   Component Value Date    HGBA1C 8.6 03/16/2021       Home blood sugar records:     Lately elevated  Has chronic back pain and received steroids      Hypoglycemia if misjudges carbs      Patient Active Problem List    Diagnosis    • Morbidly obese (CMS/[facility]) [E66.01]    • Hx of adenomatous colonic polyps [Z86.010]    • Obesity due to excess calories [E66.09]    • CHRISTY (obstructive sleep apnea) [G47.33]    • SVT (supraventricular tachycardia) (CMS/[facility]) [I47.1]    • Hypertension associated with diabetes (CMS/[facility]) [E11.59, I15.2]    • Gastroesophageal reflux disease [K21.9]    • Acquired hypothyroidism [E03.9]    • Recurrent major depressive disorder (CMS/[facility]) [F33.9]    • Osteoarthritis of multiple joints [M15.9]      Current Outpatient Medications   Medication Instructions   • aspirin 81 mg, Oral, Daily   • carbidopa-levodopa (SINEMET)  MG per tablet TAKE 1 TO 2 TABLETS BY MOUTH EVERY DAY AT BEDTIME   • cetirizine (ZYRTEC) 10 mg, Oral, Daily   • cholecalciferol (VITAMIN D3) 1,000 Units, Oral, Daily   • clopidogrel (PLAVIX) 75 mg, Oral, Daily   • desvenlafaxine (PRISTIQ) 50 MG 24 hr tablet TAKE 1 TABLET BY MOUTH EVERY DAY   • folic acid (FOLVITE) 400 mcg, Oral, Daily   • furosemide (LASIX) 40 mg, Oral, Daily   • insulin aspart (NovoLOG FlexPen) 100 UNIT/ML solution pen-injector sc pen INJECT 30 UNITS 3 TIMES DAILY   • Insulin Glargine (BASAGLAR KWIKPEN) 45 Units, Subcutaneous, Daily   • Insulin Pen Needle (Pen Needles) 32G X 4 MM misc 1 each, Does not apply, 4 Times Daily   • " "lamoTRIgine (LaMICtal) 100 MG tablet TAKE 1 TABLET BY MOUTH EVERY DAY AT NIGHT   • levothyroxine (SYNTHROID, LEVOTHROID) 125 MCG tablet TAKE 1 TABLET BY MOUTH EVERY DAY   • lisinopril (PRINIVIL,ZESTRIL) 40 MG tablet TAKE 1 TABLET BY MOUTH EVERY DAY   • metoprolol succinate XL (TOPROL-XL) 25 mg, Oral, Daily   • Multiple Vitamins-Minerals (HAIR SKIN NAILS PO) Oral   • Multiple Vitamins-Minerals (VISION-KRISTAL PRESERVE PO) Oral, Daily With Breakfast   • NIFEdipine CC (ADALAT CC) 60 MG 24 hr tablet TAKE 1 TABLET BY MOUTH EVERY DAY   • omeprazole (priLOSEC) 40 MG capsule TAKE 1 CAPSULE BY MOUTH EVERY DAY   • rosuvastatin (CRESTOR) 20 MG tablet TAKE 1 TABLET BY MOUTH EVERY DAY AT NIGHT   • SYMBICORT 160-4.5 MCG/ACT inhaler TAKE 2 PUFFS BY MOUTH TWICE A DAY   • Trulicity 3 mg, Subcutaneous, Weekly   • vitamin D (ERGOCALCIFEROL) 50,000 Units, Oral, Weekly           Review of Systems  Review of Systems   Musculoskeletal: Positive for back pain.        Objective    /68   Pulse 68   Ht 165.1 cm (65\")   Wt 119 kg (261 lb 9.6 oz)   LMP  (LMP Unknown)   SpO2 97%   BMI 43.53 kg/m²     Physical Exam  Constitutional:       Appearance: Normal appearance.   HENT:      Head: Normocephalic.   Cardiovascular:      Rate and Rhythm: Normal rate and regular rhythm.   Pulmonary:      Effort: Pulmonary effort is normal.      Breath sounds: Normal breath sounds.   Musculoskeletal:      Cervical back: Normal range of motion and neck supple.      Right lower leg: No edema.      Left lower leg: No edema.   Neurological:      Mental Status: She is alert.         Lab Review      Assessment/Plan      1. Well controlled type 2 diabetes mellitus (CMS/HCC)    2. Hypertension associated with diabetes (CMS/HCC)    3. Mixed diabetic hyperlipidemia associated with type 2 diabetes mellitus (CMS/HCC)    4. Vitamin D deficiency     5. Acquired hypothyroidism    6. Stage 3a chronic kidney disease (CMS/HCC)    .    Medications prescribed:      Orders " placed during this encounter include:  Orders Placed This Encounter   Procedures   • Hemoglobin A1c     This order was created through External Result Entry     Order Specific Question:   Release to patient     Answer:   Immediate   Patient has type 2 diabetes with hyperglycemia     Glycemic Management:     Lab Results   Component Value Date    HGBA1C 8.6 03/16/2021    HGBA1C 6.6 10/19/2020    HGBA1C 6.90 (H) 09/22/2020     Lab Results   Component Value Date    MICROALBUR 22.7 06/16/2020    CREATININE 1.11 (H) 09/16/2020            Basaglar 50 - ( toujeo not covered ),  Some lows while not eating , decrease to 45 - now doing 40       Trulicity 0.75 mg weekly --- increase to 1.5 mg weekly - 3 mg weekly       Novolog doing 20 units with meals, if low carb 15 units         Ann-Marie hurts so not using , we will do Dexcom     Approve for  Insulin pump and or Continuous Glucose Sensor     #1  Patient has diabetes mellitus, insulin-dependent.    #2 She performs blood glucose testing at least 4 times daily and blood glucose log was brought to office with variability from    #3  She is requiring  Basal insulin  and Prandial Insulin for a total of at least  4 injections or boluses per day and has been doing this for at least 6 months     #4 Patient tests blood sugars at least 4 times daily and makes frequent self-adjustments based on information provided by fingerstick and patient is injecting insulin at least 4 times daily. She has been doing this for more than 6 months . She tests frequently due to hypoglycemia and hyperglycemia.   She has frequent variability with activity     #5 I have personally seen patient within the past 6 months    #6 We plan on seeing her every 2-3 months for continuous adjustment of her diabetes regimen     #7 patient has hypoglycemia with episodes of unawareness.    #8 patient has day-to-day variation in her mealtime which confounds the degree of insulin dosing with multiple daily  injections.    #9 patient has completed diabetes education program with us.    #10 she has demonstrated the ability to self monitor her glucose.        #11 Patient is motivated in improving  diabetes control       invokana , not covered -- change to jardiance       Eye Exam , next week  No neuropathy   CKD stage III       Lipid Management    Lab Results   Component Value Date    CHLPL 173 06/16/2020    CHLPL 183 09/23/2019    CHLPL CANCELED 09/16/2019     Lab Results   Component Value Date    TRIG 230 (H) 06/16/2020    TRIG 248 (H) 09/23/2019    TRIG CANCELED 09/16/2019     Lab Results   Component Value Date    HDL 46 06/16/2020    HDL 48 09/23/2019    HDL CANCELED 09/16/2019     Lab Results   Component Value Date    LDL 81 06/16/2020    LDL 85 09/23/2019    LDL 77 05/31/2019          On crestor 10 mg qhs - increase to 20        Blood Pressure Management:            On lisinopril            Microvascular Complication Monitoring:       Eye Exam Evaluation  Within 1 year   -----------     Lab Results   Component Value Date    MALBCRERATIO 7 06/16/2020    MALBCRERATIO 1.6 05/31/2019         -----------        Neuropathy, none            Weight Related:        Diet interventions: moderate (500 kCal/d) deficit diet.        Bone Health     Lab Results   Component Value Date    FFJI58ZF 41.6 06/16/2020    YZAR45VU 32.3 04/18/2018    AEBI65GP 34.3 06/05/2017          Thyroid Health     Lab Results   Component Value Date    TSH 0.338 06/16/2020    TSH 0.713 05/31/2019    TSH 4.710 (H) 04/18/2018        on levothyroxine 125 mcgs daily       No results found for: [id number]             4. Follow-up: No follow-ups on file.

## 2021-04-26 ENCOUNTER — TELEPHONE (OUTPATIENT)
Dept: ENDOCRINOLOGY | Facility: CLINIC | Age: 66
End: 2021-04-26

## 2021-04-26 NOTE — TELEPHONE ENCOUNTER
Spoke with pt. She is waiting to hear about MRI. Reviewed Dexcom cannot be worn during MRI - pt verbalized understanding. She will fingerstick if she doesn't have Dexcom on. Pt stated over $400 at pharmacy for Dexcom - will send to [facility] to see if cheaper.   Dexcom order faxed to [facility].

## 2021-04-26 NOTE — TELEPHONE ENCOUNTER
Pt says she is having an MRI soon, she doesn't know whether or not to put her Dexcom on yet or not because she will have to take it off before time is up. Says if she does not , just let her know on the answering machine.

## 2021-04-29 ENCOUNTER — TELEPHONE (OUTPATIENT)
Dept: ENDOCRINOLOGY | Facility: CLINIC | Age: 66
End: 2021-04-29

## 2021-04-29 NOTE — TELEPHONE ENCOUNTER
Griffin with [facility] left VM that they were closing the request for the G6 due to Pt has [employer] sheild ( phone gliched out but I think that is the ins.)and they do not cover that peticular one     Call at [phone]

## 2021-05-22 DIAGNOSIS — F33.1 MODERATE EPISODE OF RECURRENT MAJOR DEPRESSIVE DISORDER (HCC): ICD-10-CM

## 2021-05-24 RX ORDER — LAMOTRIGINE 100 MG/1
TABLET ORAL
Qty: 90 TABLET | Refills: 0 | Status: SHIPPED | OUTPATIENT
Start: 2021-05-24 | End: 2021-08-24

## 2021-06-09 DIAGNOSIS — J30.2 SEASONAL ALLERGIES: ICD-10-CM

## 2021-06-09 RX ORDER — BUDESONIDE AND FORMOTEROL FUMARATE DIHYDRATE 160; 4.5 UG/1; UG/1
AEROSOL RESPIRATORY (INHALATION)
Qty: 30.6 EACH | Refills: 4 | Status: SHIPPED | OUTPATIENT
Start: 2021-06-09 | End: 2022-12-13

## 2021-06-18 ENCOUNTER — OFFICE VISIT (OUTPATIENT)
Dept: FAMILY MEDICINE CLINIC | Facility: CLINIC | Age: 66
End: 2021-06-18

## 2021-06-18 VITALS
BODY MASS INDEX: 42.82 KG/M2 | HEIGHT: 65 IN | DIASTOLIC BLOOD PRESSURE: 78 MMHG | SYSTOLIC BLOOD PRESSURE: 134 MMHG | TEMPERATURE: 97.3 F | OXYGEN SATURATION: 96 % | WEIGHT: 257 LBS | RESPIRATION RATE: 18 BRPM | HEART RATE: 64 BPM

## 2021-06-18 DIAGNOSIS — Z79.4 TYPE 2 DIABETES MELLITUS WITH HYPERGLYCEMIA, WITH LONG-TERM CURRENT USE OF INSULIN (HCC): Primary | ICD-10-CM

## 2021-06-18 DIAGNOSIS — E11.65 TYPE 2 DIABETES MELLITUS WITH HYPERGLYCEMIA, WITH LONG-TERM CURRENT USE OF INSULIN (HCC): Primary | ICD-10-CM

## 2021-06-18 DIAGNOSIS — R06.89 DECREASED BREATH SOUNDS AT LEFT LUNG BASE: ICD-10-CM

## 2021-06-18 DIAGNOSIS — Z12.31 BREAST CANCER SCREENING BY MAMMOGRAM: ICD-10-CM

## 2021-06-18 DIAGNOSIS — Z00.00 ANNUAL PHYSICAL EXAM: ICD-10-CM

## 2021-06-18 DIAGNOSIS — Z78.0 POST-MENOPAUSAL: ICD-10-CM

## 2021-06-18 LAB
BILIRUB BLD-MCNC: NEGATIVE MG/DL
CLARITY, POC: CLEAR
COLOR UR: YELLOW
GLUCOSE UR STRIP-MCNC: ABNORMAL MG/DL
KETONES UR QL: NEGATIVE
LEUKOCYTE EST, POC: NEGATIVE
NITRITE UR-MCNC: NEGATIVE MG/ML
PH UR: 5.5 [PH] (ref 5–8)
PROT UR STRIP-MCNC: NEGATIVE MG/DL
RBC # UR STRIP: NEGATIVE /UL
SP GR UR: 1.02 (ref 1–1.03)
UROBILINOGEN UR QL: NORMAL

## 2021-06-18 PROCEDURE — 81003 URINALYSIS AUTO W/O SCOPE: CPT | Performed by: FAMILY MEDICINE

## 2021-06-18 PROCEDURE — G0439 PPPS, SUBSEQ VISIT: HCPCS | Performed by: FAMILY MEDICINE

## 2021-06-18 RX ORDER — CYCLOBENZAPRINE HCL 10 MG
10 TABLET ORAL 3 TIMES DAILY PRN
COMMUNITY
Start: 2021-06-15

## 2021-06-18 NOTE — PROGRESS NOTES
The ABCs of the Annual Wellness Visit  Subsequent Medicare Wellness Visit    Chief Complaint   Patient presents with   • Medicare Wellness-subsequent     Fasting, no pap or pelvics       Subjective   History of Present Illness:  Maribeth Isbell is a 65 y.o. female who presents for a Subsequent Medicare Wellness Visit.    HEALTH RISK ASSESSMENT    Recent Hospitalizations:  No hospitalization(s) within the last year.    Current Medical Providers:  Patient Care Team:  Jer Mccray MD as PCP - General  Naif Townsend MD as Surgeon (Orthopedic Surgery)  Kathy Berry MD as Consulting Physician (Gastroenterology)  Nicole Horton MA as Medical Assistant  Carmelina Romero MA (Inactive) as Medical Assistant    Smoking Status:  Social History     Tobacco Use   Smoking Status Former Smoker   • Packs/day: 1.00   • Years: 20.00   • Pack years: 20.00   • Types: Cigarettes   • Quit date:    • Years since quittin.4   Smokeless Tobacco Never Used       Alcohol Consumption:  Social History     Substance and Sexual Activity   Alcohol Use No       Depression Screen:   PHQ-2/PHQ-9 Depression Screening 2021   Little interest or pleasure in doing things 0   Feeling down, depressed, or hopeless 1   Total Score 1       Fall Risk Screen:  STEADI Fall Risk Assessment was completed, and patient is at LOW risk for falls.Assessment completed on:2021    Health Habits and Functional and Cognitive Screening:  Functional & Cognitive Status 2021   Do you have difficulty preparing food and eating? No   Do you have difficulty bathing yourself, getting dressed or grooming yourself? No   Do you have difficulty using the toilet? No   Do you have difficulty moving around from place to place? No   Do you have trouble with steps or getting out of a bed or a chair? No   Current Diet Well Balanced Diet   Dental Exam Up to date   Eye Exam Up to date   Exercise (times per week) 7 times per week   Current  Exercises Include Stationary Bicycling/Spin Class   Do you need help using the phone?  No   Are you deaf or do you have serious difficulty hearing?  No   Do you need help with transportation? Yes   Do you need help shopping? No   Do you need help preparing meals?  No   Do you need help with housework?  No   Do you need help with laundry? No   Do you need help taking your medications? No   Do you need help managing money? No   Do you ever drive or ride in a car without wearing a seat belt? No   Have you felt unusual stress, anger or loneliness in the last month? No   Who do you live with? Spouse   If you need help, do you have trouble finding someone available to you? No   Have you been bothered in the last four weeks by sexual problems? No   Do you have difficulty concentrating, remembering or making decisions? No         Does the patient have evidence of cognitive impairment? Yes    Asprin use counseling:Taking ASA appropriately as indicated    Age-appropriate Screening Schedule:  Refer to the list below for future screening recommendations based on patient's age, sex and/or medical conditions. Orders for these recommended tests are listed in the plan section. The patient has been provided with a written plan.    Health Maintenance   Topic Date Due   • DXA SCAN  04/30/2020   • URINE MICROALBUMIN  06/16/2021   • ZOSTER VACCINE (1 of 2) 06/18/2021 (Originally 9/9/2005)   • INFLUENZA VACCINE  08/01/2021   • HEMOGLOBIN A1C  09/16/2021   • MAMMOGRAM  10/01/2021   • LIPID PANEL  03/16/2022   • DIABETIC EYE EXAM  04/26/2022   • DIABETIC FOOT EXAM  06/18/2022   • TDAP/TD VACCINES (2 - Td or Tdap) 06/28/2023   • PAP SMEAR  Discontinued          The following portions of the patient's history were reviewed and updated as appropriate: allergies, current medications, past family history, past medical history, past social history, past surgical history and problem list.    Outpatient Medications Prior to Visit   Medication Sig  Dispense Refill   • aspirin 81 MG EC tablet Take 81 mg by mouth Daily.     • carbidopa-levodopa (SINEMET)  MG per tablet TAKE 1 TO 2 TABLETS BY MOUTH EVERY DAY AT BEDTIME 180 tablet 3   • cetirizine (zyrTEC) 10 MG tablet Take 1 tablet by mouth Daily. 90 tablet 1   • cholecalciferol (VITAMIN D3) 25 MCG (1000 UT) tablet Take 1,000 Units by mouth Daily.     • clopidogrel (PLAVIX) 75 MG tablet Take 75 mg by mouth Daily.     • Continuous Blood Gluc Sensor (Dexcom G6 Sensor) As Needed (glucose control). Every 10 daysDexcom G6 Sensor Kit [address]-64 Use as directed for continuous glucose monitoring 9 each 3   • cyclobenzaprine (FLEXERIL) 10 MG tablet      • desvenlafaxine (PRISTIQ) 50 MG 24 hr tablet TAKE 1 TABLET BY MOUTH EVERY DAY 90 tablet 3   • Dulaglutide (Trulicity) 3 MG/0.5ML solution pen-injector Inject 3 mg under the skin into the appropriate area as directed 1 (One) Time Per Week. 12 pen 3   • Empagliflozin (Jardiance) 10 MG tablet Take 10 mg by mouth Daily. Before bkfast 30 tablet 11   • folic acid (FOLVITE) 400 MCG tablet Take 400 mcg by mouth Daily.     • furosemide (Lasix) 40 MG tablet Take 1 tablet by mouth Daily. 90 tablet 1   • insulin aspart (NovoLOG FlexPen) 100 UNIT/ML solution pen-injector sc pen INJECT 30 UNITS 3 TIMES DAILY (Patient taking differently: INJECT 20 UNITS 3 TIMES DAILY) 27 pen 3   • Insulin Glargine (BASAGLAR KWIKPEN) 100 UNIT/ML injection pen Inject 45 Units under the skin into the appropriate area as directed Daily. (Patient taking differently: Inject 40 Units under the skin into the appropriate area as directed Daily.) 14 pen 3   • Insulin Pen Needle (Pen Needles) 32G X 4 MM misc 1 each 4 (Four) Times a Day. 360 each 3   • lamoTRIgine (LaMICtal) 100 MG tablet TAKE 1 TABLET BY MOUTH EVERY DAY AT NIGHT 90 tablet 0   • levothyroxine (SYNTHROID, LEVOTHROID) 125 MCG tablet TAKE 1 TABLET BY MOUTH EVERY DAY 90 tablet 1   • lisinopril (PRINIVIL,ZESTRIL) 40 MG tablet TAKE 1 TABLET BY  MOUTH EVERY DAY 90 tablet 1   • metoprolol succinate XL (TOPROL-XL) 25 MG 24 hr tablet Take 25 mg by mouth Daily.     • Multiple Vitamins-Minerals (HAIR SKIN NAILS PO) Take  by mouth.     • Multiple Vitamins-Minerals (VISION-KRISTAL PRESERVE PO) Take  by mouth Daily With Breakfast.     • NIFEdipine CC (ADALAT CC) 60 MG 24 hr tablet TAKE 1 TABLET BY MOUTH EVERY DAY 90 tablet 3   • omeprazole (priLOSEC) 40 MG capsule TAKE 1 CAPSULE BY MOUTH EVERY DAY 90 capsule 1   • rosuvastatin (CRESTOR) 20 MG tablet TAKE 1 TABLET BY MOUTH EVERY DAY AT NIGHT 90 tablet 3   • Symbicort 160-4.5 MCG/ACT inhaler TAKE 2 PUFFS BY MOUTH TWICE A DAY 30.6 each 4   • vitamin D (ERGOCALCIFEROL) 1.25 MG (06534 UT) capsule capsule TAKE 1 CAPSULE BY MOUTH 1 (ONE) TIME PER WEEK. 12 capsule 1   • fluconazole (DIFLUCAN) 150 MG tablet Take 1 tablet day of infection and 1 tablet 3 days later 2 tablet 6     No facility-administered medications prior to visit.       Patient Active Problem List   Diagnosis   • Acquired hypothyroidism   • Recurrent major depressive disorder (CMS/HCC)   • Osteoarthritis of multiple joints   • Gastroesophageal reflux disease   • Hypertension associated with diabetes (CMS/HCC)   • Hx of adenomatous colonic polyps   • Obesity due to excess calories   • CHRISTY (obstructive sleep apnea)   • SVT (supraventricular tachycardia) (CMS/HCC)   • Morbidly obese (CMS/HCC)       Advanced Care Planning:  ACP discussion was declined by the patient. Patient has an advance directive in EMR which is still valid.     Review of Systems   Respiratory: Negative for shortness of breath.    Cardiovascular: Negative for chest pain and leg swelling.   Gastrointestinal:        Colonoscopy last year   Musculoskeletal: Positive for arthralgias and back pain.        Going to physical therapy   All other systems reviewed and are negative.      Compared to one year ago, the patient feels her physical health is worse.  Compared to one year ago, the patient feels  "her mental health is worse.    Reviewed chart for potential of high risk medication in the elderly: yes  Reviewed chart for potential of harmful drug interactions in the elderly:yes    Objective         Vitals:    06/18/21 0936   BP: 134/78   BP Location: Left arm   Patient Position: Sitting   Cuff Size: Adult   Pulse: 64   Resp: 18   Temp: 97.3 °F (36.3 °C)   TempSrc: Temporal   SpO2: 96%   Weight: 117 kg (257 lb)   Height: 165.1 cm (65\")   PainSc:   7   PainLoc: Back       Body mass index is 42.77 kg/m².  Discussed the patient's BMI with her. The BMI is above average; no BMI management plan is appropriate..    Physical Exam  Vitals and nursing note reviewed.   Constitutional:       Appearance: Normal appearance. She is obese.   HENT:      Right Ear: Tympanic membrane and ear canal normal.      Left Ear: Tympanic membrane and ear canal normal.   Eyes:      Extraocular Movements: Extraocular movements intact.      Pupils: Pupils are equal, round, and reactive to light.   Neck:      Vascular: No carotid bruit.   Cardiovascular:      Rate and Rhythm: Normal rate and regular rhythm.      Pulses: Normal pulses.      Heart sounds: Normal heart sounds.   Pulmonary:      Effort: Pulmonary effort is normal.      Comments: Breast no masses noted--breath sounds on the left posterior decreased  Abdominal:      General: Abdomen is flat. There is no distension.      Palpations: Abdomen is soft. There is no mass.      Tenderness: There is no abdominal tenderness.   Genitourinary:     Comments: Surgical past  Musculoskeletal:      Right lower leg: No edema.      Left lower leg: No edema.   Lymphadenopathy:      Cervical: No cervical adenopathy.   Skin:     General: Skin is warm and dry.      Capillary Refill: Capillary refill takes less than 2 seconds.   Neurological:      General: No focal deficit present.      Mental Status: She is alert and oriented to person, place, and time.   Psychiatric:         Mood and Affect: Mood " normal.         Behavior: Behavior normal.         Thought Content: Thought content normal.         Judgment: Judgment normal.               Assessment/Plan   Medicare Risks and Personalized Health Plan  CMS Preventative Services Quick Reference  Breast Cancer/Mammogram Screening  Diabetic Lab Screening   Fall Risk    The above risks/problems have been discussed with the patient.  Pertinent information has been shared with the patient in the After Visit Summary.  Follow up plans and orders are seen below in the Assessment/Plan Section.    Diagnoses and all orders for this visit:    1. Type 2 diabetes mellitus with hyperglycemia, with long-term current use of insulin (CMS/[facility]) (Primary)  -     POC Urinalysis Dipstick, Multipro    2. Breast cancer screening by mammogram  -     Mammo Screening Digital Tomosynthesis Bilateral With CAD; Future    3. Post-menopausal  -     DEXA Bone Density Axial; Future    4. Annual physical exam      Follow Up:  Return in 6 months (on 12/18/2021).     An After Visit Summary and PPPS were given to the patient.         Plan above-had Covid vaccinations-will need chest x-ray since breath sounds are decreased on the left

## 2021-06-18 NOTE — PATIENT INSTRUCTIONS
Exercising to Stay Healthy  To become healthy and stay healthy, it is recommended that you do moderate-intensity and vigorous-intensity exercise. You can tell that you are exercising at a moderate intensity if your heart starts beating faster and you start breathing faster but can still hold a conversation. You can tell that you are exercising at a vigorous intensity if you are breathing much harder and faster and cannot hold a conversation while exercising.  Exercising regularly is important. It has many health benefits, such as:  · Improving overall fitness, flexibility, and endurance.  · Increasing bone density.  · Helping with weight control.  · Decreasing body fat.  · Increasing muscle strength.  · Reducing stress and tension.  · Improving overall health.  How often should I exercise?  Choose an activity that you enjoy, and set realistic goals. Your health care provider can help you make an activity plan that works for you.  Exercise regularly as told by your health care provider. This may include:  · Doing strength training two times a week, such as:  ? Lifting weights.  ? Using resistance bands.  ? Push-ups.  ? Sit-ups.  ? Yoga.  · Doing a certain intensity of exercise for a given amount of time. Choose from these options:  ? A total of 150 minutes of moderate-intensity exercise every week.  ? A total of 75 minutes of vigorous-intensity exercise every week.  ? A mix of moderate-intensity and vigorous-intensity exercise every week.  Children, pregnant women, people who have not exercised regularly, people who are overweight, and older adults may need to talk with a health care provider about what activities are safe to do. If you have a medical condition, be sure to talk with your health care provider before you start a new exercise program.  What are some exercise ideas?  Moderate-intensity exercise ideas include:  · Walking 1 mile (1.6 km) in about 15  minutes.  · Biking.  · Hiking.  · Golfing.  · Dancing.  · Water aerobics.  Vigorous-intensity exercise ideas include:  · Walking 4.5 miles (7.2 km) or more in about 1 hour.  · Jogging or running 5 miles (8 km) in about 1 hour.  · Biking 10 miles (16.1 km) or more in about 1 hour.  · Lap swimming.  · Roller-skating or in-line skating.  · Cross-country skiing.  · Vigorous competitive sports, such as football, basketball, and soccer.  · Jumping rope.  · Aerobic dancing.  What are some everyday activities that can help me to get exercise?  · Yard work, such as:  ? Pushing a .  ? Raking and bagging leaves.  · Washing your car.  · Pushing a stroller.  · Shoveling snow.  · Gardening.  · Washing windows or floors.  How can I be more active in my day-to-day activities?  · Use stairs instead of an elevator.  · Take a walk during your lunch break.  · If you drive, park your car farther away from your work or school.  · If you take public transportation, get off one stop early and walk the rest of the way.  · Stand up or walk around during all of your indoor phone calls.  · Get up, stretch, and walk around every 30 minutes throughout the day.  · Enjoy exercise with a friend. Support to continue exercising will help you keep a regular routine of activity.  What guidelines can I follow while exercising?  · Before you start a new exercise program, talk with your health care provider.  · Do not exercise so much that you hurt yourself, feel dizzy, or get very short of breath.  · Wear comfortable clothes and wear shoes with good support.  · Drink plenty of water while you exercise to prevent dehydration or heat stroke.  · Work out until your breathing and your heartbeat get faster.  Where to find more information  · U.S. Department of Health and Human Services: www.hhs.gov  · Centers for Disease Control and Prevention (CDC): www.cdc.gov  Summary  · Exercising regularly is important. It will improve your overall fitness,  flexibility, and endurance.  · Regular exercise also will improve your overall health. It can help you control your weight, reduce stress, and improve your bone density.  · Do not exercise so much that you hurt yourself, feel dizzy, or get very short of breath.  · Before you start a new exercise program, talk with your health care provider.  This information is not intended to replace advice given to you by your health care provider. Make sure you discuss any questions you have with your health care provider.  Document Revised: 11/30/2018 Document Reviewed: 11/08/2018  Elsevier Patient Education © 2021 [employer]      Fall Prevention in the Home, Adult  Falls can cause injuries and can affect people from all age groups. There are many simple things that you can do to make your home safe and to help prevent falls. Ask for help when making these changes, if needed.  What actions can I take to prevent falls?  General instructions  · Use good lighting in all rooms. Replace any light bulbs that burn out.  · Turn on lights if it is dark. Use night-lights.  · Place frequently used items in easy-to-reach places. Lower the shelves around your home if necessary.  · Set up furniture so that there are clear paths around it. Avoid moving your furniture around.  · Remove throw rugs and other tripping hazards from the floor.  · Avoid walking on wet floors.  · Fix any uneven floor surfaces.  · Add color or contrast paint or tape to grab bars and handrails in your home. Place contrasting color strips on the first and last steps of stairways.  · When you use a stepladder, make sure that it is completely opened and that the sides are firmly locked. Have someone hold the ladder while you are using it. Do not climb a closed stepladder.  · Be aware of any and all pets.  What can I do in the bathroom?         · Keep the floor dry. Immediately clean up any water that spills onto the floor.  · Remove soap buildup in the tub or shower on  a regular basis.  · Use non-skid mats or decals on the floor of the tub or shower.  · Attach bath mats securely with double-sided, non-slip rug tape.  · If you need to sit down while you are in the shower, use a plastic, non-slip stool.  · Install grab bars by the toilet and in the tub and shower. Do not use towel bars as grab bars.  What can I do in the bedroom?  · Make sure that a bedside light is easy to reach.  · Do not use oversized bedding that drapes onto the floor.  · Have a firm chair that has side arms to use for getting dressed.  What can I do in the kitchen?  · Clean up any spills right away.  · If you need to reach for something above you, use a sturdy step stool that has a grab bar.  · Keep electrical cables out of the way.  · Do not use floor polish or wax that makes floors slippery. If you must use wax, make sure that it is non-skid floor wax.  What can I do in the stairways?  · Do not leave any items on the stairs.  · Make sure that you have a light switch at the top of the stairs and the bottom of the stairs. Have them installed if you do not have them.  · Make sure that there are handrails on both sides of the stairs. Fix handrails that are broken or loose. Make sure that handrails are as long as the stairways.  · Install non-slip stair treads on all stairs in your home.  · Avoid having throw rugs at the top or bottom of stairways, or secure the rugs with carpet tape to prevent them from moving.  · Choose a carpet design that does not hide the edge of steps on the stairway.  · Check any carpeting to make sure that it is firmly attached to the stairs. Fix any carpet that is loose or worn.  What can I do on the outside of my home?  · Use bright outdoor lighting.  · Regularly repair the edges of walkways and driveways and fix any cracks.  · Remove high doorway thresholds.  · Trim any shrubbery on the main path into your home.  · Regularly check that handrails are securely fastened and in good repair.  Both sides of any steps should have handrails.  · Install guardrails along the edges of any raised decks or porches.  · Clear walkways of debris and clutter, including tools and rocks.  · Have leaves, snow, and ice cleared regularly.  · Use sand or salt on walkways during winter months.  · In the garage, clean up any spills right away, including grease or oil spills.  What other actions can I take?  · Wear closed-toe shoes that fit well and support your feet. Wear shoes that have rubber soles or low heels.  · Use mobility aids as needed, such as canes, walkers, scooters, and crutches.  · Review your medicines with your health care provider. Some medicines can cause dizziness or changes in blood pressure, which increase your risk of falling.  Talk with your health care provider about other ways that you can decrease your risk of falls. This may include working with a physical therapist or  to improve your strength, balance, and endurance.  Where to find more information  · Centers for Disease Control and Prevention, [place]: https://www.cdc.gov  · National [place] on Aging: https://pm4dmuj.benitez.nih.gov  Contact a health care provider if:  · You are afraid of falling at home.  · You feel weak, drowsy, or dizzy at home.  · You fall at home.  Summary  · There are many simple things that you can do to make your home safe and to help prevent falls.  · Ways to make your home safe include removing tripping hazards and installing grab bars in the bathroom.  · Ask for help when making these changes in your home.  This information is not intended to replace advice given to you by your health care provider. Make sure you discuss any questions you have with your health care provider.  Document Revised: 11/30/2018 Document Reviewed: 08/02/2018  [employer] Patient Education © 2021 [employer]    Medicare Wellness  Personal Prevention Plan of Service     Date of Office Visit:  06/18/2021  Encounter Provider:  Jer Heller  MD Shazia  Place of Service:  [facility]  Patient Name: Maribeth Isbell  :  1955    As part of the Medicare Wellness portion of your visit today, we are providing you with this personalized preventive plan of services (PPPS). This plan is based upon recommendations of the [place] Preventive Services Task Force (USPSTF) and the Advisory Committee on Immunization Practices (ACIP).    This lists the preventive care services that should be considered, and provides dates of when you are due. Items listed as completed are up-to-date and do not require any further intervention.    Health Maintenance   Topic Date Due   • LUNG CANCER SCREENING  Never done   • DXA SCAN  2020   • URINE MICROALBUMIN  2021   • ZOSTER VACCINE (1 of 2) 2021 (Originally 2005)   • INFLUENZA VACCINE  2021   • COLORECTAL CANCER SCREENING  2021   • HEMOGLOBIN A1C  2021   • MAMMOGRAM  10/01/2021   • LIPID PANEL  2022   • DIABETIC EYE EXAM  2022   • DIABETIC FOOT EXAM  2022   • ANNUAL PHYSICAL  2022   • TDAP/TD VACCINES (2 - Td or Tdap) 2023   • HEPATITIS C SCREENING  Completed   • COVID-19 Vaccine  Completed   • Pneumococcal Vaccine 65+  Completed   • PAP SMEAR  Discontinued       No orders of the defined types were placed in this encounter.      Return in 6 months (on 2021).

## 2021-07-07 RX ORDER — ERGOCALCIFEROL 1.25 MG/1
50000 CAPSULE ORAL WEEKLY
Qty: 12 CAPSULE | Refills: 1 | Status: SHIPPED | OUTPATIENT
Start: 2021-07-07 | End: 2021-11-29

## 2021-07-20 ENCOUNTER — TELEPHONE (OUTPATIENT)
Dept: FAMILY MEDICINE CLINIC | Facility: CLINIC | Age: 66
End: 2021-07-20

## 2021-07-20 NOTE — TELEPHONE ENCOUNTER
PATIENT CALLED IN REQUESTING A CALL BACK TO DISCUSS GETTING DR LYLE TO CALL IN A 90 DAY SUPPLY OF HER JARDANCE    PATIENT STATES DR LYLE HAS NOT WROTE THIS MEDICINE FOR HER BEFORE BUT SHE CANT GET IN TOUCH WITH THE DR THAT DOES     PLEASE CALL BACK AND ADVISE  [phone]

## 2021-07-21 DIAGNOSIS — I15.2 HYPERTENSION ASSOCIATED WITH DIABETES (HCC): ICD-10-CM

## 2021-07-21 DIAGNOSIS — E11.59 HYPERTENSION ASSOCIATED WITH DIABETES (HCC): ICD-10-CM

## 2021-07-21 DIAGNOSIS — G25.81 RLS (RESTLESS LEGS SYNDROME): ICD-10-CM

## 2021-07-21 RX ORDER — CARBIDOPA/LEVODOPA 25MG-250MG
TABLET ORAL
Qty: 180 TABLET | Refills: 3 | Status: SHIPPED | OUTPATIENT
Start: 2021-07-21 | End: 2022-05-17

## 2021-07-21 RX ORDER — NIFEDIPINE 60 MG/1
TABLET, FILM COATED, EXTENDED RELEASE ORAL
Qty: 90 TABLET | Refills: 3 | Status: SHIPPED | OUTPATIENT
Start: 2021-07-21

## 2021-07-21 NOTE — TELEPHONE ENCOUNTER
Spoke with patient.  States has left multiple messages at endocrinology office.  Patient requesting refill on Jardiance #[phone].  Advised patient that I would send message to their office since Dr. Mccray has not this prescribed medication for her.

## 2021-08-08 DIAGNOSIS — E11.59 HYPERTENSION ASSOCIATED WITH DIABETES (HCC): ICD-10-CM

## 2021-08-08 DIAGNOSIS — F33.1 MODERATE EPISODE OF RECURRENT MAJOR DEPRESSIVE DISORDER (HCC): ICD-10-CM

## 2021-08-08 DIAGNOSIS — I15.2 HYPERTENSION ASSOCIATED WITH DIABETES (HCC): ICD-10-CM

## 2021-08-09 RX ORDER — LISINOPRIL 40 MG/1
TABLET ORAL
Qty: 90 TABLET | Refills: 1 | Status: SHIPPED | OUTPATIENT
Start: 2021-08-09 | End: 2021-11-29

## 2021-08-09 RX ORDER — DESVENLAFAXINE SUCCINATE 50 MG/1
TABLET, EXTENDED RELEASE ORAL
Qty: 90 TABLET | Refills: 3 | Status: SHIPPED | OUTPATIENT
Start: 2021-08-09 | End: 2022-12-13

## 2021-08-13 DIAGNOSIS — E03.9 ACQUIRED HYPOTHYROIDISM: ICD-10-CM

## 2021-08-13 RX ORDER — LEVOTHYROXINE SODIUM 0.12 MG/1
TABLET ORAL
Qty: 90 TABLET | Refills: 1 | Status: SHIPPED | OUTPATIENT
Start: 2021-08-13 | End: 2021-11-29

## 2021-08-16 ENCOUNTER — OFFICE VISIT (OUTPATIENT)
Dept: GASTROENTEROLOGY | Facility: CLINIC | Age: 66
End: 2021-08-16

## 2021-08-16 VITALS
TEMPERATURE: 96.8 F | HEART RATE: 84 BPM | BODY MASS INDEX: 41.99 KG/M2 | WEIGHT: 252 LBS | SYSTOLIC BLOOD PRESSURE: 132 MMHG | DIASTOLIC BLOOD PRESSURE: 78 MMHG | OXYGEN SATURATION: 98 % | HEIGHT: 65 IN

## 2021-08-16 DIAGNOSIS — Z86.010 HISTORY OF ADENOMATOUS POLYP OF COLON: Primary | ICD-10-CM

## 2021-08-16 DIAGNOSIS — Z79.02 ANTIPLATELET OR ANTITHROMBOTIC LONG-TERM USE: ICD-10-CM

## 2021-08-16 DIAGNOSIS — Z83.71 FAMILY HISTORY OF POLYPS IN THE COLON: ICD-10-CM

## 2021-08-16 PROBLEM — Z83.719 FAMILY HISTORY OF POLYPS IN THE COLON: Status: ACTIVE | Noted: 2021-08-16

## 2021-08-16 PROCEDURE — S0285 CNSLT BEFORE SCREEN COLONOSC: HCPCS | Performed by: NURSE PRACTITIONER

## 2021-08-16 RX ORDER — BUMETANIDE 1 MG/1
1 TABLET ORAL DAILY
COMMUNITY

## 2021-08-16 NOTE — PROGRESS NOTES
Chief Complaint   Patient presents with   • Colon Cancer Screening     last colon 2020- hx of polyps     Subjective   HPI  Maribeth Isbell is a 65 y.o. female who presents as a referral for preventative maintenance. She has no complaints of nausea or vomiting. No change in bowels. No wt loss. No BRBPR. No melena. There is no family hx for colon cancer. No abdominal pain. There was no Cologuard screening this year.  Patient's last colonoscopy was performed on 2020 with findings of adenomatous polyps recall 1 year.  Past Medical History:   Diagnosis Date   • Bell's palsy    • Cataract    • Depression    • E coli infection    • Gastroesophageal reflux disease 2018   • Headache    • History of adenomatous polyp of colon    • History of colon polyps    • Hyperlipidemia    • Hypertension    • Hyperthyroidism    • Hypothyroidism    • Macular degeneration    • Obesity due to excess calories 2019   • Sleep apnea     wears cpap   • Tachycardia    • Type 2 diabetes mellitus (CMS/HCC)    • Visual impairment      Past Surgical History:   Procedure Laterality Date   •  SECTION     • COLONOSCOPY  06/10/2011    Dr. Mahmood-Internal hemorrhoids; Otherwise normal   • COLONOSCOPY N/A 2016    Colon not done this day-see endo report 2016   • COLONOSCOPY  2016    Dr. Mahmood-A single small hyperplastic polyp in the ascending colon   • COLONOSCOPY N/A 2020    One 15mm tubular adenomatous polyp in the cecum; Two 7-10mm tubular adenomatous polyps in the ascending colon; Five 7-10mm tubular adenomatous polyps in the transverse colon; Two 5-12mm tubular adenomatous polyps in the descending colon; Repeat 1 year   • ENDOSCOPY N/A 2016    Normal esophagus; Normal stomach; Normal first part of the duodenum and 2nd part of the duodenum-biopsied   • ENDOSCOPY N/A 2018    Normal esophagus; Normal stomach; Normal first portion of the duodenum and second portion of the duodenum-biopsied   •  HYSTERECTOMY     • KNEE SURGERY Left    • TUMOR REMOVAL         Current Outpatient Medications:   •  aspirin 81 MG EC tablet, Take 81 mg by mouth Daily., Disp: , Rfl:   •  bumetanide (BUMEX) 1 MG tablet, Take 1 mg by mouth Daily., Disp: , Rfl:   •  carbidopa-levodopa (SINEMET)  MG per tablet, TAKE 1 TO 2 TABLETS BY MOUTH EVERY DAY AT BEDTIME, Disp: 180 tablet, Rfl: 3  •  cetirizine (zyrTEC) 10 MG tablet, Take 1 tablet by mouth Daily., Disp: 90 tablet, Rfl: 1  •  cholecalciferol (VITAMIN D3) 25 MCG (1000 UT) tablet, Take 1,000 Units by mouth Daily., Disp: , Rfl:   •  clopidogrel (PLAVIX) 75 MG tablet, Take 75 mg by mouth Daily., Disp: , Rfl:   •  Continuous Blood Gluc Sensor (Dexcom G6 Sensor), As Needed (glucose control). Every 10 daysDexcom G6 Sensor Kit [address]-24 Use as directed for continuous glucose monitoring, Disp: 9 each, Rfl: 3  •  cyclobenzaprine (FLEXERIL) 10 MG tablet, , Disp: , Rfl:   •  desvenlafaxine (PRISTIQ) 50 MG 24 hr tablet, TAKE 1 TABLET BY MOUTH EVERY DAY, Disp: 90 tablet, Rfl: 3  •  Dulaglutide (Trulicity) 3 MG/0.5ML solution pen-injector, Inject 3 mg under the skin into the appropriate area as directed 1 (One) Time Per Week., Disp: 12 pen, Rfl: 3  •  empagliflozin (Jardiance) 10 MG tablet tablet, Take 1 tablet by mouth Daily. Before bkfast, Disp: 90 tablet, Rfl: 3  •  folic acid (FOLVITE) 400 MCG tablet, Take 400 mcg by mouth Daily., Disp: , Rfl:   •  furosemide (Lasix) 40 MG tablet, Take 1 tablet by mouth Daily., Disp: 90 tablet, Rfl: 1  •  insulin aspart (NovoLOG FlexPen) 100 UNIT/ML solution pen-injector sc pen, INJECT 30 UNITS 3 TIMES DAILY (Patient taking differently: INJECT 20 UNITS 3 TIMES DAILY), Disp: 27 pen, Rfl: 3  •  Insulin Glargine (BASAGLAR KWIKPEN) 100 UNIT/ML injection pen, Inject 45 Units under the skin into the appropriate area as directed Daily. (Patient taking differently: Inject 40 Units under the skin into the appropriate area as directed Daily.), Disp: 14 pen,  Rfl: 3  •  Insulin Pen Needle (Pen Needles) 32G X 4 MM misc, 1 each 4 (Four) Times a Day., Disp: 360 each, Rfl: 3  •  lamoTRIgine (LaMICtal) 100 MG tablet, TAKE 1 TABLET BY MOUTH EVERY DAY AT NIGHT, Disp: 90 tablet, Rfl: 0  •  levothyroxine (SYNTHROID, LEVOTHROID) 125 MCG tablet, TAKE 1 TABLET BY MOUTH EVERY DAY, Disp: 90 tablet, Rfl: 1  •  lisinopril (PRINIVIL,ZESTRIL) 40 MG tablet, TAKE 1 TABLET BY MOUTH EVERY DAY, Disp: 90 tablet, Rfl: 1  •  metoprolol succinate XL (TOPROL-XL) 25 MG 24 hr tablet, Take 25 mg by mouth Daily., Disp: , Rfl:   •  Multiple Vitamins-Minerals (HAIR SKIN NAILS PO), Take  by mouth., Disp: , Rfl:   •  Multiple Vitamins-Minerals (VISION-KRISTAL PRESERVE PO), Take  by mouth Daily With Breakfast., Disp: , Rfl:   •  NIFEdipine CC (ADALAT CC) 60 MG 24 hr tablet, TAKE 1 TABLET BY MOUTH EVERY DAY, Disp: 90 tablet, Rfl: 3  •  omeprazole (priLOSEC) 40 MG capsule, TAKE 1 CAPSULE BY MOUTH EVERY DAY, Disp: 90 capsule, Rfl: 1  •  rosuvastatin (CRESTOR) 20 MG tablet, TAKE 1 TABLET BY MOUTH EVERY DAY AT NIGHT, Disp: 90 tablet, Rfl: 3  •  Symbicort 160-4.5 MCG/ACT inhaler, TAKE 2 PUFFS BY MOUTH TWICE A DAY, Disp: 30.6 each, Rfl: 4  •  vitamin D (ERGOCALCIFEROL) 1.25 MG (28068 UT) capsule capsule, TAKE 1 CAPSULE BY MOUTH 1 (ONE) TIME PER WEEK., Disp: 12 capsule, Rfl: 1  •  Sodium Sulfate-Mag Sulfate-KCl 0076-161-219 MG tablet, Take 12 tablets by mouth Take As Directed., Disp: 24 tablet, Rfl: 0  Allergies   Allergen Reactions   • Metformin GI Intolerance     Social History     Socioeconomic History   • Marital status:      Spouse name: Not on file   • Number of children: Not on file   • Years of education: Not on file   • Highest education level: Not on file   Tobacco Use   • Smoking status: Former Smoker     Packs/day: 1.00     Years: 20.00     Pack years: 20.00     Types: Cigarettes     Quit date:      Years since quittin.6   • Smokeless tobacco: Never Used   Substance and Sexual Activity   •  "Alcohol use: No   • Drug use: No   • Sexual activity: Defer     Family History   Problem Relation Age of Onset   • Heart disease Mother    • Diabetes Mother    • Colon polyps Sister 30        mid 30s   • No Known Problems Maternal Grandmother    • No Known Problems Maternal Grandfather    • No Known Problems Paternal Grandmother    • No Known Problems Paternal Grandfather    • Colon cancer Neg Hx    • Esophageal cancer Neg Hx        REVIEW OF SYSTEMS  General: well appearing, no fever chills or sweats, no unexplained wt loss  HEENT: no acute visual or hearing disturbances  Cardiovascular: No chest pain or palpitations  Pulmonary: No shortness of breath, coughing, wheezing or hemoptysis  : No burning, urgency, hematuria, or dysuria  Musculoskeletal: No joint pain or stiffness  Peripheral: no edema  Skin: No lesions or rashes  Neuro: No dizziness, headaches, stroke, syncope  Endocrine: No hot or cold intolerances  Hematological: No blood dyscrasias    Objective   Vitals:    08/16/21 1311   BP: 132/78   Pulse: 84   Temp: 96.8 °F (36 °C)   SpO2: 98%   Weight: 114 kg (252 lb)   Height: 165.1 cm (65\")         PHYSICAL EXAM  General: age appropriate well nourished well appearing, no acute distress  Head: normocephalic and atraumatic  Global assessment-supple  Neck-No JVD noted, no lymphadenopathy  Pulmonary-clear to auscultation bilaterally, normal respiratory effort  Cardiovascular-normal rate and rhythm, normal heart sounds, S1 and S2 noted  Abdomen-soft, non tender, non distended, normal bowel sounds all 4 quadrants, no hepatosplenomegaly noted  Extremities-No clubbing cyanosis or edema  Neuro-Non focal, converses appropriately, awake, alert, oriented    Lab Results - Last 18 Months   Lab Units 09/16/20  1001 09/10/20  0950 06/16/20  1133   GLUCOSE mg/dL  --  154*  --    BUN mg/dL 16 15 15   CREATININE mg/dL 1.11* 1.1* 1.11*   SODIUM mmol/L 142 140 141   POTASSIUM mmol/L 4.7 4.3 3.9   CHLORIDE mmol/L 102 99 99   TOTAL " CO2 mmol/L 27.6 29 27.6   TOTAL PROTEIN g/dL  --  7.4  --    ALBUMIN g/dL 4.50 4.5 4.70   ALT (SGPT) U/L 18 12 14   AST (SGOT) U/L 21 22 21   ALK PHOS U/L 75 75 71   BILIRUBIN mg/dL 0.3 0.5 0.5       Lab Results - Last 18 Months   Lab Units 09/10/20  0950 06/16/20  1133   HEMOGLOBIN g/dL 15.3 14.9   HEMATOCRIT % 45.5 45.7   MCV fL 86.7 91.8   WBC K/uL 4.8 5.78   RDW % 12.4 13.2   MPV fL 9.7  --    PLATELETS K/uL 264 298       Lab Results - Last 18 Months   Lab Units 06/16/20  1133   TSH uIU/mL 0.338   VIT D 25 HYDROXY ng/ml 41.6          Imaging Results (Most Recent)     None        Assessment/Plan   Diagnoses and all orders for this visit:    1. History of adenomatous polyp of colon (Primary)  -     Case Request; Standing  -     Case Request    2. Family history of polyps in the colon  -     Case Request; Standing  -     Case Request    3. Antiplatelet or antithrombotic long-term use    Other orders  -     Follow Anesthesia Guidelines / Protocol; Future  -     Obtain Informed Consent; Future  -     Implement Anesthesia Orders Day of Procedure; Standing  -     Obtain Informed Consent; Standing  -     Verify bowel prep was successful; Standing  -     Sodium Sulfate-Mag Sulfate-KCl 0602-321-615 MG tablet; Take 12 tablets by mouth Take As Directed.  Dispense: 24 tablet; Refill: 0      COLONOSCOPY WITH ANESTHESIA (N/A)       All risks, benefits, alternatives, and indications of colonoscopy procedure have been discussed with the patient. Risks to include perforation of the colon requiring possible surgery or colostomy, risk of bleeding from biopsies or removal of colon tissue, possibility of missing a colon polyp or cancer, or adverse drug reaction.  Benefits to include the diagnosis and management of disease of the colon and rectum. Alternatives to include barium enema, radiographic evaluation, lab testing or no intervention. Pt verbalizes understanding and agrees.

## 2021-08-17 ENCOUNTER — TELEPHONE (OUTPATIENT)
Dept: CARDIOLOGY CLINIC | Age: 66
End: 2021-08-17

## 2021-08-17 NOTE — TELEPHONE ENCOUNTER
Date: 8/30/21    Cardiologist: Jimmie Funez    Procedure: colonoscopy    Surgeon: Kia Moss    Last Office Visit: 3/9/21  Reason for office visit and medical concerns addressed at this office visit: cva, dm, htn, hyperlipidemia, cad,     Testing Performed and Date of Service:  9/10/20 Cath  Single-vessel disease involving proximal LAD. Normal LV ejection fraction. Successful PCI to proximal LAD (3.5 x 9 mm resolute integrity) utilizing   drug-eluting stent. Does the patient have a stent? If so, what type? CHRISTIANNE placed on 9/10/20    Current Medications: metoprolol, crestor, prilosec, nifedipine, lisinopril, levothyroxine, insulin, pristiq, sinemet, symbicort, plavix, lamictal, folic acid, zyrtec, aspirin, bumex    Is the patient currently taking an anticoagulant?  If so, what is the diagnosis the patient has been given to warrant the need for the anticoagulant? plavix for CHRISTIANNE 9/10/20    Additional Notes: requesting to hold plavix 5 days prior to procedure

## 2021-08-17 NOTE — TELEPHONE ENCOUNTER
Due to stent 9/10/20, can hold Plavix 5 days prior to colonoscopy after 9/10/21.   Thanks so much Tony!  [employer] [facility]

## 2021-08-19 ENCOUNTER — TELEPHONE (OUTPATIENT)
Dept: GASTROENTEROLOGY | Facility: CLINIC | Age: 66
End: 2021-08-19

## 2021-08-19 RX ORDER — METOPROLOL SUCCINATE 25 MG/1
TABLET, EXTENDED RELEASE ORAL
Qty: 90 TABLET | Refills: 1 | Status: SHIPPED | OUTPATIENT
Start: 2021-08-19 | End: 2021-08-31 | Stop reason: SDUPTHER

## 2021-08-19 NOTE — TELEPHONE ENCOUNTER
Pt is scheduled for colon 8/30/21. We rec'd Plavix clearance from YESSICA Rush for pt to hold her Plavix X 5 days but not until AFTER 9/10/21. I have rescheduled pt to Mon 9/20/21 @ 11:00am. She is aware the last day to take her Plavix is 9/14/21. Pt advised to call me back with any further questions/problems. I will fax sheet to scheduling and mail pt a new set of instructions per her request.

## 2021-08-24 DIAGNOSIS — F33.1 MODERATE EPISODE OF RECURRENT MAJOR DEPRESSIVE DISORDER (HCC): ICD-10-CM

## 2021-08-24 RX ORDER — LAMOTRIGINE 100 MG/1
TABLET ORAL
Qty: 90 TABLET | Refills: 2 | Status: SHIPPED | OUTPATIENT
Start: 2021-08-24 | End: 2022-07-20

## 2021-08-24 NOTE — TELEPHONE ENCOUNTER
Rx Refill Note  Requested Prescriptions     Pending Prescriptions Disp Refills   • lamoTRIgine (LaMICtal) 100 MG tablet [Pharmacy Med Name: LAMOTRIGINE 100 MG TABLET] 90 tablet 0     Sig: TAKE 1 TABLET BY MOUTH EVERY DAY AT NIGHT      Last office visit with prescribing clinician: 6/18/2021      Next office visit with prescribing clinician: 12/20/2021            Jayne Casanova MA  08/24/21, 07:28 CDT

## 2021-08-27 RX ORDER — CLOPIDOGREL BISULFATE 75 MG/1
TABLET ORAL
Qty: 90 TABLET | Refills: 3 | Status: SHIPPED | OUTPATIENT
Start: 2021-08-27 | End: 2021-08-31 | Stop reason: SDUPTHER

## 2021-08-31 ENCOUNTER — OFFICE VISIT (OUTPATIENT)
Dept: CARDIOLOGY CLINIC | Age: 66
End: 2021-08-31
Payer: COMMERCIAL

## 2021-08-31 VITALS
BODY MASS INDEX: 41.82 KG/M2 | OXYGEN SATURATION: 97 % | HEART RATE: 66 BPM | DIASTOLIC BLOOD PRESSURE: 60 MMHG | WEIGHT: 251 LBS | HEIGHT: 65 IN | SYSTOLIC BLOOD PRESSURE: 108 MMHG

## 2021-08-31 DIAGNOSIS — I47.1 PSVT (PAROXYSMAL SUPRAVENTRICULAR TACHYCARDIA) (HCC): ICD-10-CM

## 2021-08-31 DIAGNOSIS — I10 ESSENTIAL HYPERTENSION: ICD-10-CM

## 2021-08-31 DIAGNOSIS — E78.2 MIXED DYSLIPIDEMIA: ICD-10-CM

## 2021-08-31 DIAGNOSIS — I25.10 CORONARY ARTERY DISEASE INVOLVING NATIVE CORONARY ARTERY OF NATIVE HEART WITHOUT ANGINA PECTORIS: Primary | ICD-10-CM

## 2021-08-31 DIAGNOSIS — Z95.5 HISTORY OF CORONARY ARTERY STENT PLACEMENT: ICD-10-CM

## 2021-08-31 PROCEDURE — G8417 CALC BMI ABV UP PARAM F/U: HCPCS | Performed by: NURSE PRACTITIONER

## 2021-08-31 PROCEDURE — 3017F COLORECTAL CA SCREEN DOC REV: CPT | Performed by: NURSE PRACTITIONER

## 2021-08-31 PROCEDURE — 1036F TOBACCO NON-USER: CPT | Performed by: NURSE PRACTITIONER

## 2021-08-31 PROCEDURE — 4040F PNEUMOC VAC/ADMIN/RCVD: CPT | Performed by: NURSE PRACTITIONER

## 2021-08-31 PROCEDURE — G8400 PT W/DXA NO RESULTS DOC: HCPCS | Performed by: NURSE PRACTITIONER

## 2021-08-31 PROCEDURE — 1123F ACP DISCUSS/DSCN MKR DOCD: CPT | Performed by: NURSE PRACTITIONER

## 2021-08-31 PROCEDURE — G8428 CUR MEDS NOT DOCUMENT: HCPCS | Performed by: NURSE PRACTITIONER

## 2021-08-31 PROCEDURE — 99214 OFFICE O/P EST MOD 30 MIN: CPT | Performed by: NURSE PRACTITIONER

## 2021-08-31 PROCEDURE — 1090F PRES/ABSN URINE INCON ASSESS: CPT | Performed by: NURSE PRACTITIONER

## 2021-08-31 RX ORDER — ERGOCALCIFEROL (VITAMIN D2) 1250 MCG
50000 CAPSULE ORAL WEEKLY
COMMUNITY
Start: 2021-07-07

## 2021-08-31 RX ORDER — METOPROLOL SUCCINATE 25 MG/1
TABLET, EXTENDED RELEASE ORAL
Qty: 90 TABLET | Refills: 3 | Status: SHIPPED | OUTPATIENT
Start: 2021-08-31 | End: 2022-10-03

## 2021-08-31 RX ORDER — CLOPIDOGREL BISULFATE 75 MG/1
TABLET ORAL
Qty: 90 TABLET | Refills: 3 | Status: SHIPPED | OUTPATIENT
Start: 2021-08-31 | End: 2021-08-31 | Stop reason: ALTCHOICE

## 2021-08-31 RX ORDER — EMPAGLIFLOZIN 10 MG/1
TABLET, FILM COATED ORAL
COMMUNITY
Start: 2021-08-14

## 2021-08-31 RX ORDER — BUMETANIDE 1 MG/1
1 TABLET ORAL DAILY
Qty: 90 TABLET | Refills: 1 | Status: SHIPPED | OUTPATIENT
Start: 2021-08-31 | End: 2021-11-29

## 2021-08-31 RX ORDER — FUROSEMIDE 40 MG/1
TABLET ORAL
COMMUNITY
Start: 2021-06-27

## 2021-08-31 RX ORDER — INSULIN ASPART 100 [IU]/ML
INJECTION, SOLUTION INTRAVENOUS; SUBCUTANEOUS
COMMUNITY
Start: 2020-10-19

## 2021-08-31 RX ORDER — CYCLOBENZAPRINE HCL 10 MG
10 TABLET ORAL 2 TIMES DAILY PRN
COMMUNITY
Start: 2021-08-30

## 2021-08-31 RX ORDER — NIFEDIPINE 60 MG/1
TABLET, FILM COATED, EXTENDED RELEASE ORAL
Qty: 90 TABLET | Refills: 3 | Status: SHIPPED | OUTPATIENT
Start: 2021-08-31 | End: 2022-10-03

## 2021-08-31 RX ORDER — BUDESONIDE AND FORMOTEROL FUMARATE DIHYDRATE 160; 4.5 UG/1; UG/1
AEROSOL RESPIRATORY (INHALATION)
COMMUNITY
Start: 2021-06-09 | End: 2021-08-31

## 2021-08-31 NOTE — PROGRESS NOTES
[facility], [place]. [address] [address] [facility]  [phone] office  [phone] fax      OFFICE VISIT:  2021    Grisel Benson - : 1955  Reason For Visit:  Fely Obrien is a 72 y.o. female who is here for No chief complaint on file. History:   Diagnosis Orders   1. Coronary artery disease involving native coronary artery of native heart without angina pectoris     2. Essential hypertension     3. Mixed dyslipidemia     4. History of coronary artery stent placement      9/10/20 PCI to proximal LAD (3.5 x 9 mm resolute integrity) utilizing   drug-eluting stent- Dr. Lupe Arellano   5. PSVT (paroxysmal supraventricular tachycardia) ([facility] [address]       The patient presents today for cardiology follow up. Fely Obrien reports that she is doing very well. She has completed the coronavirus vaccine series. The patient denies symptoms to suggest myocardial ischemia, heart failure or arrhythmia. BP is well controlled on current regimen. The patient's PCP monitors cholesterol. She stopped smoking about 9 years ago. Subjective  Anjelica denies exertional chest pain, shortness of breath, orthopnea, paroxysmal nocturnal dyspnea, syncope, presyncope, sensed arrhythmia, edema and fatigue. The patient denies numbness or weakness to suggest cerebrovascular accident or transient ischemic attack.       Grisel Benson has the following history as recorded in [facility]:  Patient Active Problem List   Diagnosis Code    SVT (supraventricular tachycardia) ([address] I47.1    History of tobacco abuse Z87.891    Essential hypertension I10    Mixed dyslipidemia E78.2    Obesity due to excess calories E66.09    Type 2 diabetes mellitus ([address] E11.9    SILVANO (obstructive sleep apnea) G47.33    Unstable angina (HCC) I20.0     Past Medical History:   Diagnosis Date    Arthritis     Cerebral artery occlusion with cerebral infarction ([address]     Diabetes mellitus ([address]     Hyperlipidemia     Hypertension      Past Surgical History:   Procedure Laterality Date     SECTION      JOINT REPLACEMENT      TUBAL LIGATION       No family history on file.   Social History     Tobacco Use    Smoking status: Former Smoker     Packs/day: 0.50     Years: 8.00     Pack years: 4.00     Quit date: 2013     Years since quittin.0    Smokeless tobacco: Never Used   Substance Use Topics    Alcohol use: Not Currently      Current Outpatient Medications   Medication Sig Dispense Refill    clopidogrel (PLAVIX) 75 MG tablet TAKE 1 TABLET BY MOUTH EVERY DAY 90 tablet 3    metoprolol succinate (TOPROL XL) 25 MG extended release tablet TAKE 1 TABLET BY MOUTH EVERY DAY 90 tablet 1    rosuvastatin (CRESTOR) 20 MG tablet Take 20 mg by mouth nightly      omeprazole (PRILOSEC) 40 MG delayed release capsule Take 40 mg by mouth daily      NIFEdipine (ADALAT CC) 60 MG extended release tablet TAKE 1 TABLET BY MOUTH EVERY DAY      lisinopril (PRINIVIL;ZESTRIL) 40 MG tablet Take 40 mg by mouth daily      levothyroxine (SYNTHROID) 125 MCG tablet TAKE 1 TABLET BY MOUTH EVERY DAY      insulin aspart (NOVOLOG) 100 UNIT/ML injection pen INJECT 30 UNITS 3 TIMES DAILY      Dulaglutide 0.75 MG/0.5ML SOPN Inject 0.75 mg into the skin once a week      desvenlafaxine succinate (PRISTIQ) 50 MG TB24 extended release tablet TAKE 1 TABLET BY MOUTH EVERY DAY      carbidopa-levodopa (SINEMET)  MG per tablet TAKE 1 TO 2 TABLETS BY MOUTH EVERY DAY AT BEDTIME      budesonide-formoterol (SYMBICORT) 160-4.5 MCG/ACT AERO TAKE 2 PUFFS BY MOUTH TWICE A DAY      insulin glargine (BASAGLAR KWIKPEN) 100 UNIT/ML injection pen Inject 40 Units into the skin daily      Insulin Pen Needle (PEN NEEDLES) 32G X 4 MM MISC 1 each by NOT APPLICABLE route 4 times daily      lamoTRIgine (LAMICTAL) 100 MG tablet as needed   2    Multiple Vitamins-Minerals (THERAPEUTIC MULTIVITAMIN-MINERALS) tablet Take 1 tablet by mouth negative. Objective  Vital Signs - There were no vitals taken for this visit. Shahab Woodard is alert, cooperative, and pleasant. Well groomed. No acute distress. Body habitus - There is no height or weight on file to calculate BMI. HEENT - Head is normocephalic. No circumoral cyanosis. Dentition is normal.  EYES -   Lids normal without ptosis. No discharge, edema or subconjunctival hemorrhage. Neck - Symmetrical without apparent mass or lymphadenopathy. Respiratory - Normal respiratory effort without use of accessory muscles. Ausculatation reveals vesicular breath sounds without crackles, wheezes, rub or rhonchi. Cardiovascular - No jugular venous distention. Auscultation reveals regular rate and rhythm. No audible clicks, gallop or rub. No murmur. No lower extremity varicosities. No carotid bruits. Abdominal -  No visible distention, mass or pulsations. Extremities - No clubbing or cyanosis. No statis dermatitis or ulcers. No edema. Musculoskeletal -   No Osler's nodes. No kyphosis or scoliosis. Gait is even and regular without limp or shuffle. Ambulates without assistance. Skin -  Warm and dry; no rash or pallor. No new surgical wound. Neurological - No focal neurological deficits. Thought processes coherent. No apparent tremor. Oriented to person, place and time. Psychiatric -  Appropriate affect and mood. Data reviewed:  9/10/20 cath    Single-vessel disease involving proximal LAD. Normal LV ejection fraction. Successful PCI to proximal LAD (3.5 x 9 mm resolute integrity) utilizing  drug-eluting stent. Recommendations    Medical management. Risk factor modification.    Signatures   Electronically signed by Bigg Freed MD(Performing Physician) on  09/10/2020 21:51    Lab Results   Component Value Date    WBC 4.8 09/10/2020    HGB 15.3 09/10/2020    HCT 45.5 09/10/2020    MCV 86.7 09/10/2020     09/10/2020     Lab Results   Component Value Date    NA 140 09/10/2020    K 4.3 09/10/2020    CL 99 09/10/2020    CO2 29 09/10/2020    BUN 15 09/10/2020    CREATININE 1.1 (H) 09/10/2020    GLUCOSE 154 (H) 09/10/2020    CALCIUM 9.1 09/10/2020    PROT 7.4 09/10/2020    LABALBU 4.5 09/10/2020    BILITOT 0.5 09/10/2020    ALKPHOS 75 09/10/2020    AST 22 09/10/2020    ALT 12 09/10/2020    LABGLOM 50 (A) 09/10/2020    GFRAA >59 09/10/2020       BP Readings from Last 3 Encounters:   03/09/21 120/70   10/08/20 126/86   09/10/20 (!) 112/97    Pulse Readings from Last 3 Encounters:   03/09/21 72   10/08/20 80   09/10/20 59        Wt Readings from Last 3 Encounters:   03/09/21 260 lb (117.9 kg)   10/08/20 270 lb (122.5 kg)   09/10/20 260 lb (117.9 kg)     Assessment/Plan:   Diagnosis Orders   1. Coronary artery disease involving native coronary artery of native heart without angina pectoris     2. Essential hypertension     3. Mixed dyslipidemia     4. History of coronary artery stent placement      9/10/20 PCI to proximal LAD (3.5 x 9 mm resolute integrity) utilizing   drug-eluting stent- Dr. Lupe Arellano   5. PSVT (paroxysmal supraventricular tachycardia) (HCC)       Stable CV status without overt heart failure, sensed arrhythmia or angina. CAD - stable on current medical management. S/p CHRISTIANNE placement one year as of 9/10/21. Advised patient ok to stop Plavix after 9/10/21 and continue low dose ASA thereafter. HTN -normotensive on current regimen. Continue same. Hyperlipidemia and DM - followed by PCP. Continue Crestor 20 mg daily. The patient is eating healthier and has lost 19 pound since last October. Patient is compliant with medication regimen. Previous cardiac history and records reviewed. Continue current medical management for cardiac related condition. Continue other current medications as directed. Continue to follow up with primary care provider for non cardiac medical problems.   If your primary care provider is outside of the [facility], please request that your labs be faxed to this office at [phone]. BP goal 130/80 or less. Call the office with any problems, questions or concerns at [phone]. Cardiology follow up as scheduled in [address] appointments. Educational included in patient instructions. Heart health.       Ananda Warner, APRN

## 2021-08-31 NOTE — PATIENT INSTRUCTIONS
New instructions for today:  You can stop Plavix after 9/10/21, continue aspirin. Patient Instructions:  Continue current medications as prescribed. Always keep a current medication list. Bring your medications to every office visit. Continue to follow up with primary care provider for non cardiac medical problems. Call the office with any problems, questions or concerns at [phone]. If you have been asked to keep a blood pressure log, do so for 2 weeks. Call the office to report readings to the triage nurse at [phone]. Follow up with cardiologist as scheduled. The following educational material has been included in this after visit summary for your review: Life simple 7. Heart health. Life simple 7  1) Manage blood pressure - high blood pressure is a major risk factor for heart disease and stroke. Keeping blood pressure in health range reduces strain on your heart, arteries and kidneys. Blood pressure goal is less than 130/80. 2) Control cholesterol - contributes to plaque, which can clog arteries and lead to heart disease and stroke. When you control your cholesterol you are giving your arteries their best chance to remain clear. It is recommended that you get cholesterol lab work done once a year. 3) Reduce blood sugar - most of the food we eat is turning into glucose or blood sugar that our body uses for energy. Over time, high levels of blood sugar can damage your heart, kidneys, eyes and nerves. 4) Get active - living an active life is one of the most rewarding gifts you can give yourself and those you love. Simply put, daily physical activity increases your length and quality of life. Strive to exercise 15 minutes most days of the week. 5)  Eat better - A healthy diet is one of your best weapons for fighting cardiovascular disease. When you eat a heart healthy diet, you improve your chances for feeling good and staying healthy for life.   6)  Lose weight - when you shed extra fat an unnecessary pounds, you reduce the burden on your hear, lungs, blood vessels and skeleton. You give yourself the gift of active living, you lower your blood pressure and help yourself feel better. 7) Stop smoking - cigarette smokers have a higher risk of developing cardiovascular disease. If  You smoke, quitting is the best thing you can do for your health. Check American Heart Association on line for more information on Life's Simple 7 and tips for healthy living. A Healthy Heart: Care Instructions  Your Care Instructions     Coronary artery disease, also called heart disease, occurs when a substance called plaque builds up in the vessels that supply oxygen-rich blood to your heart muscle. This can narrow the blood vessels and reduce blood flow. A heart attack happens when blood flow is completely blocked. A high-fat diet, smoking, and other factors increase the risk of heart disease. Your doctor has found that you have a chance of having heart disease. You can do lots of things to keep your heart healthy. It may not be easy, but you can change your diet, exercise more, and quit smoking. These steps really work to lower your chance of heart disease. Follow-up care is a key part of your treatment and safety. Be sure to make and go to all appointments, and call your doctor if you are having problems. It's also a good idea to know your test results and keep a list of the medicines you take. How can you care for yourself at home? Diet  · Use less salt when you cook and eat. This helps lower your blood pressure. Taste food before salting. Add only a little salt when you think you need it. With time, your taste buds will adjust to less salt. · Eat fewer snack items, fast foods, canned soups, and other high-salt, high-fat, processed foods. · Read food labels and try to avoid saturated and trans fats. They increase your risk of heart disease by raising cholesterol levels.   · Limit the amount of solid fat-butter, margarine, and shortening-you eat. Use olive, peanut, or canola oil when you cook. Bake, broil, and steam foods instead of frying them. · Eat a variety of fruit and vegetables every day. Dark green, deep orange, red, or yellow fruits and vegetables are especially good for you. Examples include spinach, carrots, peaches, and berries. · Foods high in fiber can reduce your cholesterol and provide important vitamins and minerals. High-fiber foods include whole-grain cereals and breads, oatmeal, beans, brown rice, citrus fruits, and apples. · Eat lean proteins. Heart-healthy proteins include seafood, lean meats and poultry, eggs, beans, peas, nuts, seeds, and soy products. · Limit drinks and foods with added sugar. These include candy, desserts, and soda pop. Lifestyle changes  · If your doctor recommends it, get more exercise. Walking is a good choice. Bit by bit, increase the amount you walk every day. Try for at least 30 minutes on most days of the week. You also may want to swim, bike, or do other activities. · Do not smoke. If you need help quitting, talk to your doctor about stop-smoking programs and medicines. These can increase your chances of quitting for good. Quitting smoking may be the most important step you can take to protect your heart. It is never too late to quit. · Limit alcohol to 2 drinks a day for men and 1 drink a day for women. Too much alcohol can cause health problems. · Manage other health problems such as diabetes, high blood pressure, and high cholesterol. If you think you may have a problem with alcohol or drug use, talk to your doctor. Medicines  · Take your medicines exactly as prescribed. Call your doctor if you think you are having a problem with your medicine. · If your doctor recommends aspirin, take the amount directed each day. Make sure you take aspirin and not another kind of pain reliever, such as acetaminophen (Tylenol). When should you call for help? [id number] if you have symptoms of a heart attack. These may include:  · Chest pain or pressure, or a strange feeling in the chest.  · Sweating. · Shortness of breath. · Pain, pressure, or a strange feeling in the back, neck, jaw, or upper belly or in one or both shoulders or arms. · Lightheadedness or sudden weakness. · A fast or irregular heartbeat. After you call 911, the  may tell you to chew 1 adult-strength or 2 to 4 low-dose aspirin. Wait for an ambulance. Do not try to drive yourself. Watch closely for changes in your health, and be sure to contact your doctor if you have any problems. Where can you learn more? Go to https://[employer]. org and sign in to your [employer] account. Enter F774 in the [employer] box to learn more about \"A Healthy Heart: Care Instructions. \"     If you do not have an account, please click on the \"Sign Up Now\" link. Current as of: December 16, 2019               Content Version: 12.5  © 4098-0029 Healthwise, Incorporated. Care instructions adapted under license by [facility] ([facility]). If you have questions about a medical condition or this instruction, always ask your healthcare professional. [address] any warranty or liability for your use of this information.

## 2021-09-10 DIAGNOSIS — K21.9 GASTROESOPHAGEAL REFLUX DISEASE WITHOUT ESOPHAGITIS: ICD-10-CM

## 2021-09-10 RX ORDER — FUROSEMIDE 40 MG/1
TABLET ORAL
Qty: 90 TABLET | Refills: 2 | Status: SHIPPED | OUTPATIENT
Start: 2021-09-10 | End: 2022-05-17

## 2021-09-10 RX ORDER — OMEPRAZOLE 40 MG/1
CAPSULE, DELAYED RELEASE ORAL
Qty: 90 CAPSULE | Refills: 2 | Status: SHIPPED | OUTPATIENT
Start: 2021-09-10 | End: 2022-03-14

## 2021-09-10 NOTE — TELEPHONE ENCOUNTER
Rx Refill Note  Requested Prescriptions     Pending Prescriptions Disp Refills   • omeprazole (priLOSEC) 40 MG capsule [Pharmacy Med Name: OMEPRAZOLE DR 40 MG CAPSULE] 90 capsule 1     Sig: TAKE 1 CAPSULE BY MOUTH EVERY DAY   • furosemide (LASIX) 40 MG tablet [Pharmacy Med Name: FUROSEMIDE 40 MG TABLET] 90 tablet 1     Sig: TAKE 1 TABLET BY MOUTH EVERY DAY      Last office visit with prescribing clinician: 6/18/2021      Next office visit with prescribing clinician: 12/20/2021            Jayne Casanova MA  09/10/21, 10:57 CDT

## 2021-09-17 ENCOUNTER — TELEPHONE (OUTPATIENT)
Dept: GASTROENTEROLOGY | Facility: CLINIC | Age: 66
End: 2021-09-17

## 2021-09-17 NOTE — TELEPHONE ENCOUNTER
Deann called pt to confirm appt for procedure on Monday and to make sure she had been holding her Plavix after 9/14/21. Pt tells Deann she started holding her Plavix 9/10/21. I spoke to Dr. Berry about it-she tells me it is ok for pt to proceed with procedure as long as she has been taking her 81mg aspirin. I called and spoke to pt about it-she has been on her aspirin and will continue until the day of her procedure. Pt advised to call me back with any further questions/problems, otherwise she will see Dr. Berry on Monday.

## 2021-09-20 ENCOUNTER — ANESTHESIA EVENT (OUTPATIENT)
Dept: GASTROENTEROLOGY | Facility: HOSPITAL | Age: 66
End: 2021-09-20

## 2021-09-20 ENCOUNTER — HOSPITAL ENCOUNTER (OUTPATIENT)
Facility: HOSPITAL | Age: 66
Setting detail: HOSPITAL OUTPATIENT SURGERY
Discharge: HOME | End: 2021-09-20
Attending: INTERNAL MEDICINE | Admitting: INTERNAL MEDICINE

## 2021-09-20 ENCOUNTER — ANESTHESIA (OUTPATIENT)
Dept: GASTROENTEROLOGY | Facility: HOSPITAL | Age: 66
End: 2021-09-20

## 2021-09-20 VITALS
RESPIRATION RATE: 20 BRPM | WEIGHT: 243 LBS | SYSTOLIC BLOOD PRESSURE: 116 MMHG | OXYGEN SATURATION: 96 % | TEMPERATURE: 97.1 F | DIASTOLIC BLOOD PRESSURE: 77 MMHG | BODY MASS INDEX: 40.48 KG/M2 | HEART RATE: 86 BPM | HEIGHT: 65 IN

## 2021-09-20 DIAGNOSIS — Z83.71 FAMILY HISTORY OF POLYPS IN THE COLON: ICD-10-CM

## 2021-09-20 DIAGNOSIS — Z86.010 HISTORY OF ADENOMATOUS POLYP OF COLON: ICD-10-CM

## 2021-09-20 PROCEDURE — 25010000002 PROPOFOL 10 MG/ML EMULSION: Performed by: NURSE ANESTHETIST, CERTIFIED REGISTERED

## 2021-09-20 PROCEDURE — 88305 TISSUE EXAM BY PATHOLOGIST: CPT | Performed by: INTERNAL MEDICINE

## 2021-09-20 PROCEDURE — 45385 COLONOSCOPY W/LESION REMOVAL: CPT | Performed by: INTERNAL MEDICINE

## 2021-09-20 RX ORDER — PROPOFOL 10 MG/ML
VIAL (ML) INTRAVENOUS AS NEEDED
Status: DISCONTINUED | OUTPATIENT
Start: 2021-09-20 | End: 2021-09-20 | Stop reason: SURG

## 2021-09-20 RX ORDER — SODIUM CHLORIDE 9 MG/ML
500 INJECTION, SOLUTION INTRAVENOUS CONTINUOUS PRN
Status: DISCONTINUED | OUTPATIENT
Start: 2021-09-20 | End: 2021-09-20 | Stop reason: HOSPADM

## 2021-09-20 RX ORDER — LIDOCAINE HYDROCHLORIDE 20 MG/ML
INJECTION, SOLUTION EPIDURAL; INFILTRATION; INTRACAUDAL; PERINEURAL AS NEEDED
Status: DISCONTINUED | OUTPATIENT
Start: 2021-09-20 | End: 2021-09-20 | Stop reason: SURG

## 2021-09-20 RX ORDER — SODIUM CHLORIDE 0.9 % (FLUSH) 0.9 %
10 SYRINGE (ML) INJECTION AS NEEDED
Status: DISCONTINUED | OUTPATIENT
Start: 2021-09-20 | End: 2021-09-20 | Stop reason: HOSPADM

## 2021-09-20 RX ADMIN — LIDOCAINE HYDROCHLORIDE 50 MG: 20 INJECTION, SOLUTION EPIDURAL; INFILTRATION; INTRACAUDAL; PERINEURAL at 12:28

## 2021-09-20 RX ADMIN — SODIUM CHLORIDE 500 ML: 9 INJECTION, SOLUTION INTRAVENOUS at 10:40

## 2021-09-20 RX ADMIN — PROPOFOL 400 MG: 10 INJECTION, EMULSION INTRAVENOUS at 12:28

## 2021-09-20 NOTE — H&P
Chief Complaint:   Colon polyps    Subjective     HPI:   She had 10 multiple adenomatous polyps many of which were over 10 mm in size on last colonoscopy a year ago. She is here for ongoing surveillance.    Past Medical History:   Past Medical History:   Diagnosis Date   • Bell's palsy    • Cataract    • Depression    • E coli infection    • Gastroesophageal reflux disease 2018   • Headache    • History of adenomatous polyp of colon    • History of colon polyps    • Hyperlipidemia    • Hypertension    • Hyperthyroidism    • Hypothyroidism    • Macular degeneration    • Obesity due to excess calories 2019   • Sleep apnea     wears cpap   • Tachycardia    • Type 2 diabetes mellitus (CMS/HCC)    • Visual impairment        Past Surgical History:  Past Surgical History:   Procedure Laterality Date   •  SECTION     • COLONOSCOPY  06/10/2011    Dr. Mahmood-Internal hemorrhoids; Otherwise normal   • COLONOSCOPY N/A 2016    Colon not done this day-see endo report 2016   • COLONOSCOPY  2016    Dr. Mahmood-A single small hyperplastic polyp in the ascending colon   • COLONOSCOPY N/A 2020    One 15mm tubular adenomatous polyp in the cecum; Two 7-10mm tubular adenomatous polyps in the ascending colon; Five 7-10mm tubular adenomatous polyps in the transverse colon; Two 5-12mm tubular adenomatous polyps in the descending colon; Repeat 1 year   • ENDOSCOPY N/A 2016    Normal esophagus; Normal stomach; Normal first part of the duodenum and 2nd part of the duodenum-biopsied   • ENDOSCOPY N/A 2018    Normal esophagus; Normal stomach; Normal first portion of the duodenum and second portion of the duodenum-biopsied   • HYSTERECTOMY     • KNEE SURGERY Left    • TUMOR REMOVAL          Family History:  Family History   Problem Relation Age of Onset   • Heart disease Mother    • Diabetes Mother    • Colon polyps Sister 30        mid 30s   • No Known Problems Maternal Grandmother    • No Known  Problems Maternal Grandfather    • No Known Problems Paternal Grandmother    • No Known Problems Paternal Grandfather    • Colon cancer Neg Hx    • Esophageal cancer Neg Hx        Social History:   reports that she quit smoking about 6 years ago. Her smoking use included cigarettes. She has a 20.00 pack-year smoking history. She has never used smokeless tobacco. She reports that she does not drink alcohol and does not use drugs.    Medications:   Medications Prior to Admission   Medication Sig Dispense Refill Last Dose   • aspirin 81 MG EC tablet Take 81 mg by mouth Daily.   Past Week at Unknown time   • bumetanide (BUMEX) 1 MG tablet Take 1 mg by mouth Daily.   9/19/2021 at Unknown time   • cetirizine (zyrTEC) 10 MG tablet Take 1 tablet by mouth Daily. 90 tablet 1 9/19/2021 at Unknown time   • Continuous Blood Gluc Sensor (Dexcom G6 Sensor) As Needed (glucose control). Every 10 daysDexcom G6 Sensor Kit [address]-64 Use as directed for continuous glucose monitoring 9 each 3 9/20/2021 at Unknown time   • cyclobenzaprine (FLEXERIL) 10 MG tablet    Past Week at Unknown time   • desvenlafaxine (PRISTIQ) 50 MG 24 hr tablet TAKE 1 TABLET BY MOUTH EVERY DAY 90 tablet 3 9/19/2021 at Unknown time   • Dulaglutide (Trulicity) 3 MG/0.5ML solution pen-injector Inject 3 mg under the skin into the appropriate area as directed 1 (One) Time Per Week. 12 pen 3 Past Week at Unknown time   • empagliflozin (Jardiance) 10 MG tablet tablet Take 1 tablet by mouth Daily. Before bkfast 90 tablet 3 9/19/2021 at Unknown time   • folic acid (FOLVITE) 400 MCG tablet Take 400 mcg by mouth Daily.   9/19/2021 at Unknown time   • furosemide (LASIX) 40 MG tablet TAKE 1 TABLET BY MOUTH EVERY DAY 90 tablet 2 9/19/2021 at Unknown time   • insulin aspart (NovoLOG FlexPen) 100 UNIT/ML solution pen-injector sc pen INJECT 30 UNITS 3 TIMES DAILY (Patient taking differently: INJECT 20 UNITS 3 TIMES DAILY) 27 pen 3 9/19/2021 at Unknown time   • Insulin Pen  "Needle (Pen Needles) 32G X 4 MM misc 1 each 4 (Four) Times a Day. 360 each 3 9/20/2021 at Unknown time   • levothyroxine (SYNTHROID, LEVOTHROID) 125 MCG tablet TAKE 1 TABLET BY MOUTH EVERY DAY 90 tablet 1 9/19/2021 at Unknown time   • NIFEdipine CC (ADALAT CC) 60 MG 24 hr tablet TAKE 1 TABLET BY MOUTH EVERY DAY 90 tablet 3 9/19/2021 at Unknown time   • omeprazole (priLOSEC) 40 MG capsule TAKE 1 CAPSULE BY MOUTH EVERY DAY 90 capsule 2 9/19/2021 at Unknown time   • vitamin D (ERGOCALCIFEROL) 1.25 MG (49086 UT) capsule capsule TAKE 1 CAPSULE BY MOUTH 1 (ONE) TIME PER WEEK. 12 capsule 1 Past Week at Unknown time   • carbidopa-levodopa (SINEMET)  MG per tablet TAKE 1 TO 2 TABLETS BY MOUTH EVERY DAY AT BEDTIME 180 tablet 3 9/18/2021   • Insulin Glargine (BASAGLAR KWIKPEN) 100 UNIT/ML injection pen Inject 45 Units under the skin into the appropriate area as directed Daily. (Patient taking differently: Inject 40 Units under the skin into the appropriate area as directed Daily.) 14 pen 3 9/18/2021   • lamoTRIgine (LaMICtal) 100 MG tablet TAKE 1 TABLET BY MOUTH EVERY DAY AT NIGHT 90 tablet 2 9/18/2021   • lisinopril (PRINIVIL,ZESTRIL) 40 MG tablet TAKE 1 TABLET BY MOUTH EVERY DAY 90 tablet 1 9/18/2021   • metoprolol succinate XL (TOPROL-XL) 25 MG 24 hr tablet Take 25 mg by mouth Daily.   9/18/2021   • Multiple Vitamins-Minerals (HAIR SKIN NAILS PO) Take  by mouth.   9/18/2021   • Multiple Vitamins-Minerals (VISION-KRISTAL PRESERVE PO) Take  by mouth Daily With Breakfast.   9/18/2021   • rosuvastatin (CRESTOR) 20 MG tablet TAKE 1 TABLET BY MOUTH EVERY DAY AT NIGHT 90 tablet 3 9/18/2021   • Symbicort 160-4.5 MCG/ACT inhaler TAKE 2 PUFFS BY MOUTH TWICE A DAY 30.6 each 4 9/18/2021       Allergies:  Metformin    ROS:    General: Weight stable  Resp: No SOA  Cardiovascular: No CP      Objective     /85 (Patient Position: Sitting)   Pulse 83   Temp 97.1 °F (36.2 °C) (Temporal)   Resp 20   Ht 165.1 cm (65\")   Wt 110 kg " (243 lb)   LMP  (LMP Unknown)   SpO2 94%   BMI 40.44 kg/m²     Physical Exam   Constitutional: Pt is oriented to person, place, and in no distress.  Eyes: No icterus  ENMT: Unremarkable   Cardiovascular: Normal rate, regular rhythm.    Pulmonary/Chest: No distress.  No audible wheezes  Abdominal: Soft.   Skin: Skin is warm and dry.   Psychiatric: Mood, memory, affect and judgment appear normal.     Assessment/Plan     Diagnosis:  Multiple colon polyps    Anticipated Surgical Procedure:  Colonoscopy    The risks, benefits, and alternatives of colonoscopy were reviewed with the patient today.  Risks including perforation of the colon possibly requiring surgery or colostomy.  Additional risks include risk of bleeding from biopsies or removal of colon tissue.  There is also the risk of a drug reaction or problems with anesthesia.  This will be discussed with the further by the anesthesia team on the day of the procedure.  Lastly there is a possibility of missing a colon polyp or cancer.  The benefits include the diagnosis and management of disease of the colon and rectum.  Alternatives to colonoscopy include barium enema, laboratory testing, radiographic evaluation, or no intervention.  The patient verbalizes understanding and agrees.    Much of this encounter note is an electronic transcription/translation of spoken language to printed text. The electronic translation of spoken language may permit erroneous, or at times, nonsensical words or phrases to be inadvertently transcribed; although I have reviewed the note for such errors, some may still exist.

## 2021-09-20 NOTE — ANESTHESIA POSTPROCEDURE EVALUATION
Patient: Maribeth Isbell    Procedure Summary     Date: 09/20/21 Room / Location:  [facility] /  [facility]    Anesthesia Start: 1223 Anesthesia Stop: 1254    Procedure: COLONOSCOPY WITH ANESTHESIA (N/A ) Diagnosis:       History of adenomatous polyp of colon      Family history of polyps in the colon      (History of adenomatous polyp of colon [Z86.010])      (Family history of polyps in the colon [Z83.71])    Surgeons: Kathy Berry MD Provider: Kalen Goldsmith CRNA    Anesthesia Type: MAC ASA Status: 3          Anesthesia Type: MAC    Vitals  Vitals Value Taken Time   /77 09/20/21 1316   Temp     Pulse 80 09/20/21 1316   Resp 20 09/20/21 1315   SpO2 97 % 09/20/21 1316   Vitals shown include unvalidated device data.        Post Anesthesia Care and Evaluation    Patient location during evaluation: PHASE II  Patient participation: complete - patient participated  Level of consciousness: awake  Pain management: adequate  Airway patency: patent  Anesthetic complications: No anesthetic complications  PONV Status: none  Cardiovascular status: acceptable  Respiratory status: acceptable  Hydration status: acceptable  No anesthesia care post op

## 2021-09-20 NOTE — ANESTHESIA PREPROCEDURE EVALUATION
Anesthesia Evaluation                  Airway   Mallampati: II  Dental    (+) upper dentures    Pulmonary    (+) sleep apnea,   Cardiovascular   Exercise tolerance: good (4-7 METS)    (+) hypertension, CAD, cardiac stents (9/10/2020) Drug eluting stent hyperlipidemia,   (-) angina      Neuro/Psych  (+) TIA,     GI/Hepatic/Renal/Endo    (+) obesity,  GERD,  diabetes mellitus using insulin, thyroid problem hypothyroidism    Musculoskeletal     Abdominal    Substance History      OB/GYN          Other                        Anesthesia Plan    ASA 3     MAC       Anesthetic plan, all risks, benefits, and alternatives have been provided, discussed and informed consent has been obtained with: patient.

## 2021-09-22 NOTE — PROGRESS NOTES
I am writing to inform you that the polyps removed from the colon did not have any cancer. They no longer pose a threat to you since they were completely removed.  However, in the future, it is possible that additional polyps might grow.  For this reason, we will repeat colonoscopy in 3 years as planned.    If you have any questions, please call our office.    Sincerely,        Kathy Berry MD

## 2021-10-13 ENCOUNTER — TELEPHONE (OUTPATIENT)
Dept: FAMILY MEDICINE CLINIC | Facility: CLINIC | Age: 66
End: 2021-10-13

## 2021-10-13 NOTE — TELEPHONE ENCOUNTER
Caller: Maribeth Isbell    Best call back number: [phone]    What orders are you requesting (i.e. lab or imaging): BLOOD PANEL FOR DR. HOBSON WANTS THIS TO BE COLLECTED AT [facility]

## 2021-10-14 ENCOUNTER — LAB (OUTPATIENT)
Dept: FAMILY MEDICINE CLINIC | Facility: CLINIC | Age: 66
End: 2021-10-14

## 2021-10-25 ENCOUNTER — OFFICE VISIT (OUTPATIENT)
Dept: ENDOCRINOLOGY | Facility: CLINIC | Age: 66
End: 2021-10-25

## 2021-10-25 ENCOUNTER — MEDICATION THERAPY MANAGEMENT (OUTPATIENT)
Dept: PHARMACY | Facility: HOSPITAL | Age: 66
End: 2021-10-25

## 2021-10-25 VITALS
DIASTOLIC BLOOD PRESSURE: 80 MMHG | SYSTOLIC BLOOD PRESSURE: 130 MMHG | OXYGEN SATURATION: 96 % | HEART RATE: 72 BPM | WEIGHT: 245 LBS | BODY MASS INDEX: 40.82 KG/M2 | HEIGHT: 65 IN

## 2021-10-25 DIAGNOSIS — E78.2 MIXED DIABETIC HYPERLIPIDEMIA ASSOCIATED WITH TYPE 2 DIABETES MELLITUS (HCC): ICD-10-CM

## 2021-10-25 DIAGNOSIS — E11.69 MIXED DIABETIC HYPERLIPIDEMIA ASSOCIATED WITH TYPE 2 DIABETES MELLITUS (HCC): ICD-10-CM

## 2021-10-25 DIAGNOSIS — E11.9 WELL CONTROLLED TYPE 2 DIABETES MELLITUS (HCC): Primary | ICD-10-CM

## 2021-10-25 DIAGNOSIS — E55.9 VITAMIN D DEFICIENCY: ICD-10-CM

## 2021-10-25 DIAGNOSIS — E11.59 HYPERTENSION ASSOCIATED WITH DIABETES (HCC): ICD-10-CM

## 2021-10-25 DIAGNOSIS — N18.31 CHRONIC KIDNEY DISEASE (CKD) STAGE G3A/A2, MODERATELY DECREASED GLOMERULAR FILTRATION RATE (GFR) BETWEEN 45-59 ML/MIN/1.73 SQUARE METER AND ALBUMINURIA CREATININE RATIO BETWEEN 30-299 MG/G (CMS* (HCC): ICD-10-CM

## 2021-10-25 DIAGNOSIS — I15.2 HYPERTENSION ASSOCIATED WITH DIABETES (HCC): ICD-10-CM

## 2021-10-25 PROCEDURE — 95251 CONT GLUC MNTR ANALYSIS I&R: CPT | Performed by: INTERNAL MEDICINE

## 2021-10-25 PROCEDURE — 99214 OFFICE O/P EST MOD 30 MIN: CPT | Performed by: INTERNAL MEDICINE

## 2021-10-25 RX ORDER — DULAGLUTIDE 4.5 MG/.5ML
4.5 INJECTION, SOLUTION SUBCUTANEOUS WEEKLY
Qty: 6 ML | Refills: 3 | Status: SHIPPED | OUTPATIENT
Start: 2021-10-25 | End: 2022-11-28

## 2021-10-25 RX ORDER — DULAGLUTIDE 4.5 MG/.5ML
4.5 INJECTION, SOLUTION SUBCUTANEOUS WEEKLY
Qty: 6 ML | Refills: 3 | Status: SHIPPED | OUTPATIENT
Start: 2021-10-25 | End: 2021-10-25 | Stop reason: SDUPTHER

## 2021-10-25 NOTE — PROGRESS NOTES
"  Subjective    Maribeth Isbell is a 66 y.o. female. she is here today for follow-up of diabetes       Diabetes        Duration Age 55 years       Complications - TIA    CAD , stent 2020        Alleviating Factors: Compliance         Side Effects  metformin and sulfonylurea     Current diet  in general, a \"healthy\" diet       Current exercise none     Current monitoring regimen: home blood tests - using dexcom     Lab Results   Component Value Date    HGBA1C 8.6 03/16/2021       Home blood sugar records:     Now at goal      Hypoglycemia if misjudges carbs      Patient Active Problem List    Diagnosis    • Family history of polyps in the colon [Z83.71]    • Morbidly obese (HCC) [E66.01]    • Hx of adenomatous colonic polyps [Z86.010]    • Obesity due to excess calories [E66.09]    • CHRISTY (obstructive sleep apnea) [G47.33]    • SVT (supraventricular tachycardia) ([facility]) [I47.1]    • Hypertension associated with diabetes ([facility]) [E11.59, I15.2]    • Gastroesophageal reflux disease [K21.9]    • Acquired hypothyroidism [E03.9]    • Recurrent major depressive disorder ([facility]) [F33.9]    • Osteoarthritis of multiple joints [M15.9]      Current Outpatient Medications   Medication Instructions   • aspirin 81 mg, Oral, Daily   • BASAGLAR KWIKPEN 45 Units, Subcutaneous, Daily   • bumetanide (BUMEX) 1 mg, Oral, Daily   • carbidopa-levodopa (SINEMET)  MG per tablet TAKE 1 TO 2 TABLETS BY MOUTH EVERY DAY AT BEDTIME   • cetirizine (ZYRTEC) 10 mg, Oral, Daily   • Continuous Blood Gluc Sensor (Dexcom G6 Sensor) Does not apply, As Needed, Every 10 daysDexcom G6 Sensor Kit [address]-99 Use as directed for continuous glucose monitoring   • cyclobenzaprine (FLEXERIL) 10 MG tablet No dose, route, or frequency recorded.   • desvenlafaxine (PRISTIQ) 50 MG 24 hr tablet TAKE 1 TABLET BY MOUTH EVERY DAY   • empagliflozin (JARDIANCE) 10 mg, Oral, Daily, Before bkfast   • folic acid (FOLVITE) 400 mcg, Oral, Daily   • furosemide (LASIX) 40 MG " "tablet TAKE 1 TABLET BY MOUTH EVERY DAY   • insulin aspart (NovoLOG FlexPen) 100 UNIT/ML solution pen-injector sc pen INJECT 30 UNITS 3 TIMES DAILY   • Insulin Pen Needle (Pen Needles) 32G X 4 MM misc 1 each, Does not apply, 4 Times Daily   • lamoTRIgine (LaMICtal) 100 MG tablet TAKE 1 TABLET BY MOUTH EVERY DAY AT NIGHT   • levothyroxine (SYNTHROID, LEVOTHROID) 125 MCG tablet TAKE 1 TABLET BY MOUTH EVERY DAY   • lisinopril (PRINIVIL,ZESTRIL) 40 MG tablet TAKE 1 TABLET BY MOUTH EVERY DAY   • metoprolol succinate XL (TOPROL-XL) 25 mg, Oral, Daily   • Multiple Vitamins-Minerals (HAIR SKIN NAILS PO) Oral   • Multiple Vitamins-Minerals (VISION-KRISTAL PRESERVE PO) Oral, Daily With Breakfast   • NIFEdipine CC (ADALAT CC) 60 MG 24 hr tablet TAKE 1 TABLET BY MOUTH EVERY DAY   • omeprazole (priLOSEC) 40 MG capsule TAKE 1 CAPSULE BY MOUTH EVERY DAY   • rosuvastatin (CRESTOR) 20 MG tablet TAKE 1 TABLET BY MOUTH EVERY DAY AT NIGHT   • Symbicort 160-4.5 MCG/ACT inhaler TAKE 2 PUFFS BY MOUTH TWICE A DAY   • Trulicity 3 mg, Subcutaneous, Weekly   • vitamin D (ERGOCALCIFEROL) 50,000 Units, Oral, Weekly           Review of Systems  Review of Systems   Musculoskeletal: Positive for back pain.        Objective    /80   Pulse 72   Ht 165.1 cm (65\")   Wt 111 kg (245 lb)   LMP  (LMP Unknown)   SpO2 96%   BMI 40.77 kg/m²     Physical Exam  Constitutional:       Appearance: Normal appearance.   HENT:      Head: Normocephalic.   Cardiovascular:      Rate and Rhythm: Normal rate and regular rhythm.   Pulmonary:      Effort: Pulmonary effort is normal.      Breath sounds: Normal breath sounds.   Musculoskeletal:      Cervical back: Normal range of motion and neck supple.      Right lower leg: No edema.      Left lower leg: No edema.   Neurological:      Mental Status: She is alert.         Lab Review      Assessment/Plan      1. Well controlled type 2 diabetes mellitus (HCC)    2. Hypertension associated with diabetes (HCC)    3. " Mixed diabetic hyperlipidemia associated with type 2 diabetes mellitus (HCC)    4. Vitamin D deficiency     .    Medications prescribed:      Orders placed during this encounter include:  No orders of the defined types were placed in this encounter.  Patient has type 2 diabetes with hyperglycemia     Glycemic Management:     Lab Results   Component Value Date    HGBA1C 8.6 03/16/2021    HGBA1C 6.6 10/19/2020    HGBA1C 6.90 (H) 09/22/2020     Lab Results   Component Value Date    MICROALBUR 22.7 06/16/2020    CREATININE 1.11 (H) 09/16/2020            Basaglar 50 - ( toujeo not covered ),  Some lows while not eating , decrease to 45 - now doing 40 -15      Trulicity 0.75 mg weekly --- increase to 1.5 mg weekly - 3 mg weekly -  4.5 mg weekly       Novolog doing 20 units with meals, if low carb 15 units - now 10         dexcom     2 week review     92% in range     Excellent type 2 diabetes control      jardiance 10 mg daily           Lipid Management    Lab Results   Component Value Date    CHLPL 173 06/16/2020    CHLPL 183 09/23/2019    CHLPL CANCELED 09/16/2019     Lab Results   Component Value Date    TRIG 230 (H) 06/16/2020    TRIG 248 (H) 09/23/2019    TRIG CANCELED 09/16/2019     Lab Results   Component Value Date    HDL 46 06/16/2020    HDL 48 09/23/2019    HDL CANCELED 09/16/2019     Lab Results   Component Value Date    LDL 81 06/16/2020    LDL 85 09/23/2019    LDL 77 05/31/2019          On crestor 10 mg qhs - increase to 20        Blood Pressure Management:            On lisinopril            Microvascular Complication Monitoring:        Eye Exam , 2021 , no retinopathy , has macular degeneration in left eye     No neuropathy   CKD stage IIIa / A2  Add kerendia to jardiance and lisinopril   Check K in 1month      Weight Related:        Diet interventions: moderate (500 kCal/d) deficit diet.        Bone Health     Lab Results   Component Value Date    PYVJ75LE 41.6 06/16/2020    SPEQ72DO 32.3 04/18/2018     DVHR31XG 34.3 06/05/2017          Thyroid Health     Lab Results   Component Value Date    TSH 0.338 06/16/2020    TSH 0.713 05/31/2019    TSH 4.710 (H) 04/18/2018        on levothyroxine 125 mcgs daily    missing some doses, no changes    No results found for: [id number]             4. Follow-up: No follow-ups on file.

## 2021-10-25 NOTE — PROGRESS NOTES
Patient was not interested in switching due to being afraid insurance company would not work with [facility]. I have discontinued the orders sent to [facility] and moved them to [facility] in [place] per patient request.

## 2021-11-11 ENCOUNTER — SPECIALTY PHARMACY (OUTPATIENT)
Dept: PHARMACY | Facility: HOSPITAL | Age: 66
End: 2021-11-11

## 2021-11-11 NOTE — PROGRESS NOTES
Patient was afraid to switch from current pharmacy for fear of them not accepting them back if they tried to change.

## 2021-11-27 DIAGNOSIS — Z79.4 TYPE 2 DIABETES MELLITUS WITH HYPERGLYCEMIA, WITH LONG-TERM CURRENT USE OF INSULIN (HCC): ICD-10-CM

## 2021-11-27 DIAGNOSIS — I15.2 HYPERTENSION ASSOCIATED WITH DIABETES (HCC): ICD-10-CM

## 2021-11-27 DIAGNOSIS — E11.59 HYPERTENSION ASSOCIATED WITH DIABETES (HCC): ICD-10-CM

## 2021-11-27 DIAGNOSIS — E03.9 ACQUIRED HYPOTHYROIDISM: ICD-10-CM

## 2021-11-27 DIAGNOSIS — E11.65 TYPE 2 DIABETES MELLITUS WITH HYPERGLYCEMIA, WITH LONG-TERM CURRENT USE OF INSULIN (HCC): ICD-10-CM

## 2021-11-29 RX ORDER — LISINOPRIL 40 MG/1
TABLET ORAL
Qty: 90 TABLET | Refills: 2 | Status: SHIPPED | OUTPATIENT
Start: 2021-11-29 | End: 2022-10-10

## 2021-11-29 RX ORDER — BUMETANIDE 1 MG/1
TABLET ORAL
Qty: 90 TABLET | Refills: 1 | Status: SHIPPED | OUTPATIENT
Start: 2021-11-29 | End: 2022-05-17 | Stop reason: SDUPTHER

## 2021-11-29 RX ORDER — LEVOTHYROXINE SODIUM 0.12 MG/1
TABLET ORAL
Qty: 90 TABLET | Refills: 2 | Status: SHIPPED | OUTPATIENT
Start: 2021-11-29 | End: 2022-10-10

## 2021-11-29 RX ORDER — ERGOCALCIFEROL 1.25 MG/1
50000 CAPSULE ORAL WEEKLY
Qty: 12 CAPSULE | Refills: 2 | Status: SHIPPED | OUTPATIENT
Start: 2021-11-29 | End: 2022-01-28

## 2021-11-29 NOTE — TELEPHONE ENCOUNTER
Rx Refill Note  Requested Prescriptions     Pending Prescriptions Disp Refills   • levothyroxine (SYNTHROID, LEVOTHROID) 125 MCG tablet [Pharmacy Med Name: LEVOTHYROXINE 125 MCG TABLET] 90 tablet 1     Sig: TAKE 1 TABLET BY MOUTH EVERY DAY   • vitamin D (ERGOCALCIFEROL) 1.25 MG (15028 UT) capsule capsule [Pharmacy Med Name: VITAMIN D2 1.25MG(50,000 UNIT)] 12 capsule 1     Sig: TAKE 1 CAPSULE BY MOUTH 1 (ONE) TIME PER WEEK.      Last office visit with prescribing clinician: 6/18/2021      Next office visit with prescribing clinician: 12/3/2021            Jayne Casanova MA  11/29/21, 13:09 CST

## 2021-11-29 NOTE — TELEPHONE ENCOUNTER
Rx Refill Note  Requested Prescriptions     Pending Prescriptions Disp Refills   • lisinopril (PRINIVIL,ZESTRIL) 40 MG tablet [Pharmacy Med Name: LISINOPRIL 40 MG TABLET] 90 tablet 1     Sig: TAKE 1 TABLET BY MOUTH EVERY DAY      Last office visit with prescribing clinician: 6/18/21      Next office visit with prescribing clinician: 12/3/21           Jayne Casanova MA  11/29/21, 13:08 CST

## 2021-12-02 RX ORDER — INSULIN GLARGINE 100 [IU]/ML
40 INJECTION, SOLUTION SUBCUTANEOUS DAILY
Qty: 12 PEN | Refills: 6 | Status: SHIPPED | OUTPATIENT
Start: 2021-12-02 | End: 2022-03-24 | Stop reason: SDUPTHER

## 2021-12-02 RX ORDER — ROSUVASTATIN CALCIUM 20 MG/1
TABLET, COATED ORAL
Qty: 90 TABLET | Refills: 3 | Status: SHIPPED | OUTPATIENT
Start: 2021-12-02 | End: 2022-07-06 | Stop reason: DRUGHIGH

## 2021-12-02 RX ORDER — INSULIN ASPART 100 [IU]/ML
INJECTION, SOLUTION INTRAVENOUS; SUBCUTANEOUS
Qty: 20 PEN | Refills: 6 | Status: SHIPPED | OUTPATIENT
Start: 2021-12-02 | End: 2022-03-24 | Stop reason: SDUPTHER

## 2021-12-03 ENCOUNTER — OFFICE VISIT (OUTPATIENT)
Dept: FAMILY MEDICINE CLINIC | Facility: CLINIC | Age: 66
End: 2021-12-03

## 2021-12-03 VITALS
DIASTOLIC BLOOD PRESSURE: 80 MMHG | HEART RATE: 71 BPM | SYSTOLIC BLOOD PRESSURE: 144 MMHG | HEIGHT: 65 IN | TEMPERATURE: 97.4 F | BODY MASS INDEX: 40.32 KG/M2 | OXYGEN SATURATION: 97 % | RESPIRATION RATE: 18 BRPM | WEIGHT: 242 LBS

## 2021-12-03 DIAGNOSIS — E03.9 ACQUIRED HYPOTHYROIDISM: ICD-10-CM

## 2021-12-03 DIAGNOSIS — R53.83 FATIGUE, UNSPECIFIED TYPE: ICD-10-CM

## 2021-12-03 DIAGNOSIS — E11.65 TYPE 2 DIABETES MELLITUS WITH HYPERGLYCEMIA, WITH LONG-TERM CURRENT USE OF INSULIN (HCC): Primary | ICD-10-CM

## 2021-12-03 DIAGNOSIS — Z79.4 TYPE 2 DIABETES MELLITUS WITH HYPERGLYCEMIA, WITH LONG-TERM CURRENT USE OF INSULIN (HCC): Primary | ICD-10-CM

## 2021-12-03 DIAGNOSIS — E78.2 MIXED HYPERLIPIDEMIA: ICD-10-CM

## 2021-12-03 PROCEDURE — 99213 OFFICE O/P EST LOW 20 MIN: CPT | Performed by: FAMILY MEDICINE

## 2021-12-03 NOTE — PROGRESS NOTES
Subjective   Maribeth Isbell is a 66 y.o. female.     66-year-old female history of diabetes hypertension hyperlipidemia      The following portions of the patient's history were reviewed and updated as appropriate: allergies, current medications, past family history, past medical history, past social history, past surgical history and problem list.    Review of Systems   Respiratory: Negative for shortness of breath.    Cardiovascular: Negative for chest pain and leg swelling.        Blood pressure checks at home consistently less than 135   Endocrine: Negative for polydipsia, polyphagia and polyuria.   Genitourinary: Negative for difficulty urinating.       Objective   Physical Exam  Vitals and nursing note reviewed.   Constitutional:       Appearance: She is obese.   Eyes:      Extraocular Movements: Extraocular movements intact.      Pupils: Pupils are equal, round, and reactive to light.   Cardiovascular:      Rate and Rhythm: Normal rate and regular rhythm.      Pulses: Normal pulses.      Heart sounds: Normal heart sounds.   Pulmonary:      Effort: Pulmonary effort is normal.      Breath sounds: Normal breath sounds.   Musculoskeletal:      Right lower leg: No edema.      Left lower leg: No edema.   Skin:     General: Skin is warm and dry.   Neurological:      General: No focal deficit present.      Mental Status: She is alert.   Psychiatric:         Mood and Affect: Mood normal.         Behavior: Behavior normal.         Assessment/Plan   Diagnoses and all orders for this visit:    1. Type 2 diabetes mellitus with hyperglycemia, with long-term current use of insulin (HCC) (Primary)  -     Hemoglobin A1c    2. Mixed hyperlipidemia  -     Comprehensive Metabolic Panel  -     Lipid Panel    3. Fatigue, unspecified type  -     CBC & Differential    4. Acquired hypothyroidism  -     TSH         Plan advised flu shot and Covid booster

## 2021-12-04 LAB
ALBUMIN SERPL-MCNC: 4.3 G/DL (ref 3.5–5.2)
ALBUMIN/GLOB SERPL: 2.3 G/DL
ALP SERPL-CCNC: 58 U/L (ref 39–117)
ALT SERPL-CCNC: 9 U/L (ref 1–33)
AST SERPL-CCNC: 20 U/L (ref 1–32)
BASOPHILS # BLD AUTO: 0.05 10*3/MM3 (ref 0–0.2)
BASOPHILS NFR BLD AUTO: 0.9 % (ref 0–1.5)
BILIRUB SERPL-MCNC: 0.4 MG/DL (ref 0–1.2)
BUN SERPL-MCNC: 14 MG/DL (ref 8–23)
BUN/CREAT SERPL: 15.6 (ref 7–25)
CALCIUM SERPL-MCNC: 9.2 MG/DL (ref 8.6–10.5)
CHLORIDE SERPL-SCNC: 106 MMOL/L (ref 98–107)
CHOLEST SERPL-MCNC: 175 MG/DL (ref 0–200)
CO2 SERPL-SCNC: 28.5 MMOL/L (ref 22–29)
CREAT SERPL-MCNC: 0.9 MG/DL (ref 0.57–1)
EOSINOPHIL # BLD AUTO: 0.08 10*3/MM3 (ref 0–0.4)
EOSINOPHIL NFR BLD AUTO: 1.5 % (ref 0.3–6.2)
ERYTHROCYTE [DISTWIDTH] IN BLOOD BY AUTOMATED COUNT: 13 % (ref 12.3–15.4)
GLOBULIN SER CALC-MCNC: 1.9 GM/DL
GLUCOSE SERPL-MCNC: 88 MG/DL (ref 65–99)
HBA1C MFR BLD: 6 % (ref 4.8–5.6)
HCT VFR BLD AUTO: 45.1 % (ref 34–46.6)
HDLC SERPL-MCNC: 51 MG/DL (ref 40–60)
HGB BLD-MCNC: 14.9 G/DL (ref 12–15.9)
IMM GRANULOCYTES # BLD AUTO: 0.01 10*3/MM3 (ref 0–0.05)
IMM GRANULOCYTES NFR BLD AUTO: 0.2 % (ref 0–0.5)
LDLC SERPL CALC-MCNC: 102 MG/DL (ref 0–100)
LYMPHOCYTES # BLD AUTO: 2 10*3/MM3 (ref 0.7–3.1)
LYMPHOCYTES NFR BLD AUTO: 36.6 % (ref 19.6–45.3)
MCH RBC QN AUTO: 30.1 PG (ref 26.6–33)
MCHC RBC AUTO-ENTMCNC: 33 G/DL (ref 31.5–35.7)
MCV RBC AUTO: 91.1 FL (ref 79–97)
MONOCYTES # BLD AUTO: 0.47 10*3/MM3 (ref 0.1–0.9)
MONOCYTES NFR BLD AUTO: 8.6 % (ref 5–12)
NEUTROPHILS # BLD AUTO: 2.86 10*3/MM3 (ref 1.7–7)
NEUTROPHILS NFR BLD AUTO: 52.2 % (ref 42.7–76)
NRBC BLD AUTO-RTO: 0 /100 WBC (ref 0–0.2)
PLATELET # BLD AUTO: 255 10*3/MM3 (ref 140–450)
POTASSIUM SERPL-SCNC: 3.9 MMOL/L (ref 3.5–5.2)
PROT SERPL-MCNC: 6.2 G/DL (ref 6–8.5)
RBC # BLD AUTO: 4.95 10*6/MM3 (ref 3.77–5.28)
SODIUM SERPL-SCNC: 143 MMOL/L (ref 136–145)
TRIGL SERPL-MCNC: 125 MG/DL (ref 0–150)
TSH SERPL DL<=0.005 MIU/L-ACNC: 1.06 UIU/ML (ref 0.27–4.2)
VLDLC SERPL CALC-MCNC: 22 MG/DL (ref 5–40)
WBC # BLD AUTO: 5.47 10*3/MM3 (ref 3.4–10.8)

## 2021-12-29 ENCOUNTER — TELEPHONE (OUTPATIENT)
Dept: ENDOCRINOLOGY | Facility: CLINIC | Age: 66
End: 2021-12-29

## 2022-01-20 RX ORDER — PEN NEEDLE, DIABETIC 32GX 5/32"
NEEDLE, DISPOSABLE MISCELLANEOUS
Qty: 400 EACH | Refills: 3 | Status: SHIPPED | OUTPATIENT
Start: 2022-01-20

## 2022-01-28 ENCOUNTER — OFFICE VISIT (OUTPATIENT)
Dept: FAMILY MEDICINE CLINIC | Facility: CLINIC | Age: 67
End: 2022-01-28

## 2022-01-28 VITALS
DIASTOLIC BLOOD PRESSURE: 80 MMHG | HEART RATE: 85 BPM | OXYGEN SATURATION: 98 % | TEMPERATURE: 95.9 F | SYSTOLIC BLOOD PRESSURE: 134 MMHG | WEIGHT: 229 LBS | HEIGHT: 65 IN | BODY MASS INDEX: 38.15 KG/M2

## 2022-01-28 DIAGNOSIS — R07.81 RIB PAIN: Primary | ICD-10-CM

## 2022-01-28 PROCEDURE — 96372 THER/PROPH/DIAG INJ SC/IM: CPT | Performed by: NURSE PRACTITIONER

## 2022-01-28 PROCEDURE — 99213 OFFICE O/P EST LOW 20 MIN: CPT | Performed by: NURSE PRACTITIONER

## 2022-01-28 RX ORDER — KETOROLAC TROMETHAMINE 15 MG/ML
15 INJECTION, SOLUTION INTRAMUSCULAR; INTRAVENOUS ONCE
Status: DISCONTINUED | OUTPATIENT
Start: 2022-01-28 | End: 2022-01-28

## 2022-01-28 RX ORDER — KETOROLAC TROMETHAMINE 15 MG/ML
15 INJECTION, SOLUTION INTRAMUSCULAR; INTRAVENOUS ONCE
Status: COMPLETED | OUTPATIENT
Start: 2022-01-28 | End: 2022-01-28

## 2022-01-28 RX ADMIN — KETOROLAC TROMETHAMINE 15 MG: 15 INJECTION, SOLUTION INTRAMUSCULAR; INTRAVENOUS at 11:14

## 2022-01-28 NOTE — PROGRESS NOTES
CC: rib pain    History:  Maribeth Isbell is a 66 y.o. female who presents today for evaluation of the above problems.      Patient reports that she bent over to  a book on Monday and felt a pop in her ribs. The left side has gotten better, but the right has not. The left side is still mildly tender, but the right side is painful to motion and touch.       HPI  ROS:  Review of Systems   Musculoskeletal:        Rib pain       Allergies   Allergen Reactions   • Metformin GI Intolerance     Past Medical History:   Diagnosis Date   • Bell's palsy    • Cataract    • Depression    • E coli infection    • Gastroesophageal reflux disease 2018   • Headache    • History of adenomatous polyp of colon    • History of colon polyps    • Hyperlipidemia    • Hypertension    • Hyperthyroidism    • Hypothyroidism    • Macular degeneration    • Obesity due to excess calories 2019   • Sleep apnea     wears cpap   • Tachycardia    • Type 2 diabetes mellitus (HCC)    • Visual impairment      Past Surgical History:   Procedure Laterality Date   •  SECTION     • COLONOSCOPY  06/10/2011    Dr. Mahmood-Internal hemorrhoids; Otherwise normal   • COLONOSCOPY N/A 2016    Colon not done this day-see endo report 2016   • COLONOSCOPY  2016    Dr. Mahmood-A single small hyperplastic polyp in the ascending colon   • COLONOSCOPY N/A 2020    One 15mm tubular adenomatous polyp in the cecum; Two 7-10mm tubular adenomatous polyps in the ascending colon; Five 7-10mm tubular adenomatous polyps in the transverse colon; Two 5-12mm tubular adenomatous polyps in the descending colon; Repeat 1 year   • COLONOSCOPY N/A 2021    Procedure: COLONOSCOPY WITH ANESTHESIA;  Surgeon: Kathy Berry MD;  Location: [facility] ENDOSCOPY;  Service: Gastroenterology;  Laterality: N/A;  pre scsreen  post  Dr. back   • ENDOSCOPY N/A 2016    Normal esophagus; Normal stomach; Normal first part of the duodenum and 2nd part of  the duodenum-biopsied   • ENDOSCOPY N/A 7/30/2018    Normal esophagus; Normal stomach; Normal first portion of the duodenum and second portion of the duodenum-biopsied   • HYSTERECTOMY     • KNEE SURGERY Left    • TUMOR REMOVAL       Family History   Problem Relation Age of Onset   • Heart disease Mother    • Diabetes Mother    • Colon polyps Sister 30        mid 30s   • No Known Problems Maternal Grandmother    • No Known Problems Maternal Grandfather    • No Known Problems Paternal Grandmother    • No Known Problems Paternal Grandfather    • Colon cancer Neg Hx    • Esophageal cancer Neg Hx       reports that she quit smoking about 7 years ago. Her smoking use included cigarettes. She has a 20.00 pack-year smoking history. She has never used smokeless tobacco. She reports that she does not drink alcohol and does not use drugs.      Current Outpatient Medications:   •  aspirin 81 MG EC tablet, Take 81 mg by mouth Daily., Disp: , Rfl:   •  bumetanide (BUMEX) 1 MG tablet, Take 1 mg by mouth Daily., Disp: , Rfl:   •  carbidopa-levodopa (SINEMET)  MG per tablet, TAKE 1 TO 2 TABLETS BY MOUTH EVERY DAY AT BEDTIME, Disp: 180 tablet, Rfl: 3  •  cetirizine (zyrTEC) 10 MG tablet, Take 1 tablet by mouth Daily., Disp: 90 tablet, Rfl: 1  •  cyclobenzaprine (FLEXERIL) 10 MG tablet, Take 10 mg by mouth 3 (Three) Times a Day As Needed., Disp: , Rfl:   •  desvenlafaxine (PRISTIQ) 50 MG 24 hr tablet, TAKE 1 TABLET BY MOUTH EVERY DAY, Disp: 90 tablet, Rfl: 3  •  Dulaglutide (Trulicity) 4.5 MG/0.5ML solution pen-injector, Inject 4.5 mg (1 pen) under the skin into the appropriate area as directed 1 (One) Time Per Week., Disp: 6 mL, Rfl: 3  •  empagliflozin (Jardiance) 10 MG tablet tablet, Take 1 tablet by mouth Daily. Before bkfast, Disp: 90 tablet, Rfl: 3  •  Finerenone 10 MG tablet, Take 1 tablet by mouth Daily., Disp: 90 tablet, Rfl: 3  •  folic acid (FOLVITE) 400 MCG tablet, Take 400 mcg by mouth Daily., Disp: , Rfl:   •   furosemide (LASIX) 40 MG tablet, TAKE 1 TABLET BY MOUTH EVERY DAY, Disp: 90 tablet, Rfl: 2  •  insulin aspart (NovoLOG FlexPen) 100 UNIT/ML solution pen-injector sc pen, INJECT 20 UNITS 3 TIMES DAILY, Disp: 20 pen, Rfl: 6  •  Insulin Glargine (BASAGLAR KWIKPEN) 100 UNIT/ML injection pen, Inject 40 Units under the skin into the appropriate area as directed Daily., Disp: 12 pen, Rfl: 6  •  lamoTRIgine (LaMICtal) 100 MG tablet, TAKE 1 TABLET BY MOUTH EVERY DAY AT NIGHT, Disp: 90 tablet, Rfl: 2  •  levothyroxine (SYNTHROID, LEVOTHROID) 125 MCG tablet, TAKE 1 TABLET BY MOUTH EVERY DAY, Disp: 90 tablet, Rfl: 2  •  lisinopril (PRINIVIL,ZESTRIL) 40 MG tablet, TAKE 1 TABLET BY MOUTH EVERY DAY, Disp: 90 tablet, Rfl: 2  •  metoprolol succinate XL (TOPROL-XL) 25 MG 24 hr tablet, Take 25 mg by mouth Daily., Disp: , Rfl:   •  Multiple Vitamins-Minerals (HAIR SKIN NAILS PO), Take  by mouth., Disp: , Rfl:   •  Multiple Vitamins-Minerals (VISION-KRISTAL PRESERVE PO), Take  by mouth Daily With Breakfast., Disp: , Rfl:   •  NIFEdipine CC (ADALAT CC) 60 MG 24 hr tablet, TAKE 1 TABLET BY MOUTH EVERY DAY, Disp: 90 tablet, Rfl: 3  •  omeprazole (priLOSEC) 40 MG capsule, TAKE 1 CAPSULE BY MOUTH EVERY DAY, Disp: 90 capsule, Rfl: 2  •  rosuvastatin (CRESTOR) 20 MG tablet, TAKE 1 TABLET BY MOUTH EVERY DAY AT NIGHT, Disp: 90 tablet, Rfl: 3  •  Symbicort 160-4.5 MCG/ACT inhaler, TAKE 2 PUFFS BY MOUTH TWICE A DAY, Disp: 30.6 each, Rfl: 4  •  BD Pen Needle Griselda 2nd Gen 32G X 4 MM misc, 1 EACH 4 (FOUR) TIMES A DAY., Disp: 400 each, Rfl: 3  •  Continuous Blood Gluc Sensor (Dexcom G6 Sensor), As Needed (glucose control). Every 10 daysDexcom G6 Sensor Kit [id number] Use as directed for continuous glucose monitoring, Disp: 9 each, Rfl: 3  No current facility-administered medications for this visit.    OBJECTIVE:  /80 (BP Location: Left arm, Patient Position: Sitting, Cuff Size: Large Adult)   Pulse 85   Temp 95.9 °F (35.5 °C) (Temporal)   Ht  "165.1 cm (65\")   Wt 104 kg (229 lb)   LMP  (LMP Unknown)   SpO2 98%   Breastfeeding No   BMI 38.11 kg/m²    Physical Exam  Vitals reviewed.   Constitutional:       Appearance: She is well-developed.   Cardiovascular:      Rate and Rhythm: Normal rate.   Pulmonary:      Effort: Pulmonary effort is normal.   Musculoskeletal:      Comments: Tenderness of lateral rib cage on both sides   Neurological:      Mental Status: She is alert and oriented to person, place, and time.   Psychiatric:         Behavior: Behavior normal.         Assessment/Plan    Diagnoses and all orders for this visit:    1. Rib pain (Primary)  -     XR Ribs 2 View Left; Future  -     XR Ribs 2 View Right; Future  -     Discontinue: ketorolac (TORADOL) injection 15 mg  -     ketorolac (TORADOL) injection 15 mg        An After Visit Summary was printed and given to the patient at discharge.  Return if symptoms worsen or fail to improve, for Next scheduled follow up.       Gillian Mullen, YESSICA 1/28/22    Electronically signed.  "

## 2022-02-01 RX ORDER — PREDNISONE 10 MG/1
TABLET ORAL
Qty: 21 EACH | Refills: 0 | Status: SHIPPED | OUTPATIENT
Start: 2022-02-01 | End: 2022-08-18

## 2022-03-04 ENCOUNTER — TELEPHONE (OUTPATIENT)
Dept: CARDIOLOGY CLINIC | Age: 67
End: 2022-03-04

## 2022-03-04 NOTE — TELEPHONE ENCOUNTER
Date: TBD     Cardiologist: Boston Mario     Procedure: decompressive lumbar laminectomy and fusion of LF-S1     Surgeon: [address]  Reason for office visit and medical concerns addressed at this office visit: cva, dm, htn, hyperlipidemia, cad,      Testing Performed and Date of Service:  9/10/20 Cath  Single-vessel disease involving proximal LAD.   Normal LV ejection fraction.   Successful PCI to proximal LAD (3.5 x 9 mm resolute integrity) utilizing   drug-eluting stent.     Does the patient have a stent? If so, what type? CHRISTIANNE placed on 9/10/20     RCRI = 6.6   METs 4    Current Medications: metoprolol, crestor, prilosec, nifedipine, lisinopril, levothyroxine, insulin, pristiq, sinemet, symbicort, plavix, lamictal, folic acid, zyrtec, aspirin, bumex     Is the patient currently taking an anticoagulant?  If so, what is the diagnosis the patient has been given to warrant the need for the anticoagulant? plavix for CHRISTIANNE 9/10/20     Additional Notes: requesting to hold plavix 5 days prior to procedure

## 2022-03-07 NOTE — TELEPHONE ENCOUNTER
Laroy Senna,  It is ok for the patient to hold Plavix 5 days prior to the procedure.   Thanks [facility]

## 2022-03-12 DIAGNOSIS — K21.9 GASTROESOPHAGEAL REFLUX DISEASE WITHOUT ESOPHAGITIS: ICD-10-CM

## 2022-03-14 RX ORDER — OMEPRAZOLE 40 MG/1
CAPSULE, DELAYED RELEASE ORAL
Qty: 90 CAPSULE | Refills: 2 | Status: SHIPPED | OUTPATIENT
Start: 2022-03-14

## 2022-03-14 NOTE — TELEPHONE ENCOUNTER
Rx Refill Note  Requested Prescriptions     Pending Prescriptions Disp Refills   • omeprazole (priLOSEC) 40 MG capsule [Pharmacy Med Name: OMEPRAZOLE DR 40 MG CAPSULE] 90 capsule 2     Sig: TAKE 1 CAPSULE BY MOUTH EVERY DAY      Last office visit with prescribing clinician: 12/3/2021      Next office visit with prescribing clinician: 6/20/2022            Jayne Casanova MA  03/14/22, 07:28 CDT

## 2022-03-23 ENCOUNTER — TELEPHONE (OUTPATIENT)
Dept: CARDIOLOGY CLINIC | Age: 67
End: 2022-03-23

## 2022-03-23 NOTE — TELEPHONE ENCOUNTER
Jodi Muñoz called to reschedule a office visit. The pt  requested to be scheduled to May or June. The pt needs to be scheduled with her . Please be advised that the best time to call her to accommodate their needs is Anytime. [facility] unable to schedule with Dr. Faustino Smyth November. Thank you.

## 2022-03-24 ENCOUNTER — TELEPHONE (OUTPATIENT)
Dept: ENDOCRINOLOGY | Facility: CLINIC | Age: 67
End: 2022-03-24

## 2022-03-24 DIAGNOSIS — E11.65 TYPE 2 DIABETES MELLITUS WITH HYPERGLYCEMIA, WITH LONG-TERM CURRENT USE OF INSULIN: ICD-10-CM

## 2022-03-24 DIAGNOSIS — Z79.4 TYPE 2 DIABETES MELLITUS WITH HYPERGLYCEMIA, WITH LONG-TERM CURRENT USE OF INSULIN: ICD-10-CM

## 2022-03-24 RX ORDER — INSULIN GLARGINE 100 [IU]/ML
40 INJECTION, SOLUTION SUBCUTANEOUS DAILY
Qty: 12 PEN | Refills: 6 | Status: SHIPPED | OUTPATIENT
Start: 2022-03-24 | End: 2022-12-06

## 2022-03-24 RX ORDER — INSULIN ASPART 100 [IU]/ML
INJECTION, SOLUTION INTRAVENOUS; SUBCUTANEOUS
Qty: 20 PEN | Refills: 6 | Status: SHIPPED | OUTPATIENT
Start: 2022-03-24

## 2022-03-25 ENCOUNTER — DOCUMENTATION (OUTPATIENT)
Dept: ENDOCRINOLOGY | Facility: CLINIC | Age: 67
End: 2022-03-25

## 2022-03-25 NOTE — PROGRESS NOTES
DWO FOR CGM [facility] TO [employer]  3/18/2022 AND 3/23/2022      CONFIRMATION RECEIVED BOTH TIMES     SENT TO MR 03/25/2022

## 2022-03-30 ENCOUNTER — DOCUMENTATION (OUTPATIENT)
Dept: ENDOCRINOLOGY | Facility: CLINIC | Age: 67
End: 2022-03-30

## 2022-05-16 DIAGNOSIS — G25.81 RLS (RESTLESS LEGS SYNDROME): ICD-10-CM

## 2022-05-17 RX ORDER — FUROSEMIDE 40 MG/1
TABLET ORAL
Qty: 90 TABLET | Refills: 2 | Status: SHIPPED | OUTPATIENT
Start: 2022-05-17 | End: 2022-12-13

## 2022-05-17 RX ORDER — BUMETANIDE 1 MG/1
TABLET ORAL
Qty: 90 TABLET | Refills: 1 | Status: SHIPPED | OUTPATIENT
Start: 2022-05-17

## 2022-05-17 RX ORDER — CARBIDOPA/LEVODOPA 25MG-250MG
TABLET ORAL
Qty: 180 TABLET | Refills: 3 | Status: SHIPPED | OUTPATIENT
Start: 2022-05-17

## 2022-05-17 NOTE — TELEPHONE ENCOUNTER
Rx Refill Note  Requested Prescriptions     Pending Prescriptions Disp Refills   • furosemide (LASIX) 40 MG tablet [Pharmacy Med Name: FUROSEMIDE 40 MG TABLET] 90 tablet 2     Sig: TAKE 1 TABLET BY MOUTH EVERY DAY   • carbidopa-levodopa (SINEMET)  MG per tablet [Pharmacy Med Name: CARBIDOPA-LEVODOPA  TAB] 180 tablet 3     Sig: TAKE 1 TO 2 TABLETS BY MOUTH EVERY DAY AT BEDTIME      Last office visit with prescribing clinician: 12/3/2021      Next office visit with prescribing clinician: 6/20/2022       {TIP  Please add Last Relevant Lab 12/3/21    Jayne Casanova MA  05/17/22, 07:28 CDT

## 2022-05-25 ENCOUNTER — OFFICE VISIT (OUTPATIENT)
Dept: CARDIOLOGY CLINIC | Age: 67
End: 2022-05-25
Payer: COMMERCIAL

## 2022-05-25 VITALS
HEIGHT: 65 IN | HEART RATE: 76 BPM | WEIGHT: 226 LBS | DIASTOLIC BLOOD PRESSURE: 80 MMHG | OXYGEN SATURATION: 98 % | BODY MASS INDEX: 37.65 KG/M2 | SYSTOLIC BLOOD PRESSURE: 116 MMHG

## 2022-05-25 DIAGNOSIS — Z95.5 HISTORY OF CORONARY ARTERY STENT PLACEMENT: ICD-10-CM

## 2022-05-25 DIAGNOSIS — I25.10 CORONARY ARTERY DISEASE INVOLVING NATIVE CORONARY ARTERY OF NATIVE HEART WITHOUT ANGINA PECTORIS: Primary | ICD-10-CM

## 2022-05-25 DIAGNOSIS — E78.2 MIXED DYSLIPIDEMIA: ICD-10-CM

## 2022-05-25 DIAGNOSIS — I10 ESSENTIAL HYPERTENSION: ICD-10-CM

## 2022-05-25 PROCEDURE — 99214 OFFICE O/P EST MOD 30 MIN: CPT | Performed by: NURSE PRACTITIONER

## 2022-05-25 PROCEDURE — 3017F COLORECTAL CA SCREEN DOC REV: CPT | Performed by: NURSE PRACTITIONER

## 2022-05-25 PROCEDURE — G8427 DOCREV CUR MEDS BY ELIG CLIN: HCPCS | Performed by: NURSE PRACTITIONER

## 2022-05-25 PROCEDURE — 1123F ACP DISCUSS/DSCN MKR DOCD: CPT | Performed by: NURSE PRACTITIONER

## 2022-05-25 PROCEDURE — 1090F PRES/ABSN URINE INCON ASSESS: CPT | Performed by: NURSE PRACTITIONER

## 2022-05-25 PROCEDURE — G8400 PT W/DXA NO RESULTS DOC: HCPCS | Performed by: NURSE PRACTITIONER

## 2022-05-25 PROCEDURE — 1036F TOBACCO NON-USER: CPT | Performed by: NURSE PRACTITIONER

## 2022-05-25 PROCEDURE — G8417 CALC BMI ABV UP PARAM F/U: HCPCS | Performed by: NURSE PRACTITIONER

## 2022-05-25 RX ORDER — AMLODIPINE BESYLATE 5 MG/1
5 TABLET ORAL
COMMUNITY
Start: 2016-03-02

## 2022-05-25 RX ORDER — FINERENONE 10 MG/1
TABLET, FILM COATED ORAL
COMMUNITY
Start: 2022-05-02

## 2022-05-25 RX ORDER — SOD SULF/POT CHLORIDE/MAG SULF 1.479 G
TABLET ORAL
COMMUNITY

## 2022-05-25 RX ORDER — GLYBURIDE 5 MG/1
10 TABLET ORAL
COMMUNITY
Start: 2016-03-02

## 2022-05-25 RX ORDER — FLUCONAZOLE 150 MG/1
TABLET ORAL
COMMUNITY
End: 2022-05-25

## 2022-05-25 RX ORDER — HYDROCODONE BITARTRATE AND ACETAMINOPHEN 10; 325 MG/1; MG/1
TABLET ORAL
COMMUNITY

## 2022-05-25 RX ORDER — RANITIDINE 150 MG/1
150 TABLET ORAL
COMMUNITY
Start: 2016-03-02

## 2022-05-25 NOTE — PROGRESS NOTES
[facility], [place]. [address]li [address] [address] [facility]  [phone] office  [phone] fax      OFFICE VISIT:  2022    [facility] - : 1955  Reason For Visit:  Eze Smith is a 77 y.o. female who is here for Follow-up (9 month follow up, Patient denies any cardiac symptoms. ) and Coronary Artery Disease    History:   Diagnosis Orders   1. Coronary artery disease involving native coronary artery of native heart without angina pectoris     2. Essential hypertension     3. Mixed dyslipidemia     4. History of coronary artery stent placement      9/10/20 Successful PCI to proximal LAD (3.5 x 9 mm resolute integrity) utilizing  drug-eluting stent. The patient presents today for cardiology follow up. Overall, the patient is doing well from a cardiac standpoint. She has lumbar back surgery on 3/21/22 by Dr. Ana Rosa Hutton in [place]. The patient reports last HgA1c was 6.2. The patient denies symptoms to suggest myocardial ischemia, heart failure or arrhythmia. BP is well controlled on current regimen. The patient's PCP monitors cholesterol. The patient stopped smoking 8 years ago. Subjective  Anjelica denies exertional chest pain, shortness of breath, orthopnea, paroxysmal nocturnal dyspnea, syncope, presyncope, sensed arrhythmia, edema and fatigue. The patient denies numbness or weakness to suggest cerebrovascular accident or transient ischemic attack.      [facility] has the following history as recorded in [facility]:  Patient Active Problem List   Diagnosis Code    SVT (supraventricular tachycardia) ([facility] I47.1    History of tobacco abuse Z87.891    Essential hypertension I10    Mixed dyslipidemia E78.2    Obesity due to excess calories E66.09    Type 2 diabetes mellitus ([facility] E11.9    SILVANO (obstructive sleep apnea) G47.33    Unstable angina (HCC) I20.0     Past Medical History:   Diagnosis Date    Arthritis  [facility]     Diabetes mellitus ([facility]     Hyperlipidemia     Hypertension      Past Surgical History:   Procedure Laterality Date     SECTION      JOINT REPLACEMENT      TUBAL LIGATION       No family history on file.   Social History     Tobacco Use    Smoking status: Former Smoker     Packs/day: 0.50     Years: 8.00     Pack years: 4.00     Quit date: 2013     Years since quittin.8    Smokeless tobacco: Never Used   Substance Use Topics    Alcohol use: Not Currently      Current Outpatient Medications   Medication Sig Dispense Refill    bumetanide (BUMEX) 1 MG tablet TAKE 1 TABLET BY MOUTH EVERY DAY 90 tablet 1    cyclobenzaprine (FLEXERIL) 10 MG tablet Take 10 mg by mouth 2 times daily as needed       JARDIANCE 10 MG tablet TAKE 1 TABLET BY MOUTH DAILY BEFORE BREAKFAST      ergocalciferol (ERGOCALCIFEROL) 1.25 MG (02353 UT) capsule Take 50,000 Units by mouth once a week      furosemide (LASIX) 40 MG tablet TAKE 1 TABLET BY MOUTH EVERY DAY      insulin aspart (NOVOLOG FLEXPEN) 100 UNIT/ML injection pen INJECT 30 UNITS 3 TIMES DAILY      metoprolol succinate (TOPROL XL) 25 MG extended release tablet TAKE 1 TABLET BY MOUTH EVERY DAY 90 tablet 3    NIFEdipine (ADALAT CC) 60 MG extended release tablet TAKE 1 TABLET BY MOUTH EVERY DAY 90 tablet 3    rosuvastatin (CRESTOR) 20 MG tablet Take 20 mg by mouth nightly      omeprazole (PRILOSEC) 40 MG delayed release capsule Take 40 mg by mouth daily      lisinopril (PRINIVIL;ZESTRIL) 40 MG tablet Take 40 mg by mouth daily      levothyroxine (SYNTHROID) 125 MCG tablet TAKE 1 TABLET BY MOUTH EVERY DAY      Dulaglutide 0.75 MG/0.5ML SOPN Inject 0.75 mg into the skin once a week      desvenlafaxine succinate (PRISTIQ) 50 MG TB24 extended release tablet TAKE 1 TABLET BY MOUTH EVERY DAY      carbidopa-levodopa (SINEMET)  MG per tablet TAKE 1 TO 2 TABLETS BY MOUTH EVERY DAY AT BEDTIME  budesonide-formoterol (SYMBICORT) 160-4.5 MCG/ACT AERO TAKE 2 PUFFS BY MOUTH TWICE A DAY      insulin glargine (BASAGLAR KWIKPEN) 100 UNIT/ML injection pen Inject 40 Units into the skin daily      Insulin Pen Needle (PEN NEEDLES) 32G X 4 MM MISC 1 each by NOT APPLICABLE route 4 times daily      lamoTRIgine (LAMICTAL) 100 MG tablet as needed   2    Multiple Vitamins-Minerals (THERAPEUTIC MULTIVITAMIN-MINERALS) tablet Take 1 tablet by mouth daily      vitamin D (CHOLECALCIFEROL) 1000 UNIT TABS tablet Take 1,000 Units by mouth daily      folic acid (FOLVITE) 474 MCG tablet Take 400 mcg by mouth daily      cetirizine (ZYRTEC) 10 MG tablet Take 10 mg by mouth daily      Multiple Vitamins-Minerals (PRESERVISION AREDS 2+MULTI VIT PO) Take by mouth      aspirin 81 MG tablet Take 81 mg by mouth daily       No current facility-administered medications for this visit. Allergies: Metformin and related    Review of Systems  Constitutional - no appetite change, or unexpected weight change. No fever, chills or diaphoresis. No significant change in activity level or new onset of fatigue. HEENT - no significant rhinorrhea or epistaxis. No tinnitus or significant hearing loss. Eyes - no sudden vision change or amaurosis. No corneal arcus, xantholasma, subconjunctival hemorrhage or discharge. Respiratory - no significant wheezing, stridor, apnea or cough. No dyspnea on exertion or shortness of air. Cardiovascular - no exertional chest pain to suggest myocardial ischemia. No orthopnea or PND. No sensation of sustained arrythmia. No occurrence of slow heart rate. No palpitations. No claudication. Gastrointestinal - no abdominal swelling or pain. No blood in stool. No severe constipation, diarrhea, nausea, or vomiting. Genitourinary - no dysuria, frequency, or urgency. No flank pain or hematuria. Musculoskeletal - no back pain or myalgia. No problems with gait.   Extremities - no clubbing, cyanosis or extremity edema. Skin - no color change or rash. No pallor. No new surgical incision. Neurologic - no speech difficulty, facial asymmetry or lateralizing weakness. No seizures, presyncope or syncope. No significant dizziness. Hematologic - no easy bruising or excessive bleeding. Psychiatric - no severe anxiety or insomnia. No confusion. All other review of systems are negative. Objective  Vital Signs - Ht 5' 5\" (1.651 m)   BMI 41.77 kg/m²   General - Melissa Ramos is alert, cooperative, and pleasant. Well groomed. No acute distress. Body habitus - Body mass index is 41.77 kg/m². HEENT - Head is normocephalic. No circumoral cyanosis. Dentition is normal.  EYES -   Lids normal without ptosis. No discharge, edema or subconjunctival hemorrhage. Neck - Symmetrical without apparent mass or lymphadenopathy. Respiratory - Normal respiratory effort without use of accessory muscles. Ausculatation reveals vesicular breath sounds without crackles, wheezes, rub or rhonchi. Cardiovascular - No jugular venous distention. Auscultation reveals regular rate and rhythm. No audible clicks, gallop or rub. No murmur. No lower extremity varicosities. No carotid bruits. Abdominal -  No visible distention, mass or pulsations. Extremities - No clubbing or cyanosis. No statis dermatitis or ulcers. No edema. Musculoskeletal -   No Osler's nodes. No kyphosis or scoliosis. Gait is even and regular without limp or shuffle. Ambulates without assistance. Skin -  Warm and dry; no rash or pallor. No new surgical wound. Neurological - No focal neurological deficits. Thought processes coherent. No apparent tremor. Oriented to person, place and time. Psychiatric -  Appropriate affect and mood. Data reviewed:  9/10/20 cath - Dr. Piotr Avalos disease involving proximal LAD. Normal LV ejection fraction.    Successful PCI to proximal LAD (3.5 x 9 mm resolute integrity) utilizing drug-eluting stent. Recommendations    Medical management. Risk factor modification. Electronically signed by Tigre Freed MD(Performing Physician) on   09/10/2020 21:51    Lab Results   Component Value Date    WBC 4.8 09/10/2020    HGB 15.3 09/10/2020    HCT 45.5 09/10/2020    MCV 86.7 09/10/2020     09/10/2020     Lab Results   Component Value Date     09/10/2020    K 4.3 09/10/2020    CL 99 09/10/2020    CO2 29 09/10/2020    BUN 15 09/10/2020    CREATININE 1.1 (H) 09/10/2020    GLUCOSE 154 (H) 09/10/2020    CALCIUM 9.1 09/10/2020    PROT 7.4 09/10/2020    LABALBU 4.5 09/10/2020    BILITOT 0.5 09/10/2020    ALKPHOS 75 09/10/2020    AST 22 09/10/2020    ALT 12 09/10/2020    LABGLOM 50 (A) 09/10/2020    GFRAA >59 09/10/2020     BP Readings from Last 3 Encounters:   08/31/21 108/60   03/09/21 120/70   10/08/20 126/86    Pulse Readings from Last 3 Encounters:   08/31/21 66   03/09/21 72   10/08/20 80        Wt Readings from Last 3 Encounters:   08/31/21 251 lb (113.9 kg)   03/09/21 260 lb (117.9 kg)   10/08/20 270 lb (122.5 kg)     Assessment/Plan:   Diagnosis Orders   1. Coronary artery disease involving native coronary artery of native heart without angina pectoris     2. Essential hypertension     3. Mixed dyslipidemia     4. History of coronary artery stent placement      9/10/20 Successful PCI to proximal LAD (3.5 x 9 mm resolute integrity) utilizing  drug-eluting stent. Stable CV status without overt heart failure, sensed arrhythmia or angina. CAD - stable on current medical management to include Lisinopril, Toprol XL, Crestor and ASA. Continue same. HTN - normotensive on current regimen to include Lisinopril, Toprol XL and Adalat CC. Hyperlipidemia and DM - monitored and managed by PCP. Continues on Crestor 20 mg daily. Patient is compliant with medication regimen. Previous cardiac history and records reviewed.   Continue current medical management for cardiac related condition. Continue other current medications as directed. Continue to follow up with primary care provider for non cardiac medical problems. If your primary care provider is outside of the [facility], please request that your labs be faxed to this office at [phone]. BP goal 130/80 or less. Call the office with any problems, questions or concerns at [phone]. Cardiology follow up as scheduled in [address] appointments. Educational included in patient instructions. Heart health.       Toribio Burt, APRN

## 2022-05-25 NOTE — PATIENT INSTRUCTIONS
New instructions for today:  How to take:  NITROGLYCERIN (Nitrostat) 0.4 mg tablets, sublingual.  Nitroglycerin is in a group of drugs called nitrates. Nitroglycerin dilates (widens) blood vessels, making it easier for blood to flow through them and easier for the heart to pump. Dosing Guidelines for Nitroglycerin Tablets  · At the start of an angina (chest pain) attack, place one tablet under the tongue or between the cheek and gum. Do not swallow or chew the tablet; let it dissolve on its own. If necessary, a second and third tablet may be used, with five minutes between using each tablet. If you use a third tablet and your chest pain continues, it is time to seek immediate medical attention. Call 911 immediately and have someone drive you to the emergency room. You may be having a heart attack or other serious heart problem. · To prevent angina from exercise or stress, use 1 tablet 5 to 10 minutes before the activity. Patient Instructions:  Continue current medications as prescribed. Always keep a current medication list. Bring your medications to every office visit. Continue to follow up with primary care provider for non cardiac medical problems. Call the office with any problems, questions or concerns at [phone]. If you have been asked to keep a blood pressure log, do so for 2 weeks. Call the office to report readings to the triage nurse at [phone]. Follow up with cardiologist as scheduled. The following educational material has been included in this after visit summary for your review: Life simple 7. Heart health. Life simple 7  1) Manage blood pressure - high blood pressure is a major risk factor for heart disease and stroke. Keeping blood pressure in health range reduces strain on your heart, arteries and kidneys. Blood pressure goal is less than 130/80. 2) Control cholesterol - contributes to plaque, which can clog arteries and lead to heart disease and stroke.  When you control your cholesterol you are giving your arteries their best chance to remain clear. It is recommended that you get cholesterol lab work done once a year. 3) Reduce blood sugar - most of the food we eat is turning into glucose or blood sugar that our body uses for energy. Over time, high levels of blood sugar can damage your heart, kidneys, eyes and nerves. 4) Get active - living an active life is one of the most rewarding gifts you can give yourself and those you love. Simply put, daily physical activity increases your length and quality of life. Strive to exercise 15 minutes most days of the week. 5)  Eat better - A healthy diet is one of your best weapons for fighting cardiovascular disease. When you eat a heart healthy diet, you improve your chances for feeling good and staying healthy for life. 6)  Lose weight - when you shed extra fat an unnecessary pounds, you reduce the burden on your hear, lungs, blood vessels and skeleton. You give yourself the gift of active living, you lower your blood pressure and help yourself feel better. 7) Stop smoking - cigarette smokers have a higher risk of developing cardiovascular disease. If  You smoke, quitting is the best thing you can do for your health. Check American Heart Association on line for more information on Life's Simple 7 and tips for healthy living. A Healthy Heart: Care Instructions  Your Care Instructions     Coronary artery disease, also called heart disease, occurs when a substance called plaque builds up in the vessels that supply oxygen-rich blood to your heart muscle. This can narrow the blood vessels and reduce blood flow. A heart attack happens when blood flow is completely blocked. A high-fat diet, smoking, and other factors increase the risk of heart disease. Your doctor has found that you have a chance of having heart disease. You can do lots of things to keep your heart healthy.  It may not be easy, but you can change your diet, exercise more, and quit smoking. These steps really work to lower your chance of heart disease. Follow-up care is a key part of your treatment and safety. Be sure to make and go to all appointments, and call your doctor if you are having problems. It's also a good idea to know your test results and keep a list of the medicines you take. How can you care for yourself at home? Diet  · Use less salt when you cook and eat. This helps lower your blood pressure. Taste food before salting. Add only a little salt when you think you need it. With time, your taste buds will adjust to less salt. · Eat fewer snack items, fast foods, canned soups, and other high-salt, high-fat, processed foods. · Read food labels and try to avoid saturated and trans fats. They increase your risk of heart disease by raising cholesterol levels. · Limit the amount of solid fat-butter, margarine, and shortening-you eat. Use olive, peanut, or canola oil when you cook. Bake, broil, and steam foods instead of frying them. · Eat a variety of fruit and vegetables every day. Dark green, deep orange, red, or yellow fruits and vegetables are especially good for you. Examples include spinach, carrots, peaches, and berries. · Foods high in fiber can reduce your cholesterol and provide important vitamins and minerals. High-fiber foods include whole-grain cereals and breads, oatmeal, beans, brown rice, citrus fruits, and apples. · Eat lean proteins. Heart-healthy proteins include seafood, lean meats and poultry, eggs, beans, peas, nuts, seeds, and soy products. · Limit drinks and foods with added sugar. These include candy, desserts, and soda pop. Lifestyle changes  · If your doctor recommends it, get more exercise. Walking is a good choice. Bit by bit, increase the amount you walk every day. Try for at least 30 minutes on most days of the week. You also may want to swim, bike, or do other activities. · Do not smoke.  If you need help quitting,

## 2022-06-09 ENCOUNTER — TELEPHONE (OUTPATIENT)
Dept: ENDOCRINOLOGY | Facility: CLINIC | Age: 67
End: 2022-06-09

## 2022-06-20 ENCOUNTER — OFFICE VISIT (OUTPATIENT)
Dept: FAMILY MEDICINE CLINIC | Facility: CLINIC | Age: 67
End: 2022-06-20

## 2022-06-20 VITALS
HEART RATE: 76 BPM | OXYGEN SATURATION: 98 % | SYSTOLIC BLOOD PRESSURE: 120 MMHG | TEMPERATURE: 98.4 F | BODY MASS INDEX: 37.94 KG/M2 | DIASTOLIC BLOOD PRESSURE: 76 MMHG | HEIGHT: 64 IN | WEIGHT: 222.2 LBS

## 2022-06-20 DIAGNOSIS — Z79.4 TYPE 2 DIABETES MELLITUS WITH HYPERGLYCEMIA, WITH LONG-TERM CURRENT USE OF INSULIN: ICD-10-CM

## 2022-06-20 DIAGNOSIS — E11.59 HYPERTENSION ASSOCIATED WITH DIABETES: ICD-10-CM

## 2022-06-20 DIAGNOSIS — Z12.31 ENCOUNTER FOR SCREENING MAMMOGRAM FOR MALIGNANT NEOPLASM OF BREAST: ICD-10-CM

## 2022-06-20 DIAGNOSIS — I15.2 HYPERTENSION ASSOCIATED WITH DIABETES: ICD-10-CM

## 2022-06-20 DIAGNOSIS — Z00.00 MEDICARE ANNUAL WELLNESS VISIT, SUBSEQUENT: Primary | ICD-10-CM

## 2022-06-20 DIAGNOSIS — E11.65 TYPE 2 DIABETES MELLITUS WITH HYPERGLYCEMIA, WITH LONG-TERM CURRENT USE OF INSULIN: ICD-10-CM

## 2022-06-20 DIAGNOSIS — E03.9 ACQUIRED HYPOTHYROIDISM: ICD-10-CM

## 2022-06-20 DIAGNOSIS — K21.9 GASTROESOPHAGEAL REFLUX DISEASE, UNSPECIFIED WHETHER ESOPHAGITIS PRESENT: ICD-10-CM

## 2022-06-20 DIAGNOSIS — Z87.891 PERSONAL HISTORY OF NICOTINE DEPENDENCE: ICD-10-CM

## 2022-06-20 DIAGNOSIS — Z12.2 ENCOUNTER FOR SCREENING FOR LUNG CANCER: ICD-10-CM

## 2022-06-20 LAB
BILIRUB BLD-MCNC: NEGATIVE MG/DL
CLARITY, POC: CLEAR
COLOR UR: ABNORMAL
GLUCOSE UR STRIP-MCNC: ABNORMAL MG/DL
KETONES UR QL: NEGATIVE
LEUKOCYTE EST, POC: NEGATIVE
NITRITE UR-MCNC: NEGATIVE MG/ML
PH UR: 6.5 [PH] (ref 5–8)
PROT UR STRIP-MCNC: NEGATIVE MG/DL
RBC # UR STRIP: NEGATIVE /UL
SP GR UR: 1.01 (ref 1–1.03)
UROBILINOGEN UR QL: NORMAL

## 2022-06-20 PROCEDURE — 1159F MED LIST DOCD IN RCRD: CPT | Performed by: NURSE PRACTITIONER

## 2022-06-20 PROCEDURE — 81003 URINALYSIS AUTO W/O SCOPE: CPT | Performed by: NURSE PRACTITIONER

## 2022-06-20 PROCEDURE — G0439 PPPS, SUBSEQ VISIT: HCPCS | Performed by: NURSE PRACTITIONER

## 2022-06-20 PROCEDURE — 1170F FXNL STATUS ASSESSED: CPT | Performed by: NURSE PRACTITIONER

## 2022-06-20 PROCEDURE — 1125F AMNT PAIN NOTED PAIN PRSNT: CPT | Performed by: NURSE PRACTITIONER

## 2022-06-20 RX ORDER — ERGOCALCIFEROL 1.25 MG/1
CAPSULE ORAL
COMMUNITY
End: 2022-10-10

## 2022-06-20 NOTE — PATIENT INSTRUCTIONS
Medicare Wellness  Personal Prevention Plan of Service     Date of Office Visit:    Encounter Provider:  YESSICA Govea  Place of Service:  [facility]  Patient Name: Maribeth Isbell  :  1955    As part of the Medicare Wellness portion of your visit today, we are providing you with this personalized preventive plan of services (PPPS). This plan is based upon recommendations of the [place] Preventive Services Task Force (USPSTF) and the Advisory Committee on Immunization Practices (ACIP).    This lists the preventive care services that should be considered, and provides dates of when you are due. Items listed as completed are up-to-date and do not require any further intervention.    Health Maintenance   Topic Date Due    LUNG CANCER SCREENING  Never done    URINE MICROALBUMIN  2021    HEMOGLOBIN A1C  2022    ZOSTER VACCINE (1 of 2) 2022 (Originally 2005)    DIABETIC EYE EXAM  2022 (Originally 2022)    COVID-19 Vaccine (4 - Booster for Moderna series) 10/20/2022 (Originally 2022)    MAMMOGRAM  2022    COLORECTAL CANCER SCREENING  2022    INFLUENZA VACCINE  10/01/2022    LIPID PANEL  2022    DIABETIC FOOT EXAM  2023    ANNUAL PHYSICAL  2023    TDAP/TD VACCINES (2 - Td or Tdap) 2023    DXA SCAN  2023    HEPATITIS C SCREENING  Completed    Pneumococcal Vaccine 65+  Completed    PAP SMEAR  Discontinued       Orders Placed This Encounter   Procedures    CT Chest Low Dose Wo     Standing Status:   Future     Standing Expiration Date:   2023     Order Specific Question:   The patient is age 50-80 (Medicare coverage 50-77)     Answer:   66     Order Specific Question:   The patient is a current smoker?     Answer:   No     Order Specific Question:   The patient is a former smoker who has quit within the last 15 years?     Answer:   Yes     Order Specific Question:   The number of years  since quitting smoking.     Answer:   7     Order Specific Question:   The patient has a smoking history of 20 pack-years or greater:     Answer:   Yes     Order Specific Question:   Actual pack - year smoking history (number):     Answer:   20     Order Specific Question:   Does the patient have any clinical signs/symptoms of lung cancer?     Answer:   No     Order Specific Question:   The patient was engaged in shared decision-making for this test:     Answer:   Yes     Order Specific Question:   Release to patient     Answer:   Immediate    Mammo Screening Digital Tomosynthesis Bilateral With CAD     Standing Status:   Future     Standing Expiration Date:   6/20/2023     Order Specific Question:   Reason for Exam:     Answer:   screening    Microalbumin / Creatinine Urine Ratio - Urine, Clean Catch     Order Specific Question:   Release to patient     Answer:   Immediate    TSH     Order Specific Question:   Release to patient     Answer:   Immediate    T4, free     Order Specific Question:   Release to patient     Answer:   Immediate    Comprehensive Metabolic Panel     Order Specific Question:   Release to patient     Answer:   Immediate    Hemoglobin A1c     Order Specific Question:   Release to patient     Answer:   Immediate    Lipid Panel    POC Urinalysis Dipstick, Multipro     Order Specific Question:   Release to patient     Answer:   Immediate    CBC & Differential     Order Specific Question:   Manual Differential     Answer:   No       No follow-ups on file.        Advance Care Planning and Advance Directives     You make decisions on a daily basis - decisions about where you want to live, your career, your home, your life. Perhaps one of the most important decisions you face is your choice for future medical care. Take time to talk with your family and your healthcare team and start planning today.  Advance Care Planning is a process that can help you:  Understand possible future healthcare decisions  in light of your own experiences  Reflect on those decision in light of your goals and values  Discuss your decisions with those closest to you and the healthcare professionals that care for you  Make a plan by creating a document that reflects your wishes    Surrogate Decision Maker  In the event of a medical emergency, which has left you unable to communicate or to make your own decisions, you would need someone to make decisions for you.  It is important to discuss your preferences for medical treatment with this person while you are in good health.     Qualities of a surrogate decision maker:  Willing to take on this role and responsibility  Knows what you want for future medical care  Willing to follow your wishes even if they don't agree with them  Able to make difficult medical decisions under stressful circumstances    Advance Directives  These are legal documents you can create that will guide your healthcare team and decision maker(s) when needed. These documents can be stored in the electronic medical record.    Living Will - a legal document to guide your care if you have a terminal condition or a serious illness and are unable to communicate. States vary by statute in document names/types, but most forms may include one or more of the following:        -  Directions regarding life-prolonging treatments        -  Directions regarding artificially provided nutrition/hydration        -  Choosing a healthcare decision maker        -  Direction regarding organ/tissue donation    Durable Power of  for Healthcare - this document names an -in-fact to make medical decisions for you, but it may also allow this person to make personal and financial decisions for you. Please seek the advice of an  if you need this type of document.    **Advance Directives are not required and no one may discriminate against you if you do not sign one.    Medical Orders  Many states allow specific  forms/orders signed by your physician to record your wishes for medical treatment in your current state of health. This form, signed in personal communication with your physician, addresses resuscitation and other medical interventions that you may or may not want.      For more information or to schedule a time with a [facility] Advance Care Planning Facilitator contact: [email or web address] or call [phone] and someone will contact you directly.

## 2022-06-20 NOTE — PROGRESS NOTES
The ABCs of the Annual Wellness Visit  Subsequent Medicare Wellness Visit    Chief Complaint   Patient presents with   • Annual Exam     fasting      Subjective    History of Present Illness:  Maribeth Isbell is a 66 y.o. female who presents for a Subsequent Medicare Wellness Visit.    The following portions of the patient's history were reviewed and   updated as appropriate: allergies, current medications, past family history, past medical history, past social history, past surgical history and problem list.    Compared to one year ago, the patient feels her physical   health is the same.    Compared to one year ago, the patient feels her mental   health is worse.    Recent Hospitalizations:  She was admitted within the past 365 days at [facility] for back operation.       Current Medical Providers:  Patient Care Team:  Jer Mccray MD as PCP - General  Naif Townsend MD as Surgeon (Orthopedic Surgery)  Kathy Berry MD as Consulting Physician (Gastroenterology)  Nicole Horton MA (Inactive) as Medical Assistant  Carmelina Romero MA (Inactive) as Medical Assistant    Outpatient Medications Prior to Visit   Medication Sig Dispense Refill   • aspirin 81 MG EC tablet Take 81 mg by mouth Daily.     • BD Pen Needle Griselda 2nd Gen 32G X 4 MM misc 1 EACH 4 (FOUR) TIMES A DAY. 400 each 3   • bumetanide (BUMEX) 1 MG tablet Take 1 mg by mouth Daily.     • carbidopa-levodopa (SINEMET)  MG per tablet TAKE 1 TO 2 TABLETS BY MOUTH EVERY DAY AT BEDTIME 180 tablet 3   • cetirizine (zyrTEC) 10 MG tablet Take 1 tablet by mouth Daily. 90 tablet 1   • Continuous Blood Gluc Sensor (Dexcom G6 Sensor) As Needed (glucose control). Every 10 daysDexcom G6 Sensor Kit [address]-40 Use as directed for continuous glucose monitoring 9 each 3   • cyclobenzaprine (FLEXERIL) 10 MG tablet Take 10 mg by mouth 3 (Three) Times a Day As Needed.     • desvenlafaxine (PRISTIQ) 50 MG 24 hr tablet TAKE 1 TABLET BY  MOUTH EVERY DAY 90 tablet 3   • Dulaglutide (Trulicity) 4.5 MG/0.5ML solution pen-injector Inject 4.5 mg (1 pen) under the skin into the appropriate area as directed 1 (One) Time Per Week. 6 mL 3   • empagliflozin (Jardiance) 10 MG tablet tablet Take 1 tablet by mouth Daily. Before bkfast 90 tablet 3   • Finerenone 10 MG tablet Take 1 tablet by mouth Daily. 90 tablet 3   • folic acid (FOLVITE) 400 MCG tablet Take 400 mcg by mouth Daily.     • furosemide (LASIX) 40 MG tablet TAKE 1 TABLET BY MOUTH EVERY DAY 90 tablet 2   • insulin aspart (NovoLOG FlexPen) 100 UNIT/ML solution pen-injector sc pen INJECT 20 UNITS 3 TIMES DAILY 20 pen 6   • Insulin Glargine (BASAGLAR KWIKPEN) 100 UNIT/ML injection pen Inject 40 Units under the skin into the appropriate area as directed Daily. 12 pen 6   • lamoTRIgine (LaMICtal) 100 MG tablet TAKE 1 TABLET BY MOUTH EVERY DAY AT NIGHT 90 tablet 2   • levothyroxine (SYNTHROID, LEVOTHROID) 125 MCG tablet TAKE 1 TABLET BY MOUTH EVERY DAY 90 tablet 2   • lisinopril (PRINIVIL,ZESTRIL) 40 MG tablet TAKE 1 TABLET BY MOUTH EVERY DAY 90 tablet 2   • metoprolol succinate XL (TOPROL-XL) 25 MG 24 hr tablet Take 25 mg by mouth Daily.     • Multiple Vitamins-Minerals (HAIR SKIN NAILS PO) Take  by mouth.     • Multiple Vitamins-Minerals (VISION-KRISTAL PRESERVE PO) Take  by mouth Daily With Breakfast.     • NIFEdipine CC (ADALAT CC) 60 MG 24 hr tablet TAKE 1 TABLET BY MOUTH EVERY DAY 90 tablet 3   • omeprazole (priLOSEC) 40 MG capsule TAKE 1 CAPSULE BY MOUTH EVERY DAY 90 capsule 2   • predniSONE (DELTASONE) 10 MG (21) dose pack Use as directed on package 21 each 0   • rosuvastatin (CRESTOR) 20 MG tablet TAKE 1 TABLET BY MOUTH EVERY DAY AT NIGHT 90 tablet 3   • Symbicort 160-4.5 MCG/ACT inhaler TAKE 2 PUFFS BY MOUTH TWICE A DAY 30.6 each 4   • ergocalciferol (ERGOCALCIFEROL) 1.25 MG (07746 UT) capsule ergocalciferol (vitamin D2) 1,250 mcg (50,000 unit) capsule   TAKE 1 CAPSULE BY MOUTH 1 TIME PER WEEK.    "    No facility-administered medications prior to visit.       No opioid medication identified on active medication list. I have reviewed chart for other potential  high risk medication/s and harmful drug interactions in the elderly.          Aspirin is on active medication list. Aspirin use is indicated based on review of current medical condition/s. Pros and cons of this therapy have been discussed today. Benefits of this medication outweigh potential harm.  Patient has been encouraged to continue taking this medication.  .      Patient Active Problem List   Diagnosis   • Acquired hypothyroidism   • Recurrent major depressive disorder (HCC)   • Osteoarthritis of multiple joints   • Gastroesophageal reflux disease   • Hypertension associated with diabetes (HCC)   • Hx of adenomatous colonic polyps   • Obesity due to excess calories   • CHRISTY (obstructive sleep apnea)   • SVT (supraventricular tachycardia) (HCC)   • Morbidly obese (HCC)   • Family history of polyps in the colon     Advance Care Planning  Advance Directive is not on file.  ACP discussion was held with the patient during this visit. Patient does not have an advance directive, information provided.    Review of Systems   Constitutional: Negative for fatigue.   Respiratory: Negative for shortness of breath.    Cardiovascular: Negative for chest pain.   Musculoskeletal: Positive for back pain.   Psychiatric/Behavioral:        Depression as been worse since back surgery, but patient states that she is ok.    Frustrated with not being able to do what she wants since back surgery        Objective    Vitals:    06/20/22 1025   BP: 120/76   BP Location: Left arm   Patient Position: Sitting   Cuff Size: Large Adult   Pulse: 76   Temp: 98.4 °F (36.9 °C)   TempSrc: Temporal   SpO2: 98%   Weight: 101 kg (222 lb 3.2 oz)   Height: 162.6 cm (64\")   PainSc:   3   PainLoc: Back     Estimated body mass index is 38.14 kg/m² as calculated from the following:    Height as " "of this encounter: 162.6 cm (64\").    Weight as of this encounter: 101 kg (222 lb 3.2 oz).    Class 2 Severe Obesity (BMI >=35 and <=39.9). Obesity-related health conditions include the following: hypertension and diabetes mellitus. Obesity is unchanged. BMI is is above average; BMI management plan is completed. We discussed portion control and increasing exercise.      Does the patient have evidence of cognitive impairment? No    Physical Exam  Vitals reviewed.   Constitutional:       Appearance: She is well-developed.   HENT:      Right Ear: Tympanic membrane, ear canal and external ear normal.      Left Ear: Tympanic membrane, ear canal and external ear normal.      Mouth/Throat:      Mouth: Mucous membranes are moist.      Pharynx: Oropharynx is clear.   Neck:      Vascular: No carotid bruit.   Cardiovascular:      Rate and Rhythm: Normal rate and regular rhythm.      Heart sounds: Normal heart sounds.   Pulmonary:      Effort: Pulmonary effort is normal.      Breath sounds: Normal breath sounds.   Chest:   Breasts:      Right: Normal.      Left: Normal.       Abdominal:      General: Abdomen is flat. Bowel sounds are normal.      Palpations: Abdomen is soft.   Neurological:      Mental Status: She is alert and oriented to person, place, and time.   Psychiatric:         Behavior: Behavior normal.                 HEALTH RISK ASSESSMENT    Smoking Status:  Social History     Tobacco Use   Smoking Status Former Smoker   • Packs/day: 1.00   • Years: 20.00   • Pack years: 20.00   • Types: Cigarettes   • Quit date:    • Years since quittin.4   Smokeless Tobacco Never Used     Alcohol Consumption:  Social History     Substance and Sexual Activity   Alcohol Use No     Fall Risk Screen:    STEADI Fall Risk Assessment was completed, and patient is at MODERATE risk for falls. Assessment completed on:2022    Depression Screening:  PHQ-2/PHQ-9 Depression Screening 2022   Retired PHQ-9 Total Score - "   Retired Total Score -   Little Interest or Pleasure in Doing Things 0-->not at all   Feeling Down, Depressed or Hopeless 0-->not at all   PHQ-9: Brief Depression Severity Measure Score 0       Health Habits and Functional and Cognitive Screening:  Functional & Cognitive Status 6/18/2021   Do you have difficulty preparing food and eating? No   Do you have difficulty bathing yourself, getting dressed or grooming yourself? No   Do you have difficulty using the toilet? No   Do you have difficulty moving around from place to place? No   Do you have trouble with steps or getting out of a bed or a chair? No   Current Diet Well Balanced Diet   Dental Exam Up to date   Eye Exam Up to date   Exercise (times per week) 7 times per week   Current Exercises Include Stationary Bicycling/Spin Class   Do you need help using the phone?  No   Are you deaf or do you have serious difficulty hearing?  No   Do you need help with transportation? Yes   Do you need help shopping? No   Do you need help preparing meals?  No   Do you need help with housework?  No   Do you need help with laundry? No   Do you need help taking your medications? No   Do you need help managing money? No   Do you ever drive or ride in a car without wearing a seat belt? No   Have you felt unusual stress, anger or loneliness in the last month? No   Who do you live with? Spouse   If you need help, do you have trouble finding someone available to you? No   Have you been bothered in the last four weeks by sexual problems? No   Do you have difficulty concentrating, remembering or making decisions? No       Age-appropriate Screening Schedule:  Refer to the list below for future screening recommendations based on patient's age, sex and/or medical conditions. Orders for these recommended tests are listed in the plan section. The patient has been provided with a written plan.    Health Maintenance   Topic Date Due   • URINE MICROALBUMIN  06/16/2021   • HEMOGLOBIN A1C   06/03/2022   • ZOSTER VACCINE (1 of 2) 06/20/2022 (Originally 9/9/2005)   • DIABETIC EYE EXAM  08/08/2022 (Originally 4/26/2022)   • MAMMOGRAM  07/14/2022   • INFLUENZA VACCINE  10/01/2022   • LIPID PANEL  12/03/2022   • DIABETIC FOOT EXAM  06/20/2023   • TDAP/TD VACCINES (2 - Td or Tdap) 06/28/2023   • DXA SCAN  07/14/2023   • PAP SMEAR  Discontinued              Assessment & Plan   CMS Preventative Services Quick Reference  Risk Factors Identified During Encounter  Cardiovascular Disease  Immunizations Discussed/Encouraged (specific Immunizations; Influenza and COVID19  Obesity/Overweight   The above risks/problems have been discussed with the patient.  Follow up actions/plans if indicated are seen below in the Assessment/Plan Section.  Pertinent information has been shared with the patient in the After Visit Summary.    Diagnoses and all orders for this visit:    1. Medicare annual wellness visit, subsequent (Primary)  -     Microalbumin / Creatinine Urine Ratio - Urine, Clean Catch  -     TSH  -     T4, free  -     CBC & Differential  -     Comprehensive Metabolic Panel  -     Hemoglobin A1c  -     Lipid Panel  -     POC Urinalysis Dipstick, Multipro  -     CT Chest Low Dose Wo; Future  -     Mammo Screening Digital Tomosynthesis Bilateral With CAD; Future    2. Type 2 diabetes mellitus with hyperglycemia, with long-term current use of insulin (HCC)  -     Microalbumin / Creatinine Urine Ratio - Urine, Clean Catch  -     Hemoglobin A1c  -     Lipid Panel  -     POC Urinalysis Dipstick, Multipro    3. Acquired hypothyroidism  -     TSH  -     T4, free    4. Gastroesophageal reflux disease, unspecified whether esophagitis present  -     CBC & Differential  -     Comprehensive Metabolic Panel    5. Hypertension associated with diabetes (HCC)  -     POC Urinalysis Dipstick, Multipro    6. Encounter for screening for lung cancer  -     CT Chest Low Dose Wo; Future    7. Personal history of nicotine dependence  -      CT Chest Low Dose Wo; Future    8. Encounter for screening mammogram for malignant neoplasm of breast  -     Mammo Screening Digital Tomosynthesis Bilateral With CAD; Future        Follow Up:   Return in about 3 months (around 9/20/2022) for Next scheduled follow up.     An After Visit Summary and PPPS were made available to the patient.    Gillian Mullen, APRN 6/20/22

## 2022-06-21 LAB
ALBUMIN SERPL-MCNC: 4.7 G/DL (ref 3.5–5.2)
ALBUMIN/CREAT UR: 8 MG/G CREAT (ref 0–29)
ALBUMIN/GLOB SERPL: 1.8 G/DL
ALP SERPL-CCNC: 91 U/L (ref 39–117)
ALT SERPL-CCNC: 12 U/L (ref 1–33)
AST SERPL-CCNC: 21 U/L (ref 1–32)
BASOPHILS # BLD AUTO: 0.06 10*3/MM3 (ref 0–0.2)
BASOPHILS NFR BLD AUTO: 1.2 % (ref 0–1.5)
BILIRUB SERPL-MCNC: 0.5 MG/DL (ref 0–1.2)
BUN SERPL-MCNC: 13 MG/DL (ref 8–23)
BUN/CREAT SERPL: 12.9 (ref 7–25)
CALCIUM SERPL-MCNC: 9.6 MG/DL (ref 8.6–10.5)
CHLORIDE SERPL-SCNC: 100 MMOL/L (ref 98–107)
CHOLEST SERPL-MCNC: 223 MG/DL (ref 0–200)
CO2 SERPL-SCNC: 27.2 MMOL/L (ref 22–29)
CREAT SERPL-MCNC: 1.01 MG/DL (ref 0.57–1)
CREAT UR-MCNC: 140.1 MG/DL
EGFRCR SERPLBLD CKD-EPI 2021: 61.5 ML/MIN/1.73
EOSINOPHIL # BLD AUTO: 0.16 10*3/MM3 (ref 0–0.4)
EOSINOPHIL NFR BLD AUTO: 3.2 % (ref 0.3–6.2)
ERYTHROCYTE [DISTWIDTH] IN BLOOD BY AUTOMATED COUNT: 13.8 % (ref 12.3–15.4)
GLOBULIN SER CALC-MCNC: 2.6 GM/DL
GLUCOSE SERPL-MCNC: 165 MG/DL (ref 65–99)
HBA1C MFR BLD: 6.3 % (ref 4.8–5.6)
HCT VFR BLD AUTO: 49.4 % (ref 34–46.6)
HDLC SERPL-MCNC: 58 MG/DL (ref 40–60)
HGB BLD-MCNC: 16.4 G/DL (ref 12–15.9)
IMM GRANULOCYTES # BLD AUTO: 0.02 10*3/MM3 (ref 0–0.05)
IMM GRANULOCYTES NFR BLD AUTO: 0.4 % (ref 0–0.5)
LDLC SERPL CALC-MCNC: 128 MG/DL (ref 0–100)
LYMPHOCYTES # BLD AUTO: 2.03 10*3/MM3 (ref 0.7–3.1)
LYMPHOCYTES NFR BLD AUTO: 41.2 % (ref 19.6–45.3)
MCH RBC QN AUTO: 28.8 PG (ref 26.6–33)
MCHC RBC AUTO-ENTMCNC: 33.2 G/DL (ref 31.5–35.7)
MCV RBC AUTO: 86.8 FL (ref 79–97)
MICROALBUMIN UR-MCNC: 11.1 UG/ML
MONOCYTES # BLD AUTO: 0.42 10*3/MM3 (ref 0.1–0.9)
MONOCYTES NFR BLD AUTO: 8.5 % (ref 5–12)
NEUTROPHILS # BLD AUTO: 2.24 10*3/MM3 (ref 1.7–7)
NEUTROPHILS NFR BLD AUTO: 45.5 % (ref 42.7–76)
NRBC BLD AUTO-RTO: 0 /100 WBC (ref 0–0.2)
PLATELET # BLD AUTO: 315 10*3/MM3 (ref 140–450)
POTASSIUM SERPL-SCNC: 4.1 MMOL/L (ref 3.5–5.2)
PROT SERPL-MCNC: 7.3 G/DL (ref 6–8.5)
RBC # BLD AUTO: 5.69 10*6/MM3 (ref 3.77–5.28)
SODIUM SERPL-SCNC: 140 MMOL/L (ref 136–145)
T4 FREE SERPL-MCNC: 1.13 NG/DL (ref 0.93–1.7)
TRIGL SERPL-MCNC: 208 MG/DL (ref 0–150)
TSH SERPL DL<=0.005 MIU/L-ACNC: 1.49 UIU/ML (ref 0.27–4.2)
VLDLC SERPL CALC-MCNC: 37 MG/DL (ref 5–40)
WBC # BLD AUTO: 4.93 10*3/MM3 (ref 3.4–10.8)

## 2022-06-21 NOTE — PROGRESS NOTES
Labs ok except cholesterol is up and hemoglobin is elevated. Is she taking her crestor?  And is she taking a multivitamin with iron?

## 2022-06-22 DIAGNOSIS — D58.2 ELEVATED HEMOGLOBIN: Primary | ICD-10-CM

## 2022-06-24 ENCOUNTER — PATIENT ROUNDING (BHMG ONLY) (OUTPATIENT)
Dept: FAMILY MEDICINE CLINIC | Facility: CLINIC | Age: 67
End: 2022-06-24

## 2022-06-24 NOTE — PROGRESS NOTES
June 24, 2022    Hello, may I speak with Maribeth Isbell?    My name is Lauri COOPER     I am  with [facility]  [address], [address]  [place] [address] [phone].    Before we get started may I verify your date of birth? 1955    I am calling to officially welcome you to our practice and ask about your recent visit. Is this a good time to talk?  Yes    Tell me about your visit with us. What things went well?  Everything went good. They really took care of me.        We're always looking for ways to make our patients' experiences even better. Do you have recommendations on ways we may improve?  No    Overall were you satisfied with your first visit to our practice? Yes       I appreciate you taking the time to speak with me today. Is there anything else I can do for you? No      Thank you, and have a great day.

## 2022-07-05 ENCOUNTER — DOCUMENTATION (OUTPATIENT)
Dept: CT IMAGING | Facility: HOSPITAL | Age: 67
End: 2022-07-05

## 2022-07-05 ENCOUNTER — HOSPITAL ENCOUNTER (OUTPATIENT)
Dept: CT IMAGING | Facility: HOSPITAL | Age: 67
Discharge: HOME | End: 2022-07-05

## 2022-07-05 ENCOUNTER — HOSPITAL ENCOUNTER (OUTPATIENT)
Dept: MAMMOGRAPHY | Facility: HOSPITAL | Age: 67
Discharge: HOME | End: 2022-07-05

## 2022-07-05 DIAGNOSIS — Z12.2 ENCOUNTER FOR SCREENING FOR LUNG CANCER: ICD-10-CM

## 2022-07-05 DIAGNOSIS — Z00.00 MEDICARE ANNUAL WELLNESS VISIT, SUBSEQUENT: ICD-10-CM

## 2022-07-05 DIAGNOSIS — Z12.31 ENCOUNTER FOR SCREENING MAMMOGRAM FOR MALIGNANT NEOPLASM OF BREAST: ICD-10-CM

## 2022-07-05 DIAGNOSIS — Z87.891 PERSONAL HISTORY OF NICOTINE DEPENDENCE: ICD-10-CM

## 2022-07-05 PROCEDURE — 77067 SCR MAMMO BI INCL CAD: CPT

## 2022-07-05 PROCEDURE — 77063 BREAST TOMOSYNTHESIS BI: CPT

## 2022-07-05 PROCEDURE — 71271 CT THORAX LUNG CANCER SCR C-: CPT

## 2022-07-06 ENCOUNTER — DOCUMENTATION (OUTPATIENT)
Dept: CT IMAGING | Facility: HOSPITAL | Age: 67
End: 2022-07-06

## 2022-07-06 RX ORDER — ROSUVASTATIN CALCIUM 40 MG/1
40 TABLET, COATED ORAL DAILY
COMMUNITY
End: 2022-12-09 | Stop reason: SDUPTHER

## 2022-07-20 DIAGNOSIS — F33.1 MODERATE EPISODE OF RECURRENT MAJOR DEPRESSIVE DISORDER: ICD-10-CM

## 2022-07-20 RX ORDER — LAMOTRIGINE 100 MG/1
TABLET ORAL
Qty: 90 TABLET | Refills: 2 | Status: SHIPPED | OUTPATIENT
Start: 2022-07-20

## 2022-07-20 NOTE — TELEPHONE ENCOUNTER
Rx Refill Note  Requested Prescriptions     Pending Prescriptions Disp Refills   • lamoTRIgine (LaMICtal) 100 MG tablet [Pharmacy Med Name: LAMOTRIGINE 100 MG TABLET] 90 tablet 2     Sig: TAKE 1 TABLET BY MOUTH EVERY DAY AT NIGHT      Last office visit with prescribing clinician: 6/20/2022      Next office visit with prescribing clinician: Visit date not found            Jayne Casanova MA  07/20/22, 13:36 CDT

## 2022-08-18 ENCOUNTER — OFFICE VISIT (OUTPATIENT)
Dept: FAMILY MEDICINE CLINIC | Facility: CLINIC | Age: 67
End: 2022-08-18

## 2022-08-18 VITALS
DIASTOLIC BLOOD PRESSURE: 60 MMHG | TEMPERATURE: 97.3 F | SYSTOLIC BLOOD PRESSURE: 88 MMHG | BODY MASS INDEX: 37.9 KG/M2 | OXYGEN SATURATION: 98 % | WEIGHT: 222 LBS | HEIGHT: 64 IN | HEART RATE: 76 BPM | RESPIRATION RATE: 16 BRPM

## 2022-08-18 DIAGNOSIS — R11.2 NAUSEA AND VOMITING, UNSPECIFIED VOMITING TYPE: Primary | ICD-10-CM

## 2022-08-18 LAB
EXPIRATION DATE: NORMAL
FLUAV AG UPPER RESP QL IA.RAPID: NOT DETECTED
FLUBV AG UPPER RESP QL IA.RAPID: NOT DETECTED
INTERNAL CONTROL: NORMAL
Lab: NORMAL
SARS-COV-2 AG UPPER RESP QL IA.RAPID: NOT DETECTED

## 2022-08-18 PROCEDURE — 99213 OFFICE O/P EST LOW 20 MIN: CPT | Performed by: FAMILY MEDICINE

## 2022-08-18 PROCEDURE — 87428 SARSCOV & INF VIR A&B AG IA: CPT | Performed by: FAMILY MEDICINE

## 2022-08-18 NOTE — PROGRESS NOTES
Subjective   Maribeth Isbell is a 66 y.o. female.     66-year-old  female with diabetes complains of vomiting diarrhea over the last week      The following portions of the patient's history were reviewed and updated as appropriate: allergies, current medications, past family history, past medical history, past social history, past surgical history and problem list.    Review of Systems   Respiratory: Negative for shortness of breath.    Cardiovascular: Negative for chest pain and leg swelling.   Gastrointestinal: Positive for abdominal pain, diarrhea, nausea and vomiting.   Neurological: Positive for dizziness.       Objective   Physical Exam  Vitals and nursing note reviewed.   Constitutional:       Appearance: Normal appearance.   Eyes:      Extraocular Movements: Extraocular movements intact.      Pupils: Pupils are equal, round, and reactive to light.   Cardiovascular:      Rate and Rhythm: Normal rate and regular rhythm.   Pulmonary:      Effort: Pulmonary effort is normal.      Breath sounds: Normal breath sounds.   Abdominal:      General: There is distension.      Palpations: There is no mass.      Tenderness: There is abdominal tenderness.   Musculoskeletal:      Right lower leg: No edema.      Left lower leg: No edema.   Skin:     General: Skin is warm and dry.   Neurological:      General: No focal deficit present.      Mental Status: She is alert and oriented to person, place, and time.   Psychiatric:         Mood and Affect: Mood normal.         Behavior: Behavior normal.         Thought Content: Thought content normal.         Judgment: Judgment normal.         Assessment & Plan   Diagnoses and all orders for this visit:    1. Nausea and vomiting, unspecified vomiting type (Primary)  -     POCT SARS-CoV-2 Antigen SATHYA         Plan above-COVID-negative sent to ER for urgent visit dehydration hypotension

## 2022-09-08 ENCOUNTER — OFFICE VISIT (OUTPATIENT)
Dept: FAMILY MEDICINE CLINIC | Facility: CLINIC | Age: 67
End: 2022-09-08

## 2022-09-08 VITALS
TEMPERATURE: 97.1 F | HEIGHT: 64 IN | BODY MASS INDEX: 38.1 KG/M2 | DIASTOLIC BLOOD PRESSURE: 74 MMHG | OXYGEN SATURATION: 99 % | HEART RATE: 72 BPM | SYSTOLIC BLOOD PRESSURE: 117 MMHG | WEIGHT: 223.2 LBS

## 2022-09-08 DIAGNOSIS — T63.461A WASP STING, ACCIDENTAL OR UNINTENTIONAL, INITIAL ENCOUNTER: Primary | ICD-10-CM

## 2022-09-08 PROCEDURE — 99212 OFFICE O/P EST SF 10 MIN: CPT | Performed by: NURSE PRACTITIONER

## 2022-09-08 NOTE — PROGRESS NOTES
CC: wasp sting    History:  Maribeth Isbell is a 66 y.o. female who presents today for evaluation of the above problems.     Patient notes that she was stung by a wasp 3 days ago on her right upper arm.  Notes that wasp was carrying a spider at the time of sting, however, she does believe that she was stung and not bitten.  States that the erythema was initially approximately 4 inches diameter and there was significant swelling, however, the area has significantly reduced in size and the swelling has improved as well.  States that she initially washed the area with hot soapy water and then proceeded to use ice on it.  Has been soaking in Epson salt water nightly.     HPI  ROS:  Review of Systems   Skin: Positive for wound (Apparent sting superior to right AC).       Allergies   Allergen Reactions   • Metformin GI Intolerance     Past Medical History:   Diagnosis Date   • Bell's palsy    • Cataract    • Depression    • E coli infection    • Gastroesophageal reflux disease 2018   • Headache    • History of adenomatous polyp of colon    • History of colon polyps    • Hyperlipidemia    • Hypertension    • Hyperthyroidism    • Hypothyroidism    • Macular degeneration    • Obesity due to excess calories 2019   • Sleep apnea     wears cpap   • Tachycardia    • Type 2 diabetes mellitus (HCC)    • Visual impairment      Past Surgical History:   Procedure Laterality Date   • BACK SURGERY  2022     Jose Elias   •  SECTION     • COLONOSCOPY  06/10/2011    Dr. Mahmood-Internal hemorrhoids; Otherwise normal   • COLONOSCOPY N/A 2016    Colon not done this day-see endo report 2016   • COLONOSCOPY  2016    Dr. Mahmood-A single small hyperplastic polyp in the ascending colon   • COLONOSCOPY N/A 2020    One 15mm tubular adenomatous polyp in the cecum; Two 7-10mm tubular adenomatous polyps in the ascending colon; Five 7-10mm tubular adenomatous polyps in the transverse colon; Two 5-12mm  tubular adenomatous polyps in the descending colon; Repeat 1 year   • COLONOSCOPY N/A 09/20/2021    Procedure: COLONOSCOPY WITH ANESTHESIA;  Surgeon: Kathy Berry MD;  Location: [facility] ENDOSCOPY;  Service: Gastroenterology;  Laterality: N/A;  pre scsreen  post  Dr. back   • ENDOSCOPY N/A 12/01/2016    Normal esophagus; Normal stomach; Normal first part of the duodenum and 2nd part of the duodenum-biopsied   • ENDOSCOPY N/A 07/30/2018    Normal esophagus; Normal stomach; Normal first portion of the duodenum and second portion of the duodenum-biopsied   • HYSTERECTOMY     • KNEE SURGERY Left    • TUMOR REMOVAL       Family History   Problem Relation Age of Onset   • Heart disease Mother    • Diabetes Mother    • Breast cancer Sister    • Colon polyps Sister 30        mid 30s   • Breast cancer Sister    • Breast cancer Brother    • No Known Problems Maternal Grandmother    • No Known Problems Paternal Grandmother    • No Known Problems Maternal Grandfather    • No Known Problems Paternal Grandfather    • Colon cancer Neg Hx    • Esophageal cancer Neg Hx       reports that she quit smoking about 7 years ago. Her smoking use included cigarettes. She has a 20.00 pack-year smoking history. She has never used smokeless tobacco. She reports that she does not drink alcohol and does not use drugs.      Current Outpatient Medications:   •  aspirin 81 MG EC tablet, Take 81 mg by mouth Daily., Disp: , Rfl:   •  BD Pen Needle Griselda 2nd Gen 32G X 4 MM misc, 1 EACH 4 (FOUR) TIMES A DAY., Disp: 400 each, Rfl: 3  •  bumetanide (BUMEX) 1 MG tablet, Take 1 mg by mouth Daily., Disp: , Rfl:   •  carbidopa-levodopa (SINEMET)  MG per tablet, TAKE 1 TO 2 TABLETS BY MOUTH EVERY DAY AT BEDTIME, Disp: 180 tablet, Rfl: 3  •  cetirizine (zyrTEC) 10 MG tablet, Take 1 tablet by mouth Daily., Disp: 90 tablet, Rfl: 1  •  Continuous Blood Gluc Sensor (Dexcom G6 Sensor), As Needed (glucose control). Every 10 daysDexcom G6 Sensor Kit  [address]-87 Use as directed for continuous glucose monitoring, Disp: 9 each, Rfl: 3  •  cyclobenzaprine (FLEXERIL) 10 MG tablet, Take 10 mg by mouth 3 (Three) Times a Day As Needed., Disp: , Rfl:   •  desvenlafaxine (PRISTIQ) 50 MG 24 hr tablet, TAKE 1 TABLET BY MOUTH EVERY DAY, Disp: 90 tablet, Rfl: 3  •  Dulaglutide (Trulicity) 4.5 MG/0.5ML solution pen-injector, Inject 4.5 mg (1 pen) under the skin into the appropriate area as directed 1 (One) Time Per Week., Disp: 6 mL, Rfl: 3  •  empagliflozin (Jardiance) 10 MG tablet tablet, Take 1 tablet by mouth Daily. Before bkfast, Disp: 90 tablet, Rfl: 3  •  ergocalciferol (ERGOCALCIFEROL) 1.25 MG (74948 UT) capsule, ergocalciferol (vitamin D2) 1,250 mcg (50,000 unit) capsule  TAKE 1 CAPSULE BY MOUTH 1 TIME PER WEEK., Disp: , Rfl:   •  Finerenone 10 MG tablet, Take 1 tablet by mouth Daily., Disp: 90 tablet, Rfl: 3  •  folic acid (FOLVITE) 400 MCG tablet, Take 400 mcg by mouth Daily., Disp: , Rfl:   •  furosemide (LASIX) 40 MG tablet, TAKE 1 TABLET BY MOUTH EVERY DAY, Disp: 90 tablet, Rfl: 2  •  insulin aspart (NovoLOG FlexPen) 100 UNIT/ML solution pen-injector sc pen, INJECT 20 UNITS 3 TIMES DAILY, Disp: 20 pen, Rfl: 6  •  Insulin Glargine (BASAGLAR KWIKPEN) 100 UNIT/ML injection pen, Inject 40 Units under the skin into the appropriate area as directed Daily., Disp: 12 pen, Rfl: 6  •  lamoTRIgine (LaMICtal) 100 MG tablet, TAKE 1 TABLET BY MOUTH EVERY DAY AT NIGHT, Disp: 90 tablet, Rfl: 2  •  levothyroxine (SYNTHROID, LEVOTHROID) 125 MCG tablet, TAKE 1 TABLET BY MOUTH EVERY DAY, Disp: 90 tablet, Rfl: 2  •  lisinopril (PRINIVIL,ZESTRIL) 40 MG tablet, TAKE 1 TABLET BY MOUTH EVERY DAY, Disp: 90 tablet, Rfl: 2  •  metoprolol succinate XL (TOPROL-XL) 25 MG 24 hr tablet, Take 25 mg by mouth Daily., Disp: , Rfl:   •  Multiple Vitamins-Minerals (HAIR SKIN NAILS PO), Take  by mouth., Disp: , Rfl:   •  Multiple Vitamins-Minerals (VISION-KRISTAL PRESERVE PO), Take  by mouth Daily With  "Breakfast., Disp: , Rfl:   •  NIFEdipine CC (ADALAT CC) 60 MG 24 hr tablet, TAKE 1 TABLET BY MOUTH EVERY DAY, Disp: 90 tablet, Rfl: 3  •  omeprazole (priLOSEC) 40 MG capsule, TAKE 1 CAPSULE BY MOUTH EVERY DAY, Disp: 90 capsule, Rfl: 2  •  rosuvastatin (CRESTOR) 40 MG tablet, Take 40 mg by mouth Daily., Disp: , Rfl:   •  Symbicort 160-4.5 MCG/ACT inhaler, TAKE 2 PUFFS BY MOUTH TWICE A DAY, Disp: 30.6 each, Rfl: 4    OBJECTIVE:  /74 (BP Location: Left arm, Patient Position: Sitting, Cuff Size: Large Adult)   Pulse 72   Temp 97.1 °F (36.2 °C) (Temporal)   Ht 162.6 cm (64\")   Wt 101 kg (223 lb 3.2 oz)   LMP  (LMP Unknown)   SpO2 99%   Breastfeeding No   BMI 38.31 kg/m²    Physical Exam  Vitals reviewed.   Constitutional:       Appearance: She is well-developed.   Cardiovascular:      Rate and Rhythm: Normal rate.   Pulmonary:      Effort: Pulmonary effort is normal.   Skin:     Comments: Quarter size erythematous lesion with pencil sized central area of necrosis superior to right AC.  Faint area of very slight erythema visible in approximate 4 inch diameter around the quarter size lesion.   Neurological:      Mental Status: She is alert and oriented to person, place, and time.   Psychiatric:         Behavior: Behavior normal.         Assessment/Plan    Diagnoses and all orders for this visit:    1. Wasp sting, accidental or unintentional, initial encounter (Primary)    Area is improving.  No additional intervention needed at this time.  Patient advised to always use ice not heat on stings and bites.    An After Visit Summary was printed and given to the patient at discharge.  Return if symptoms worsen or fail to improve, for Next scheduled follow up.       Gillian Mullen, YESSICA 9/8/22    Electronically signed.  "

## 2022-09-20 ENCOUNTER — OFFICE VISIT (OUTPATIENT)
Dept: FAMILY MEDICINE CLINIC | Facility: CLINIC | Age: 67
End: 2022-09-20

## 2022-09-20 VITALS
BODY MASS INDEX: 37.56 KG/M2 | DIASTOLIC BLOOD PRESSURE: 70 MMHG | WEIGHT: 220 LBS | HEIGHT: 64 IN | RESPIRATION RATE: 18 BRPM | OXYGEN SATURATION: 97 % | TEMPERATURE: 98.4 F | HEART RATE: 78 BPM | SYSTOLIC BLOOD PRESSURE: 116 MMHG

## 2022-09-20 DIAGNOSIS — E78.2 MIXED HYPERLIPIDEMIA: Primary | ICD-10-CM

## 2022-09-20 DIAGNOSIS — Z23 NEED FOR INFLUENZA VACCINATION: ICD-10-CM

## 2022-09-20 DIAGNOSIS — R73.9 ELEVATED BLOOD SUGAR: ICD-10-CM

## 2022-09-20 PROCEDURE — 99213 OFFICE O/P EST LOW 20 MIN: CPT | Performed by: FAMILY MEDICINE

## 2022-09-20 PROCEDURE — 90471 IMMUNIZATION ADMIN: CPT | Performed by: FAMILY MEDICINE

## 2022-09-20 PROCEDURE — 90662 IIV NO PRSV INCREASED AG IM: CPT | Performed by: FAMILY MEDICINE

## 2022-09-20 RX ORDER — LOPERAMIDE HYDROCHLORIDE 2 MG/1
CAPSULE ORAL
COMMUNITY
Start: 2022-08-18 | End: 2022-09-20

## 2022-09-20 NOTE — PROGRESS NOTES
Subjective   Maribeth Isbell is a 67 y.o. female.     History of Present Illness  67-year-old diabetic with hyperlipidemia      The following portions of the patient's history were reviewed and updated as appropriate: allergies, current medications, past family history, past medical history, past social history, past surgical history and problem list.    Review of Systems   Respiratory: Negative for shortness of breath.    Cardiovascular: Negative for chest pain and leg swelling.   Endocrine: Negative for polydipsia, polyphagia and polyuria.       Objective   Physical Exam  Vitals and nursing note reviewed.   Constitutional:       Appearance: She is obese.   Cardiovascular:      Rate and Rhythm: Normal rate and regular rhythm.   Pulmonary:      Effort: Pulmonary effort is normal.      Breath sounds: Normal breath sounds.   Musculoskeletal:      Right lower leg: No edema.      Left lower leg: No edema.   Skin:     General: Skin is warm and dry.   Neurological:      General: No focal deficit present.      Mental Status: She is alert and oriented to person, place, and time.   Psychiatric:         Mood and Affect: Mood normal.         Behavior: Behavior normal.         Thought Content: Thought content normal.         Judgment: Judgment normal.         Assessment & Plan   Diagnoses and all orders for this visit:    1. Mixed hyperlipidemia (Primary)  -     Comprehensive Metabolic Panel  -     Lipid Panel    2. Elevated blood sugar  -     Hemoglobin A1c    -Flu shot-above

## 2022-09-21 LAB
ALBUMIN SERPL-MCNC: 4.3 G/DL (ref 3.5–5.2)
ALBUMIN/GLOB SERPL: 2.2 G/DL
ALP SERPL-CCNC: 68 U/L (ref 39–117)
ALT SERPL-CCNC: 8 U/L (ref 1–33)
AST SERPL-CCNC: 17 U/L (ref 1–32)
BILIRUB SERPL-MCNC: 0.5 MG/DL (ref 0–1.2)
BUN SERPL-MCNC: 10 MG/DL (ref 8–23)
BUN/CREAT SERPL: 9.5 (ref 7–25)
CALCIUM SERPL-MCNC: 9.1 MG/DL (ref 8.6–10.5)
CHLORIDE SERPL-SCNC: 102 MMOL/L (ref 98–107)
CHOLEST SERPL-MCNC: 165 MG/DL (ref 0–200)
CO2 SERPL-SCNC: 29.1 MMOL/L (ref 22–29)
CREAT SERPL-MCNC: 1.05 MG/DL (ref 0.57–1)
EGFRCR SERPLBLD CKD-EPI 2021: 58.4 ML/MIN/1.73
GLOBULIN SER CALC-MCNC: 2 GM/DL
GLUCOSE SERPL-MCNC: 142 MG/DL (ref 65–99)
HBA1C MFR BLD: 6 % (ref 4.8–5.6)
HDLC SERPL-MCNC: 48 MG/DL (ref 40–60)
LDLC SERPL CALC-MCNC: 85 MG/DL (ref 0–100)
POTASSIUM SERPL-SCNC: 3.8 MMOL/L (ref 3.5–5.2)
PROT SERPL-MCNC: 6.3 G/DL (ref 6–8.5)
SODIUM SERPL-SCNC: 140 MMOL/L (ref 136–145)
TRIGL SERPL-MCNC: 189 MG/DL (ref 0–150)
VLDLC SERPL CALC-MCNC: 32 MG/DL (ref 5–40)

## 2022-10-03 RX ORDER — METOPROLOL SUCCINATE 25 MG/1
TABLET, EXTENDED RELEASE ORAL
Qty: 90 TABLET | Refills: 3 | Status: SHIPPED | OUTPATIENT
Start: 2022-10-03

## 2022-10-03 RX ORDER — NIFEDIPINE 60 MG/1
TABLET, FILM COATED, EXTENDED RELEASE ORAL
Qty: 90 TABLET | Refills: 3 | Status: SHIPPED | OUTPATIENT
Start: 2022-10-03

## 2022-10-06 ENCOUNTER — IMMUNIZATION (OUTPATIENT)
Dept: FAMILY MEDICINE CLINIC | Facility: CLINIC | Age: 67
End: 2022-10-06

## 2022-10-06 DIAGNOSIS — Z23 NEED FOR VACCINATION: Primary | ICD-10-CM

## 2022-10-06 PROCEDURE — 0124A COVID-19 (PFIZER) BIVALENT BOOSTER 12+YRS: CPT | Performed by: FAMILY MEDICINE

## 2022-10-06 PROCEDURE — 91312 COVID-19 (PFIZER) BIVALENT BOOSTER 12+YRS: CPT | Performed by: FAMILY MEDICINE

## 2022-10-08 DIAGNOSIS — E11.59 HYPERTENSION ASSOCIATED WITH DIABETES: ICD-10-CM

## 2022-10-08 DIAGNOSIS — I15.2 HYPERTENSION ASSOCIATED WITH DIABETES: ICD-10-CM

## 2022-10-09 DIAGNOSIS — E03.9 ACQUIRED HYPOTHYROIDISM: ICD-10-CM

## 2022-10-10 RX ORDER — LEVOTHYROXINE SODIUM 0.12 MG/1
TABLET ORAL
Qty: 90 TABLET | Refills: 2 | Status: SHIPPED | OUTPATIENT
Start: 2022-10-10

## 2022-10-10 RX ORDER — LISINOPRIL 40 MG/1
TABLET ORAL
Qty: 90 TABLET | Refills: 2 | Status: SHIPPED | OUTPATIENT
Start: 2022-10-10

## 2022-10-10 RX ORDER — ERGOCALCIFEROL 1.25 MG/1
CAPSULE ORAL
Qty: 12 CAPSULE | Refills: 2 | Status: SHIPPED | OUTPATIENT
Start: 2022-10-10

## 2022-10-10 NOTE — TELEPHONE ENCOUNTER
Rx Refill Note  Requested Prescriptions     Pending Prescriptions Disp Refills   • levothyroxine (SYNTHROID, LEVOTHROID) 125 MCG tablet [Pharmacy Med Name: LEVOTHYROXINE 125 MCG TABLET] 90 tablet 2     Sig: TAKE 1 TABLET BY MOUTH EVERY DAY      Last office visit with prescribing clinician: 9/20/2022      Next office visit with prescribing clinician: 6/22/2023       {TIP  Please add Last Relevant Lab 6/20/22    Jayne Casanova MA  10/10/22, 08:21 CDT

## 2022-10-10 NOTE — TELEPHONE ENCOUNTER
Rx Refill Note  Requested Prescriptions     Pending Prescriptions Disp Refills   • vitamin D (ERGOCALCIFEROL) 1.25 MG (95424 UT) capsule capsule [Pharmacy Med Name: VITAMIN D2 1.25MG(50,000 UNIT)] 12 capsule 2     Sig: TAKE 1 CAPSULE BY MOUTH 1 TIME PER WEEK.   • lisinopril (PRINIVIL,ZESTRIL) 40 MG tablet [Pharmacy Med Name: LISINOPRIL 40 MG TABLET] 90 tablet 2     Sig: TAKE 1 TABLET BY MOUTH EVERY DAY      Last office visit with prescribing clinician: 9/20/2022      Next office visit with prescribing clinician: 10/9/2022       {TIP  Please add Last Relevant Lab 9/20/22    Jayne Casanova MA  10/10/22, 07:41 CDT

## 2022-11-04 ENCOUNTER — OFFICE VISIT (OUTPATIENT)
Dept: ENDOCRINOLOGY | Facility: CLINIC | Age: 67
End: 2022-11-04

## 2022-11-04 VITALS
SYSTOLIC BLOOD PRESSURE: 114 MMHG | HEART RATE: 102 BPM | HEIGHT: 64 IN | BODY MASS INDEX: 37.56 KG/M2 | DIASTOLIC BLOOD PRESSURE: 70 MMHG | WEIGHT: 220 LBS | OXYGEN SATURATION: 100 %

## 2022-11-04 DIAGNOSIS — E11.69 MIXED DIABETIC HYPERLIPIDEMIA ASSOCIATED WITH TYPE 2 DIABETES MELLITUS: ICD-10-CM

## 2022-11-04 DIAGNOSIS — E11.65 TYPE 2 DIABETES MELLITUS WITH HYPERGLYCEMIA, WITH LONG-TERM CURRENT USE OF INSULIN: Primary | ICD-10-CM

## 2022-11-04 DIAGNOSIS — E78.2 MIXED DIABETIC HYPERLIPIDEMIA ASSOCIATED WITH TYPE 2 DIABETES MELLITUS: ICD-10-CM

## 2022-11-04 DIAGNOSIS — I15.2 HYPERTENSION ASSOCIATED WITH DIABETES: ICD-10-CM

## 2022-11-04 DIAGNOSIS — N18.31 STAGE 3A CHRONIC KIDNEY DISEASE: ICD-10-CM

## 2022-11-04 DIAGNOSIS — E11.59 HYPERTENSION ASSOCIATED WITH DIABETES: ICD-10-CM

## 2022-11-04 DIAGNOSIS — Z79.4 TYPE 2 DIABETES MELLITUS WITH HYPERGLYCEMIA, WITH LONG-TERM CURRENT USE OF INSULIN: Primary | ICD-10-CM

## 2022-11-04 PROCEDURE — 99214 OFFICE O/P EST MOD 30 MIN: CPT | Performed by: INTERNAL MEDICINE

## 2022-11-04 NOTE — PROGRESS NOTES
"  Subjective    Maribeth Isbell is a 67 y.o. female. she is here today for follow-up of diabetes       Diabetes        Duration Age 55 years       Complications - TIA    CAD , stent 2020        Alleviating Factors: Compliance         Side Effects  metformin and sulfonylurea     Current diet  in general, a \"healthy\" diet       Current exercise none     Current monitoring regimen: home blood tests - using dexcom     Lab Results   Component Value Date    HGBA1C 6.00 (H) 09/20/2022       Home blood sugar records:     Now at goal      Hypoglycemia if misjudges carbs      Patient Active Problem List    Diagnosis    • Family history of polyps in the colon [Z83.71]    • Morbidly obese (HCC) [E66.01]    • Hx of adenomatous colonic polyps [Z86.010]    • Obesity due to excess calories [E66.09]    • CHRISTY (obstructive sleep apnea) [G47.33]    • SVT (supraventricular tachycardia) ([facility]) [I47.1]    • Hypertension associated with diabetes ([facility]) [E11.59, I15.2]    • Gastroesophageal reflux disease [K21.9]    • Acquired hypothyroidism [E03.9]    • Recurrent major depressive disorder ([facility]) [F33.9]    • Osteoarthritis of multiple joints [M15.9]      Current Outpatient Medications   Medication Instructions   • aspirin 81 mg, Oral, Daily   • BASAGLAR KWIKPEN 40 Units, Subcutaneous, Daily   • BD Pen Needle Griselda 2nd Gen 32G X 4 MM misc 1 EACH 4 (FOUR) TIMES A DAY.   • bumetanide (BUMEX) 1 mg, Oral, Daily   • carbidopa-levodopa (SINEMET)  MG per tablet TAKE 1 TO 2 TABLETS BY MOUTH EVERY DAY AT BEDTIME   • cetirizine (ZYRTEC) 10 mg, Oral, Daily   • Continuous Blood Gluc Sensor (Dexcom G6 Sensor) Does not apply, As Needed, Every 10 daysDexcom G6 Sensor Kit [address]-07 Use as directed for continuous glucose monitoring   • cyclobenzaprine (FLEXERIL) 10 mg, Oral, 3 Times Daily PRN   • desvenlafaxine (PRISTIQ) 50 MG 24 hr tablet TAKE 1 TABLET BY MOUTH EVERY DAY   • Dulaglutide (Trulicity) 4.5 MG/0.5ML solution pen-injector Inject 4.5 mg " "(1 pen) under the skin into the appropriate area as directed 1 (One) Time Per Week.   • empagliflozin (JARDIANCE) 10 mg, Oral, Daily, Before bkfast   • Finerenone 10 MG tablet Take 1 tablet by mouth Daily.   • folic acid (FOLVITE) 400 mcg, Oral, Daily   • furosemide (LASIX) 40 MG tablet TAKE 1 TABLET BY MOUTH EVERY DAY   • insulin aspart (NovoLOG FlexPen) 100 UNIT/ML solution pen-injector sc pen INJECT 20 UNITS 3 TIMES DAILY   • lamoTRIgine (LaMICtal) 100 MG tablet TAKE 1 TABLET BY MOUTH EVERY DAY AT NIGHT   • levothyroxine (SYNTHROID, LEVOTHROID) 125 MCG tablet TAKE 1 TABLET BY MOUTH EVERY DAY   • lisinopril (PRINIVIL,ZESTRIL) 40 MG tablet TAKE 1 TABLET BY MOUTH EVERY DAY   • metoprolol succinate XL (TOPROL-XL) 25 mg, Oral, Daily   • Multiple Vitamins-Minerals (HAIR SKIN NAILS PO) Oral   • Multiple Vitamins-Minerals (VISION-KRISTAL PRESERVE PO) Oral, Daily With Breakfast   • NIFEdipine CC (ADALAT CC) 60 MG 24 hr tablet TAKE 1 TABLET BY MOUTH EVERY DAY   • omeprazole (priLOSEC) 40 MG capsule TAKE 1 CAPSULE BY MOUTH EVERY DAY   • rosuvastatin (CRESTOR) 40 mg, Oral, Daily   • Symbicort 160-4.5 MCG/ACT inhaler TAKE 2 PUFFS BY MOUTH TWICE A DAY   • vitamin D (ERGOCALCIFEROL) 1.25 MG (97812 UT) capsule capsule TAKE 1 CAPSULE BY MOUTH 1 TIME PER WEEK.           Review of Systems  Review of Systems   Musculoskeletal: Positive for back pain.        Objective    /70   Pulse 102   Ht 162.6 cm (64\")   Wt 99.8 kg (220 lb)   LMP  (LMP Unknown)   SpO2 100%   BMI 37.76 kg/m²     Physical Exam  Constitutional:       Appearance: Normal appearance.   HENT:      Head: Normocephalic.   Cardiovascular:      Rate and Rhythm: Normal rate and regular rhythm.   Pulmonary:      Effort: Pulmonary effort is normal.      Breath sounds: Normal breath sounds.   Musculoskeletal:      Cervical back: Normal range of motion and neck supple.      Right lower leg: No edema.      Left lower leg: No edema.   Neurological:      Mental Status: She " "is alert.         Lab Review      Assessment & Plan      1. Type 2 diabetes mellitus with hyperglycemia, with long-term current use of insulin (HCC)    2. Hypertension associated with diabetes (HCC)    3. Mixed diabetic hyperlipidemia associated with type 2 diabetes mellitus (HCC)    4. Stage 3a chronic kidney disease (HCC)    .    Medications prescribed:      Orders placed during this encounter include:  No orders of the defined types were placed in this encounter.  Patient has type 2 diabetes with hyperglycemia     Glycemic Management:     Lab Results   Component Value Date    HGBA1C 6.00 (H) 09/20/2022    HGBA1C 6.30 (H) 06/20/2022    HGBA1C 6.00 (H) 12/03/2021     Lab Results   Component Value Date    MICROALBUR 11.1 06/20/2022    CREATININE 1.05 (H) 09/20/2022            Basaglar 50 - ( toujeo not covered ),  Some lows while not eating , decrease to 45 - now doing 40 -15-20      Trulicity 0.75 mg weekly --- increase to 1.5 mg weekly - 3 mg weekly -  4.5 mg weekly       Novolog doing 20 units with meals, if low carb 15 units - now 10         dexcom     Using phone , couldn't remember password to link clarity to us     Excellent type 2 diabetes control      jardiance 10 mg daily           Lipid Management    Lab Results   Component Value Date    CHLPL 165 09/20/2022    CHLPL 223 (H) 06/20/2022    CHLPL 175 12/03/2021     Lab Results   Component Value Date    TRIG 189 (H) 09/20/2022    TRIG 208 (H) 06/20/2022    TRIG 125 12/03/2021     Lab Results   Component Value Date    HDL 48 09/20/2022    HDL 58 06/20/2022    HDL 51 12/03/2021     Lab Results   Component Value Date    LDL 85 09/20/2022     (H) 06/20/2022     (H) 12/03/2021          On crestor 10 mg qhs - increase to 20 - 40 mg qhs   Goal ldl is less than 70 but due to sickness some doses were missed        Blood Pressure Management:       /70   Pulse 102   Ht 162.6 cm (64\")   Wt 99.8 kg (220 lb)   LMP  (LMP Unknown)   SpO2 100%   BMI " 37.76 kg/m²        On lisinopril            Microvascular Complication Monitoring:        Eye Exam , 2022 , no retinopathy , has macular degeneration in left eye     No neuropathy   CKD stage IIIa / A2  kerendia 10 - increase 20      Weight Related:        Diet interventions: moderate (500 kCal/d) deficit diet.        Bone Health     Lab Results   Component Value Date    INGB41MP 41.6 06/16/2020    IHKQ40SV 32.3 04/18/2018    ZJKE80VA 34.3 06/05/2017          Thyroid Health     Lab Results   Component Value Date    TSH 1.490 06/20/2022    TSH 1.060 12/03/2021    TSH 0.338 06/16/2020        on levothyroxine 125 mcgs daily       No results found for: [id number]             4. Follow-up: No follow-ups on file.             This document has been electronically signed by Taiwo Khan MD on November 7, 2022 21:34 CST

## 2022-11-08 ENCOUNTER — DOCUMENTATION (OUTPATIENT)
Dept: ENDOCRINOLOGY | Facility: CLINIC | Age: 67
End: 2022-11-08

## 2022-11-28 RX ORDER — DULAGLUTIDE 4.5 MG/.5ML
4.5 INJECTION, SOLUTION SUBCUTANEOUS WEEKLY
Qty: 2 ML | Refills: 3 | Status: SHIPPED | OUTPATIENT
Start: 2022-11-28

## 2022-12-05 DIAGNOSIS — Z79.4 TYPE 2 DIABETES MELLITUS WITH HYPERGLYCEMIA, WITH LONG-TERM CURRENT USE OF INSULIN: ICD-10-CM

## 2022-12-05 DIAGNOSIS — E11.65 TYPE 2 DIABETES MELLITUS WITH HYPERGLYCEMIA, WITH LONG-TERM CURRENT USE OF INSULIN: ICD-10-CM

## 2022-12-06 RX ORDER — INSULIN GLARGINE 100 [IU]/ML
40 INJECTION, SOLUTION SUBCUTANEOUS DAILY
Qty: 15 ML | Refills: 2 | Status: SHIPPED | OUTPATIENT
Start: 2022-12-06

## 2022-12-09 RX ORDER — ROSUVASTATIN CALCIUM 20 MG/1
TABLET, COATED ORAL
Qty: 90 TABLET | Refills: 3 | Status: SHIPPED | OUTPATIENT
Start: 2022-12-09

## 2022-12-13 DIAGNOSIS — F33.1 MODERATE EPISODE OF RECURRENT MAJOR DEPRESSIVE DISORDER: ICD-10-CM

## 2022-12-13 DIAGNOSIS — J30.2 SEASONAL ALLERGIES: ICD-10-CM

## 2022-12-13 RX ORDER — DESVENLAFAXINE SUCCINATE 50 MG/1
TABLET, EXTENDED RELEASE ORAL
Qty: 90 TABLET | Refills: 3 | Status: SHIPPED | OUTPATIENT
Start: 2022-12-13

## 2022-12-13 RX ORDER — FUROSEMIDE 40 MG/1
TABLET ORAL
Qty: 90 TABLET | Refills: 2 | Status: SHIPPED | OUTPATIENT
Start: 2022-12-13

## 2022-12-13 RX ORDER — BUDESONIDE AND FORMOTEROL FUMARATE DIHYDRATE 160; 4.5 UG/1; UG/1
AEROSOL RESPIRATORY (INHALATION)
Qty: 30.6 EACH | Refills: 4 | Status: SHIPPED | OUTPATIENT
Start: 2022-12-13

## 2022-12-13 NOTE — TELEPHONE ENCOUNTER
Rx Refill Note  Requested Prescriptions     Pending Prescriptions Disp Refills   • furosemide (LASIX) 40 MG tablet [Pharmacy Med Name: FUROSEMIDE 40 MG TABLET] 90 tablet 2     Sig: TAKE 1 TABLET BY MOUTH EVERY DAY      Last office visit with prescribing clinician: 9/20/2022   Last telemedicine visit with prescribing clinician: Visit date not found   Next office visit with prescribing clinician: 6/22/2023   {TIP  Encounters:23}    {TIP  Please add Last Relevant Lab 9/20/22                 Would you like a call back once the refill request has been completed: [] Yes [] No    If the office needs to give you a call back, can they leave a voicemail: [] Yes [] No    Jayne Casanova MA  12/13/22, 09:54 CST

## 2022-12-13 NOTE — TELEPHONE ENCOUNTER
Rx Refill Note  Requested Prescriptions     Pending Prescriptions Disp Refills   • desvenlafaxine (PRISTIQ) 50 MG 24 hr tablet [Pharmacy Med Name: DESVENLAFAXINE SUCCNT ER 50 MG] 90 tablet 3     Sig: TAKE 1 TABLET BY MOUTH EVERY DAY   • Symbicort 160-4.5 MCG/ACT inhaler [Pharmacy Med Name: SYMBICORT 160-4.5 MCG INHALER] 30.6 each 4     Sig: TAKE 2 PUFFS BY MOUTH TWICE A DAY      Last office visit with prescribing clinician: 9/8/2022   Last telemedicine visit with prescribing clinician: 12/13/2022   Next office visit with prescribing clinician: Visit date not found       {TIP  Please add Last Relevant Lab 9/20/22                 Would you like a call back once the refill request has been completed: [] Yes [] No    If the office needs to give you a call back, can they leave a voicemail: [] Yes [] No    Jayne Casanova MA  12/13/22, 09:53 CST

## 2022-12-21 ENCOUNTER — TELEPHONE (OUTPATIENT)
Dept: CARDIOLOGY CLINIC | Age: 67
End: 2022-12-21

## 2022-12-21 ENCOUNTER — OFFICE VISIT (OUTPATIENT)
Dept: CARDIOLOGY CLINIC | Age: 67
End: 2022-12-21
Payer: COMMERCIAL

## 2022-12-21 VITALS
DIASTOLIC BLOOD PRESSURE: 82 MMHG | WEIGHT: 219 LBS | SYSTOLIC BLOOD PRESSURE: 124 MMHG | BODY MASS INDEX: 36.49 KG/M2 | HEART RATE: 80 BPM | HEIGHT: 65 IN

## 2022-12-21 DIAGNOSIS — I47.1 PSVT (PAROXYSMAL SUPRAVENTRICULAR TACHYCARDIA) (HCC): Primary | ICD-10-CM

## 2022-12-21 PROCEDURE — 1123F ACP DISCUSS/DSCN MKR DOCD: CPT | Performed by: INTERNAL MEDICINE

## 2022-12-21 PROCEDURE — G8400 PT W/DXA NO RESULTS DOC: HCPCS | Performed by: INTERNAL MEDICINE

## 2022-12-21 PROCEDURE — 1036F TOBACCO NON-USER: CPT | Performed by: INTERNAL MEDICINE

## 2022-12-21 PROCEDURE — 3074F SYST BP LT 130 MM HG: CPT | Performed by: INTERNAL MEDICINE

## 2022-12-21 PROCEDURE — 1090F PRES/ABSN URINE INCON ASSESS: CPT | Performed by: INTERNAL MEDICINE

## 2022-12-21 PROCEDURE — G8417 CALC BMI ABV UP PARAM F/U: HCPCS | Performed by: INTERNAL MEDICINE

## 2022-12-21 PROCEDURE — 3078F DIAST BP <80 MM HG: CPT | Performed by: INTERNAL MEDICINE

## 2022-12-21 PROCEDURE — G8484 FLU IMMUNIZE NO ADMIN: HCPCS | Performed by: INTERNAL MEDICINE

## 2022-12-21 PROCEDURE — G8427 DOCREV CUR MEDS BY ELIG CLIN: HCPCS | Performed by: INTERNAL MEDICINE

## 2022-12-21 PROCEDURE — 93000 ELECTROCARDIOGRAM COMPLETE: CPT | Performed by: INTERNAL MEDICINE

## 2022-12-21 PROCEDURE — 99214 OFFICE O/P EST MOD 30 MIN: CPT | Performed by: INTERNAL MEDICINE

## 2022-12-21 PROCEDURE — 3017F COLORECTAL CA SCREEN DOC REV: CPT | Performed by: INTERNAL MEDICINE

## 2022-12-21 NOTE — PROGRESS NOTES
HISTORY  51-year-old lady with a history of dyslipidemia, hypertension, SVT, tobacco abuse, familial coronary disease, diabetes, and coronary disease returns for follow-up. In August 2020 she developed profound hypotension during a dobutamine stress echo. Subsequently underwent angiographic assessment demonstrating normal left ventricular dysfunction, 30% mid circumflex stenosis, 45% proximal right lesion, and a 75% proximal LAD lesion which was stented. She was asymptomatic before her procedure and remained so afterwards. Her blood pressures have been marginally elevated at the time of her clinic visits with reasonable control of her lipids -September values LDL 85, HDL 48, triglycerides 189. On return today, active, he has no chest discomfort while also denying change in exercise tolerance or unusual shortness of breath, palpitations, or or fatigue. She has been vaccinated and boosted x3 for COVID-19. PHYSICAL EXAM  On exam she carries 219 pounds in a 5 foot 5 inch frame. Pressure is 124/82 pulse 80. EOMs full, sclerae and conjunctiva normal. PERRLA. Mask in place. Trachea midline with no neck masses. Assessment of internal jugular veins reveals no elevation of central venous pressure at 45 degrees. Carotid pulses normal without delay or bruit. Thyroid normal to palpation. Chest exam reveals normal respiratory effort, no abnormal breath sounds and normal expiratory phase. No skin lesions seen. PMI normal. S1, S2 normal without murmur or johnathan or click. Abdominal obesity. Normal bowel sounds without palpable mass or bruit. No clubbing or acrocyanosis. No significant lower extremity edema or signs of venous insufficiency. General motor strength appears to be within normal limits. Normal range of motion with normal gait. Alert, oriented x 3, memory and cognition normal as reflected by history and conversation.   EKG reveals sinus rhythm with first-degree AV block [302 ms] and low voltage in the precordial leads with poor R wave progression precluding exclusion of a previous anterior infarction. ASSESSMENT/PLAN:   Coronary disease -status post coronary stenting with residual disease as noted above. Defective anginal warning system mandates serial stress test in the future. Continue metoprolol and low-dose aspirin. Hypertension -controlled.   Continuing metoprolol, nifedipine, amlodipine [just , diuretic, and lisinopril realized she was on both these drugs with need to discontinue the amlodipine as they are duplicates] loop diuretic [needs to be Bumex or Lasix not both], and lisinopril  Dyslipidemia -adequately controlled, continue Crestor  Pandemic response -appropriate/vaccinated/boosted x3

## 2022-12-21 NOTE — TELEPHONE ENCOUNTER
Left  with patient advising per Dr. Marcy Adair she has Amlodipine and Nifedipine listed on her meds. She does not need two calcium channel blockers so she will need to d/c amlodipine and stay on Nifedipine. She also had two loop diuretics. Furosemide and Bumex. She will need to d/c the Furosemide and stay on the Bumex.

## 2023-01-30 RX ORDER — EMPAGLIFLOZIN 10 MG/1
TABLET, FILM COATED ORAL
Qty: 90 TABLET | Refills: 3 | Status: SHIPPED | OUTPATIENT
Start: 2023-01-30

## 2023-02-03 RX ORDER — BUMETANIDE 1 MG/1
TABLET ORAL
Qty: 90 TABLET | Refills: 1 | Status: SHIPPED | OUTPATIENT
Start: 2023-02-03

## 2023-02-13 ENCOUNTER — TELEPHONE (OUTPATIENT)
Dept: ENDOCRINOLOGY | Facility: CLINIC | Age: 68
End: 2023-02-13
Payer: COMMERCIAL

## 2023-02-13 NOTE — TELEPHONE ENCOUNTER
Pt called stating she is currently on Dexcom and she just got off the phone with Martina. She said they are informing her that her insurance will no longer cover the Dexcom and pt can't afford to pay out of pocket. She also states that her pharmacy is back ordered on Trulicity and she only has 2 pens left. Is there anything similar she can be switched to. She uses "[facility] pharmacy.     Please advise.     Thanks

## 2023-02-13 NOTE — TELEPHONE ENCOUNTER
Pt called stating she is currently on Dexcom and she just got off the phone with Martina. She said they are informing her that her insurance will no longer cover the Dexcom and pt can't afford to pay out of pocket. She also states that her pharmacy is back ordered on Trulicity and she only has 2 pens left. Is there anything similar she can be switched to. She uses [facility] pharmacy.    Please advise.    Thanks

## 2023-03-15 ENCOUNTER — DOCUMENTATION (OUTPATIENT)
Dept: ENDOCRINOLOGY | Facility: CLINIC | Age: 68
End: 2023-03-15
Payer: COMMERCIAL

## 2023-04-16 DIAGNOSIS — F33.1 MODERATE EPISODE OF RECURRENT MAJOR DEPRESSIVE DISORDER: ICD-10-CM

## 2023-04-17 RX ORDER — LAMOTRIGINE 100 MG/1
TABLET ORAL
Qty: 90 TABLET | Refills: 2 | Status: SHIPPED | OUTPATIENT
Start: 2023-04-17

## 2023-04-17 NOTE — TELEPHONE ENCOUNTER
Rx Refill Note  Requested Prescriptions     Pending Prescriptions Disp Refills   • lamoTRIgine (LaMICtal) 100 MG tablet [Pharmacy Med Name: LAMOTRIGINE 100 MG TABLET] 90 tablet 2     Sig: TAKE 1 TABLET BY MOUTH EVERY DAY AT NIGHT      Last office visit with prescribing clinician: 9/20/2022   Last telemedicine visit with prescribing clinician: 6/22/2023   Next office visit with prescribing clinician: 6/22/2023       {  Jayne Casanova MA  04/17/23, 07:55 CDT

## 2023-05-04 ENCOUNTER — OFFICE VISIT (OUTPATIENT)
Dept: ENDOCRINOLOGY | Facility: CLINIC | Age: 68
End: 2023-05-04
Payer: MEDICARE

## 2023-05-04 VITALS
DIASTOLIC BLOOD PRESSURE: 60 MMHG | HEART RATE: 75 BPM | OXYGEN SATURATION: 97 % | HEIGHT: 65 IN | BODY MASS INDEX: 36.49 KG/M2 | WEIGHT: 219 LBS | SYSTOLIC BLOOD PRESSURE: 110 MMHG

## 2023-05-04 DIAGNOSIS — E11.69 MIXED DIABETIC HYPERLIPIDEMIA ASSOCIATED WITH TYPE 2 DIABETES MELLITUS: ICD-10-CM

## 2023-05-04 DIAGNOSIS — E11.59 HYPERTENSION ASSOCIATED WITH DIABETES: ICD-10-CM

## 2023-05-04 DIAGNOSIS — I15.2 HYPERTENSION ASSOCIATED WITH DIABETES: ICD-10-CM

## 2023-05-04 DIAGNOSIS — E11.21 DIABETIC NEPHROPATHY ASSOCIATED WITH TYPE 2 DIABETES MELLITUS: ICD-10-CM

## 2023-05-04 DIAGNOSIS — Z79.4 TYPE 2 DIABETES MELLITUS WITH HYPERGLYCEMIA, WITH LONG-TERM CURRENT USE OF INSULIN: Primary | ICD-10-CM

## 2023-05-04 DIAGNOSIS — E78.2 MIXED DIABETIC HYPERLIPIDEMIA ASSOCIATED WITH TYPE 2 DIABETES MELLITUS: ICD-10-CM

## 2023-05-04 DIAGNOSIS — E11.65 TYPE 2 DIABETES MELLITUS WITH HYPERGLYCEMIA, WITH LONG-TERM CURRENT USE OF INSULIN: Primary | ICD-10-CM

## 2023-05-04 RX ORDER — (INSULIN DEGLUDEC AND LIRAGLUTIDE) 100; 3.6 [IU]/ML; MG/ML
50 INJECTION, SOLUTION SUBCUTANEOUS DAILY
Qty: 45 ML | Refills: 3 | Status: SHIPPED | OUTPATIENT
Start: 2023-05-04

## 2023-05-04 RX ORDER — INSULIN ASPART INJECTION 100 [IU]/ML
INJECTION, SOLUTION SUBCUTANEOUS
Qty: 108 ML | Refills: 3 | Status: SHIPPED | OUTPATIENT
Start: 2023-05-04

## 2023-05-04 RX ORDER — PEN NEEDLE, DIABETIC 30 GX3/16"
1 NEEDLE, DISPOSABLE MISCELLANEOUS 4 TIMES DAILY
Qty: 120 EACH | Refills: 11 | Status: SHIPPED | OUTPATIENT
Start: 2023-05-04

## 2023-05-04 NOTE — PROGRESS NOTES
"  Subjective    Maribeth Isbell is a 67 y.o. female. she is here today for follow-up of diabetes       Diabetes        Duration Age 55 years       Complications - TIA    CAD , stent 2020        Alleviating Factors: Compliance         Side Effects  metformin and sulfonylurea     Current diet  in general, a \"healthy\" diet       Current exercise none     Current monitoring regimen: home blood tests - using dexcom     Lab Results   Component Value Date    HGBA1C 6.00 (H) 09/20/2022       Home blood sugar records:     See below      Hypoglycemia if misjudges carbs      Patient Active Problem List    Diagnosis    • Family history of polyps in the colon [Z83.71]    • Morbidly obese [E66.01]    • Hx of adenomatous colonic polyps [Z86.010]    • Obesity due to excess calories [E66.09]    • CHRISTY (obstructive sleep apnea) [G47.33]    • SVT (supraventricular tachycardia) [I47.1]    • Hypertension associated with diabetes [E11.59, I15.2]    • Gastroesophageal reflux disease [K21.9]    • Acquired hypothyroidism [E03.9]    • Recurrent major depressive disorder [F33.9]    • Osteoarthritis of multiple joints [M15.9]      Current Outpatient Medications   Medication Instructions   • aspirin 81 mg, Oral, Daily   • bumetanide (BUMEX) 1 mg, Oral, Daily   • carbidopa-levodopa (SINEMET)  MG per tablet TAKE 1 TO 2 TABLETS BY MOUTH EVERY DAY AT BEDTIME   • cetirizine (ZYRTEC) 10 mg, Oral, Daily   • Continuous Blood Gluc Sensor (Dexcom G6 Sensor) Does not apply, As Needed, Every 10 daysDexcom G6 Sensor Kit [address]-33 Use as directed for continuous glucose monitoring   • Continuous Blood Gluc Sensor (FreeStyle Ann-Marie 2 Sensor) misc 1 each, Does not apply, Every 14 Days   • cyclobenzaprine (FLEXERIL) 10 mg, Oral, 3 Times Daily PRN   • desvenlafaxine (PRISTIQ) 50 MG 24 hr tablet TAKE 1 TABLET BY MOUTH EVERY DAY   • Finerenone 20 mg, Oral, Daily   • folic acid (FOLVITE) 400 mcg, Oral, Daily   • furosemide (LASIX) 40 MG tablet TAKE 1 " "TABLET BY MOUTH EVERY DAY   • Insulin Aspart, w/Niacinamide, (Fiasp FlexTouch) 100 UNIT/ML solution pen-injector UP TO 40 UNITS QAC TID   • Insulin Degludec-Liraglutide (Xultophy) 100-3.6 UNIT-MG/ML solution pen-injector subcutaneous pen 50 Units, Subcutaneous, Daily, PLEASE STOP TRULICITY   • Insulin Pen Needle (Pen Needles) 32G X 4 MM misc 1 each, Does not apply, 4 Times Daily, Use 4 x daily   • Jardiance 10 MG tablet tablet TAKE 1 TABLET BY MOUTH EVERY DAY BEFORE BREAKFAST   • lamoTRIgine (LaMICtal) 100 MG tablet TAKE 1 TABLET BY MOUTH EVERY DAY AT NIGHT   • levothyroxine (SYNTHROID, LEVOTHROID) 125 MCG tablet TAKE 1 TABLET BY MOUTH EVERY DAY   • lisinopril (PRINIVIL,ZESTRIL) 40 MG tablet TAKE 1 TABLET BY MOUTH EVERY DAY   • metoprolol succinate XL (TOPROL-XL) 25 mg, Oral, Daily   • Multiple Vitamins-Minerals (HAIR SKIN NAILS PO) Oral   • Multiple Vitamins-Minerals (VISION-KRISTAL PRESERVE PO) Oral, Daily With Breakfast   • NIFEdipine CC (ADALAT CC) 60 MG 24 hr tablet TAKE 1 TABLET BY MOUTH EVERY DAY   • omeprazole (priLOSEC) 40 MG capsule TAKE 1 CAPSULE BY MOUTH EVERY DAY   • rosuvastatin (CRESTOR) 20 MG tablet TAKE 1 TABLET BY MOUTH EVERY DAY AT NIGHT   • Symbicort 160-4.5 MCG/ACT inhaler TAKE 2 PUFFS BY MOUTH TWICE A DAY   • vitamin D (ERGOCALCIFEROL) 1.25 MG (40196 UT) capsule capsule TAKE 1 CAPSULE BY MOUTH 1 TIME PER WEEK.           Review of Systems  Review of Systems   Musculoskeletal: Positive for back pain.        Objective    /60   Pulse 75   Ht 165.1 cm (65\")   Wt 99.3 kg (219 lb)   LMP  (LMP Unknown)   SpO2 97%   BMI 36.44 kg/m²     Physical Exam  Constitutional:       Appearance: Normal appearance.   HENT:      Head: Normocephalic.   Cardiovascular:      Rate and Rhythm: Normal rate and regular rhythm.   Pulmonary:      Effort: Pulmonary effort is normal.      Breath sounds: Normal breath sounds.   Musculoskeletal:      Cervical back: Normal range of motion and neck supple.      Right lower " leg: No edema.      Left lower leg: No edema.   Neurological:      Mental Status: She is alert.         Lab Review      Assessment & Plan      1. Type 2 diabetes mellitus with hyperglycemia, with long-term current use of insulin    2. Hypertension associated with diabetes    3. Mixed diabetic hyperlipidemia associated with type 2 diabetes mellitus    4. Diabetic nephropathy associated with type 2 diabetes mellitus    .    Medications prescribed:          Glycemic Management:     Lab Results   Component Value Date    HGBA1C 6.00 (H) 09/20/2022    HGBA1C 6.30 (H) 06/20/2022    HGBA1C 6.00 (H) 12/03/2021     Lab Results   Component Value Date    MICROALBUR 11.1 06/20/2022    CREATININE 1.05 (H) 09/20/2022         Ambulatory Glucose Profile Report    Days Analyzed : 2 week period ending on 05/04/23      Continuous Glucose Monitory Device:  FreeStyle Ann-Marie 2     - 8% very high target range  - 34% high target range  - 58% in target range  - 0% below target range  - GMI 7.6 %  - Average glucose 180 mg/dl    Interpretation : Diabetes Type 2 Uncontrolled              Basaglar 50 - ( toujeo not covered ),  Some lows while not eating , decrease to 45 - now doing 40 -15-20      Trulicity 0.75 mg weekly --- increase to 1.5 mg weekly - 3 mg weekly -  4.5 mg weekly     Change to xultophy , start w 30 units, may increase up to 50       Novolog doing 20 units with meals, if low carb 15 units - now 10   Change to fiasp        Ann-Marie 2 upgrade to 3    Uses phone  Insurance won't pay for dexcom    jardiance 10 mg daily      On kerendia for nephropathy along with jardiance and lisinopril     No results found for: EGFR     Lab Results   Component Value Date    MALBCRERATIO 8 06/20/2022    MALBCRERATIO 7 06/16/2020       Eye exam, nl 2022   No neuropathy, has knee pain from replacement     Lipid Management    Lab Results   Component Value Date    CHLPL 165 09/20/2022    CHLPL 223 (H) 06/20/2022    CHLPL 175 12/03/2021     Lab Results  "  Component Value Date    TRIG 189 (H) 09/20/2022    TRIG 208 (H) 06/20/2022    TRIG 125 12/03/2021     Lab Results   Component Value Date    HDL 48 09/20/2022    HDL 58 06/20/2022    HDL 51 12/03/2021     Lab Results   Component Value Date    LDL 85 09/20/2022     (H) 06/20/2022     (H) 12/03/2021          On crestor 10 mg qhs - increase to 20 - 40 mg qhs           Blood Pressure Management:       /60   Pulse 75   Ht 165.1 cm (65\")   Wt 99.3 kg (219 lb)   LMP  (LMP Unknown)   SpO2 97%   BMI 36.44 kg/m²        On lisinopril            Microvascular Complication Monitoring:        Eye Exam , 2022 , no retinopathy , has macular degeneration in left eye     No neuropathy   CKD stage IIIa / A2  kerendia 10 - increase 20      Weight Related:        Diet interventions: moderate (500 kCal/d) deficit diet.        Bone Health     Lab Results   Component Value Date    KZUB72ND 41.6 06/16/2020    EMIM58HM 32.3 04/18/2018    WFJW47CC 34.3 06/05/2017          Thyroid Health     Lab Results   Component Value Date    TSH 1.490 06/20/2022    TSH 1.060 12/03/2021    TSH 0.338 06/16/2020        on levothyroxine 125 mcgs daily       No results found for: [id number]             4. Follow-up: No follow-ups on file.             This document has been electronically signed by Taiwo Khan MD on May 4, 2023 10:16 CDT        USES MY CHART FOR RESULTS      "

## 2023-05-09 ENCOUNTER — TELEPHONE (OUTPATIENT)
Dept: ENDOCRINOLOGY | Facility: CLINIC | Age: 68
End: 2023-05-09
Payer: COMMERCIAL

## 2023-06-13 RX ORDER — ERGOCALCIFEROL 1.25 MG/1
CAPSULE ORAL
Qty: 12 CAPSULE | Refills: 2 | Status: SHIPPED | OUTPATIENT
Start: 2023-06-13

## 2023-06-13 NOTE — TELEPHONE ENCOUNTER
Rx Refill Note  Requested Prescriptions     Pending Prescriptions Disp Refills    vitamin D (ERGOCALCIFEROL) 1.25 MG (72553 UT) capsule capsule [Pharmacy Med Name: VITAMIN D2 1.25MG(50,000 UNIT)] 12 capsule 2     Sig: TAKE 1 CAPSULE BY MOUTH 1 TIME PER WEEK.      Last office visit with prescribing clinician: 9/20/2022   Last telemedicine visit with prescribing clinician: Visit date not found   Next office visit with prescribing clinician: 6/22/2023       {TIP  Please add Last Relevant Lab 4/19/21    Jayne Casanova MA  06/13/23, 07:35 CDT

## 2023-07-25 ENCOUNTER — HOSPITAL ENCOUNTER (OUTPATIENT)
Dept: CT IMAGING | Facility: HOSPITAL | Age: 68
Discharge: HOME | End: 2023-07-25
Admitting: NURSE PRACTITIONER
Payer: COMMERCIAL

## 2023-07-25 DIAGNOSIS — Z12.2 ENCOUNTER FOR SCREENING FOR LUNG CANCER: ICD-10-CM

## 2023-07-25 DIAGNOSIS — K21.9 GASTROESOPHAGEAL REFLUX DISEASE WITHOUT ESOPHAGITIS: ICD-10-CM

## 2023-07-25 PROCEDURE — 71271 CT THORAX LUNG CANCER SCR C-: CPT

## 2023-07-25 RX ORDER — OMEPRAZOLE 40 MG/1
40 CAPSULE, DELAYED RELEASE ORAL DAILY
Qty: 90 CAPSULE | Refills: 3 | Status: SHIPPED | OUTPATIENT
Start: 2023-07-25

## 2023-07-25 NOTE — TELEPHONE ENCOUNTER
Rx Refill Note  Requested Prescriptions     Pending Prescriptions Disp Refills    omeprazole (priLOSEC) 40 MG capsule 90 capsule 3     Sig: Take 1 capsule by mouth Daily.      Last office visit with prescribing clinician: 6/22/2023   Last telemedicine visit with prescribing clinician: Visit date not found   Next office visit with prescribing clinician: 12/22/2023                         Would you like a call back once the refill request has been completed: [] Yes [] No    If the office needs to give you a call back, can they leave a voicemail: [] Yes [] No    Jorden Gamboa Rep  07/25/23, 15:14 CDT

## 2023-08-02 ENCOUNTER — TELEPHONE (OUTPATIENT)
Dept: ENDOCRINOLOGY | Facility: CLINIC | Age: 68
End: 2023-08-02
Payer: COMMERCIAL

## 2023-08-02 DIAGNOSIS — G25.81 RLS (RESTLESS LEGS SYNDROME): ICD-10-CM

## 2023-08-02 RX ORDER — ROSUVASTATIN CALCIUM 40 MG/1
40 TABLET, COATED ORAL NIGHTLY
Qty: 90 TABLET | Refills: 3 | Status: SHIPPED | OUTPATIENT
Start: 2023-08-02

## 2023-08-02 RX ORDER — CARBIDOPA/LEVODOPA 25MG-250MG
TABLET ORAL
Qty: 180 TABLET | Refills: 3 | Status: SHIPPED | OUTPATIENT
Start: 2023-08-02

## 2023-09-21 DIAGNOSIS — E03.9 ACQUIRED HYPOTHYROIDISM: Primary | ICD-10-CM

## 2023-09-21 RX ORDER — LEVOTHYROXINE SODIUM 88 UG/1
88 TABLET ORAL
Qty: 90 TABLET | Refills: 1 | Status: SHIPPED | OUTPATIENT
Start: 2023-09-21

## 2023-09-25 RX ORDER — NIFEDIPINE 60 MG/1
TABLET, FILM COATED, EXTENDED RELEASE ORAL
Qty: 90 TABLET | Refills: 3 | Status: SHIPPED | OUTPATIENT
Start: 2023-09-25

## 2023-10-20 RX ORDER — BUMETANIDE 1 MG/1
TABLET ORAL
Qty: 90 TABLET | Refills: 0 | Status: SHIPPED | OUTPATIENT
Start: 2023-10-20

## 2023-10-26 DIAGNOSIS — E87.6 LOW POTASSIUM SYNDROME: ICD-10-CM

## 2023-10-26 RX ORDER — POTASSIUM CHLORIDE 750 MG/1
10 TABLET, FILM COATED, EXTENDED RELEASE ORAL 2 TIMES DAILY
Qty: 180 TABLET | Refills: 1 | Status: SHIPPED | OUTPATIENT
Start: 2023-10-26

## 2023-10-26 NOTE — TELEPHONE ENCOUNTER
Rx Refill Note  Requested Prescriptions     Pending Prescriptions Disp Refills    potassium chloride 10 MEQ CR tablet [Pharmacy Med Name: POTASSIUM CL ER 10 MEQ TABLET] 180 tablet 1     Sig: TAKE 1 TABLET BY MOUTH TWICE A DAY      Last office visit with prescribing clinician: 6/22/2023   Last telemedicine visit with prescribing clinician: Visit date not found   Next office visit with prescribing clinician: 12/22/2023       {TIP  Please add Last Relevant Lab 6/22/23    Jayne Casanova MA  10/26/23, 07:32 CDT

## 2023-11-01 DIAGNOSIS — F33.1 MODERATE EPISODE OF RECURRENT MAJOR DEPRESSIVE DISORDER: ICD-10-CM

## 2023-11-01 RX ORDER — DESVENLAFAXINE SUCCINATE 50 MG/1
TABLET, EXTENDED RELEASE ORAL
Qty: 90 TABLET | Refills: 3 | Status: SHIPPED | OUTPATIENT
Start: 2023-11-01

## 2023-11-01 NOTE — TELEPHONE ENCOUNTER
Rx Refill Note  Requested Prescriptions     Pending Prescriptions Disp Refills    desvenlafaxine (PRISTIQ) 50 MG 24 hr tablet [Pharmacy Med Name: DESVENLAFAXINE SUCCNT ER 50 MG] 90 tablet 3     Sig: TAKE 1 TABLET BY MOUTH EVERY DAY      Last office visit with prescribing clinician: 6/22/23   Last telemedicine visit with prescribing clinician: Visit date not found   Next office visit with prescribing clinician: 12/22/23    Jayne Casanova MA  11/01/23, 07:42 CDT

## 2023-11-02 RX ORDER — METOPROLOL SUCCINATE 25 MG/1
TABLET, EXTENDED RELEASE ORAL
Qty: 90 TABLET | Refills: 3 | Status: SHIPPED | OUTPATIENT
Start: 2023-11-02

## 2023-11-02 RX ORDER — FUROSEMIDE 40 MG/1
TABLET ORAL
Qty: 90 TABLET | Refills: 2 | Status: SHIPPED | OUTPATIENT
Start: 2023-11-02

## 2023-11-02 NOTE — TELEPHONE ENCOUNTER
Rx Refill Note  Requested Prescriptions     Pending Prescriptions Disp Refills    furosemide (LASIX) 40 MG tablet [Pharmacy Med Name: FUROSEMIDE 40 MG TABLET] 90 tablet 2     Sig: TAKE 1 TABLET BY MOUTH EVERY DAY      Last office visit with prescribing clinician: 6/22/23   Last telemedicine visit with prescribing clinician: Visit date not found   Next office visit with prescribing clinician: 12/22/23    {TIP  Please add Last Relevant Lab 6/22/23    Jayne Casanova MA  11/02/23, 07:30 CDT     covid test

## 2023-11-14 NOTE — TELEPHONE ENCOUNTER
Rx Refill Note  Requested Prescriptions     Pending Prescriptions Disp Refills    potassium bicarbonate (K-LYTE) 25 MEQ disintegrating tablet 30 tablet 1     Sig: Take 1 once a day with largest meal      Last office visit with prescribing clinician: 6/22/2023   Last telemedicine visit with prescribing clinician: Visit date not found   Next office visit with prescribing clinician: 12/22/2023                         Would you like a call back once the refill request has been completed: [] Yes [] No    If the office needs to give you a call back, can they leave a voicemail: [] Yes [] No    Leeann Hamilton, PCT  11/14/23, 09:51 CST

## 2023-11-14 NOTE — TELEPHONE ENCOUNTER
Maribeth's  called and said that her potassium keeps making her sick. It's causing stomach problems and if she skips it a couple days it gets better but as soon as she starts it again it starts back up. Please advise

## 2023-12-25 DIAGNOSIS — J30.2 SEASONAL ALLERGIES: ICD-10-CM

## 2023-12-26 RX ORDER — BUDESONIDE AND FORMOTEROL FUMARATE DIHYDRATE 160; 4.5 UG/1; UG/1
AEROSOL RESPIRATORY (INHALATION)
Qty: 30.6 EACH | Refills: 4 | Status: SHIPPED | OUTPATIENT
Start: 2023-12-26

## 2023-12-26 NOTE — TELEPHONE ENCOUNTER
Rx Refill Note  Requested Prescriptions     Pending Prescriptions Disp Refills    Symbicort 160-4.5 MCG/ACT inhaler [Pharmacy Med Name: SYMBICORT 160-4.5 MCG INHALER] 30.6 each 4     Sig: TAKE 2 PUFFS BY MOUTH TWICE A DAY      Last office visit with prescribing clinician: 9/8/2022   Last telemedicine visit with prescribing clinician: Visit date not found   Next office visit with prescribing clinician: Visit date not found   CPE done 6/22/2023    {TIP  Please add Last Relevant Lab Date if appropriate: 09/20/2023             {TIP  Is Refill Pharmacy correct? yes    Would you like a call back once the refill request has been completed: [] Yes [x] No    If the office needs to give you a call back, can they leave a voicemail: [] Yes [] No    Jayne Ewing MA  12/26/23, 08:23 CST

## 2024-01-09 RX ORDER — ERGOCALCIFEROL 1.25 MG/1
CAPSULE ORAL
Qty: 12 CAPSULE | Refills: 2 | Status: SHIPPED | OUTPATIENT
Start: 2024-01-09

## 2024-01-09 NOTE — TELEPHONE ENCOUNTER
Rx Refill Note  Requested Prescriptions     Pending Prescriptions Disp Refills    vitamin D (ERGOCALCIFEROL) 1.25 MG (24205 UT) capsule capsule [Pharmacy Med Name: VITAMIN D2 1.25MG(50,000 UNIT)] 12 capsule 2     Sig: TAKE 1 CAPSULE BY MOUTH 1 TIME PER WEEK.      Last office visit with prescribing clinician: 6/22/2023   Last telemedicine visit with prescribing clinician: Visit date not found   Next office visit with prescribing clinician: 1/23/2024       {TIP  Please add Last Relevant Lab 4/19/21  Jayne Casanova MA  01/09/24, 07:30 CST

## 2024-01-18 DIAGNOSIS — E87.6 LOW POTASSIUM SYNDROME: ICD-10-CM

## 2024-01-18 NOTE — TELEPHONE ENCOUNTER
Rx Refill Note  Requested Prescriptions     Pending Prescriptions Disp Refills    potassium bicarbonate (K-LYTE) 25 MEQ disintegrating tablet 90 tablet 1     Sig: Take 1 once a day with largest meal      Last office visit with prescribing clinician: 6/22/2023   Last telemedicine visit with prescribing clinician: Visit date not found   Next office visit with prescribing clinician: 1/23/2024                         Would you like a call back once the refill request has been completed: [] Yes [] No    If the office needs to give you a call back, can they leave a voicemail: [] Yes [] No    Jorden Gamboa Rep  01/18/24, 15:27 CST

## 2024-01-20 DIAGNOSIS — F33.1 MODERATE EPISODE OF RECURRENT MAJOR DEPRESSIVE DISORDER: ICD-10-CM

## 2024-01-22 DIAGNOSIS — E03.9 ACQUIRED HYPOTHYROIDISM: Primary | ICD-10-CM

## 2024-01-22 RX ORDER — LAMOTRIGINE 100 MG/1
TABLET ORAL
Qty: 90 TABLET | Refills: 2 | Status: SHIPPED | OUTPATIENT
Start: 2024-01-22

## 2024-01-22 RX ORDER — LEVOTHYROXINE SODIUM 0.1 MG/1
100 TABLET ORAL
Qty: 90 TABLET | Refills: 1 | Status: SHIPPED | OUTPATIENT
Start: 2024-01-22

## 2024-01-22 RX ORDER — BUMETANIDE 1 MG/1
TABLET ORAL
Qty: 90 TABLET | Refills: 0 | OUTPATIENT
Start: 2024-01-22

## 2024-01-22 NOTE — TELEPHONE ENCOUNTER
Rx Refill Note  Requested Prescriptions     Pending Prescriptions Disp Refills    lamoTRIgine (LaMICtal) 100 MG tablet [Pharmacy Med Name: LAMOTRIGINE 100 MG TABLET] 90 tablet 2     Sig: TAKE 1 TABLET BY MOUTH EVERY DAY AT NIGHT      Last office visit with prescribing clinician: 6/22/2023   Last telemedicine visit with prescribing clinician: Visit date not found   Next office visit with prescribing clinician: 1/23/2024       Jayne Casanova MA  01/22/24, 07:40 CST

## 2024-01-29 DIAGNOSIS — E11.59 HYPERTENSION ASSOCIATED WITH DIABETES: ICD-10-CM

## 2024-01-29 DIAGNOSIS — I15.2 HYPERTENSION ASSOCIATED WITH DIABETES: ICD-10-CM

## 2024-01-29 RX ORDER — LISINOPRIL 40 MG/1
40 TABLET ORAL DAILY
Qty: 90 TABLET | Refills: 3 | Status: SHIPPED | OUTPATIENT
Start: 2024-01-29

## 2024-01-29 NOTE — TELEPHONE ENCOUNTER
Rx Refill Note  Requested Prescriptions     Pending Prescriptions Disp Refills    lisinopril (PRINIVIL,ZESTRIL) 40 MG tablet 90 tablet 3     Sig: Take 1 tablet by mouth Daily.      Last office visit with prescribing clinician: 6/22/2023   Last telemedicine visit with prescribing clinician: Visit date not found   Next office visit with prescribing clinician: 2/12/2024                         Would you like a call back once the refill request has been completed: [] Yes [] No    If the office needs to give you a call back, can they leave a voicemail: [] Yes [] No    Jorden Gamboa Rep  01/29/24, 12:08 CST

## 2024-02-12 ENCOUNTER — OFFICE VISIT (OUTPATIENT)
Dept: FAMILY MEDICINE CLINIC | Facility: CLINIC | Age: 69
End: 2024-02-12
Payer: COMMERCIAL

## 2024-02-12 VITALS
RESPIRATION RATE: 18 BRPM | BODY MASS INDEX: 35.25 KG/M2 | OXYGEN SATURATION: 98 % | TEMPERATURE: 97.5 F | HEIGHT: 65 IN | DIASTOLIC BLOOD PRESSURE: 70 MMHG | WEIGHT: 211.6 LBS | SYSTOLIC BLOOD PRESSURE: 118 MMHG | HEART RATE: 78 BPM

## 2024-02-12 DIAGNOSIS — Z79.4 TYPE 2 DIABETES MELLITUS WITHOUT COMPLICATION, WITH LONG-TERM CURRENT USE OF INSULIN: Primary | ICD-10-CM

## 2024-02-12 DIAGNOSIS — E11.9 TYPE 2 DIABETES MELLITUS WITHOUT COMPLICATION, WITH LONG-TERM CURRENT USE OF INSULIN: Primary | ICD-10-CM

## 2024-02-29 ENCOUNTER — NURSE TRIAGE (OUTPATIENT)
Dept: CALL CENTER | Facility: HOSPITAL | Age: 69
End: 2024-02-29
Payer: COMMERCIAL

## 2024-02-29 NOTE — TELEPHONE ENCOUNTER
Checked with office staff.  Nobody tried calling patient.  Left voice mail for patient to return call if necessary.

## 2024-02-29 NOTE — TELEPHONE ENCOUNTER
"Reason for Disposition   General information question, no triage required and triager able to answer question    Additional Information   Negative: [1] Caller is not with the adult (patient) AND [2] reporting urgent symptoms   Negative: Lab result questions   Negative: Medication questions   Negative: Caller can't be reached by phone   Negative: Caller has already spoken to PCP or another triager   Negative: RN needs further essential information from caller in order to complete triage   Negative: Requesting regular office appointment   Negative: [1] Caller requesting NON-URGENT health information AND [2] PCP's office is the best resource   Negative: Health Information question, no triage required and triager able to answer question   Negative: Question about upcoming scheduled test, no triage required and triager able to answer question    Answer Assessment - Initial Assessment Questions  1. REASON FOR CALL or QUESTION: \"What is your reason for calling today?\" or \"How can I best help you?\" or \"What question do you have that I can help answer?\"      Missed call. Advised we take call for many offices when closed and check on patient after their hospital stay.  States her  was admitted two weeks ago. Advised most likely a call to check on him and they will call again.    Protocols used: Information Only Call-ADULT-    "

## 2024-03-22 DIAGNOSIS — E03.9 ACQUIRED HYPOTHYROIDISM: ICD-10-CM

## 2024-03-22 RX ORDER — LEVOTHYROXINE SODIUM 88 UG/1
88 TABLET ORAL EVERY MORNING
Qty: 90 TABLET | Refills: 1 | OUTPATIENT
Start: 2024-03-22

## 2024-03-22 NOTE — TELEPHONE ENCOUNTER
The original prescription was discontinued on 1/22/2024 by Jayne Casanova MA for the following reason: Dose adjustment. Renewing this prescription may not be appropriate.

## 2024-04-02 RX ORDER — FLUTICASONE FUROATE AND VILANTEROL 100; 25 UG/1; UG/1
1 POWDER RESPIRATORY (INHALATION)
Qty: 60 EACH | Refills: 2 | Status: SHIPPED | OUTPATIENT
Start: 2024-04-02

## 2024-05-07 ENCOUNTER — OFFICE VISIT (OUTPATIENT)
Dept: FAMILY MEDICINE CLINIC | Facility: CLINIC | Age: 69
End: 2024-05-07
Payer: COMMERCIAL

## 2024-05-07 VITALS
SYSTOLIC BLOOD PRESSURE: 128 MMHG | WEIGHT: 208 LBS | HEIGHT: 65 IN | BODY MASS INDEX: 34.66 KG/M2 | RESPIRATION RATE: 16 BRPM | HEART RATE: 68 BPM | OXYGEN SATURATION: 97 % | DIASTOLIC BLOOD PRESSURE: 78 MMHG | TEMPERATURE: 97.5 F

## 2024-05-07 DIAGNOSIS — Z79.4 TYPE 2 DIABETES MELLITUS WITHOUT COMPLICATION, WITH LONG-TERM CURRENT USE OF INSULIN: Primary | ICD-10-CM

## 2024-05-07 DIAGNOSIS — E78.2 MIXED HYPERLIPIDEMIA: ICD-10-CM

## 2024-05-07 DIAGNOSIS — E11.9 TYPE 2 DIABETES MELLITUS WITHOUT COMPLICATION, WITH LONG-TERM CURRENT USE OF INSULIN: Primary | ICD-10-CM

## 2024-05-07 DIAGNOSIS — R53.83 OTHER FATIGUE: ICD-10-CM

## 2024-05-07 DIAGNOSIS — R35.0 FREQUENCY OF URINATION: ICD-10-CM

## 2024-05-07 DIAGNOSIS — R41.3 MEMORY LOSS: ICD-10-CM

## 2024-05-07 LAB
BILIRUB BLD-MCNC: NEGATIVE MG/DL
CLARITY, POC: CLEAR
COLOR UR: YELLOW
GLUCOSE UR STRIP-MCNC: NEGATIVE MG/DL
KETONES UR QL: NEGATIVE
LEUKOCYTE EST, POC: ABNORMAL
NITRITE UR-MCNC: NEGATIVE MG/ML
PH UR: 6 [PH] (ref 5–8)
PROT UR STRIP-MCNC: ABNORMAL MG/DL
RBC # UR STRIP: ABNORMAL /UL
SP GR UR: 1.02 (ref 1–1.03)
UROBILINOGEN UR QL: NORMAL

## 2024-05-08 LAB
ALBUMIN SERPL-MCNC: 4.3 G/DL (ref 3.5–5.2)
ALBUMIN/GLOB SERPL: 1.9 G/DL
ALP SERPL-CCNC: 76 U/L (ref 39–117)
ALT SERPL-CCNC: 10 U/L (ref 1–33)
AST SERPL-CCNC: 18 U/L (ref 1–32)
BASOPHILS # BLD AUTO: 0.04 10*3/MM3 (ref 0–0.2)
BASOPHILS NFR BLD AUTO: 1 % (ref 0–1.5)
BILIRUB SERPL-MCNC: 0.4 MG/DL (ref 0–1.2)
BUN SERPL-MCNC: 10 MG/DL (ref 8–23)
BUN/CREAT SERPL: 10.3 (ref 7–25)
CALCIUM SERPL-MCNC: 9.1 MG/DL (ref 8.6–10.5)
CHLORIDE SERPL-SCNC: 105 MMOL/L (ref 98–107)
CHOLEST SERPL-MCNC: 216 MG/DL (ref 0–200)
CO2 SERPL-SCNC: 25 MMOL/L (ref 22–29)
CREAT SERPL-MCNC: 0.97 MG/DL (ref 0.57–1)
EGFRCR SERPLBLD CKD-EPI 2021: 63.8 ML/MIN/1.73
EOSINOPHIL # BLD AUTO: 0.13 10*3/MM3 (ref 0–0.4)
EOSINOPHIL NFR BLD AUTO: 3.3 % (ref 0.3–6.2)
ERYTHROCYTE [DISTWIDTH] IN BLOOD BY AUTOMATED COUNT: 13.7 % (ref 12.3–15.4)
FOLATE SERPL-MCNC: 16.8 NG/ML (ref 4.78–24.2)
GLOBULIN SER CALC-MCNC: 2.3 GM/DL
GLUCOSE SERPL-MCNC: 116 MG/DL (ref 65–99)
HBA1C MFR BLD: 7.4 % (ref 4.8–5.6)
HCT VFR BLD AUTO: 48 % (ref 34–46.6)
HDLC SERPL-MCNC: 47 MG/DL (ref 40–60)
HGB BLD-MCNC: 15.6 G/DL (ref 12–15.9)
IMM GRANULOCYTES # BLD AUTO: 0.02 10*3/MM3 (ref 0–0.05)
IMM GRANULOCYTES NFR BLD AUTO: 0.5 % (ref 0–0.5)
LDLC SERPL CALC-MCNC: 137 MG/DL (ref 0–100)
LYMPHOCYTES # BLD AUTO: 1.53 10*3/MM3 (ref 0.7–3.1)
LYMPHOCYTES NFR BLD AUTO: 38.3 % (ref 19.6–45.3)
MCH RBC QN AUTO: 28.5 PG (ref 26.6–33)
MCHC RBC AUTO-ENTMCNC: 32.5 G/DL (ref 31.5–35.7)
MCV RBC AUTO: 87.8 FL (ref 79–97)
MONOCYTES # BLD AUTO: 0.3 10*3/MM3 (ref 0.1–0.9)
MONOCYTES NFR BLD AUTO: 7.5 % (ref 5–12)
NEUTROPHILS # BLD AUTO: 1.97 10*3/MM3 (ref 1.7–7)
NEUTROPHILS NFR BLD AUTO: 49.4 % (ref 42.7–76)
NRBC BLD AUTO-RTO: 0 /100 WBC (ref 0–0.2)
PLATELET # BLD AUTO: 246 10*3/MM3 (ref 140–450)
POTASSIUM SERPL-SCNC: 3.9 MMOL/L (ref 3.5–5.2)
PROT SERPL-MCNC: 6.6 G/DL (ref 6–8.5)
RBC # BLD AUTO: 5.47 10*6/MM3 (ref 3.77–5.28)
SODIUM SERPL-SCNC: 140 MMOL/L (ref 136–145)
TRIGL SERPL-MCNC: 179 MG/DL (ref 0–150)
VIT B12 SERPL-MCNC: 282 PG/ML (ref 211–946)
VLDLC SERPL CALC-MCNC: 32 MG/DL (ref 5–40)
WBC # BLD AUTO: 3.99 10*3/MM3 (ref 3.4–10.8)

## 2024-05-09 LAB
BACTERIA UR CULT: NO GROWTH
BACTERIA UR CULT: NORMAL

## 2024-05-09 RX ORDER — LANOLIN ALCOHOL/MO/W.PET/CERES
1000 CREAM (GRAM) TOPICAL DAILY
COMMUNITY

## 2024-06-21 ENCOUNTER — OFFICE VISIT (OUTPATIENT)
Dept: CARDIOLOGY | Facility: CLINIC | Age: 69
End: 2024-06-21
Payer: MEDICARE

## 2024-06-21 VITALS
WEIGHT: 211 LBS | BODY MASS INDEX: 35.16 KG/M2 | SYSTOLIC BLOOD PRESSURE: 128 MMHG | DIASTOLIC BLOOD PRESSURE: 86 MMHG | HEART RATE: 69 BPM | OXYGEN SATURATION: 98 % | HEIGHT: 65 IN

## 2024-06-21 DIAGNOSIS — I47.10 SVT (SUPRAVENTRICULAR TACHYCARDIA): ICD-10-CM

## 2024-06-21 DIAGNOSIS — E78.5 DYSLIPIDEMIA: ICD-10-CM

## 2024-06-21 DIAGNOSIS — I10 ESSENTIAL HYPERTENSION: ICD-10-CM

## 2024-06-21 DIAGNOSIS — I25.10 CORONARY ARTERY DISEASE INVOLVING NATIVE CORONARY ARTERY OF NATIVE HEART WITHOUT ANGINA PECTORIS: Primary | ICD-10-CM

## 2024-06-21 PROCEDURE — 3074F SYST BP LT 130 MM HG: CPT | Performed by: INTERNAL MEDICINE

## 2024-06-21 PROCEDURE — 1160F RVW MEDS BY RX/DR IN RCRD: CPT | Performed by: INTERNAL MEDICINE

## 2024-06-21 PROCEDURE — 93000 ELECTROCARDIOGRAM COMPLETE: CPT | Performed by: INTERNAL MEDICINE

## 2024-06-21 PROCEDURE — 99204 OFFICE O/P NEW MOD 45 MIN: CPT | Performed by: INTERNAL MEDICINE

## 2024-06-21 PROCEDURE — 3079F DIAST BP 80-89 MM HG: CPT | Performed by: INTERNAL MEDICINE

## 2024-06-21 PROCEDURE — 1159F MED LIST DOCD IN RCRD: CPT | Performed by: INTERNAL MEDICINE

## 2024-06-21 NOTE — PROGRESS NOTES
Reason for Visit: Paroxysmal SVT, coronary artery disease.    HPI:  Maribeth Isbell is a 68 y.o. female is here today as a self referral for assistance with management of paroxysmal SVT and coronary artery disease.  She is a former patient of Dr. Garces and is transitioning care.  She reports doing well and denies any chest pain, palpitations, dizziness, syncope, PND, or orthopnea.  She previously had a reaction to a chemical stress test, but she does not know what type this was.  She walks intermittently for exercise, both on the road and in the swimming pool.  She recently lost weight, about 60 lbs over the past 7 months.  Lipid panel from 2024 showed poor control.  She notes being off rosuvastatin for a little bit while she waited for the medication to come back.      Previous Cardiac Testing and Procedures:  -[facility] (2020) single-vessel disease involving the proximal LAD, status post successful PCI with 3.5 x 9 mm resolute JUSTA    Lab data:  -Lipid panel (2024) total cholesterol 216, HDL 47, , triglycerides 179  -Hemoglobin A1c (2024) 7.4%  -BMP (2024) creatinine 0.97, potassium 3.9, sodium 140    Patient Active Problem List   Diagnosis    Acquired hypothyroidism    Recurrent major depressive disorder    Osteoarthritis of multiple joints    Gastroesophageal reflux disease    Essential hypertension    Hx of adenomatous colonic polyps    Obesity due to excess calories    CHRISTY (obstructive sleep apnea)    SVT (supraventricular tachycardia)    Morbidly obese    Family history of polyps in the colon    Coronary artery disease involving native coronary artery of native heart without angina pectoris       Social History     Tobacco Use    Smoking status: Former     Current packs/day: 0.00     Average packs/day: 1 pack/day for 20.0 years (20.0 ttl pk-yrs)     Types: Cigarettes     Start date:      Quit date:      Years since quittin.4    Smokeless tobacco: Never   Vaping Use     Vaping status: Never Used   Substance Use Topics    Alcohol use: No    Drug use: No       Family History   Problem Relation Age of Onset    Heart disease Mother     Diabetes Mother     Breast cancer Sister     Colon polyps Sister 30        mid 30s    Breast cancer Sister     Breast cancer Brother     No Known Problems Maternal Grandmother     No Known Problems Paternal Grandmother     No Known Problems Maternal Grandfather     No Known Problems Paternal Grandfather     Colon cancer Neg Hx     Esophageal cancer Neg Hx        The following portions of the patient's history were reviewed and updated as appropriate: allergies, current medications, past family history, past medical history, past social history, past surgical history, and problem list.      Current Outpatient Medications:     aspirin 81 MG EC tablet, Take 1 tablet by mouth Daily., Disp: , Rfl:     bumetanide (BUMEX) 1 MG tablet, Take 1 tablet by mouth Daily., Disp: , Rfl:     carbidopa-levodopa (SINEMET)  MG per tablet, TAKE 1 TO 2 TABLETS BY MOUTH EVERY DAY AT BEDTIME, Disp: 180 tablet, Rfl: 3    cetirizine (zyrTEC) 10 MG tablet, Take 1 tablet by mouth Daily., Disp: 90 tablet, Rfl: 1    Continuous Blood Gluc Sensor (FreeStyle Ann-Marie 2 Sensor) misc, 1 each Every 14 (Fourteen) Days., Disp: 2 each, Rfl: 6    desvenlafaxine (PRISTIQ) 50 MG 24 hr tablet, TAKE 1 TABLET BY MOUTH EVERY DAY, Disp: 90 tablet, Rfl: 3    empagliflozin (Jardiance) 10 MG tablet tablet, Take  by mouth Daily., Disp: , Rfl:     Finerenone 20 MG tablet, Take 20 mg by mouth Daily., Disp: 30 tablet, Rfl: 11    Fluticasone Furoate-Vilanterol (Breo Ellipta) 100-25 MCG/ACT aerosol powder , Inhale 1 puff Daily., Disp: 60 each, Rfl: 2    folic acid (FOLVITE) 400 MCG tablet, Take 1 tablet by mouth Daily., Disp: , Rfl:     furosemide (LASIX) 40 MG tablet, TAKE 1 TABLET BY MOUTH EVERY DAY, Disp: 90 tablet, Rfl: 2    Insulin Aspart, w/Niacinamide, (Fiasp FlexTouch) 100 UNIT/ML solution  pen-injector, UP TO 40 UNITS QAC TID (Patient taking differently: 20 Units. UP TO 40 UNITS QAC TID), Disp: 108 mL, Rfl: 3    Insulin Degludec-Liraglutide (Xultophy) 100-3.6 UNIT-MG/ML solution pen-injector subcutaneous pen, Inject 50 Units under the skin into the appropriate area as directed Daily. PLEASE STOP TRULICITY (Patient taking differently: Inject 25 Units under the skin into the appropriate area as directed Every Night. PLEASE STOP TRULICITY), Disp: 45 mL, Rfl: 3    Insulin Pen Needle (Pen Needles) 32G X 4 MM misc, 1 each 4 (Four) Times a Day. Use 4 x daily, Disp: 120 each, Rfl: 11    levothyroxine (Synthroid) 100 MCG tablet, Take 1 tablet by mouth Every Morning., Disp: 90 tablet, Rfl: 1    lisinopril (PRINIVIL,ZESTRIL) 40 MG tablet, Take 1 tablet by mouth Daily., Disp: 90 tablet, Rfl: 3    metoprolol succinate XL (TOPROL-XL) 25 MG 24 hr tablet, Take 1 tablet by mouth Daily., Disp: , Rfl:     Multiple Vitamins-Minerals (HAIR SKIN NAILS PO), Take  by mouth., Disp: , Rfl:     Multiple Vitamins-Minerals (VISION-KRISTAL PRESERVE PO), Take  by mouth Daily With Breakfast., Disp: , Rfl:     multivitamin with minerals (CENTRUM SILVER 50+WOMEN PO), Take 1 tablet by mouth Daily., Disp: , Rfl:     NIFEdipine CC (ADALAT CC) 60 MG 24 hr tablet, TAKE 1 TABLET BY MOUTH EVERY DAY, Disp: 90 tablet, Rfl: 3    omeprazole (priLOSEC) 40 MG capsule, Take 1 capsule by mouth Daily., Disp: 90 capsule, Rfl: 3    potassium chloride 10 MEQ CR tablet, TAKE 1 TABLET BY MOUTH TWICE A DAY (Patient taking differently: Take 25 mEq by mouth Daily.), Disp: 180 tablet, Rfl: 1    rosuvastatin (CRESTOR) 40 MG tablet, Take 1 tablet by mouth Every Night., Disp: 90 tablet, Rfl: 3    vitamin B-12 (CYANOCOBALAMIN) 1000 MCG tablet, Take 1 tablet by mouth Daily., Disp: , Rfl:     vitamin D (ERGOCALCIFEROL) 1.25 MG (35671 UT) capsule capsule, TAKE 1 CAPSULE BY MOUTH 1 TIME PER WEEK., Disp: 12 capsule, Rfl: 2    lamoTRIgine (LaMICtal) 100 MG tablet, TAKE 1  "TABLET BY MOUTH EVERY DAY AT NIGHT (Patient not taking: Reported on 6/21/2024), Disp: 90 tablet, Rfl: 2    Review of Systems   Constitutional: Negative for chills and fever.   Cardiovascular:  Negative for chest pain and paroxysmal nocturnal dyspnea.   Respiratory:  Negative for cough and shortness of breath.    Skin:  Negative for rash.   Musculoskeletal:  Positive for arthritis and joint pain.   Gastrointestinal:  Negative for abdominal pain and heartburn.   Neurological:  Negative for dizziness and numbness.       Objective   /86 (BP Location: Left arm, Patient Position: Sitting, Cuff Size: Adult)   Pulse 69   Ht 165.1 cm (65\")   Wt 95.7 kg (211 lb)   LMP  (LMP Unknown)   SpO2 98%   BMI 35.11 kg/m²   Constitutional:       Appearance: Well-developed.   HENT:      Head: Normocephalic and atraumatic.   Pulmonary:      Effort: Pulmonary effort is normal.      Breath sounds: Normal breath sounds.   Cardiovascular:      Normal rate. Regular rhythm.   Edema:     Peripheral edema absent.   Skin:     General: Skin is warm and dry.   Neurological:      Mental Status: Alert and oriented to person, place, and time.         ECG 12 Lead    Date/Time: 6/21/2024 11:14 AM  Performed by: Ryan Huizar MD    Authorized by: Ryan Huizar MD  Previous ECG: no previous ECG available  Rhythm: sinus rhythm  Ectopy: atrial premature contractions  Conduction: 1st degree AV block  Other findings: non-specific ST-T wave changes            ICD-10-CM ICD-9-CM   1. Coronary artery disease involving native coronary artery of native heart without angina pectoris  I25.10 414.01   2. SVT (supraventricular tachycardia)  I47.10 427.89   3. Essential hypertension  I10 401.9   4. Dyslipidemia  E78.5 272.4         Assessment/Plan:  1.  Coronary artery disease: History of PCI to LAD in 2020.  No current chest pain or other anginal symptoms.  Reports previously having an adverse reaction to a pharmacological stress agent, but she does " not know which one this was.  Continue aspirin, rosuvastatin, and metoprolol.    2.  Paroxysmal SVT: Reported on prior records from Dr. Garces.  No rhythm strips, EKG, or Holter monitor that demonstrates this.  No current palpitations.  Continue metoprolol.    3.  Essential hypertension: Blood pressure is acceptable today.  Continue metoprolol, nifedipine, and lisinopril.    4.  Dyslipidemia: Lipid panel from 5/7/2024 showed suboptimal control with LDL of 137 (goal less than 70) she does report being off rosuvastatin for several days prior to the lipid panel which may have affected this.  She also notes some flip-flops in her diet.  If remains suboptimally controlled at follow-up, ezetimibe should be considered.

## 2024-06-21 NOTE — LETTER
June 21, 2024     Jer Mccray MD  [address] [facility] [address]    Patient: Maribeth Isbell   YOB: 1955   Date of Visit: 6/21/2024       Dear Jer Mccray MD,    Thank you for referring Maribeth Isbell to me for evaluation. Below is a copy of my consult note.    If you have questions, please do not hesitate to call me. I look forward to following Maribeth along with you.         Sincerely,        Ryan Huizar MD        CC: No Recipients      Reason for Visit: Paroxysmal SVT, coronary artery disease.    HPI:  Maribeth Isbell is a 68 y.o. female is here today as a self referral for assistance with management of paroxysmal SVT and coronary artery disease.  She is a former patient of Dr. Garces and is transitioning care.  She reports doing well and denies any chest pain, palpitations, dizziness, syncope, PND, or orthopnea.  She previously had a reaction to a chemical stress test, but she does not know what type this was.  She walks intermittently for exercise, both on the road and in the swimming pool.  She recently lost weight, about 60 lbs over the past 7 months.  Lipid panel from 5/7/2024 showed poor control.  She notes being off rosuvastatin for a little bit while she waited for the medication to come back.      Previous Cardiac Testing and Procedures:  -[facility] (9/11/2020) single-vessel disease involving the proximal LAD, status post successful PCI with 3.5 x 9 mm resolute JUSTA    Lab data:  -Lipid panel (5/7/2024) total cholesterol 216, HDL 47, , triglycerides 179  -Hemoglobin A1c (5/7/2024) 7.4%  -BMP (5/7/2024) creatinine 0.97, potassium 3.9, sodium 140    Patient Active Problem List   Diagnosis   • Acquired hypothyroidism   • Recurrent major depressive disorder   • Osteoarthritis of multiple joints   • Gastroesophageal reflux disease   • Essential hypertension   • Hx of adenomatous colonic polyps   • Obesity due to excess calories   • CHRISTY (obstructive sleep  apnea)   • SVT (supraventricular tachycardia)   • Morbidly obese   • Family history of polyps in the colon   • Coronary artery disease involving native coronary artery of native heart without angina pectoris       Social History     Tobacco Use   • Smoking status: Former     Current packs/day: 0.00     Average packs/day: 1 pack/day for 20.0 years (20.0 ttl pk-yrs)     Types: Cigarettes     Start date:      Quit date:      Years since quittin.4   • Smokeless tobacco: Never   Vaping Use   • Vaping status: Never Used   Substance Use Topics   • Alcohol use: No   • Drug use: No       Family History   Problem Relation Age of Onset   • Heart disease Mother    • Diabetes Mother    • Breast cancer Sister    • Colon polyps Sister 30        mid 30s   • Breast cancer Sister    • Breast cancer Brother    • No Known Problems Maternal Grandmother    • No Known Problems Paternal Grandmother    • No Known Problems Maternal Grandfather    • No Known Problems Paternal Grandfather    • Colon cancer Neg Hx    • Esophageal cancer Neg Hx        The following portions of the patient's history were reviewed and updated as appropriate: allergies, current medications, past family history, past medical history, past social history, past surgical history, and problem list.      Current Outpatient Medications:   •  aspirin 81 MG EC tablet, Take 1 tablet by mouth Daily., Disp: , Rfl:   •  bumetanide (BUMEX) 1 MG tablet, Take 1 tablet by mouth Daily., Disp: , Rfl:   •  carbidopa-levodopa (SINEMET)  MG per tablet, TAKE 1 TO 2 TABLETS BY MOUTH EVERY DAY AT BEDTIME, Disp: 180 tablet, Rfl: 3  •  cetirizine (zyrTEC) 10 MG tablet, Take 1 tablet by mouth Daily., Disp: 90 tablet, Rfl: 1  •  Continuous Blood Gluc Sensor (FreeStyle Ann-Marie 2 Sensor) misc, 1 each Every 14 (Fourteen) Days., Disp: 2 each, Rfl: 6  •  desvenlafaxine (PRISTIQ) 50 MG 24 hr tablet, TAKE 1 TABLET BY MOUTH EVERY DAY, Disp: 90 tablet, Rfl: 3  •  empagliflozin  (Jardiance) 10 MG tablet tablet, Take  by mouth Daily., Disp: , Rfl:   •  Finerenone 20 MG tablet, Take 20 mg by mouth Daily., Disp: 30 tablet, Rfl: 11  •  Fluticasone Furoate-Vilanterol (Breo Ellipta) 100-25 MCG/ACT aerosol powder , Inhale 1 puff Daily., Disp: 60 each, Rfl: 2  •  folic acid (FOLVITE) 400 MCG tablet, Take 1 tablet by mouth Daily., Disp: , Rfl:   •  furosemide (LASIX) 40 MG tablet, TAKE 1 TABLET BY MOUTH EVERY DAY, Disp: 90 tablet, Rfl: 2  •  Insulin Aspart, w/Niacinamide, (Fiasp FlexTouch) 100 UNIT/ML solution pen-injector, UP TO 40 UNITS QAC TID (Patient taking differently: 20 Units. UP TO 40 UNITS QAC TID), Disp: 108 mL, Rfl: 3  •  Insulin Degludec-Liraglutide (Xultophy) 100-3.6 UNIT-MG/ML solution pen-injector subcutaneous pen, Inject 50 Units under the skin into the appropriate area as directed Daily. PLEASE STOP TRULICITY (Patient taking differently: Inject 25 Units under the skin into the appropriate area as directed Every Night. PLEASE STOP TRULICITY), Disp: 45 mL, Rfl: 3  •  Insulin Pen Needle (Pen Needles) 32G X 4 MM misc, 1 each 4 (Four) Times a Day. Use 4 x daily, Disp: 120 each, Rfl: 11  •  levothyroxine (Synthroid) 100 MCG tablet, Take 1 tablet by mouth Every Morning., Disp: 90 tablet, Rfl: 1  •  lisinopril (PRINIVIL,ZESTRIL) 40 MG tablet, Take 1 tablet by mouth Daily., Disp: 90 tablet, Rfl: 3  •  metoprolol succinate XL (TOPROL-XL) 25 MG 24 hr tablet, Take 1 tablet by mouth Daily., Disp: , Rfl:   •  Multiple Vitamins-Minerals (HAIR SKIN NAILS PO), Take  by mouth., Disp: , Rfl:   •  Multiple Vitamins-Minerals (VISION-KRISTAL PRESERVE PO), Take  by mouth Daily With Breakfast., Disp: , Rfl:   •  multivitamin with minerals (CENTRUM SILVER 50+WOMEN PO), Take 1 tablet by mouth Daily., Disp: , Rfl:   •  NIFEdipine CC (ADALAT CC) 60 MG 24 hr tablet, TAKE 1 TABLET BY MOUTH EVERY DAY, Disp: 90 tablet, Rfl: 3  •  omeprazole (priLOSEC) 40 MG capsule, Take 1 capsule by mouth Daily., Disp: 90 capsule,  "Rfl: 3  •  potassium chloride 10 MEQ CR tablet, TAKE 1 TABLET BY MOUTH TWICE A DAY (Patient taking differently: Take 25 mEq by mouth Daily.), Disp: 180 tablet, Rfl: 1  •  rosuvastatin (CRESTOR) 40 MG tablet, Take 1 tablet by mouth Every Night., Disp: 90 tablet, Rfl: 3  •  vitamin B-12 (CYANOCOBALAMIN) 1000 MCG tablet, Take 1 tablet by mouth Daily., Disp: , Rfl:   •  vitamin D (ERGOCALCIFEROL) 1.25 MG (27101 UT) capsule capsule, TAKE 1 CAPSULE BY MOUTH 1 TIME PER WEEK., Disp: 12 capsule, Rfl: 2  •  lamoTRIgine (LaMICtal) 100 MG tablet, TAKE 1 TABLET BY MOUTH EVERY DAY AT NIGHT (Patient not taking: Reported on 6/21/2024), Disp: 90 tablet, Rfl: 2    Review of Systems   Constitutional: Negative for chills and fever.   Cardiovascular:  Negative for chest pain and paroxysmal nocturnal dyspnea.   Respiratory:  Negative for cough and shortness of breath.    Skin:  Negative for rash.   Musculoskeletal:  Positive for arthritis and joint pain.   Gastrointestinal:  Negative for abdominal pain and heartburn.   Neurological:  Negative for dizziness and numbness.       Objective  /86 (BP Location: Left arm, Patient Position: Sitting, Cuff Size: Adult)   Pulse 69   Ht 165.1 cm (65\")   Wt 95.7 kg (211 lb)   LMP  (LMP Unknown)   SpO2 98%   BMI 35.11 kg/m²   Constitutional:       Appearance: Well-developed.   HENT:      Head: Normocephalic and atraumatic.   Pulmonary:      Effort: Pulmonary effort is normal.      Breath sounds: Normal breath sounds.   Cardiovascular:      Normal rate. Regular rhythm.   Edema:     Peripheral edema absent.   Skin:     General: Skin is warm and dry.   Neurological:      Mental Status: Alert and oriented to person, place, and time.         ECG 12 Lead    Date/Time: 6/21/2024 11:14 AM  Performed by: Ryan Huizar MD    Authorized by: Ryan Huizar MD  Previous ECG: no previous ECG available  Rhythm: sinus rhythm  Ectopy: atrial premature contractions  Conduction: 1st degree AV " block  Other findings: non-specific ST-T wave changes            ICD-10-CM ICD-9-CM   1. Coronary artery disease involving native coronary artery of native heart without angina pectoris  I25.10 414.01   2. SVT (supraventricular tachycardia)  I47.10 427.89   3. Essential hypertension  I10 401.9   4. Dyslipidemia  E78.5 272.4         Assessment/Plan:  1.  Coronary artery disease: History of PCI to LAD in 2020.  No current chest pain or other anginal symptoms.  Reports previously having an adverse reaction to a pharmacological stress agent, but she does not know which one this was.  Continue aspirin, rosuvastatin, and metoprolol.    2.  Paroxysmal SVT: Reported on prior records from Dr. Garces.  No rhythm strips, EKG, or Holter monitor that demonstrates this.  No current palpitations.  Continue metoprolol.    3.  Essential hypertension: Blood pressure is acceptable today.  Continue metoprolol, nifedipine, and lisinopril.    4.  Dyslipidemia: Lipid panel from 5/7/2024 showed suboptimal control with LDL of 137 (goal less than 70) she does report being off rosuvastatin for several days prior to the lipid panel which may have affected this.  She also notes some flip-flops in her diet.  If remains suboptimally controlled at follow-up, ezetimibe should be considered.

## 2024-06-21 NOTE — LETTER
June 21, 2024     Jer Mccray MD  [address] [facility] [address]    Patient: Maribeth Isbell   YOB: 1955   Date of Visit: 6/21/2024       Dear Jer Mccray MD    Maribeth Isbell was in my office today. Below is a copy of my note.    If you have questions, please do not hesitate to call me. I look forward to following Maribeth along with you.         Sincerely,        Ryan Huizar MD        CC: No Recipients      Reason for Visit: Paroxysmal SVT, coronary artery disease.    HPI:  Maribeth Isbell is a 68 y.o. female is here today as a self referral for assistance with management of paroxysmal SVT and coronary artery disease.  She is a former patient of Dr. Garces and is transitioning care.  She reports doing well and denies any chest pain, palpitations, dizziness, syncope, PND, or orthopnea.  She previously had a reaction to a chemical stress test, but she does not know what type this was.  She walks intermittently for exercise, both on the road and in the swimming pool.  She recently lost weight, about 60 lbs over the past 7 months.  Lipid panel from 5/7/2024 showed poor control.  She notes being off rosuvastatin for a little bit while she waited for the medication to come back.      Previous Cardiac Testing and Procedures:  -[facility] (9/11/2020) single-vessel disease involving the proximal LAD, status post successful PCI with 3.5 x 9 mm resolute JUSTA    Lab data:  -Lipid panel (5/7/2024) total cholesterol 216, HDL 47, , triglycerides 179  -Hemoglobin A1c (5/7/2024) 7.4%  -BMP (5/7/2024) creatinine 0.97, potassium 3.9, sodium 140    Patient Active Problem List   Diagnosis   • Acquired hypothyroidism   • Recurrent major depressive disorder   • Osteoarthritis of multiple joints   • Gastroesophageal reflux disease   • Essential hypertension   • Hx of adenomatous colonic polyps   • Obesity due to excess calories   • CHRISTY (obstructive sleep apnea)   • SVT (supraventricular  tachycardia)   • Morbidly obese   • Family history of polyps in the colon   • Coronary artery disease involving native coronary artery of native heart without angina pectoris       Social History     Tobacco Use   • Smoking status: Former     Current packs/day: 0.00     Average packs/day: 1 pack/day for 20.0 years (20.0 ttl pk-yrs)     Types: Cigarettes     Start date:      Quit date:      Years since quittin.4   • Smokeless tobacco: Never   Vaping Use   • Vaping status: Never Used   Substance Use Topics   • Alcohol use: No   • Drug use: No       Family History   Problem Relation Age of Onset   • Heart disease Mother    • Diabetes Mother    • Breast cancer Sister    • Colon polyps Sister 30        mid 30s   • Breast cancer Sister    • Breast cancer Brother    • No Known Problems Maternal Grandmother    • No Known Problems Paternal Grandmother    • No Known Problems Maternal Grandfather    • No Known Problems Paternal Grandfather    • Colon cancer Neg Hx    • Esophageal cancer Neg Hx        The following portions of the patient's history were reviewed and updated as appropriate: allergies, current medications, past family history, past medical history, past social history, past surgical history, and problem list.      Current Outpatient Medications:   •  aspirin 81 MG EC tablet, Take 1 tablet by mouth Daily., Disp: , Rfl:   •  bumetanide (BUMEX) 1 MG tablet, Take 1 tablet by mouth Daily., Disp: , Rfl:   •  carbidopa-levodopa (SINEMET)  MG per tablet, TAKE 1 TO 2 TABLETS BY MOUTH EVERY DAY AT BEDTIME, Disp: 180 tablet, Rfl: 3  •  cetirizine (zyrTEC) 10 MG tablet, Take 1 tablet by mouth Daily., Disp: 90 tablet, Rfl: 1  •  Continuous Blood Gluc Sensor (FreeStyle Ann-Marie 2 Sensor) misc, 1 each Every 14 (Fourteen) Days., Disp: 2 each, Rfl: 6  •  desvenlafaxine (PRISTIQ) 50 MG 24 hr tablet, TAKE 1 TABLET BY MOUTH EVERY DAY, Disp: 90 tablet, Rfl: 3  •  empagliflozin (Jardiance) 10 MG tablet tablet, Take  by  mouth Daily., Disp: , Rfl:   •  Finerenone 20 MG tablet, Take 20 mg by mouth Daily., Disp: 30 tablet, Rfl: 11  •  Fluticasone Furoate-Vilanterol (Breo Ellipta) 100-25 MCG/ACT aerosol powder , Inhale 1 puff Daily., Disp: 60 each, Rfl: 2  •  folic acid (FOLVITE) 400 MCG tablet, Take 1 tablet by mouth Daily., Disp: , Rfl:   •  furosemide (LASIX) 40 MG tablet, TAKE 1 TABLET BY MOUTH EVERY DAY, Disp: 90 tablet, Rfl: 2  •  Insulin Aspart, w/Niacinamide, (Fiasp FlexTouch) 100 UNIT/ML solution pen-injector, UP TO 40 UNITS QAC TID (Patient taking differently: 20 Units. UP TO 40 UNITS QAC TID), Disp: 108 mL, Rfl: 3  •  Insulin Degludec-Liraglutide (Xultophy) 100-3.6 UNIT-MG/ML solution pen-injector subcutaneous pen, Inject 50 Units under the skin into the appropriate area as directed Daily. PLEASE STOP TRULICITY (Patient taking differently: Inject 25 Units under the skin into the appropriate area as directed Every Night. PLEASE STOP TRULICITY), Disp: 45 mL, Rfl: 3  •  Insulin Pen Needle (Pen Needles) 32G X 4 MM misc, 1 each 4 (Four) Times a Day. Use 4 x daily, Disp: 120 each, Rfl: 11  •  levothyroxine (Synthroid) 100 MCG tablet, Take 1 tablet by mouth Every Morning., Disp: 90 tablet, Rfl: 1  •  lisinopril (PRINIVIL,ZESTRIL) 40 MG tablet, Take 1 tablet by mouth Daily., Disp: 90 tablet, Rfl: 3  •  metoprolol succinate XL (TOPROL-XL) 25 MG 24 hr tablet, Take 1 tablet by mouth Daily., Disp: , Rfl:   •  Multiple Vitamins-Minerals (HAIR SKIN NAILS PO), Take  by mouth., Disp: , Rfl:   •  Multiple Vitamins-Minerals (VISION-KRISTAL PRESERVE PO), Take  by mouth Daily With Breakfast., Disp: , Rfl:   •  multivitamin with minerals (CENTRUM SILVER 50+WOMEN PO), Take 1 tablet by mouth Daily., Disp: , Rfl:   •  NIFEdipine CC (ADALAT CC) 60 MG 24 hr tablet, TAKE 1 TABLET BY MOUTH EVERY DAY, Disp: 90 tablet, Rfl: 3  •  omeprazole (priLOSEC) 40 MG capsule, Take 1 capsule by mouth Daily., Disp: 90 capsule, Rfl: 3  •  potassium chloride 10 MEQ CR  "tablet, TAKE 1 TABLET BY MOUTH TWICE A DAY (Patient taking differently: Take 25 mEq by mouth Daily.), Disp: 180 tablet, Rfl: 1  •  rosuvastatin (CRESTOR) 40 MG tablet, Take 1 tablet by mouth Every Night., Disp: 90 tablet, Rfl: 3  •  vitamin B-12 (CYANOCOBALAMIN) 1000 MCG tablet, Take 1 tablet by mouth Daily., Disp: , Rfl:   •  vitamin D (ERGOCALCIFEROL) 1.25 MG (88568 UT) capsule capsule, TAKE 1 CAPSULE BY MOUTH 1 TIME PER WEEK., Disp: 12 capsule, Rfl: 2  •  lamoTRIgine (LaMICtal) 100 MG tablet, TAKE 1 TABLET BY MOUTH EVERY DAY AT NIGHT (Patient not taking: Reported on 6/21/2024), Disp: 90 tablet, Rfl: 2    Review of Systems   Constitutional: Negative for chills and fever.   Cardiovascular:  Negative for chest pain and paroxysmal nocturnal dyspnea.   Respiratory:  Negative for cough and shortness of breath.    Skin:  Negative for rash.   Musculoskeletal:  Positive for arthritis and joint pain.   Gastrointestinal:  Negative for abdominal pain and heartburn.   Neurological:  Negative for dizziness and numbness.       Objective  /86 (BP Location: Left arm, Patient Position: Sitting, Cuff Size: Adult)   Pulse 69   Ht 165.1 cm (65\")   Wt 95.7 kg (211 lb)   LMP  (LMP Unknown)   SpO2 98%   BMI 35.11 kg/m²   Constitutional:       Appearance: Well-developed.   HENT:      Head: Normocephalic and atraumatic.   Pulmonary:      Effort: Pulmonary effort is normal.      Breath sounds: Normal breath sounds.   Cardiovascular:      Normal rate. Regular rhythm.   Edema:     Peripheral edema absent.   Skin:     General: Skin is warm and dry.   Neurological:      Mental Status: Alert and oriented to person, place, and time.         ECG 12 Lead    Date/Time: 6/21/2024 11:14 AM  Performed by: Ryan Huizar MD    Authorized by: Ryan Huizar MD  Previous ECG: no previous ECG available  Rhythm: sinus rhythm  Ectopy: atrial premature contractions  Conduction: 1st degree AV block  Other findings: non-specific ST-T wave " changes            ICD-10-CM ICD-9-CM   1. Coronary artery disease involving native coronary artery of native heart without angina pectoris  I25.10 414.01   2. SVT (supraventricular tachycardia)  I47.10 427.89   3. Essential hypertension  I10 401.9   4. Dyslipidemia  E78.5 272.4         Assessment/Plan:  1.  Coronary artery disease: History of PCI to LAD in 2020.  No current chest pain or other anginal symptoms.  Reports previously having an adverse reaction to a pharmacological stress agent, but she does not know which one this was.  Continue aspirin, rosuvastatin, and metoprolol.    2.  Paroxysmal SVT: Reported on prior records from Dr. Garces.  No rhythm strips, EKG, or Holter monitor that demonstrates this.  No current palpitations.  Continue metoprolol.    3.  Essential hypertension: Blood pressure is acceptable today.  Continue metoprolol, nifedipine, and lisinopril.    4.  Dyslipidemia: Lipid panel from 5/7/2024 showed suboptimal control with LDL of 137 (goal less than 70) she does report being off rosuvastatin for several days prior to the lipid panel which may have affected this.  She also notes some flip-flops in her diet.  If remains suboptimally controlled at follow-up, ezetimibe should be considered.

## 2024-07-27 DIAGNOSIS — G25.81 RLS (RESTLESS LEGS SYNDROME): ICD-10-CM

## 2024-07-28 DIAGNOSIS — E03.9 ACQUIRED HYPOTHYROIDISM: ICD-10-CM

## 2024-07-29 DIAGNOSIS — K21.9 GASTROESOPHAGEAL REFLUX DISEASE WITHOUT ESOPHAGITIS: ICD-10-CM

## 2024-07-29 DIAGNOSIS — F33.1 MODERATE EPISODE OF RECURRENT MAJOR DEPRESSIVE DISORDER: ICD-10-CM

## 2024-07-29 RX ORDER — LEVOTHYROXINE SODIUM 0.1 MG/1
100 TABLET ORAL EVERY MORNING
Qty: 90 TABLET | Refills: 0 | Status: SHIPPED | OUTPATIENT
Start: 2024-07-29

## 2024-07-29 RX ORDER — FUROSEMIDE 40 MG/1
TABLET ORAL
Qty: 90 TABLET | Refills: 0 | Status: SHIPPED | OUTPATIENT
Start: 2024-07-29

## 2024-07-29 RX ORDER — CARBIDOPA/LEVODOPA 25MG-250MG
TABLET ORAL
Qty: 180 TABLET | Refills: 3 | Status: SHIPPED | OUTPATIENT
Start: 2024-07-29

## 2024-07-29 RX ORDER — OMEPRAZOLE 40 MG/1
40 CAPSULE, DELAYED RELEASE ORAL DAILY
Qty: 90 CAPSULE | Refills: 3 | Status: SHIPPED | OUTPATIENT
Start: 2024-07-29

## 2024-07-29 NOTE — TELEPHONE ENCOUNTER
Rx Refill Note  Requested Prescriptions     Pending Prescriptions Disp Refills    carbidopa-levodopa (SINEMET)  MG per tablet [Pharmacy Med Name: CARBIDOPA-LEVODOPA  TAB] 180 tablet 3     Sig: TAKE 1 TO 2 TABLETS BY MOUTH EVERY DAY AT BEDTIME      Last office visit with prescribing clinician: 5/7/2024   Last telemedicine visit with prescribing clinician: Visit date not found   Next office visit with prescribing clinician: 7/28/2024       Jayne Casanova MA  07/29/24, 08:26 CDT

## 2024-07-29 NOTE — TELEPHONE ENCOUNTER
Rx Refill Note  Requested Prescriptions     Pending Prescriptions Disp Refills    levothyroxine (SYNTHROID, LEVOTHROID) 100 MCG tablet [Pharmacy Med Name: LEVOTHYROXINE 100 MCG TABLET] 90 tablet 1     Sig: TAKE 1 TABLET BY MOUTH EVERY DAY IN THE MORNING    furosemide (LASIX) 40 MG tablet [Pharmacy Med Name: FUROSEMIDE 40 MG TABLET] 90 tablet 2     Sig: TAKE 1 TABLET BY MOUTH EVERY DAY      Last office visit with prescribing clinician: 5/7/2024   Last telemedicine visit with prescribing clinician: Visit date not found   Next office visit with prescribing clinician: 8/8/2024       {TIP  Please add Last Relevant Lab 5/7/24    Jayne Casanova MA  07/29/24, 08:11 CDT

## 2024-07-29 NOTE — TELEPHONE ENCOUNTER
Rx Refill Note  Requested Prescriptions     Pending Prescriptions Disp Refills    omeprazole (priLOSEC) 40 MG capsule [Pharmacy Med Name: OMEPRAZOLE DR 40 MG CAPSULE] 90 capsule 3     Sig: TAKE 1 CAPSULE BY MOUTH EVERY DAY      Last office visit with prescribing clinician: 5/7/24  Last telemedicine visit with prescribing clinician: Visit date not found   Next office visit with prescribing clinician: 8/8/24    Jayne Casanova MA  07/29/24, 07:34 CDT

## 2024-07-30 RX ORDER — LAMOTRIGINE 100 MG/1
TABLET ORAL
Qty: 90 TABLET | Refills: 0 | Status: SHIPPED | OUTPATIENT
Start: 2024-07-30

## 2024-07-30 RX ORDER — METOPROLOL SUCCINATE 25 MG/1
TABLET, EXTENDED RELEASE ORAL
Qty: 90 TABLET | Refills: 3 | OUTPATIENT
Start: 2024-07-30

## 2024-07-30 NOTE — TELEPHONE ENCOUNTER
Rx Refill Note  Requested Prescriptions     Pending Prescriptions Disp Refills    lamoTRIgine (LaMICtal) 100 MG tablet [Pharmacy Med Name: LAMOTRIGINE 100 MG TABLET] 90 tablet 2     Sig: TAKE 1 TABLET BY MOUTH EVERY DAY AT NIGHT      Last office visit with prescribing clinician: 5/7/2024   Last telemedicine visit with prescribing clinician: Visit date not found   Next office visit with prescribing clinician: 8/8/2024       Jayne Casanova MA  07/30/24, 07:12 CDT

## 2024-08-15 DIAGNOSIS — Z87.891 PERSONAL HISTORY OF SMOKING: Primary | ICD-10-CM

## 2024-10-21 ENCOUNTER — HOSPITAL ENCOUNTER (OUTPATIENT)
Dept: CT IMAGING | Facility: HOSPITAL | Age: 69
Discharge: HOME | End: 2024-10-21
Admitting: FAMILY MEDICINE
Payer: COMMERCIAL

## 2024-10-21 DIAGNOSIS — Z87.891 PERSONAL HISTORY OF SMOKING: ICD-10-CM

## 2024-10-21 PROCEDURE — 71271 CT THORAX LUNG CANCER SCR C-: CPT

## 2024-10-23 DIAGNOSIS — E03.9 ACQUIRED HYPOTHYROIDISM: ICD-10-CM

## 2024-10-23 RX ORDER — LEVOTHYROXINE SODIUM 100 UG/1
100 TABLET ORAL EVERY MORNING
Qty: 90 TABLET | Refills: 0 | Status: SHIPPED | OUTPATIENT
Start: 2024-10-23

## 2024-10-23 RX ORDER — FUROSEMIDE 40 MG
TABLET ORAL
Qty: 90 TABLET | Refills: 0 | Status: SHIPPED | OUTPATIENT
Start: 2024-10-23

## 2024-10-23 NOTE — TELEPHONE ENCOUNTER
Rx Refill Note  Requested Prescriptions     Pending Prescriptions Disp Refills    levothyroxine (SYNTHROID, LEVOTHROID) 100 MCG tablet [Pharmacy Med Name: LEVOTHYROXINE 100 MCG TABLET] 90 tablet 0     Sig: TAKE 1 TABLET BY MOUTH EVERY DAY IN THE MORNING    furosemide (LASIX) 40 MG tablet [Pharmacy Med Name: FUROSEMIDE 40 MG TABLET] 90 tablet 0     Sig: TAKE 1 TABLET BY MOUTH EVERY DAY      Last office visit with prescribing clinician: 5/7/24  Last telemedicine visit with prescribing clinician: Visit date not found   Next office visit with prescribing clinician:     {TIP  Please add Last Relevant Lab 5/7/24    Jayne Casanova MA  10/23/24, 07:32 CDT

## 2024-12-13 ENCOUNTER — TELEPHONE (OUTPATIENT)
Dept: GASTROENTEROLOGY | Facility: CLINIC | Age: 69
End: 2024-12-13
Payer: COMMERCIAL

## 2025-01-19 DIAGNOSIS — E03.9 ACQUIRED HYPOTHYROIDISM: ICD-10-CM

## 2025-01-19 DIAGNOSIS — F33.1 MODERATE EPISODE OF RECURRENT MAJOR DEPRESSIVE DISORDER: ICD-10-CM

## 2025-01-20 RX ORDER — LEVOTHYROXINE SODIUM 100 UG/1
100 TABLET ORAL EVERY MORNING
Qty: 90 TABLET | Refills: 0 | Status: SHIPPED | OUTPATIENT
Start: 2025-01-20

## 2025-01-20 RX ORDER — LAMOTRIGINE 100 MG/1
TABLET ORAL
Qty: 90 TABLET | Refills: 0 | Status: SHIPPED | OUTPATIENT
Start: 2025-01-20

## 2025-01-20 RX ORDER — FUROSEMIDE 40 MG/1
TABLET ORAL
Qty: 90 TABLET | Refills: 0 | Status: SHIPPED | OUTPATIENT
Start: 2025-01-20

## 2025-01-20 NOTE — TELEPHONE ENCOUNTER
Rx Refill Note  Requested Prescriptions     Pending Prescriptions Disp Refills    levothyroxine (SYNTHROID, LEVOTHROID) 100 MCG tablet [Pharmacy Med Name: LEVOTHYROXINE 100 MCG TABLET] 90 tablet 0     Sig: TAKE 1 TABLET BY MOUTH EVERY DAY IN THE MORNING    furosemide (LASIX) 40 MG tablet [Pharmacy Med Name: FUROSEMIDE 40 MG TABLET] 90 tablet 0     Sig: TAKE 1 TABLET BY MOUTH EVERY DAY    lamoTRIgine (LaMICtal) 100 MG tablet [Pharmacy Med Name: LAMOTRIGINE 100 MG TABLET] 90 tablet 0     Sig: TAKE 1 TABLET BY MOUTH EVERY DAY AT NIGHT      Last office visit with prescribing clinician: 5/7/2024   Last telemedicine visit with prescribing clinician: Visit date not found   Next office visit with prescribing clinician:     {TIP  Please add Last Relevant Lab 5/7/24    Jayne Casanova MA  01/20/25, 08:04 CST

## 2025-01-21 ENCOUNTER — OFFICE VISIT (OUTPATIENT)
Dept: FAMILY MEDICINE CLINIC | Facility: CLINIC | Age: 70
End: 2025-01-21
Payer: COMMERCIAL

## 2025-01-21 VITALS
OXYGEN SATURATION: 97 % | HEART RATE: 83 BPM | BODY MASS INDEX: 35.16 KG/M2 | SYSTOLIC BLOOD PRESSURE: 160 MMHG | RESPIRATION RATE: 20 BRPM | TEMPERATURE: 97.8 F | WEIGHT: 211 LBS | DIASTOLIC BLOOD PRESSURE: 88 MMHG | HEIGHT: 65 IN

## 2025-01-21 DIAGNOSIS — M54.41 ACUTE RIGHT-SIDED LOW BACK PAIN WITH RIGHT-SIDED SCIATICA: Primary | ICD-10-CM

## 2025-01-21 PROCEDURE — 99213 OFFICE O/P EST LOW 20 MIN: CPT | Performed by: FAMILY MEDICINE

## 2025-01-21 RX ORDER — PREDNISONE 20 MG/1
TABLET ORAL
Qty: 15 TABLET | Refills: 0 | Status: SHIPPED | OUTPATIENT
Start: 2025-01-21

## 2025-01-21 NOTE — PROGRESS NOTES
Subjective   Maribeth Isbell is a 69 y.o. female.     History of Present Illness  69-year-old female surgical past lumbar laminectomy x 2-1-week ago Collatyl following box and experienced back pain-presents with lumbar radiculopathy on right      The following portions of the patient's history were reviewed and updated as appropriate: allergies, current medications, past family history, past medical history, past social history, past surgical history, and problem list.    Review of Systems   Musculoskeletal:  Positive for arthralgias and back pain.   Neurological:         Lumbar radiculopathy on right       Objective   Physical Exam  Vitals and nursing note reviewed.   Constitutional:       Appearance: She is obese.   Eyes:      Extraocular Movements: Extraocular movements intact.      Pupils: Pupils are equal, round, and reactive to light.   Pulmonary:      Effort: Pulmonary effort is normal.   Musculoskeletal:         General: Swelling and tenderness present.      Right lower leg: No edema.      Left lower leg: No edema.      Comments: Sacroiliac joints tender   Skin:     General: Skin is warm and dry.   Neurological:      General: No focal deficit present.      Mental Status: She is alert and oriented to person, place, and time.      Deep Tendon Reflexes: Reflexes abnormal.      Comments: Decreased patellar reflex on the right with early atrophy of quadriceps   Psychiatric:         Mood and Affect: Mood normal.         Behavior: Behavior normal.         Thought Content: Thought content normal.         Judgment: Judgment normal.         Assessment & Plan   Diagnoses and all orders for this visit:    1. Acute right-sided low back pain with right-sided sciatica (Primary)  -     Ambulatory Referral to Neurosurgery    Other orders  -     predniSONE (DELTASONE) 20 MG tablet; Starting Wednesday morning take 2 pills on Wednesday Thursday Friday then 1 pill till medicine gone  Dispense: 15 tablet; Refill: 0       Plan  above-advise she is due her annual physical

## 2025-01-27 ENCOUNTER — OFFICE VISIT (OUTPATIENT)
Dept: FAMILY MEDICINE CLINIC | Facility: CLINIC | Age: 70
End: 2025-01-27
Payer: COMMERCIAL

## 2025-01-27 VITALS
HEART RATE: 80 BPM | SYSTOLIC BLOOD PRESSURE: 133 MMHG | BODY MASS INDEX: 36.65 KG/M2 | DIASTOLIC BLOOD PRESSURE: 90 MMHG | HEIGHT: 65 IN | OXYGEN SATURATION: 98 % | TEMPERATURE: 98 F | WEIGHT: 220 LBS | RESPIRATION RATE: 18 BRPM

## 2025-01-27 DIAGNOSIS — U07.1 COVID-19: Primary | ICD-10-CM

## 2025-01-27 DIAGNOSIS — R05.1 ACUTE COUGH: ICD-10-CM

## 2025-01-27 DIAGNOSIS — M54.50 ACUTE LOW BACK PAIN, UNSPECIFIED BACK PAIN LATERALITY, UNSPECIFIED WHETHER SCIATICA PRESENT: ICD-10-CM

## 2025-01-27 DIAGNOSIS — M62.830 MUSCLE SPASM OF BACK: ICD-10-CM

## 2025-01-27 LAB
EXPIRATION DATE: ABNORMAL
FLUAV AG UPPER RESP QL IA.RAPID: NOT DETECTED
FLUBV AG UPPER RESP QL IA.RAPID: NOT DETECTED
INTERNAL CONTROL: ABNORMAL
Lab: ABNORMAL
SARS-COV-2 AG UPPER RESP QL IA.RAPID: DETECTED

## 2025-01-27 PROCEDURE — 87428 SARSCOV & INF VIR A&B AG IA: CPT | Performed by: NURSE PRACTITIONER

## 2025-01-27 PROCEDURE — 99214 OFFICE O/P EST MOD 30 MIN: CPT | Performed by: NURSE PRACTITIONER

## 2025-01-27 RX ORDER — CYCLOBENZAPRINE HCL 5 MG
TABLET ORAL
Qty: 30 TABLET | Refills: 1 | Status: SHIPPED | OUTPATIENT
Start: 2025-01-27

## 2025-01-27 NOTE — PROGRESS NOTES
CC:   Chief Complaint   Patient presents with    Cough     X2 days        History:  Maribeth Isbell is a 69 y.o. female who presents today for evaluation of the above problems.      HPI    Patient presents with illness.  Reports cough x 2 days.  Denies any difficulty breathing or wheezing.  Afebrile.  Some nasal congestion.    Patient injured her back last week.  Reports back pain is 8 out of 10 on pain scale.  She has appointment with Dr. Cancino, neurosurgery, later this week.  Patient is limited on what she can do due to pain levels.  Requesting muscle relaxer to help with spasms and pain.  Has taken Flexeril in the past and tolerated well.      Allergies   Allergen Reactions    Metformin GI Intolerance     Past Medical History:   Diagnosis Date    Atrial fibrillation     Bell's palsy     Cataract     Depression     E coli infection     Gastroesophageal reflux disease 2018    Headache     History of adenomatous polyp of colon     History of colon polyps     Hyperlipidemia     Hypertension     Hyperthyroidism     Hypothyroidism     Macular degeneration     Obesity due to excess calories 2019    Sleep apnea     wears cpap    Tachycardia     Type 2 diabetes mellitus     Visual impairment      Past Surgical History:   Procedure Laterality Date    BACK SURGERY  2022    [facility]     SECTION      COLONOSCOPY  06/10/2011    Dr. Mahmodo-Internal hemorrhoids; Otherwise normal    COLONOSCOPY N/A 2016    Colon not done this day-see endo report 2016    COLONOSCOPY  2016    Dr. Mahmood-A single small hyperplastic polyp in the ascending colon    COLONOSCOPY N/A 2020    One 15mm tubular adenomatous polyp in the cecum; Two 7-10mm tubular adenomatous polyps in the ascending colon; Five 7-10mm tubular adenomatous polyps in the transverse colon; Two 5-12mm tubular adenomatous polyps in the descending colon; Repeat 1 year    COLONOSCOPY N/A 2021    Procedure: COLONOSCOPY WITH  ANESTHESIA;  Surgeon: Kathy Berry MD;  Location: [facility] ENDOSCOPY;  Service: Gastroenterology;  Laterality: N/A;  pre scsreen  post  Dr. back    CORONARY ANGIOPLASTY WITH STENT PLACEMENT  2020    DR MYERS    ENDOSCOPY N/A 12/01/2016    Normal esophagus; Normal stomach; Normal first part of the duodenum and 2nd part of the duodenum-biopsied    ENDOSCOPY N/A 07/30/2018    Normal esophagus; Normal stomach; Normal first portion of the duodenum and second portion of the duodenum-biopsied    HYSTERECTOMY      KNEE SURGERY Left     TUMOR REMOVAL       Family History   Problem Relation Age of Onset    Heart disease Mother     Diabetes Mother     Breast cancer Sister     Colon polyps Sister 30        mid 30s    Breast cancer Sister     Breast cancer Brother     No Known Problems Maternal Grandmother     No Known Problems Paternal Grandmother     No Known Problems Maternal Grandfather     No Known Problems Paternal Grandfather     Colon cancer Neg Hx     Esophageal cancer Neg Hx       reports that she quit smoking about 10 years ago. Her smoking use included cigarettes. She started smoking about 30 years ago. She has a 20 pack-year smoking history. She has never used smokeless tobacco. She reports that she does not drink alcohol and does not use drugs.      Current Outpatient Medications:     aspirin 81 MG EC tablet, Take 1 tablet by mouth Daily., Disp: , Rfl:     bumetanide (BUMEX) 1 MG tablet, Take 1 tablet by mouth Daily., Disp: , Rfl:     carbidopa-levodopa (SINEMET)  MG per tablet, TAKE 1 TO 2 TABLETS BY MOUTH EVERY DAY AT BEDTIME, Disp: 180 tablet, Rfl: 3    cetirizine (zyrTEC) 10 MG tablet, Take 1 tablet by mouth Daily., Disp: 90 tablet, Rfl: 1    Continuous Blood Gluc Sensor (FreeStyle Ann-Marie 2 Sensor) misc, 1 each Every 14 (Fourteen) Days., Disp: 2 each, Rfl: 6    desvenlafaxine (PRISTIQ) 50 MG 24 hr tablet, TAKE 1 TABLET BY MOUTH EVERY DAY, Disp: 90 tablet, Rfl: 3    empagliflozin (Jardiance) 10 MG tablet  tablet, Take  by mouth Daily., Disp: , Rfl:     Finerenone 20 MG tablet, Take 20 mg by mouth Daily., Disp: 30 tablet, Rfl: 11    Fluticasone Furoate-Vilanterol (Breo Ellipta) 100-25 MCG/ACT aerosol powder , Inhale 1 puff Daily., Disp: 60 each, Rfl: 2    folic acid (FOLVITE) 400 MCG tablet, Take 1 tablet by mouth Daily., Disp: , Rfl:     furosemide (LASIX) 40 MG tablet, TAKE 1 TABLET BY MOUTH EVERY DAY, Disp: 90 tablet, Rfl: 0    Insulin Aspart, w/Niacinamide, (Fiasp FlexTouch) 100 UNIT/ML solution pen-injector, UP TO 40 UNITS QAC TID (Patient taking differently: 20 Units. UP TO 40 UNITS QAC TID), Disp: 108 mL, Rfl: 3    Insulin Degludec-Liraglutide (Xultophy) 100-3.6 UNIT-MG/ML solution pen-injector subcutaneous pen, Inject 50 Units under the skin into the appropriate area as directed Daily. PLEASE STOP TRULICITY (Patient taking differently: Inject 25 Units under the skin into the appropriate area as directed Every Night. PLEASE STOP TRULICITY), Disp: 45 mL, Rfl: 3    Insulin Pen Needle (Pen Needles) 32G X 4 MM misc, 1 each 4 (Four) Times a Day. Use 4 x daily, Disp: 120 each, Rfl: 11    lamoTRIgine (LaMICtal) 100 MG tablet, TAKE 1 TABLET BY MOUTH EVERY DAY AT NIGHT, Disp: 90 tablet, Rfl: 0    levothyroxine (SYNTHROID, LEVOTHROID) 100 MCG tablet, TAKE 1 TABLET BY MOUTH EVERY DAY IN THE MORNING, Disp: 90 tablet, Rfl: 0    lisinopril (PRINIVIL,ZESTRIL) 40 MG tablet, Take 1 tablet by mouth Daily., Disp: 90 tablet, Rfl: 3    metoprolol succinate XL (TOPROL-XL) 25 MG 24 hr tablet, Take 1 tablet by mouth Daily., Disp: , Rfl:     Multiple Vitamins-Minerals (HAIR SKIN NAILS PO), Take  by mouth., Disp: , Rfl:     Multiple Vitamins-Minerals (VISION-KRISTAL PRESERVE PO), Take  by mouth Daily With Breakfast., Disp: , Rfl:     multivitamin with minerals (CENTRUM SILVER 50+WOMEN PO), Take 1 tablet by mouth Daily., Disp: , Rfl:     NIFEdipine CC (ADALAT CC) 60 MG 24 hr tablet, TAKE 1 TABLET BY MOUTH EVERY DAY, Disp: 90 tablet, Rfl:  "3    omeprazole (priLOSEC) 40 MG capsule, TAKE 1 CAPSULE BY MOUTH EVERY DAY, Disp: 90 capsule, Rfl: 3    potassium bicarbonate (Klor-Con/EF) 25 MEQ disintegrating tablet, PLACE 1 TABLET IN 8 OZ OF WATER AND DRINK ONCE DAILY WITH LARGEST MEAL, Disp: 90 tablet, Rfl: 1    predniSONE (DELTASONE) 20 MG tablet, Starting Wednesday morning take 2 pills on Wednesday Thursday Friday then 1 pill till medicine gone, Disp: 15 tablet, Rfl: 0    rosuvastatin (CRESTOR) 40 MG tablet, Take 1 tablet by mouth Every Night., Disp: 90 tablet, Rfl: 3    vitamin B-12 (CYANOCOBALAMIN) 1000 MCG tablet, Take 1 tablet by mouth Daily., Disp: , Rfl:     vitamin D (ERGOCALCIFEROL) 1.25 MG (69912 UT) capsule capsule, TAKE 1 CAPSULE BY MOUTH 1 TIME PER WEEK., Disp: 12 capsule, Rfl: 2    cyclobenzaprine (FLEXERIL) 5 MG tablet, Take 1-2 tablets as needed every 8 hours for muscle spasms, Disp: 30 tablet, Rfl: 1    OBJECTIVE:  /90 (BP Location: Right arm, Patient Position: Sitting, Cuff Size: Large Adult)   Pulse 80   Temp 98 °F (36.7 °C) (Temporal)   Resp 18   Ht 165.1 cm (65\")   Wt 99.8 kg (220 lb)   LMP  (LMP Unknown)   SpO2 98%   BMI 36.61 kg/m²    Estimated body mass index is 36.61 kg/m² as calculated from the following:    Height as of this encounter: 165.1 cm (65\").    Weight as of this encounter: 99.8 kg (220 lb).                Physical Exam  Constitutional:       General: She is not in acute distress.     Appearance: Normal appearance.   HENT:      Head: Normocephalic and atraumatic.      Right Ear: Tympanic membrane, ear canal and external ear normal.      Left Ear: Tympanic membrane, ear canal and external ear normal.      Mouth/Throat:      Pharynx: Oropharynx is clear.   Cardiovascular:      Rate and Rhythm: Normal rate and regular rhythm.      Heart sounds: Normal heart sounds.   Pulmonary:      Effort: No respiratory distress.      Breath sounds: Normal breath sounds. No wheezing.   Musculoskeletal:      Thoracic back: " Tenderness present. Decreased range of motion.      Lumbar back: Tenderness present. Decreased range of motion.   Skin:     General: Skin is warm and dry.   Neurological:      Mental Status: She is alert and oriented to person, place, and time.   Psychiatric:         Mood and Affect: Mood normal.         Behavior: Behavior normal.              Assessment/Plan    Diagnoses and all orders for this visit:    1. COVID-19 (Primary)    Positive for COVID-19.  Discussed virus and treatment options.  Patient declines Paxlovid.  Symptoms are mild-will treat with over-the-counter medications.  If any worsening symptoms or concerns-patient will follow-up.    2. Acute cough  -     POCT SARS-CoV-2 + Flu Antigen SATHYA    3. Acute low back pain, unspecified back pain laterality, unspecified whether sciatica present  -     cyclobenzaprine (FLEXERIL) 5 MG tablet; Take 1-2 tablets as needed every 8 hours for muscle spasms  Dispense: 30 tablet; Refill: 1    4. Muscle spasm of back  -     cyclobenzaprine (FLEXERIL) 5 MG tablet; Take 1-2 tablets as needed every 8 hours for muscle spasms  Dispense: 30 tablet; Refill: 1    Medication side effects discussed.  Patient's  is going to call neurosurgery to let them know she is positive for COVID and see if she can continue to see them later this week.              An After Visit Summary was printed and given to the patient at discharge.  Return if symptoms worsen or fail to improve.

## 2025-02-04 NOTE — TELEPHONE ENCOUNTER
Rx Refill Note  Requested Prescriptions     Pending Prescriptions Disp Refills    potassium bicarbonate (Klor-Con/EF) 25 MEQ disintegrating tablet [Pharmacy Med Name: KLOR-CON-EF 25 MEQ TAB EFF] 90 tablet 1     Sig: PLACE 1 TABLET IN 8 OZ OF WATER AND DRINK ONCE DAILY WITH LARGEST MEAL      Last office visit with prescribing clinician: 1/27/2025   Last telemedicine visit with prescribing clinician: Visit date not found   Next office visit with prescribing clinician:     {TIP  Please add Last Relevant Lab 5/7/24    Jayne Casanova MA  02/04/25, 07:27 CST

## 2025-03-04 ENCOUNTER — TELEPHONE (OUTPATIENT)
Dept: CARDIOLOGY | Facility: CLINIC | Age: 70
End: 2025-03-04
Payer: COMMERCIAL

## 2025-03-04 NOTE — TELEPHONE ENCOUNTER
This pt called stated that she had a procedure on her back today at [facility] in [place]. She was told that her heart rate dropped to 30 bpm. She was told to get an appt with us.     Appt made on 3/10/25 with Dr Huizar     [place] [facility] Dr Suarez [phone]

## 2025-03-10 ENCOUNTER — OFFICE VISIT (OUTPATIENT)
Dept: CARDIOLOGY | Facility: CLINIC | Age: 70
End: 2025-03-10
Payer: COMMERCIAL

## 2025-03-10 VITALS
HEIGHT: 65 IN | DIASTOLIC BLOOD PRESSURE: 70 MMHG | OXYGEN SATURATION: 98 % | HEART RATE: 62 BPM | SYSTOLIC BLOOD PRESSURE: 132 MMHG | WEIGHT: 220 LBS | BODY MASS INDEX: 36.65 KG/M2

## 2025-03-10 DIAGNOSIS — E78.5 DYSLIPIDEMIA: ICD-10-CM

## 2025-03-10 DIAGNOSIS — R55 SYNCOPE AND COLLAPSE: ICD-10-CM

## 2025-03-10 DIAGNOSIS — I47.10 SVT (SUPRAVENTRICULAR TACHYCARDIA): ICD-10-CM

## 2025-03-10 DIAGNOSIS — I25.10 CORONARY ARTERY DISEASE INVOLVING NATIVE CORONARY ARTERY OF NATIVE HEART WITHOUT ANGINA PECTORIS: Primary | ICD-10-CM

## 2025-03-10 DIAGNOSIS — I10 ESSENTIAL HYPERTENSION: ICD-10-CM

## 2025-03-10 DIAGNOSIS — R00.1 BRADYCARDIA, SINUS: ICD-10-CM

## 2025-03-10 PROBLEM — E66.01 MORBIDLY OBESE: Status: RESOLVED | Noted: 2021-03-16 | Resolved: 2025-03-10

## 2025-03-10 PROCEDURE — 3078F DIAST BP <80 MM HG: CPT | Performed by: INTERNAL MEDICINE

## 2025-03-10 PROCEDURE — 3075F SYST BP GE 130 - 139MM HG: CPT | Performed by: INTERNAL MEDICINE

## 2025-03-10 PROCEDURE — 99214 OFFICE O/P EST MOD 30 MIN: CPT | Performed by: INTERNAL MEDICINE

## 2025-03-10 PROCEDURE — 93000 ELECTROCARDIOGRAM COMPLETE: CPT | Performed by: INTERNAL MEDICINE

## 2025-03-10 NOTE — LETTER
March 10, 2025     Jer Mccray MD  [address] [facility] [address]    Patient: Maribeth Isbell   YOB: 1955   Date of Visit: 3/10/2025       Dear Jer Mccray MD    Maribeth Isbell was in my office today. Below is a copy of my note.    If you have questions, please do not hesitate to call me. I look forward to following Maribeth along with you.         Sincerely,        Ryan Huizar MD        CC: No Recipients      Reason for Visit: Bradycardia.    HPI:  Maribeth Isbell is a 69 y.o. female is here today for follow-up.  She reports recent heart rate down to 30 bpm while having a back procedure done at [facility] in [place].  She does take metoprolol 25 mg daily.  She recently broke 2 vertebrae in her back.  She was given Lortab beforehand and notes her blood pressure got low.  It was then high following the procedure when she was in recovery.  Her blood pressure has been running 130-140 mmHg systolic at home.  She does get some dizzy spells intermittently.  She reports passing out last Friday shortly after getting home from [place].      Previous Cardiac Testing and Procedures:  -[facility] (9/11/2020) single-vessel disease involving the proximal LAD, status post successful PCI with 3.5 x 9 mm resolute JUSTA     Lab data:  -Lipid panel (5/7/2024) total cholesterol 216, HDL 47, , triglycerides 179  -Hemoglobin A1c (5/7/2024) 7.4%  -BMP (5/7/2024) creatinine 0.97, potassium 3.9, sodium 140    Patient Active Problem List   Diagnosis   • Acquired hypothyroidism   • Recurrent major depressive disorder   • Osteoarthritis of multiple joints   • Gastroesophageal reflux disease   • Essential hypertension   • Hx of adenomatous colonic polyps   • Obesity due to excess calories   • CHRISTY (obstructive sleep apnea)   • SVT (supraventricular tachycardia)   • Family history of polyps in the colon   • Coronary artery disease involving native coronary artery of native heart  without angina pectoris   • Dyslipidemia       Social History     Tobacco Use   • Smoking status: Former     Current packs/day: 0.00     Average packs/day: 1 pack/day for 20.0 years (20.0 ttl pk-yrs)     Types: Cigarettes     Start date: 1995     Quit date: 2015     Years since quitting: 10.1   • Smokeless tobacco: Never   Vaping Use   • Vaping status: Never Used   Substance Use Topics   • Alcohol use: No   • Drug use: No       Family History   Problem Relation Age of Onset   • Heart disease Mother    • Diabetes Mother    • Breast cancer Sister    • Colon polyps Sister 30        mid 30s   • Breast cancer Sister    • Breast cancer Brother    • No Known Problems Maternal Grandmother    • No Known Problems Paternal Grandmother    • No Known Problems Maternal Grandfather    • No Known Problems Paternal Grandfather    • Colon cancer Neg Hx    • Esophageal cancer Neg Hx        The following portions of the patient's history were reviewed and updated as appropriate: allergies, current medications, past family history, past medical history, past social history, past surgical history, and problem list.      Current Outpatient Medications:   •  aspirin 81 MG EC tablet, Take 1 tablet by mouth Daily., Disp: , Rfl:   •  bumetanide (BUMEX) 1 MG tablet, Take 1 tablet by mouth Daily., Disp: , Rfl:   •  carbidopa-levodopa (SINEMET)  MG per tablet, TAKE 1 TO 2 TABLETS BY MOUTH EVERY DAY AT BEDTIME, Disp: 180 tablet, Rfl: 3  •  cetirizine (zyrTEC) 10 MG tablet, Take 1 tablet by mouth Daily., Disp: 90 tablet, Rfl: 1  •  Continuous Blood Gluc Sensor (FreeStyle Ann-Marie 2 Sensor) misc, 1 each Every 14 (Fourteen) Days., Disp: 2 each, Rfl: 6  •  cyclobenzaprine (FLEXERIL) 5 MG tablet, Take 1-2 tablets as needed every 8 hours for muscle spasms, Disp: 30 tablet, Rfl: 1  •  desvenlafaxine (PRISTIQ) 50 MG 24 hr tablet, TAKE 1 TABLET BY MOUTH EVERY DAY, Disp: 90 tablet, Rfl: 3  •  empagliflozin (Jardiance) 10 MG tablet tablet, Take  by mouth  Daily., Disp: , Rfl:   •  Finerenone 20 MG tablet, Take 20 mg by mouth Daily., Disp: 30 tablet, Rfl: 11  •  Fluticasone Furoate-Vilanterol (Breo Ellipta) 100-25 MCG/ACT aerosol powder , Inhale 1 puff Daily., Disp: 60 each, Rfl: 2  •  folic acid (FOLVITE) 400 MCG tablet, Take 1 tablet by mouth Daily., Disp: , Rfl:   •  furosemide (LASIX) 40 MG tablet, TAKE 1 TABLET BY MOUTH EVERY DAY, Disp: 90 tablet, Rfl: 0  •  Insulin Aspart, w/Niacinamide, (Fiasp FlexTouch) 100 UNIT/ML solution pen-injector, UP TO 40 UNITS QAC TID (Patient taking differently: 20 Units. UP TO 40 UNITS QAC TID), Disp: 108 mL, Rfl: 3  •  Insulin Degludec-Liraglutide (Xultophy) 100-3.6 UNIT-MG/ML solution pen-injector subcutaneous pen, Inject 50 Units under the skin into the appropriate area as directed Daily. PLEASE STOP TRULICITY (Patient taking differently: Inject 25 Units under the skin into the appropriate area as directed Every Night. PLEASE STOP TRULICITY), Disp: 45 mL, Rfl: 3  •  Insulin Pen Needle (Pen Needles) 32G X 4 MM misc, 1 each 4 (Four) Times a Day. Use 4 x daily, Disp: 120 each, Rfl: 11  •  lamoTRIgine (LaMICtal) 100 MG tablet, TAKE 1 TABLET BY MOUTH EVERY DAY AT NIGHT, Disp: 90 tablet, Rfl: 0  •  levothyroxine (SYNTHROID, LEVOTHROID) 100 MCG tablet, TAKE 1 TABLET BY MOUTH EVERY DAY IN THE MORNING, Disp: 90 tablet, Rfl: 0  •  lisinopril (PRINIVIL,ZESTRIL) 40 MG tablet, Take 1 tablet by mouth Daily., Disp: 90 tablet, Rfl: 3  •  Multiple Vitamins-Minerals (HAIR SKIN NAILS PO), Take  by mouth., Disp: , Rfl:   •  Multiple Vitamins-Minerals (VISION-KRISTAL PRESERVE PO), Take  by mouth Daily With Breakfast., Disp: , Rfl:   •  multivitamin with minerals (CENTRUM SILVER 50+WOMEN PO), Take 1 tablet by mouth Daily., Disp: , Rfl:   •  NIFEdipine CC (ADALAT CC) 60 MG 24 hr tablet, TAKE 1 TABLET BY MOUTH EVERY DAY, Disp: 90 tablet, Rfl: 3  •  omeprazole (priLOSEC) 40 MG capsule, TAKE 1 CAPSULE BY MOUTH EVERY DAY, Disp: 90 capsule, Rfl: 3  •   "potassium bicarbonate (Klor-Con/EF) 25 MEQ disintegrating tablet, PLACE 1 TABLET IN 8 OZ OF WATER AND DRINK ONCE DAILY WITH LARGEST MEAL, Disp: 90 tablet, Rfl: 1  •  predniSONE (DELTASONE) 20 MG tablet, Starting Wednesday morning take 2 pills on Wednesday Thursday Friday then 1 pill till medicine gone, Disp: 15 tablet, Rfl: 0  •  rosuvastatin (CRESTOR) 40 MG tablet, Take 1 tablet by mouth Every Night., Disp: 90 tablet, Rfl: 3  •  vitamin B-12 (CYANOCOBALAMIN) 1000 MCG tablet, Take 1 tablet by mouth Daily., Disp: , Rfl:   •  vitamin D (ERGOCALCIFEROL) 1.25 MG (20531 UT) capsule capsule, TAKE 1 CAPSULE BY MOUTH 1 TIME PER WEEK., Disp: 12 capsule, Rfl: 2    Review of Systems   Constitutional: Negative for chills and fever.   Cardiovascular:  Positive for syncope. Negative for chest pain and paroxysmal nocturnal dyspnea.   Respiratory:  Negative for cough and shortness of breath.    Skin:  Negative for rash.   Musculoskeletal:  Positive for back pain.   Gastrointestinal:  Negative for abdominal pain and heartburn.   Neurological:  Positive for dizziness. Negative for numbness.       Objective  /70 (BP Location: Left arm, Patient Position: Sitting, Cuff Size: Adult)   Pulse 62   Ht 165.1 cm (65\")   Wt 99.8 kg (220 lb)   LMP  (LMP Unknown)   SpO2 98%   BMI 36.61 kg/m²   Constitutional:       Appearance: Well-developed.   HENT:      Head: Normocephalic and atraumatic.   Pulmonary:      Effort: Pulmonary effort is normal.      Breath sounds: Normal breath sounds.   Cardiovascular:      Normal rate. Regular rhythm.   Edema:     Peripheral edema absent.   Skin:     General: Skin is warm and dry.   Neurological:      Mental Status: Alert and oriented to person, place, and time.         ECG 12 Lead    Date/Time: 3/10/2025 10:51 AM  Performed by: Ryan Huizar MD    Authorized by: Ryan Huizar MD  Comparison: compared with previous ECG from 6/21/2024  Similar to previous ECG  Rhythm: sinus rhythm  Rate: " normal  Conduction: 1st degree AV block  Other findings: non-specific ST-T wave changes            ICD-10-CM ICD-9-CM   1. Coronary artery disease involving native coronary artery of native heart without angina pectoris  I25.10 414.01   2. SVT (supraventricular tachycardia)  I47.10 427.89   3. Essential hypertension  I10 401.9   4. Dyslipidemia  E78.5 272.4   5. Bradycardia, sinus  R00.1 427.89   6. Syncope and collapse  R55 780.2         Assessment/Plan:  1.  Coronary artery disease: PCI to LAD in 2020.  She remains chest pain-free.  History of adverse reaction to a pharmacological stress agent, but unknown as to which type.  Continue aspirin and rosuvastatin.     2.  Paroxysmal SVT: Reported on prior records from Dr. Garces, but no available rhythm strips or EKGs to confirm this.  No palpitations or other symptoms of arrhythmia.  Discontinue metoprolol due to bradycardia.     3.  Essential hypertension: Reasonably well-controlled.  Continue nifedipine and lisinopril.     4.  Dyslipidemia: Suboptimal control on lipid panel on 5/7/2024, but was off rosuvastatin at that time.  Continue at current dose for now and consider adding ezetimibe if no improvement.      5.  Sinus bradycardia: Reportedly had heart rate into the 30s during recent back procedure.  Anesthesia from surgery may have contributed.  Discontinue metoprolol.  Evaluate further with a Holter monitor.    6.  Syncope: Patient reported syncope after getting home from her procedure.  Unclear if this could be related to bradycardia.  Other items in the differential  include vagal episode, volume depletion, and hypotension.  Check echo and Holter monitor for further evaluation.

## 2025-03-10 NOTE — PROGRESS NOTES
Reason for Visit: Bradycardia.    HPI:  Maribeth Isbell is a 69 y.o. female is here today for follow-up.  She reports recent heart rate down to 30 bpm while having a back procedure done at [facility] in [place].  She does take metoprolol 25 mg daily.  She recently broke 2 vertebrae in her back.  She was given Lortab beforehand and notes her blood pressure got low.  It was then high following the procedure when she was in recovery.  Her blood pressure has been running 130-140 mmHg systolic at home.  She does get some dizzy spells intermittently.  She reports passing out last Friday shortly after getting home from [place].      Previous Cardiac Testing and Procedures:  -[facility] (9/11/2020) single-vessel disease involving the proximal LAD, status post successful PCI with 3.5 x 9 mm resolute JUSTA     Lab data:  -Lipid panel (5/7/2024) total cholesterol 216, HDL 47, , triglycerides 179  -Hemoglobin A1c (5/7/2024) 7.4%  -BMP (5/7/2024) creatinine 0.97, potassium 3.9, sodium 140    Patient Active Problem List   Diagnosis    Acquired hypothyroidism    Recurrent major depressive disorder    Osteoarthritis of multiple joints    Gastroesophageal reflux disease    Essential hypertension    Hx of adenomatous colonic polyps    Obesity due to excess calories    CHRISTY (obstructive sleep apnea)    SVT (supraventricular tachycardia)    Family history of polyps in the colon    Coronary artery disease involving native coronary artery of native heart without angina pectoris    Dyslipidemia       Social History     Tobacco Use    Smoking status: Former     Current packs/day: 0.00     Average packs/day: 1 pack/day for 20.0 years (20.0 ttl pk-yrs)     Types: Cigarettes     Start date: 1995     Quit date: 2015     Years since quitting: 10.1    Smokeless tobacco: Never   Vaping Use    Vaping status: Never Used   Substance Use Topics    Alcohol use: No    Drug use: No       Family History   Problem Relation Age of Onset     Heart disease Mother     Diabetes Mother     Breast cancer Sister     Colon polyps Sister 30        mid 30s    Breast cancer Sister     Breast cancer Brother     No Known Problems Maternal Grandmother     No Known Problems Paternal Grandmother     No Known Problems Maternal Grandfather     No Known Problems Paternal Grandfather     Colon cancer Neg Hx     Esophageal cancer Neg Hx        The following portions of the patient's history were reviewed and updated as appropriate: allergies, current medications, past family history, past medical history, past social history, past surgical history, and problem list.      Current Outpatient Medications:     aspirin 81 MG EC tablet, Take 1 tablet by mouth Daily., Disp: , Rfl:     bumetanide (BUMEX) 1 MG tablet, Take 1 tablet by mouth Daily., Disp: , Rfl:     carbidopa-levodopa (SINEMET)  MG per tablet, TAKE 1 TO 2 TABLETS BY MOUTH EVERY DAY AT BEDTIME, Disp: 180 tablet, Rfl: 3    cetirizine (zyrTEC) 10 MG tablet, Take 1 tablet by mouth Daily., Disp: 90 tablet, Rfl: 1    Continuous Blood Gluc Sensor (FreeStyle Ann-Marie 2 Sensor) misc, 1 each Every 14 (Fourteen) Days., Disp: 2 each, Rfl: 6    cyclobenzaprine (FLEXERIL) 5 MG tablet, Take 1-2 tablets as needed every 8 hours for muscle spasms, Disp: 30 tablet, Rfl: 1    desvenlafaxine (PRISTIQ) 50 MG 24 hr tablet, TAKE 1 TABLET BY MOUTH EVERY DAY, Disp: 90 tablet, Rfl: 3    empagliflozin (Jardiance) 10 MG tablet tablet, Take  by mouth Daily., Disp: , Rfl:     Finerenone 20 MG tablet, Take 20 mg by mouth Daily., Disp: 30 tablet, Rfl: 11    Fluticasone Furoate-Vilanterol (Breo Ellipta) 100-25 MCG/ACT aerosol powder , Inhale 1 puff Daily., Disp: 60 each, Rfl: 2    folic acid (FOLVITE) 400 MCG tablet, Take 1 tablet by mouth Daily., Disp: , Rfl:     furosemide (LASIX) 40 MG tablet, TAKE 1 TABLET BY MOUTH EVERY DAY, Disp: 90 tablet, Rfl: 0    Insulin Aspart, w/Niacinamide, (Fiasp FlexTouch) 100 UNIT/ML solution pen-injector, UP TO  40 UNITS QAC TID (Patient taking differently: 20 Units. UP TO 40 UNITS QAC TID), Disp: 108 mL, Rfl: 3    Insulin Degludec-Liraglutide (Xultophy) 100-3.6 UNIT-MG/ML solution pen-injector subcutaneous pen, Inject 50 Units under the skin into the appropriate area as directed Daily. PLEASE STOP TRULICITY (Patient taking differently: Inject 25 Units under the skin into the appropriate area as directed Every Night. PLEASE STOP TRULICITY), Disp: 45 mL, Rfl: 3    Insulin Pen Needle (Pen Needles) 32G X 4 MM misc, 1 each 4 (Four) Times a Day. Use 4 x daily, Disp: 120 each, Rfl: 11    lamoTRIgine (LaMICtal) 100 MG tablet, TAKE 1 TABLET BY MOUTH EVERY DAY AT NIGHT, Disp: 90 tablet, Rfl: 0    levothyroxine (SYNTHROID, LEVOTHROID) 100 MCG tablet, TAKE 1 TABLET BY MOUTH EVERY DAY IN THE MORNING, Disp: 90 tablet, Rfl: 0    lisinopril (PRINIVIL,ZESTRIL) 40 MG tablet, Take 1 tablet by mouth Daily., Disp: 90 tablet, Rfl: 3    Multiple Vitamins-Minerals (HAIR SKIN NAILS PO), Take  by mouth., Disp: , Rfl:     Multiple Vitamins-Minerals (VISION-KRISTAL PRESERVE PO), Take  by mouth Daily With Breakfast., Disp: , Rfl:     multivitamin with minerals (CENTRUM SILVER 50+WOMEN PO), Take 1 tablet by mouth Daily., Disp: , Rfl:     NIFEdipine CC (ADALAT CC) 60 MG 24 hr tablet, TAKE 1 TABLET BY MOUTH EVERY DAY, Disp: 90 tablet, Rfl: 3    omeprazole (priLOSEC) 40 MG capsule, TAKE 1 CAPSULE BY MOUTH EVERY DAY, Disp: 90 capsule, Rfl: 3    potassium bicarbonate (Klor-Con/EF) 25 MEQ disintegrating tablet, PLACE 1 TABLET IN 8 OZ OF WATER AND DRINK ONCE DAILY WITH LARGEST MEAL, Disp: 90 tablet, Rfl: 1    predniSONE (DELTASONE) 20 MG tablet, Starting Wednesday morning take 2 pills on Wednesday Thursday Friday then 1 pill till medicine gone, Disp: 15 tablet, Rfl: 0    rosuvastatin (CRESTOR) 40 MG tablet, Take 1 tablet by mouth Every Night., Disp: 90 tablet, Rfl: 3    vitamin B-12 (CYANOCOBALAMIN) 1000 MCG tablet, Take 1 tablet by mouth Daily., Disp: , Rfl:     " vitamin D (ERGOCALCIFEROL) 1.25 MG (07355 UT) capsule capsule, TAKE 1 CAPSULE BY MOUTH 1 TIME PER WEEK., Disp: 12 capsule, Rfl: 2    Review of Systems   Constitutional: Negative for chills and fever.   Cardiovascular:  Positive for syncope. Negative for chest pain and paroxysmal nocturnal dyspnea.   Respiratory:  Negative for cough and shortness of breath.    Skin:  Negative for rash.   Musculoskeletal:  Positive for back pain.   Gastrointestinal:  Negative for abdominal pain and heartburn.   Neurological:  Positive for dizziness. Negative for numbness.       Objective   /70 (BP Location: Left arm, Patient Position: Sitting, Cuff Size: Adult)   Pulse 62   Ht 165.1 cm (65\")   Wt 99.8 kg (220 lb)   LMP  (LMP Unknown)   SpO2 98%   BMI 36.61 kg/m²   Constitutional:       Appearance: Well-developed.   HENT:      Head: Normocephalic and atraumatic.   Pulmonary:      Effort: Pulmonary effort is normal.      Breath sounds: Normal breath sounds.   Cardiovascular:      Normal rate. Regular rhythm.   Edema:     Peripheral edema absent.   Skin:     General: Skin is warm and dry.   Neurological:      Mental Status: Alert and oriented to person, place, and time.         ECG 12 Lead    Date/Time: 3/10/2025 10:51 AM  Performed by: Ryan Huizar MD    Authorized by: Ryan Huizar MD  Comparison: compared with previous ECG from 6/21/2024  Similar to previous ECG  Rhythm: sinus rhythm  Rate: normal  Conduction: 1st degree AV block  Other findings: non-specific ST-T wave changes            ICD-10-CM ICD-9-CM   1. Coronary artery disease involving native coronary artery of native heart without angina pectoris  I25.10 414.01   2. SVT (supraventricular tachycardia)  I47.10 427.89   3. Essential hypertension  I10 401.9   4. Dyslipidemia  E78.5 272.4   5. Bradycardia, sinus  R00.1 427.89   6. Syncope and collapse  R55 780.2         Assessment/Plan:  1.  Coronary artery disease: PCI to LAD in 2020.  She remains chest " pain-free.  History of adverse reaction to a pharmacological stress agent, but unknown as to which type.  Continue aspirin and rosuvastatin.     2.  Paroxysmal SVT: Reported on prior records from Dr. Garces, but no available rhythm strips or EKGs to confirm this.  No palpitations or other symptoms of arrhythmia.  Discontinue metoprolol due to bradycardia.     3.  Essential hypertension: Reasonably well-controlled.  Continue nifedipine and lisinopril.     4.  Dyslipidemia: Suboptimal control on lipid panel on 5/7/2024, but was off rosuvastatin at that time.  Continue at current dose for now and consider adding ezetimibe if no improvement.      5.  Sinus bradycardia: Reportedly had heart rate into the 30s during recent back procedure.  Anesthesia from surgery may have contributed.  Discontinue metoprolol.  Evaluate further with a Holter monitor.    6.  Syncope: Patient reported syncope after getting home from her procedure.  Unclear if this could be related to bradycardia.  Other items in the differential  include vagal episode, volume depletion, and hypotension.  Check echo and Holter monitor for further evaluation.

## 2025-03-19 ENCOUNTER — HOSPITAL ENCOUNTER (OUTPATIENT)
Dept: CARDIOLOGY | Facility: HOSPITAL | Age: 70
Discharge: HOME | End: 2025-03-19
Admitting: INTERNAL MEDICINE
Payer: COMMERCIAL

## 2025-03-19 ENCOUNTER — RESULTS FOLLOW-UP (OUTPATIENT)
Dept: CARDIOLOGY | Facility: CLINIC | Age: 70
End: 2025-03-19
Payer: COMMERCIAL

## 2025-03-19 ENCOUNTER — TELEPHONE (OUTPATIENT)
Dept: CARDIOLOGY | Facility: CLINIC | Age: 70
End: 2025-03-19

## 2025-03-19 VITALS
DIASTOLIC BLOOD PRESSURE: 70 MMHG | SYSTOLIC BLOOD PRESSURE: 132 MMHG | BODY MASS INDEX: 36.65 KG/M2 | WEIGHT: 220 LBS | HEIGHT: 65 IN

## 2025-03-19 DIAGNOSIS — I25.10 CORONARY ARTERY DISEASE INVOLVING NATIVE CORONARY ARTERY OF NATIVE HEART WITHOUT ANGINA PECTORIS: Primary | ICD-10-CM

## 2025-03-19 DIAGNOSIS — E78.5 DYSLIPIDEMIA: ICD-10-CM

## 2025-03-19 LAB
AORTIC DIMENSIONLESS INDEX: 0.79 (DI)
AV MEAN PRESS GRAD SYS DOP V1V2: 2 MMHG
AV VMAX SYS DOP: 97.8 CM/SEC
BH CV ECHO MEAS - AO MAX PG: 3.8 MMHG
BH CV ECHO MEAS - AO ROOT DIAM: 3.1 CM
BH CV ECHO MEAS - AO V2 VTI: 23.3 CM
BH CV ECHO MEAS - AVA(I,D): 3 CM2
BH CV ECHO MEAS - EDV(CUBED): 107.9 ML
BH CV ECHO MEAS - EDV(MOD-SP2): 62.8 ML
BH CV ECHO MEAS - EDV(MOD-SP4): 73.4 ML
BH CV ECHO MEAS - EF(MOD-SP2): 77.1 %
BH CV ECHO MEAS - EF(MOD-SP4): 73 %
BH CV ECHO MEAS - ESV(CUBED): 26.2 ML
BH CV ECHO MEAS - ESV(MOD-SP2): 14.4 ML
BH CV ECHO MEAS - ESV(MOD-SP4): 19.8 ML
BH CV ECHO MEAS - FS: 37.6 %
BH CV ECHO MEAS - IVS/LVPW: 0.99 CM
BH CV ECHO MEAS - IVSD: 1.52 CM
BH CV ECHO MEAS - LA DIMENSION: 4.4 CM
BH CV ECHO MEAS - LAT PEAK E' VEL: 5.1 CM/SEC
BH CV ECHO MEAS - LV DIASTOLIC VOL/BSA (35-75): 35.6 CM2
BH CV ECHO MEAS - LV MASS(C)D: 307.2 GRAMS
BH CV ECHO MEAS - LV MAX PG: 2.7 MMHG
BH CV ECHO MEAS - LV MEAN PG: 1 MMHG
BH CV ECHO MEAS - LV SYSTOLIC VOL/BSA (12-30): 9.6 CM2
BH CV ECHO MEAS - LV V1 MAX: 81.6 CM/SEC
BH CV ECHO MEAS - LV V1 VTI: 18.5 CM
BH CV ECHO MEAS - LVIDD: 4.8 CM
BH CV ECHO MEAS - LVIDS: 3 CM
BH CV ECHO MEAS - LVOT AREA: 3.8 CM2
BH CV ECHO MEAS - LVOT DIAM: 2.2 CM
BH CV ECHO MEAS - LVPWD: 1.53 CM
BH CV ECHO MEAS - MED PEAK E' VEL: 6 CM/SEC
BH CV ECHO MEAS - MV A MAX VEL: 62.6 CM/SEC
BH CV ECHO MEAS - MV DEC SLOPE: 338 CM/SEC2
BH CV ECHO MEAS - MV DEC TIME: 0.22 SEC
BH CV ECHO MEAS - MV E MAX VEL: 75.8 CM/SEC
BH CV ECHO MEAS - MV E/A: 1.21
BH CV ECHO MEAS - PA V2 MAX: 60.6 CM/SEC
BH CV ECHO MEAS - RV MAX PG: 1.06 MMHG
BH CV ECHO MEAS - RV V1 MAX: 51.2 CM/SEC
BH CV ECHO MEAS - SV(LVOT): 70.3 ML
BH CV ECHO MEAS - SV(MOD-SP2): 48.4 ML
BH CV ECHO MEAS - SV(MOD-SP4): 53.6 ML
BH CV ECHO MEAS - SVI(LVOT): 34.1 ML/M2
BH CV ECHO MEAS - SVI(MOD-SP2): 23.5 ML/M2
BH CV ECHO MEAS - SVI(MOD-SP4): 26 ML/M2
BH CV ECHO MEAS - TAPSE (>1.6): 1.47 CM
BH CV ECHO MEASUREMENTS AVERAGE E/E' RATIO: 13.66
LEFT ATRIUM VOLUME INDEX: 30.5 ML/M2
LEFT ATRIUM VOLUME: 62.9 ML
LV EF BIPLANE MOD: 75 %

## 2025-03-19 PROCEDURE — 93306 TTE W/DOPPLER COMPLETE: CPT

## 2025-03-19 PROCEDURE — 25510000001 PERFLUTREN 6.52 MG/ML SUSPENSION: Performed by: INTERNAL MEDICINE

## 2025-03-19 RX ADMIN — PERFLUTREN 13.04 MG: 6.52 INJECTION, SUSPENSION INTRAVENOUS at 09:34

## 2025-03-19 NOTE — TELEPHONE ENCOUNTER
----- Message from Ryan Huizar sent at 3/19/2025  1:31 PM CDT -----  Order repeat lipid panel, please make sure she gets it done before next follow-up appointment.  ----- Message -----  From: Chasity Littlejohn MA  Sent: 3/19/2025   1:21 PM CDT  To: Ryan Huizar MD    Most recent lipid was 5/2024 in chart  ----- Message -----  From: Ryan Huizar MD  Sent: 3/10/2025  12:01 PM CDT  To: Chasity Littlejohn MA    Please obtain copy of most recent lipid panel from PCP.

## 2025-03-30 LAB
CV ZIO BASELINE AVG BPM: 64 BPM
CV ZIO BASELINE BPM HIGH: 171 BPM
CV ZIO BASELINE BPM LOW: 30 BPM
CV ZIO DEVICE ANALYSIS TIME: NORMAL
CV ZIO ECT SVE COUNT: 3449 EPISODES
CV ZIO ECT SVE CPLT COUNT: 82 EPISODES
CV ZIO ECT SVE CPLT FREQ: NORMAL
CV ZIO ECT SVE FREQ: NORMAL
CV ZIO ECT SVE TPLT COUNT: 2 EPISODES
CV ZIO ECT SVE TPLT FREQ: NORMAL
CV ZIO ECT VE COUNT: 327 EPISODES
CV ZIO ECT VE CPLT COUNT: 2 EPISODES
CV ZIO ECT VE CPLT FREQ: NORMAL
CV ZIO ECT VE FREQ: NORMAL
CV ZIO ECT VE TPLT COUNT: 0 EPISODES
CV ZIO ECT VE TPLT FREQ: 0
CV ZIO ECTOPIC SVE COUPLET RAW PERCENT: 0.07 %
CV ZIO ECTOPIC SVE ISOLATED PERCENT: 1.47 %
CV ZIO ECTOPIC SVE TRIPLET RAW PERCENT: 0 %
CV ZIO ECTOPIC VE COUPLET RAW PERCENT: 0 %
CV ZIO ECTOPIC VE ISOLATED PERCENT: 0.14 %
CV ZIO ECTOPIC VE TRIPLET RAW PERCENT: 0 %
CV ZIO ENROLLMENT END: NORMAL
CV ZIO ENROLLMENT START: NORMAL
CV ZIO L BIGEMINY DUR: 35.8 SEC
CV ZIO L BIGEMINY END: NORMAL
CV ZIO L BIGEMINY START: NORMAL
CV ZIO PATIENT EVENTS DIARIES: 0
CV ZIO PATIENT EVENTS TRIGGERS: 0
CV ZIO PAUSE COUNT: 0
CV ZIO PRESCRIPTION STATUS: NORMAL
CV ZIO SVT COUNT: 0
CV ZIO TOTAL  ENROLLMENT PERIOD: NORMAL
CV ZIO VT AVG BPM: 138 BPM
CV ZIO VT BPM HIGH: 171 BPM
CV ZIO VT BPM LOW: 103 BPM
CV ZIO VT COUNT: 2
CV ZIO VT F EPI AVG BPM: 146
CV ZIO VT F EPI BEATS: 9 BEATS
CV ZIO VT F EPI BPM HIGH: 171
CV ZIO VT F EPI BPM LOW: 107
CV ZIO VT F EPI DUR: 3.5 SEC
CV ZIO VT F EPI END: NORMAL
CV ZIO VT F EPI START: NORMAL
CV ZIO VT L EPI AVG BPM: 130
CV ZIO VT L EPI BEATS: 8 BEATS
CV ZIO VT L EPI BPM HIGH: 141 BPM
CV ZIO VT L EPI BPM LOW: 103 BPM
CV ZIO VT L EPI DUR: 3.6
CV ZIO VT L EPI END: NORMAL
CV ZIO VT L EPI START: NORMAL

## 2025-04-02 ENCOUNTER — OFFICE VISIT (OUTPATIENT)
Dept: FAMILY MEDICINE CLINIC | Facility: CLINIC | Age: 70
End: 2025-04-02
Payer: MEDICARE

## 2025-04-02 VITALS
HEART RATE: 76 BPM | SYSTOLIC BLOOD PRESSURE: 124 MMHG | RESPIRATION RATE: 18 BRPM | DIASTOLIC BLOOD PRESSURE: 78 MMHG | TEMPERATURE: 98 F | HEIGHT: 65 IN | BODY MASS INDEX: 39.15 KG/M2 | WEIGHT: 235 LBS | OXYGEN SATURATION: 97 %

## 2025-04-02 DIAGNOSIS — R41.3 MEMORY LOSS: ICD-10-CM

## 2025-04-02 DIAGNOSIS — R73.9 ELEVATED BLOOD SUGAR: ICD-10-CM

## 2025-04-02 DIAGNOSIS — E78.2 MIXED HYPERLIPIDEMIA: Primary | ICD-10-CM

## 2025-04-02 DIAGNOSIS — R53.83 OTHER FATIGUE: ICD-10-CM

## 2025-04-02 NOTE — PROGRESS NOTES
Subjective   Maribeth Isbell is a 69 y.o. female.     History of Present Illness  69-year-old diabetic follow-up-Holter monitor present because of recent bradycardia and hypotension-Metroprolol stopped by cardiology      The following portions of the patient's history were reviewed and updated as appropriate: allergies, current medications, past family history, past medical history, past social history, past surgical history, and problem list.    Review of Systems   Respiratory:  Negative for shortness of breath.    Cardiovascular:  Negative for chest pain and leg swelling.   Endocrine: Negative for polydipsia, polyphagia and polyuria.   Musculoskeletal:  Positive for arthralgias and back pain.        Recent kyphoplasty for fractured vertebrae's       Objective   Physical Exam  Vitals and nursing note reviewed.   Constitutional:       Appearance: She is obese.   Eyes:      Extraocular Movements: Extraocular movements intact.      Pupils: Pupils are equal, round, and reactive to light.   Cardiovascular:      Rate and Rhythm: Normal rate and regular rhythm.   Pulmonary:      Effort: Pulmonary effort is normal.      Breath sounds: Normal breath sounds.   Musculoskeletal:      Right lower leg: No edema.      Left lower leg: No edema.      Comments: Presently has back brace on from kyphoplasty procedure a few weeks ago   Skin:     General: Skin is warm and dry.   Neurological:      General: No focal deficit present.      Mental Status: She is alert and oriented to person, place, and time.   Psychiatric:         Mood and Affect: Mood normal.         Behavior: Behavior normal.         Thought Content: Thought content normal.         Judgment: Judgment normal.         Assessment & Plan   Diagnoses and all orders for this visit:    1. Mixed hyperlipidemia (Primary)  -     Comprehensive Metabolic Panel  -     Lipid Panel    2. Memory loss  -     Vitamin B12  -     Folate    3. Other fatigue  -     TSH  -     CBC &  Differential    4. Elevated blood sugar  -     Hemoglobin A1c         Plan above-mammogram annual physical Gillian over the next couple months-multiple medicines stopped

## 2025-04-03 LAB
ALBUMIN SERPL-MCNC: 4.1 G/DL (ref 3.5–5.2)
ALBUMIN/GLOB SERPL: 1.6 G/DL
ALP SERPL-CCNC: 102 U/L (ref 39–117)
ALT SERPL-CCNC: 14 U/L (ref 1–33)
AST SERPL-CCNC: 31 U/L (ref 1–32)
BASOPHILS # BLD AUTO: 0.05 10*3/MM3 (ref 0–0.2)
BASOPHILS NFR BLD AUTO: 1.1 % (ref 0–1.5)
BILIRUB SERPL-MCNC: 0.5 MG/DL (ref 0–1.2)
BUN SERPL-MCNC: 8 MG/DL (ref 8–23)
BUN/CREAT SERPL: 7.5 (ref 7–25)
CALCIUM SERPL-MCNC: 8.9 MG/DL (ref 8.6–10.5)
CHLORIDE SERPL-SCNC: 101 MMOL/L (ref 98–107)
CHOLEST SERPL-MCNC: 387 MG/DL (ref 0–200)
CO2 SERPL-SCNC: 24 MMOL/L (ref 22–29)
CREAT SERPL-MCNC: 1.07 MG/DL (ref 0.57–1)
EGFRCR SERPLBLD CKD-EPI 2021: 56.3 ML/MIN/1.73
EOSINOPHIL # BLD AUTO: 0.16 10*3/MM3 (ref 0–0.4)
EOSINOPHIL NFR BLD AUTO: 3.7 % (ref 0.3–6.2)
ERYTHROCYTE [DISTWIDTH] IN BLOOD BY AUTOMATED COUNT: 15.9 % (ref 12.3–15.4)
FOLATE SERPL-MCNC: 13.8 NG/ML (ref 4.78–24.2)
GLOBULIN SER CALC-MCNC: 2.6 GM/DL
GLUCOSE SERPL-MCNC: 170 MG/DL (ref 65–99)
HBA1C MFR BLD: 7.9 % (ref 4.8–5.6)
HCT VFR BLD AUTO: 47.3 % (ref 34–46.6)
HDLC SERPL-MCNC: 63 MG/DL (ref 40–60)
HGB BLD-MCNC: 15 G/DL (ref 12–15.9)
IMM GRANULOCYTES # BLD AUTO: 0.04 10*3/MM3 (ref 0–0.05)
IMM GRANULOCYTES NFR BLD AUTO: 0.9 % (ref 0–0.5)
LDLC SERPL CALC-MCNC: 292 MG/DL (ref 0–100)
LYMPHOCYTES # BLD AUTO: 1.78 10*3/MM3 (ref 0.7–3.1)
LYMPHOCYTES NFR BLD AUTO: 40.6 % (ref 19.6–45.3)
MCH RBC QN AUTO: 28.8 PG (ref 26.6–33)
MCHC RBC AUTO-ENTMCNC: 31.7 G/DL (ref 31.5–35.7)
MCV RBC AUTO: 91 FL (ref 79–97)
MONOCYTES # BLD AUTO: 0.23 10*3/MM3 (ref 0.1–0.9)
MONOCYTES NFR BLD AUTO: 5.3 % (ref 5–12)
NEUTROPHILS # BLD AUTO: 2.12 10*3/MM3 (ref 1.7–7)
NEUTROPHILS NFR BLD AUTO: 48.4 % (ref 42.7–76)
NRBC BLD AUTO-RTO: 0 /100 WBC (ref 0–0.2)
PLATELET # BLD AUTO: 229 10*3/MM3 (ref 140–450)
POTASSIUM SERPL-SCNC: 3.7 MMOL/L (ref 3.5–5.2)
PROT SERPL-MCNC: 6.7 G/DL (ref 6–8.5)
RBC # BLD AUTO: 5.2 10*6/MM3 (ref 3.77–5.28)
SODIUM SERPL-SCNC: 137 MMOL/L (ref 136–145)
TRIGL SERPL-MCNC: 164 MG/DL (ref 0–150)
TSH SERPL DL<=0.005 MIU/L-ACNC: 57.3 UIU/ML (ref 0.27–4.2)
VIT B12 SERPL-MCNC: 180 PG/ML (ref 211–946)
VLDLC SERPL CALC-MCNC: 32 MG/DL (ref 5–40)
WBC # BLD AUTO: 4.38 10*3/MM3 (ref 3.4–10.8)

## 2025-04-03 RX ORDER — ERGOCALCIFEROL 1.25 MG/1
50000 CAPSULE, LIQUID FILLED ORAL
Qty: 12 CAPSULE | Refills: 2 | Status: SHIPPED | OUTPATIENT
Start: 2025-04-03

## 2025-04-03 NOTE — TELEPHONE ENCOUNTER
Rx Refill Note  Requested Prescriptions     Pending Prescriptions Disp Refills    vitamin D (ERGOCALCIFEROL) 1.25 MG (00021 UT) capsule capsule 12 capsule 2     Sig: Take 1 capsule by mouth Every 7 (Seven) Days.          Last office visit with prescribing clinician: 4/2/2025   Last telemedicine visit with prescribing clinician: Visit date not found   Next office visit with prescribing clinician: Visit date not found                         Would you like a call back once the refill request has been completed: [] Yes [] No    If the office needs to give you a call back, can they leave a voicemail: [] Yes [] No    Christine Bell MA  04/03/25, 09:58 CDT

## 2025-04-07 ENCOUNTER — CLINICAL SUPPORT (OUTPATIENT)
Dept: FAMILY MEDICINE CLINIC | Facility: CLINIC | Age: 70
End: 2025-04-07
Payer: MEDICARE

## 2025-04-07 DIAGNOSIS — E53.8 B12 DEFICIENCY: Primary | ICD-10-CM

## 2025-04-07 PROCEDURE — 96372 THER/PROPH/DIAG INJ SC/IM: CPT | Performed by: FAMILY MEDICINE

## 2025-04-07 RX ORDER — CYANOCOBALAMIN 1000 UG/ML
1000 INJECTION, SOLUTION INTRAMUSCULAR; SUBCUTANEOUS WEEKLY
Status: SHIPPED | OUTPATIENT
Start: 2025-04-07 | End: 2025-05-05

## 2025-04-07 RX ADMIN — CYANOCOBALAMIN 1000 MCG: 1000 INJECTION, SOLUTION INTRAMUSCULAR; SUBCUTANEOUS at 09:05

## 2025-04-07 NOTE — PROGRESS NOTES
Administered 1000 mcg cyanocobalamin IM to the left deltoid.  Patient tolerated well.  No reaction observed.  Patient will return in one week for week injection.

## 2025-04-09 DIAGNOSIS — F33.1 MODERATE EPISODE OF RECURRENT MAJOR DEPRESSIVE DISORDER: ICD-10-CM

## 2025-04-09 DIAGNOSIS — E11.59 HYPERTENSION ASSOCIATED WITH DIABETES: ICD-10-CM

## 2025-04-09 DIAGNOSIS — I15.2 HYPERTENSION ASSOCIATED WITH DIABETES: ICD-10-CM

## 2025-04-09 NOTE — TELEPHONE ENCOUNTER
Rx Refill Note  Requested Prescriptions     Pending Prescriptions Disp Refills    bumetanide (BUMEX) 1 MG tablet 90 tablet 1     Sig: Take 1 tablet by mouth Daily.    desvenlafaxine (PRISTIQ) 50 MG 24 hr tablet 90 tablet 1     Sig: Take 1 tablet by mouth Daily.      Last office visit with prescribing clinician: 4/2/2025   Last telemedicine visit with prescribing clinician: Visit date not found   Next office visit with prescribing clinician: Visit date not found                         Would you like a call back once the refill request has been completed: [] Yes [] No    If the office needs to give you a call back, can they leave a voicemail: [] Yes [] No    Jorden Gamboa Rep  04/09/25, 16:30 CDT

## 2025-04-10 RX ORDER — DESVENLAFAXINE 50 MG/1
50 TABLET, FILM COATED, EXTENDED RELEASE ORAL DAILY
Qty: 90 TABLET | Refills: 1 | Status: SHIPPED | OUTPATIENT
Start: 2025-04-10

## 2025-04-10 RX ORDER — METOPROLOL SUCCINATE 25 MG/1
25 TABLET, EXTENDED RELEASE ORAL DAILY
Qty: 90 TABLET | Refills: 3 | OUTPATIENT
Start: 2025-04-10

## 2025-04-10 RX ORDER — BUMETANIDE 1 MG/1
1 TABLET ORAL DAILY
Qty: 90 TABLET | Refills: 1 | Status: SHIPPED | OUTPATIENT
Start: 2025-04-10

## 2025-04-10 RX ORDER — LISINOPRIL 40 MG/1
40 TABLET ORAL DAILY
Qty: 90 TABLET | Refills: 3 | Status: SHIPPED | OUTPATIENT
Start: 2025-04-10

## 2025-04-10 NOTE — TELEPHONE ENCOUNTER
Rx Refill Note  Requested Prescriptions     Pending Prescriptions Disp Refills    lisinopril (PRINIVIL,ZESTRIL) 40 MG tablet [Pharmacy Med Name: LISINOPRIL 40 MG TABLET] 90 tablet 3     Sig: TAKE 1 TABLET BY MOUTH EVERY DAY      Last office visit with prescribing clinician: 4/2/25  Last telemedicine visit with prescribing clinician: Visit date not found   Next office visit with prescribing clinician: 4/30/2025       {TIP  Please add Last Relevant Lab 4/2/25  Jayne Casanova MA  04/10/25, 07:06 CDT

## 2025-04-14 ENCOUNTER — CLINICAL SUPPORT (OUTPATIENT)
Dept: FAMILY MEDICINE CLINIC | Facility: CLINIC | Age: 70
End: 2025-04-14
Payer: COMMERCIAL

## 2025-04-14 DIAGNOSIS — E53.8 B12 DEFICIENCY: Primary | ICD-10-CM

## 2025-04-14 PROCEDURE — 96372 THER/PROPH/DIAG INJ SC/IM: CPT | Performed by: NURSE PRACTITIONER

## 2025-04-14 RX ADMIN — CYANOCOBALAMIN 1000 MCG: 1000 INJECTION, SOLUTION INTRAMUSCULAR; SUBCUTANEOUS at 08:40

## 2025-04-18 DIAGNOSIS — F33.1 MODERATE EPISODE OF RECURRENT MAJOR DEPRESSIVE DISORDER: ICD-10-CM

## 2025-04-18 RX ORDER — LAMOTRIGINE 100 MG/1
100 TABLET ORAL
Qty: 90 TABLET | Refills: 0 | OUTPATIENT
Start: 2025-04-18

## 2025-04-18 NOTE — TELEPHONE ENCOUNTER
Rx Refill Note  Requested Prescriptions     Refused Prescriptions Disp Refills    lamoTRIgine (LaMICtal) 100 MG tablet [Pharmacy Med Name: LAMOTRIGINE 100 MG TABLET] 90 tablet 0     Sig: TAKE 1 TABLET BY MOUTH EVERY DAY AT NIGHT      Last office visit with prescribing clinician: 4/2/2025   Last telemedicine visit with prescribing clinician: Visit date not found   Next office visit with prescribing clinician: Visit date not found                         Would you like a call back once the refill request has been completed: [] Yes [] No    If the office needs to give you a call back, can they leave a voicemail: [] Yes [] No    Jayne Ewing MA  04/18/25, 11:22 CDT    Rx says Dr Mccray cancelled med due to therapy completed on 04/02/2025

## 2025-04-21 ENCOUNTER — CLINICAL SUPPORT (OUTPATIENT)
Dept: FAMILY MEDICINE CLINIC | Facility: CLINIC | Age: 70
End: 2025-04-21
Payer: MEDICARE

## 2025-04-21 DIAGNOSIS — E53.8 B12 DEFICIENCY: Primary | ICD-10-CM

## 2025-04-21 DIAGNOSIS — E11.9 TYPE 2 DIABETES MELLITUS WITHOUT COMPLICATION, WITH LONG-TERM CURRENT USE OF INSULIN: ICD-10-CM

## 2025-04-21 DIAGNOSIS — Z79.4 TYPE 2 DIABETES MELLITUS WITHOUT COMPLICATION, WITH LONG-TERM CURRENT USE OF INSULIN: ICD-10-CM

## 2025-04-21 PROCEDURE — 96372 THER/PROPH/DIAG INJ SC/IM: CPT | Performed by: NURSE PRACTITIONER

## 2025-04-21 RX ADMIN — CYANOCOBALAMIN 1000 MCG: 1000 INJECTION, SOLUTION INTRAMUSCULAR; SUBCUTANEOUS at 09:16

## 2025-04-22 LAB — MICROALBUMIN UR-MCNC: 15.5 UG/ML

## 2025-04-28 ENCOUNTER — CLINICAL SUPPORT (OUTPATIENT)
Dept: FAMILY MEDICINE CLINIC | Facility: CLINIC | Age: 70
End: 2025-04-28
Payer: MEDICARE

## 2025-04-28 DIAGNOSIS — E53.8 B12 DEFICIENCY: Primary | ICD-10-CM

## 2025-04-28 PROCEDURE — 96372 THER/PROPH/DIAG INJ SC/IM: CPT | Performed by: NURSE PRACTITIONER

## 2025-04-28 RX ADMIN — CYANOCOBALAMIN 1000 MCG: 1000 INJECTION, SOLUTION INTRAMUSCULAR; SUBCUTANEOUS at 09:37

## 2025-05-01 ENCOUNTER — OFFICE VISIT (OUTPATIENT)
Dept: FAMILY MEDICINE CLINIC | Facility: CLINIC | Age: 70
End: 2025-05-01
Payer: MEDICARE

## 2025-05-01 VITALS
OXYGEN SATURATION: 96 % | SYSTOLIC BLOOD PRESSURE: 111 MMHG | WEIGHT: 226 LBS | BODY MASS INDEX: 37.65 KG/M2 | HEART RATE: 80 BPM | HEIGHT: 65 IN | TEMPERATURE: 98 F | DIASTOLIC BLOOD PRESSURE: 70 MMHG

## 2025-05-01 DIAGNOSIS — E78.5 DYSLIPIDEMIA: ICD-10-CM

## 2025-05-01 DIAGNOSIS — Z12.11 ENCOUNTER FOR SCREENING FOR MALIGNANT NEOPLASM OF COLON: ICD-10-CM

## 2025-05-01 DIAGNOSIS — I25.10 CORONARY ARTERY DISEASE INVOLVING NATIVE CORONARY ARTERY OF NATIVE HEART WITHOUT ANGINA PECTORIS: ICD-10-CM

## 2025-05-01 DIAGNOSIS — E03.9 ACQUIRED HYPOTHYROIDISM: ICD-10-CM

## 2025-05-01 DIAGNOSIS — Z78.0 POSTMENOPAUSE: ICD-10-CM

## 2025-05-01 DIAGNOSIS — Z12.11 COLON CANCER SCREENING: ICD-10-CM

## 2025-05-01 DIAGNOSIS — I10 ESSENTIAL HYPERTENSION: ICD-10-CM

## 2025-05-01 DIAGNOSIS — R82.90 ABNORMAL URINALYSIS: ICD-10-CM

## 2025-05-01 DIAGNOSIS — R53.83 FATIGUE, UNSPECIFIED TYPE: ICD-10-CM

## 2025-05-01 DIAGNOSIS — F33.1 MODERATE EPISODE OF RECURRENT MAJOR DEPRESSIVE DISORDER: ICD-10-CM

## 2025-05-01 DIAGNOSIS — Z79.4 TYPE 2 DIABETES MELLITUS WITHOUT COMPLICATION, WITH LONG-TERM CURRENT USE OF INSULIN: ICD-10-CM

## 2025-05-01 DIAGNOSIS — Z12.31 ENCOUNTER FOR SCREENING MAMMOGRAM FOR MALIGNANT NEOPLASM OF BREAST: ICD-10-CM

## 2025-05-01 DIAGNOSIS — E11.9 TYPE 2 DIABETES MELLITUS WITHOUT COMPLICATION, WITH LONG-TERM CURRENT USE OF INSULIN: ICD-10-CM

## 2025-05-01 DIAGNOSIS — Z00.00 MEDICARE ANNUAL WELLNESS VISIT, SUBSEQUENT: Primary | ICD-10-CM

## 2025-05-01 LAB
BILIRUB BLD-MCNC: NEGATIVE MG/DL
CLARITY, POC: CLEAR
COLOR UR: YELLOW
GLUCOSE UR STRIP-MCNC: ABNORMAL MG/DL
KETONES UR QL: NEGATIVE
LEUKOCYTE EST, POC: ABNORMAL
NITRITE UR-MCNC: NEGATIVE MG/ML
PH UR: 6 [PH] (ref 5–8)
PROT UR STRIP-MCNC: NEGATIVE MG/DL
RBC # UR STRIP: ABNORMAL /UL
SP GR UR: 1.01 (ref 1–1.03)
UROBILINOGEN UR QL: ABNORMAL

## 2025-05-01 RX ORDER — OXYBUTYNIN CHLORIDE 10 MG/1
10 TABLET, EXTENDED RELEASE ORAL DAILY
Qty: 30 TABLET | Refills: 5 | Status: SHIPPED | OUTPATIENT
Start: 2025-05-01

## 2025-05-01 NOTE — PATIENT INSTRUCTIONS
Medicare Wellness  Personal Prevention Plan of Service     Date of Office Visit:    Encounter Provider:  YESSICA Govea  Place of Service:  [facility]  Patient Name: Maribeth Isbell  :  1955    As part of the Medicare Wellness portion of your visit today, we are providing you with this personalized preventive plan of services (PPPS). This plan is based upon recommendations of the [place] Preventive Services Task Force (USPSTF) and the Advisory Committee on Immunization Practices (ACIP).    This lists the preventive care services that should be considered, and provides dates of when you are due. Items listed as completed are up-to-date and do not require any further intervention.    Health Maintenance   Topic Date Due    URINE MICROALBUMIN-CREATININE RATIO (uACR)  2023    TDAP/TD VACCINES (2 - Td or Tdap) 2023    DXA SCAN  2023    MAMMOGRAM  2024    COLORECTAL CANCER SCREENING  2024    ZOSTER VACCINE (1 of 2) 10/28/2025 (Originally 2005)    COVID-19 Vaccine ( season) 2025    INFLUENZA VACCINE  2025    HEMOGLOBIN A1C  10/02/2025    LUNG CANCER SCREENING  10/21/2025    ANNUAL WELLNESS VISIT  2026    LIPID PANEL  2026    DIABETIC EYE EXAM  2026    DIABETIC FOOT EXAM  2026    HEPATITIS C SCREENING  Completed    Pneumococcal Vaccine 50+  Completed       Orders Placed This Encounter   Procedures    Urine Culture - Urine, Urine, Clean Catch     Release to patient:   Routine Release [[id number]]    Mammo Screening Digital Tomosynthesis Bilateral With CAD     Standing Status:   Future     Expected Date:   2025     Expiration Date:   2026     Scheduling Instructions:      Any day, morning     Reason for Exam::   screening     Does this patient have a diabetic monitoring/medication delivering device on?:   Yes     Release to patient:   Routine Release []    DEXA Bone  Density Axial     Standing Status:   Future     Expected Date:   5/4/2025     Expiration Date:   8/1/2026     Is patient taking or have taken long term Glucocorticoid (steroids)?:   No     Does the patient have rheumatoid arthritis?:   No     Does the patient have secondary osteoporosis?:   No     Reason for Exam::   post menopausal     Does this patient have a diabetic monitoring/medication delivering device on?:   Yes     Release to patient:   Routine Release [[id number]]    Microalbumin / Creatinine Urine Ratio - Urine, Clean Catch     Release to patient:   Routine Release [[id number]]    Ambulatory Referral to Gastroenterology     Referral Priority:   Routine     Referral Type:   Consultation     Referral Reason:   Specialty Services Required     Requested Specialty:   Gastroenterology     Number of Visits Requested:   1    POC Urinalysis Dipstick, Multipro     Release to patient:   Routine Release [[id number]]       No follow-ups on file.

## 2025-05-01 NOTE — PROGRESS NOTES
Subjective   The ABCs of the Annual Wellness Visit  Medicare Wellness Visit      Maribeth Isbell is a 69 y.o. patient who presents for a Medicare Wellness Visit.    The following portions of the patient's history were reviewed and   updated as appropriate: allergies, current medications, past family history, past medical history, past social history, past surgical history, and problem list.    Compared to one year ago, the patient's physical   health is the same.  Compared to one year ago, the patient's mental   health is the same.    Recent Hospitalizations:  She was not admitted to the hospital during the last year.     Current Medical Providers:  Patient Care Team:  Jer Mccray MD as PCP - General  Naif Townsend MD as Surgeon (Orthopedic Surgery)  Kathy Berry MD as Consulting Physician (Gastroenterology)  Taiwo Diggs MD as Consulting Physician (Endocrinology)  Ryan Huizar MD as Consulting Physician (Cardiology)    Outpatient Medications Prior to Visit   Medication Sig Dispense Refill    aspirin 81 MG EC tablet Take 1 tablet by mouth Daily.      bumetanide (BUMEX) 1 MG tablet Take 1 tablet by mouth Daily. 90 tablet 1    cetirizine (zyrTEC) 10 MG tablet Take 1 tablet by mouth Daily. 90 tablet 1    Continuous Blood Gluc Sensor (FreeStyle Ann-Marie 2 Sensor) misc 1 each Every 14 (Fourteen) Days. 2 each 6    desvenlafaxine (PRISTIQ) 50 MG 24 hr tablet Take 1 tablet by mouth Daily. 90 tablet 1    empagliflozin (Jardiance) 10 MG tablet tablet Take  by mouth Daily.      Finerenone 20 MG tablet Take 20 mg by mouth Daily. 30 tablet 11    Fluticasone Furoate-Vilanterol (Breo Ellipta) 100-25 MCG/ACT aerosol powder  Inhale 1 puff Daily. 60 each 2    folic acid (FOLVITE) 400 MCG tablet Take 1 tablet by mouth Daily.      Insulin Aspart, w/Niacinamide, (Fiasp FlexTouch) 100 UNIT/ML solution pen-injector UP TO 40 UNITS QAC TID (Patient taking differently: 20 Units. UP TO 40 UNITS QAC TID) 108  mL 3    Insulin Degludec-Liraglutide (Xultophy) 100-3.6 UNIT-MG/ML solution pen-injector subcutaneous pen Inject 50 Units under the skin into the appropriate area as directed Daily. PLEASE STOP TRULICITY (Patient taking differently: Inject 25 Units under the skin into the appropriate area as directed Every Night. PLEASE STOP TRULICITY) 45 mL 3    Insulin Pen Needle (Pen Needles) 32G X 4 MM misc 1 each 4 (Four) Times a Day. Use 4 x daily 120 each 11    levothyroxine (Synthroid) 112 MCG tablet Take 1 tablet by mouth Every Morning Before Breakfast. 90 tablet 3    lisinopril (PRINIVIL,ZESTRIL) 40 MG tablet TAKE 1 TABLET BY MOUTH EVERY DAY 90 tablet 3    Multiple Vitamins-Minerals (VISION-KRISTAL PRESERVE PO) Take  by mouth Daily With Breakfast.      NIFEdipine CC (ADALAT CC) 60 MG 24 hr tablet TAKE 1 TABLET BY MOUTH EVERY DAY 90 tablet 3    omeprazole (priLOSEC) 40 MG capsule TAKE 1 CAPSULE BY MOUTH EVERY DAY 90 capsule 3    potassium bicarbonate (Klor-Con/EF) 25 MEQ disintegrating tablet PLACE 1 TABLET IN 8 OZ OF WATER AND DRINK ONCE DAILY WITH LARGEST MEAL 90 tablet 1    rosuvastatin (CRESTOR) 40 MG tablet Take 1 tablet by mouth Every Night. 90 tablet 3    vitamin B-12 (CYANOCOBALAMIN) 1000 MCG tablet Take 1 tablet by mouth Daily.      vitamin D (ERGOCALCIFEROL) 1.25 MG (32339 UT) capsule capsule Take 1 capsule by mouth Every 7 (Seven) Days. 12 capsule 2     No facility-administered medications prior to visit.     No opioid medication identified on active medication list. I have reviewed chart for other potential  high risk medication/s and harmful drug interactions in the elderly.      Aspirin is on active medication list. Aspirin use is indicated based on review of current medical condition/s. Pros and cons of this therapy have been discussed today. Benefits of this medication outweigh potential harm.  Patient has been encouraged to continue taking this medication.  .      Patient Active Problem List   Diagnosis     "Acquired hypothyroidism    Type 2 diabetes mellitus without complication, with long-term current use of insulin    Recurrent major depressive disorder    Osteoarthritis of multiple joints    Gastroesophageal reflux disease    Essential hypertension    Hx of adenomatous colonic polyps    Obesity due to excess calories    CHRISTY (obstructive sleep apnea)    SVT (supraventricular tachycardia)    Family history of polyps in the colon    Coronary artery disease involving native coronary artery of native heart without angina pectoris    Dyslipidemia     Advance Care Planning Advance Directive is not on file.  ACP discussion was held with the patient during this visit. Patient does not have an advance directive, information provided.            Objective   Vitals:    05/01/25 0935   BP: 111/70   BP Location: Left arm   Patient Position: Sitting   Cuff Size: Large Adult   Pulse: 80   Temp: 98 °F (36.7 °C)   TempSrc: Temporal   SpO2: 96%   Weight: 103 kg (226 lb)   Height: 165.1 cm (65\")   PainSc: 9    PainLoc: Back       Estimated body mass index is 37.61 kg/m² as calculated from the following:    Height as of this encounter: 165.1 cm (65\").    Weight as of this encounter: 103 kg (226 lb).    Class 2 Severe Obesity (BMI >=35 and <=39.9). Obesity-related health conditions include the following: hypertension and dyslipidemias. Obesity is worsening. BMI is is above average; BMI management plan is completed. We discussed portion control and increasing exercise.         Does the patient have evidence of cognitive impairment? No  Lab Results   Component Value Date    CHLPL 387 (H) 04/02/2025    TRIG 164 (H) 04/02/2025    HDL 63 (H) 04/02/2025     (H) 04/02/2025    VLDL 32 04/02/2025    HGBA1C 7.90 (H) 04/02/2025                                                                                                Health  Risk Assessment    Smoking Status:  Social History     Tobacco Use   Smoking Status Former    Current packs/day: " 0.00    Average packs/day: 1 pack/day for 20.0 years (20.0 ttl pk-yrs)    Types: Cigarettes    Start date:     Quit date: 2015    Years since quitting: 10.3   Smokeless Tobacco Never     Alcohol Consumption:  Social History     Substance and Sexual Activity   Alcohol Use No       Fall Risk Screen  STEADI Fall Risk Assessment was completed, and patient is at LOW risk for falls.Assessment completed on:2025    Depression Screening   Little interest or pleasure in doing things? Not at all   Feeling down, depressed, or hopeless? Not at all   PHQ-2 Total Score 0      Health Habits and Functional and Cognitive Screenin/18/2021     9:40 AM   Functional & Cognitive Status   Do you have difficulty preparing food and eating? No    Do you have difficulty bathing yourself, getting dressed or grooming yourself? No    Do you have difficulty using the toilet? No    Do you have difficulty moving around from place to place? No    Do you have trouble with steps or getting out of a bed or a chair? No   Current Diet Well Balanced Diet   Dental Exam Up to date   Eye Exam Up to date   Exercise (times per week) 7 times per week   Current Exercises Include Stationary Bicycling/Spin Class   Do you need help using the phone?  No   Are you deaf or do you have serious difficulty hearing?  No   Do you need help to go to places out of walking distance? Yes    Do you need help shopping? No   Do you need help preparing meals?  No   Do you need help with housework?  No   Do you need help with laundry? No   Do you need help taking your medications? No   Do you need help managing money? No   Do you ever drive or ride in a car without wearing a seat belt? No   Have you felt unusual stress, anger or loneliness in the last month? No   Who do you live with? Spouse   If you need help, do you have trouble finding someone available to you? No   Have you been bothered in the last four weeks by sexual problems? No   Do you have difficulty  concentrating, remembering or making decisions? No       Data saved with a previous flowsheet row definition           Age-appropriate Screening Schedule:  Refer to the list below for future screening recommendations based on patient's age, sex and/or medical conditions. Orders for these recommended tests are listed in the plan section. The patient has been provided with a written plan.    Health Maintenance List  Health Maintenance   Topic Date Due    URINE MICROALBUMIN-CREATININE RATIO (uACR)  06/20/2023    TDAP/TD VACCINES (2 - Td or Tdap) 06/28/2023    DXA SCAN  07/14/2023    MAMMOGRAM  06/22/2024    COLORECTAL CANCER SCREENING  09/20/2024    ZOSTER VACCINE (1 of 2) 10/28/2025 (Originally 9/9/2005)    COVID-19 Vaccine (7 - 2024-25 season) 06/11/2025    INFLUENZA VACCINE  07/01/2025    HEMOGLOBIN A1C  10/02/2025    LUNG CANCER SCREENING  10/21/2025    ANNUAL WELLNESS VISIT  01/27/2026    LIPID PANEL  04/02/2026    DIABETIC EYE EXAM  04/17/2026    DIABETIC FOOT EXAM  05/01/2026    HEPATITIS C SCREENING  Completed    Pneumococcal Vaccine 50+  Completed                                                                                                                                                CMS Preventative Services Quick Reference  Risk Factors Identified During Encounter  Urinary Incontinence: Medication adjustment made    The above risks/problems have been discussed with the patient.  Pertinent information has been shared with the patient in the After Visit Summary.  An After Visit Summary and PPPS were made available to the patient.    Follow Up:   Next Medicare Wellness visit to be scheduled in 1 year.         Additional E&M Note during same encounter follows:  Patient has additional, significant, and separately identifiable condition(s)/problem(s) that require work above and beyond the Medicare Wellness Visit     Chief Complaint  Annual Exam (Patient has already had labs completed)    Subjective  "  DARREL Stahl is also being seen today for an annual adult preventative physical exam.         Patient has already had labs drawn and resulted by Dr. Mccray. Is due for colonoscopy, mammogram, and bone density scan. Has had a total hysterectomy. Notes that she is struggling with urinary incontinence. States that when she rises from a seated position will have urine leakage.         Objective   Vital Signs:  /70 (BP Location: Left arm, Patient Position: Sitting, Cuff Size: Large Adult)   Pulse 80   Temp 98 °F (36.7 °C) (Temporal)   Ht 165.1 cm (65\")   Wt 103 kg (226 lb)   SpO2 96%   BMI 37.61 kg/m²   Physical Exam  Vitals reviewed.   Constitutional:       Appearance: She is well-developed.   HENT:      Right Ear: Tympanic membrane, ear canal and external ear normal.      Left Ear: Tympanic membrane, ear canal and external ear normal.      Mouth/Throat:      Mouth: Mucous membranes are moist.      Pharynx: Oropharynx is clear.   Neck:      Vascular: No carotid bruit.   Cardiovascular:      Rate and Rhythm: Normal rate and regular rhythm.      Heart sounds: Normal heart sounds.   Pulmonary:      Effort: Pulmonary effort is normal.      Breath sounds: Normal breath sounds.   Chest:   Breasts:     Right: Normal.      Left: Normal.   Abdominal:      General: Abdomen is flat. Bowel sounds are normal.      Palpations: Abdomen is soft.   Genitourinary:     Comments: Deferred - hyst  Neurological:      Mental Status: She is alert and oriented to person, place, and time.   Psychiatric:         Behavior: Behavior normal.                    Assessment and Plan      Type 2 diabetes mellitus without complication, with long-term current use of insulin      Orders:    Microalbumin / Creatinine Urine Ratio - Urine, Clean Catch    POC Urinalysis Dipstick, Multipro    Encounter for screening for malignant neoplasm of colon         Medicare annual wellness visit, subsequent    Orders:    Microalbumin / Creatinine Urine Ratio " - Urine, Clean Catch    POC Urinalysis Dipstick, Multipro    Fatigue, unspecified type         Essential hypertension           Acquired hypothyroidism         Coronary artery disease involving native coronary artery of native heart without angina pectoris           Moderate episode of recurrent major depressive disorder           Dyslipidemia         Encounter for screening mammogram for malignant neoplasm of breast    Orders:    Mammo Screening Digital Tomosynthesis Bilateral With CAD; Future    Postmenopause    Orders:    DEXA Bone Density Axial; Future    Colon cancer screening    Orders:    Ambulatory Referral to Gastroenterology    Abnormal urinalysis    Orders:    Urine Culture - Urine, Urine, Clean Catch            Follow Up   Return in about 6 months (around 11/1/2025) for Next scheduled follow up.  Patient was given instructions and counseling regarding her condition or for health maintenance advice. Please see specific information pulled into the AVS if appropriate.    Gillian Mullen, APRN 05/01/25

## 2025-05-01 NOTE — ASSESSMENT & PLAN NOTE
Orders:    Microalbumin / Creatinine Urine Ratio - Urine, Clean Catch    POC Urinalysis Dipstick, Multipro

## 2025-05-02 LAB
ALBUMIN/CREAT UR: 108 MG/G CREAT (ref 0–29)
CREAT UR-MCNC: 22 MG/DL
MICROALBUMIN UR-MCNC: 23.7 UG/ML

## 2025-05-03 LAB
BACTERIA UR CULT: NORMAL
BACTERIA UR CULT: NORMAL

## 2025-05-06 ENCOUNTER — CLINICAL SUPPORT (OUTPATIENT)
Dept: FAMILY MEDICINE CLINIC | Facility: CLINIC | Age: 70
End: 2025-05-06
Payer: MEDICARE

## 2025-05-06 ENCOUNTER — RESULTS FOLLOW-UP (OUTPATIENT)
Dept: FAMILY MEDICINE CLINIC | Facility: CLINIC | Age: 70
End: 2025-05-06

## 2025-05-06 DIAGNOSIS — R31.29 MICROSCOPIC HEMATURIA: Primary | ICD-10-CM

## 2025-05-06 LAB
BILIRUB BLD-MCNC: NEGATIVE MG/DL
CLARITY, POC: CLEAR
COLOR UR: YELLOW
GLUCOSE UR STRIP-MCNC: ABNORMAL MG/DL
KETONES UR QL: NEGATIVE
LEUKOCYTE EST, POC: NEGATIVE
NITRITE UR-MCNC: NEGATIVE MG/ML
PH UR: 7 [PH] (ref 5–8)
PROT UR STRIP-MCNC: ABNORMAL MG/DL
RBC # UR STRIP: NEGATIVE /UL
SP GR UR: 1.01 (ref 1–1.03)
UROBILINOGEN UR QL: NORMAL

## 2025-05-06 PROCEDURE — 81003 URINALYSIS AUTO W/O SCOPE: CPT | Performed by: NURSE PRACTITIONER

## 2025-05-06 NOTE — PROGRESS NOTES
Patient returned to office to leave a urine sample due to her last urine showing large blood.  Urinalysis was obtained and was negative for blood today.

## 2025-05-07 NOTE — TELEPHONE ENCOUNTER
Pt's  came in to the office to ask about pt receiving a call yesterday. Let him know a vm was left on the pt's cell and that the sample was good, no blood in urine.

## 2025-06-12 ENCOUNTER — TELEPHONE (OUTPATIENT)
Dept: FAMILY MEDICINE CLINIC | Facility: CLINIC | Age: 70
End: 2025-06-12
Payer: MEDICARE

## 2025-06-12 DIAGNOSIS — G25.81 RLS (RESTLESS LEGS SYNDROME): ICD-10-CM

## 2025-06-12 DIAGNOSIS — F33.1 MODERATE EPISODE OF RECURRENT MAJOR DEPRESSIVE DISORDER: ICD-10-CM

## 2025-06-12 RX ORDER — LAMOTRIGINE 100 MG/1
100 TABLET ORAL
Qty: 90 TABLET | Refills: 0 | OUTPATIENT
Start: 2025-06-12

## 2025-06-12 RX ORDER — CARBIDOPA/LEVODOPA 25MG-250MG
1-2 TABLET ORAL
Qty: 180 TABLET | Refills: 3 | OUTPATIENT
Start: 2025-06-12

## 2025-06-12 NOTE — TELEPHONE ENCOUNTER
Pt calling trying to fill up meds--inquiring if she needs to take bumex.  You had taken her off meds and wasn't sure if still needed to be off or ok to resume.

## 2025-06-12 NOTE — TELEPHONE ENCOUNTER
Rx Refill Note  Requested Prescriptions     Refused Prescriptions Disp Refills    lamoTRIgine (LaMICtal) 100 MG tablet [Pharmacy Med Name: LAMOTRIGINE 100 MG TABLET] 90 tablet 0     Sig: TAKE 1 TABLET BY MOUTH EVERY DAY AT NIGHT    carbidopa-levodopa (SINEMET)  MG per tablet [Pharmacy Med Name: CARBIDOPA-LEVODOPA  TAB] 180 tablet 3     Sig: TAKE 1 TO 2 TABLETS BY MOUTH EVERY DAY AT BEDTIME      Last office visit with prescribing clinician: 4/2/2025   Last telemedicine visit with prescribing clinician: Visit date not found   Next office visit with prescribing clinician: Visit date not found                         Would you like a call back once the refill request has been completed: [] Yes [] No    If the office needs to give you a call back, can they leave a voicemail: [] Yes [] No    Jayne Ewing MA  06/12/25, 15:45 CDT

## 2025-06-19 LAB
AMBIG ABBREV LP DEFAULT: NORMAL
CHOLEST SERPL-MCNC: 175 MG/DL (ref 100–199)
HDLC SERPL-MCNC: 54 MG/DL
LDLC SERPL CALC-MCNC: 92 MG/DL (ref 0–99)
TRIGL SERPL-MCNC: 167 MG/DL (ref 0–149)
VLDLC SERPL CALC-MCNC: 29 MG/DL (ref 5–40)

## 2025-06-23 ENCOUNTER — OFFICE VISIT (OUTPATIENT)
Dept: CARDIOLOGY | Facility: CLINIC | Age: 70
End: 2025-06-23
Payer: MEDICARE

## 2025-06-23 VITALS
HEIGHT: 65 IN | SYSTOLIC BLOOD PRESSURE: 112 MMHG | BODY MASS INDEX: 36.82 KG/M2 | WEIGHT: 221 LBS | HEART RATE: 94 BPM | DIASTOLIC BLOOD PRESSURE: 62 MMHG | OXYGEN SATURATION: 100 %

## 2025-06-23 DIAGNOSIS — I47.10 SVT (SUPRAVENTRICULAR TACHYCARDIA): ICD-10-CM

## 2025-06-23 DIAGNOSIS — I10 ESSENTIAL HYPERTENSION: ICD-10-CM

## 2025-06-23 DIAGNOSIS — E78.5 DYSLIPIDEMIA: ICD-10-CM

## 2025-06-23 DIAGNOSIS — R00.1 BRADYCARDIA, SINUS: ICD-10-CM

## 2025-06-23 DIAGNOSIS — Z87.898 HISTORY OF SYNCOPE: ICD-10-CM

## 2025-06-23 DIAGNOSIS — I25.10 CORONARY ARTERY DISEASE INVOLVING NATIVE CORONARY ARTERY OF NATIVE HEART WITHOUT ANGINA PECTORIS: Primary | ICD-10-CM

## 2025-06-23 PROCEDURE — 3078F DIAST BP <80 MM HG: CPT | Performed by: INTERNAL MEDICINE

## 2025-06-23 PROCEDURE — 1160F RVW MEDS BY RX/DR IN RCRD: CPT | Performed by: INTERNAL MEDICINE

## 2025-06-23 PROCEDURE — 3074F SYST BP LT 130 MM HG: CPT | Performed by: INTERNAL MEDICINE

## 2025-06-23 PROCEDURE — 1159F MED LIST DOCD IN RCRD: CPT | Performed by: INTERNAL MEDICINE

## 2025-06-23 PROCEDURE — G2211 COMPLEX E/M VISIT ADD ON: HCPCS | Performed by: INTERNAL MEDICINE

## 2025-06-23 PROCEDURE — 99214 OFFICE O/P EST MOD 30 MIN: CPT | Performed by: INTERNAL MEDICINE

## 2025-06-23 NOTE — PROGRESS NOTES
Reason for Visit: cardiovascular follow up.    HPI:  Maribeth Isbell is a 69 y.o. female is here today for 3-month follow-up.  At last visit on 3/10/2025 she reported bradycardia into the 30s during her back procedure in [place], [place].  After she got home from the procedure she had a syncopal episode.  Metoprolol was discontinued and Holter monitor and echo were ordered for further evaluation.  She has done well since stopping metoprolol.  She denies any chest pain, palpitations, dizziness, syncope, PND, or orthopnea.  She has had poor balance and some falls.  She has a bicycle at home and does this regularly during the summer months.      Previous Cardiac Testing and Procedures:  -[facility] (9/11/2020) single-vessel disease involving the proximal LAD, status post successful PCI with 3.5 x 9 mm resolute JUSTA  -Holter monitor (3/10/2025) occasional PACs, rare PVCs, 2 runs of wide-complex tachycardia up to 9 beats either representing VT versus SVT with aberrancy, Mobitz 1 second-degree AV block, no symptoms reported  -Echo (3/19/2025) EF 66-70%, mild to moderate LVH, diastolic dysfunction, mild LA enlargement, normal RV size and function, no significant valve dysfunction     Lab data:  -Lipid panel (5/7/2024) total cholesterol 216, HDL 47, , triglycerides 179  -Hemoglobin A1c (5/7/2024) 7.4%  -BMP (5/7/2024) creatinine 0.97, potassium 3.9, sodium 140  -Lipid panel (6/18/2025) total cholesterol 175, HDL 54, LDL 92, triglycerides 167    Patient Active Problem List   Diagnosis    Acquired hypothyroidism    Type 2 diabetes mellitus without complication, with long-term current use of insulin    Recurrent major depressive disorder    Osteoarthritis of multiple joints    Gastroesophageal reflux disease    Essential hypertension    Hx of adenomatous colonic polyps    Obesity due to excess calories    CHRISTY (obstructive sleep apnea)    SVT (supraventricular tachycardia)    Family history of polyps in the colon    Coronary  artery disease involving native coronary artery of native heart without angina pectoris    Dyslipidemia       Social History     Tobacco Use    Smoking status: Former     Current packs/day: 0.00     Average packs/day: 1 pack/day for 20.0 years (20.0 ttl pk-yrs)     Types: Cigarettes     Start date: 1995     Quit date: 2015     Years since quitting: 10.4    Smokeless tobacco: Never   Vaping Use    Vaping status: Never Used   Substance Use Topics    Alcohol use: No    Drug use: No       Family History   Problem Relation Age of Onset    Heart disease Mother     Diabetes Mother     Breast cancer Sister     Colon polyps Sister 30        mid 30s    Breast cancer Sister     Breast cancer Brother     No Known Problems Maternal Grandmother     No Known Problems Paternal Grandmother     No Known Problems Maternal Grandfather     No Known Problems Paternal Grandfather     Colon cancer Neg Hx     Esophageal cancer Neg Hx        The following portions of the patient's history were reviewed and updated as appropriate: allergies, current medications, past family history, past medical history, past social history, past surgical history, and problem list.      Current Outpatient Medications:     alendronate (Fosamax) 70 MG tablet, Take 1 tablet by mouth Every 7 (Seven) Days., Disp: 12 tablet, Rfl: 3    aspirin 81 MG EC tablet, Take 1 tablet by mouth Daily., Disp: , Rfl:     cetirizine (zyrTEC) 10 MG tablet, Take 1 tablet by mouth Daily., Disp: 90 tablet, Rfl: 1    Continuous Blood Gluc Sensor (FreeStyle Ann-Marie 2 Sensor) misc, 1 each Every 14 (Fourteen) Days., Disp: 2 each, Rfl: 6    desvenlafaxine (PRISTIQ) 50 MG 24 hr tablet, Take 1 tablet by mouth Daily., Disp: 90 tablet, Rfl: 1    empagliflozin (Jardiance) 10 MG tablet tablet, Take  by mouth Daily., Disp: , Rfl:     Finerenone 20 MG tablet, Take 20 mg by mouth Daily., Disp: 30 tablet, Rfl: 11    Fluticasone Furoate-Vilanterol (Breo Ellipta) 100-25 MCG/ACT aerosol powder , Inhale 1  puff Daily., Disp: 60 each, Rfl: 2    folic acid (FOLVITE) 400 MCG tablet, Take 1 tablet by mouth Daily., Disp: , Rfl:     Insulin Aspart, w/Niacinamide, (Fiasp FlexTouch) 100 UNIT/ML solution pen-injector, UP TO 40 UNITS QAC TID (Patient taking differently: 20 Units. UP TO 40 UNITS QAC TID), Disp: 108 mL, Rfl: 3    Insulin Degludec-Liraglutide (Xultophy) 100-3.6 UNIT-MG/ML solution pen-injector subcutaneous pen, Inject 50 Units under the skin into the appropriate area as directed Daily. PLEASE STOP TRULICITY (Patient taking differently: Inject 25 Units under the skin into the appropriate area as directed Every Night. PLEASE STOP TRULICITY), Disp: 45 mL, Rfl: 3    Insulin Pen Needle (Pen Needles) 32G X 4 MM misc, 1 each 4 (Four) Times a Day. Use 4 x daily, Disp: 120 each, Rfl: 11    levothyroxine (Synthroid) 112 MCG tablet, Take 1 tablet by mouth Every Morning Before Breakfast., Disp: 90 tablet, Rfl: 3    lisinopril (PRINIVIL,ZESTRIL) 40 MG tablet, TAKE 1 TABLET BY MOUTH EVERY DAY, Disp: 90 tablet, Rfl: 3    Multiple Vitamins-Minerals (VISION-KRISTAL PRESERVE PO), Take  by mouth Daily With Breakfast., Disp: , Rfl:     NIFEdipine CC (ADALAT CC) 60 MG 24 hr tablet, TAKE 1 TABLET BY MOUTH EVERY DAY, Disp: 90 tablet, Rfl: 3    omeprazole (priLOSEC) 40 MG capsule, TAKE 1 CAPSULE BY MOUTH EVERY DAY, Disp: 90 capsule, Rfl: 3    oxybutynin XL (Ditropan XL) 10 MG 24 hr tablet, Take 1 tablet by mouth Daily., Disp: 30 tablet, Rfl: 5    potassium bicarbonate (Klor-Con/EF) 25 MEQ disintegrating tablet, PLACE 1 TABLET IN 8 OZ OF WATER AND DRINK ONCE DAILY WITH LARGEST MEAL, Disp: 90 tablet, Rfl: 1    rosuvastatin (CRESTOR) 40 MG tablet, Take 1 tablet by mouth Every Night., Disp: 90 tablet, Rfl: 3    vitamin B-12 (CYANOCOBALAMIN) 1000 MCG tablet, Take 1 tablet by mouth Daily., Disp: , Rfl:     vitamin D (ERGOCALCIFEROL) 1.25 MG (36847 UT) capsule capsule, Take 1 capsule by mouth Every 7 (Seven) Days., Disp: 12 capsule, Rfl:  "2    Review of Systems   Constitutional: Negative for chills and fever.   Cardiovascular:  Negative for chest pain and paroxysmal nocturnal dyspnea.   Respiratory:  Negative for cough and shortness of breath.    Skin:  Negative for rash.   Gastrointestinal:  Negative for abdominal pain and heartburn.   Neurological:  Negative for dizziness and numbness.       Objective   /62 (BP Location: Left arm, Patient Position: Sitting, Cuff Size: Adult)   Pulse 94   Ht 165.1 cm (65\")   Wt 100 kg (221 lb)   LMP  (LMP Unknown)   SpO2 100%   BMI 36.78 kg/m²   Constitutional:       Appearance: Well-developed.   HENT:      Head: Normocephalic and atraumatic.   Pulmonary:      Effort: Pulmonary effort is normal.      Breath sounds: Normal breath sounds.   Cardiovascular:      Normal rate. Regular rhythm.   Edema:     Peripheral edema absent.   Skin:     General: Skin is warm and dry.   Neurological:      Mental Status: Alert and oriented to person, place, and time.       Procedures      ICD-10-CM ICD-9-CM   1. Coronary artery disease involving native coronary artery of native heart without angina pectoris  I25.10 414.01   2. SVT (supraventricular tachycardia)  I47.10 427.89   3. Essential hypertension  I10 401.9   4. Dyslipidemia  E78.5 272.4   5. Bradycardia, sinus  R00.1 427.89   6. History of syncope  Z87.898 V15.89         Assessment/Plan:  1.  Coronary artery disease: PCI to LAD in 2020.  No anginal symptoms.  History of adverse reaction to a pharmacological stress agent, but no details as to which type.  Continue aspirin and rosuvastatin.     2.  Paroxysmal SVT: Previously diagnosed by Dr. Garces.  Holter monitor on 3/10/2025 showed 2 runs of wide-complex tachycardia representing either VT versus SVT with aberrancy.  Metoprolol stopped last visit due to bradycardia.       3.  Essential hypertension: Blood pressure is within normal limits today.  Continue lisinopril and nifedipine.       4.  Dyslipidemia: Improved " control lipid panel on 6/18/2025.  Continue rosuvastatin.       5.  Sinus bradycardia: Episode during anesthesia for back procedure.  No significant bradycardia on recent Holter monitor.  No further episodes following discontinuation of metoprolol.       6.  History of syncope: Episode of syncope that occurred after her back procedure.  No significant findings on echo or Holter monitor that would explain syncope.  She has not had any recurrence.

## 2025-07-01 RX ORDER — BUDESONIDE AND FORMOTEROL FUMARATE DIHYDRATE 160; 4.5 UG/1; UG/1
2 AEROSOL RESPIRATORY (INHALATION) EVERY 12 HOURS SCHEDULED
Qty: 30.6 EACH | Refills: 4 | Status: SHIPPED | OUTPATIENT
Start: 2025-07-01

## 2025-07-01 NOTE — TELEPHONE ENCOUNTER
Rx Refill Note  Requested Prescriptions     Pending Prescriptions Disp Refills    Symbicort 160-4.5 MCG/ACT inhaler [Pharmacy Med Name: SYMBICORT 160-4.5 MCG INHALER] 30.6 each 4     Sig: TAKE 2 PUFFS BY MOUTH TWICE A DAY      Last office visit with prescribing clinician: 5/1/2025   Last telemedicine visit with prescribing clinician: Visit date not found   Next office visit with prescribing clinician: 10/31/2025     Jayne Casanova MA  07/01/25, 07:59 CDT

## 2025-07-02 ENCOUNTER — OFFICE VISIT (OUTPATIENT)
Dept: GASTROENTEROLOGY | Facility: CLINIC | Age: 70
End: 2025-07-02
Payer: MEDICARE

## 2025-07-02 VITALS
SYSTOLIC BLOOD PRESSURE: 122 MMHG | HEART RATE: 91 BPM | DIASTOLIC BLOOD PRESSURE: 72 MMHG | OXYGEN SATURATION: 96 % | BODY MASS INDEX: 36.15 KG/M2 | WEIGHT: 217 LBS | TEMPERATURE: 97.1 F | HEIGHT: 65 IN

## 2025-07-02 DIAGNOSIS — Z83.719 FAMILY HISTORY OF POLYPS IN THE COLON: ICD-10-CM

## 2025-07-02 DIAGNOSIS — Z86.0101 HX OF ADENOMATOUS COLONIC POLYPS: Primary | ICD-10-CM

## 2025-07-02 NOTE — PROGRESS NOTES
"Primary Physician: Jer Mccray MD    Chief Complaint   Patient presents with    Colon Cancer Screening     Pt presents today for evaluation for colonoscopy-pt's last colon was 2021; Personal history of adenomatous and hyperplastic polyps; Family history of colon polyps       Subjective     Maribeth Isbell is a 69 y.o. female.    HPI  History of adenomatous colon polyps  2021 most recent colonoscopy revealing multiple polyps resected from transverse colon and cecum.  All polyps were adenomatous negative for dysplasia.    Patient denies any change in bowel habits.  No diarrhea, constipation, abdominal pain, or rectal bleeding.    Sister had colon polyps age less than 60.  No family history of colon cancer that she is aware of.    Past Medical History:   Diagnosis Date    Atrial fibrillation     Bell's palsy     Cataract     Depression     E coli infection     Family history of colonic polyps     Gastroesophageal reflux disease 2018    Headache     History of adenomatous polyp of colon     History of colon polyps     Hyperlipidemia     Hypertension     Hyperthyroidism     Hypothyroidism     Macular degeneration     Obesity due to excess calories 2019    Sleep apnea     wears cpap    Tachycardia     Type 2 diabetes mellitus     Visual impairment        Past Surgical History:   Procedure Laterality Date    BACK SURGERY  2022    [facility]    BACK SURGERY  2024    BACK SURGERY  2025    [place]-pt's  states she had \"cement put in her back\"     SECTION      COLONOSCOPY  06/10/2011    Dr. Mahmood-Internal hemorrhoids; Otherwise normal    COLONOSCOPY N/A 2016    Colon not done this day-see endo report 2016    COLONOSCOPY  2016    Dr. Mahmood-A single small hyperplastic polyp in the ascending colon    COLONOSCOPY N/A 2020    One 15mm tubular adenomatous polyp in the cecum; Two 7-10mm tubular adenomatous polyps in the ascending colon; Five " 7-10mm tubular adenomatous polyps in the transverse colon; Two 5-12mm tubular adenomatous polyps in the descending colon; Repeat 1 year    COLONOSCOPY N/A 09/20/2021    Two 7-8mm tubular adenomatous polyps in the transverse colon; Two 4-6mm tubular adenomatous polyps in the cecum; Repeat 3 years    CORONARY ANGIOPLASTY WITH STENT PLACEMENT  2020    DR MYERS    ENDOSCOPY N/A 12/01/2016    Normal esophagus; Normal stomach; Normal first part of the duodenum and 2nd part of the duodenum-biopsied    ENDOSCOPY N/A 07/30/2018    Normal esophagus; Normal stomach; Normal first portion of the duodenum and second portion of the duodenum-biopsied    HYSTERECTOMY      KNEE SURGERY Left     TUMOR REMOVAL          Current Outpatient Medications:     alendronate (Fosamax) 70 MG tablet, Take 1 tablet by mouth Every 7 (Seven) Days., Disp: 12 tablet, Rfl: 3    aspirin 81 MG EC tablet, Take 1 tablet by mouth Daily., Disp: , Rfl:     cetirizine (zyrTEC) 10 MG tablet, Take 1 tablet by mouth Daily., Disp: 90 tablet, Rfl: 1    Continuous Blood Gluc Sensor (FreeStyle Ann-Marie 2 Sensor) misc, 1 each Every 14 (Fourteen) Days., Disp: 2 each, Rfl: 6    desvenlafaxine (PRISTIQ) 50 MG 24 hr tablet, Take 1 tablet by mouth Daily., Disp: 90 tablet, Rfl: 1    empagliflozin (Jardiance) 10 MG tablet tablet, Take  by mouth Daily., Disp: , Rfl:     Finerenone 20 MG tablet, Take 20 mg by mouth Daily., Disp: 30 tablet, Rfl: 11    Fluticasone Furoate-Vilanterol (Breo Ellipta) 100-25 MCG/ACT aerosol powder , Inhale 1 puff Daily., Disp: 60 each, Rfl: 2    folic acid (FOLVITE) 400 MCG tablet, Take 1 tablet by mouth Daily., Disp: , Rfl:     Insulin Aspart, w/Niacinamide, (Fiasp FlexTouch) 100 UNIT/ML solution pen-injector, UP TO 40 UNITS QAC TID (Patient taking differently: 20 Units. UP TO 40 UNITS QAC TID), Disp: 108 mL, Rfl: 3    Insulin Degludec-Liraglutide (Xultophy) 100-3.6 UNIT-MG/ML solution pen-injector subcutaneous pen, Inject 50 Units under the skin into  the appropriate area as directed Daily. PLEASE STOP TRULICITY (Patient taking differently: Inject 25 Units under the skin into the appropriate area as directed Every Night. PLEASE STOP TRULICITY), Disp: 45 mL, Rfl: 3    Insulin Pen Needle (Pen Needles) 32G X 4 MM misc, 1 each 4 (Four) Times a Day. Use 4 x daily, Disp: 120 each, Rfl: 11    levothyroxine (Synthroid) 112 MCG tablet, Take 1 tablet by mouth Every Morning Before Breakfast., Disp: 90 tablet, Rfl: 3    lisinopril (PRINIVIL,ZESTRIL) 40 MG tablet, TAKE 1 TABLET BY MOUTH EVERY DAY, Disp: 90 tablet, Rfl: 3    Multiple Vitamins-Minerals (VISION-KRISTAL PRESERVE PO), Take  by mouth Daily With Breakfast., Disp: , Rfl:     NIFEdipine CC (ADALAT CC) 60 MG 24 hr tablet, TAKE 1 TABLET BY MOUTH EVERY DAY, Disp: 90 tablet, Rfl: 3    omeprazole (priLOSEC) 40 MG capsule, TAKE 1 CAPSULE BY MOUTH EVERY DAY, Disp: 90 capsule, Rfl: 3    oxybutynin XL (Ditropan XL) 10 MG 24 hr tablet, Take 1 tablet by mouth Daily., Disp: 30 tablet, Rfl: 5    potassium bicarbonate (Klor-Con/EF) 25 MEQ disintegrating tablet, PLACE 1 TABLET IN 8 OZ OF WATER AND DRINK ONCE DAILY WITH LARGEST MEAL, Disp: 90 tablet, Rfl: 1    rosuvastatin (CRESTOR) 40 MG tablet, Take 1 tablet by mouth Every Night., Disp: 90 tablet, Rfl: 3    Symbicort 160-4.5 MCG/ACT inhaler, TAKE 2 PUFFS BY MOUTH TWICE A DAY, Disp: 30.6 each, Rfl: 4    vitamin B-12 (CYANOCOBALAMIN) 1000 MCG tablet, Take 1 tablet by mouth Daily., Disp: , Rfl:     vitamin D (ERGOCALCIFEROL) 1.25 MG (12834 UT) capsule capsule, Take 1 capsule by mouth Every 7 (Seven) Days., Disp: 12 capsule, Rfl: 2    Allergies   Allergen Reactions    Metformin GI Intolerance       Social History     Socioeconomic History    Marital status:    Tobacco Use    Smoking status: Former     Current packs/day: 0.00     Average packs/day: 1 pack/day for 20.0 years (20.0 ttl pk-yrs)     Types: Cigarettes     Start date: 1995     Quit date: 2015     Years since quitting:  "10.5    Smokeless tobacco: Never   Vaping Use    Vaping status: Never Used   Substance and Sexual Activity    Alcohol use: No    Drug use: No    Sexual activity: Defer       Family History   Problem Relation Age of Onset    Heart disease Mother     Diabetes Mother     Breast cancer Sister     Colon polyps Sister 30        < 60 years of age    Breast cancer Sister     Breast cancer Brother     No Known Problems Maternal Grandmother     No Known Problems Maternal Grandfather     No Known Problems Paternal Grandmother     No Known Problems Paternal Grandfather     Colon cancer Neg Hx     Esophageal cancer Neg Hx     Liver cancer Neg Hx     Rectal cancer Neg Hx     Stomach cancer Neg Hx     Liver disease Neg Hx        Review of Systems   Constitutional:  Negative for unexpected weight change.   Respiratory:  Positive for shortness of breath.         Shortness of breath and dizziness if her sugars drop fast   Cardiovascular:  Negative for chest pain.   Hematological:  Bruises/bleeds easily.       Objective     /72 (BP Location: Left arm, Patient Position: Sitting, Cuff Size: Adult)   Pulse 91   Temp 97.1 °F (36.2 °C) (Infrared)   Ht 165.1 cm (65\")   Wt 98.4 kg (217 lb)   LMP  (LMP Unknown)   SpO2 96%   Breastfeeding No   BMI 36.11 kg/m²     Physical Exam  Vitals reviewed.   Constitutional:       Appearance: Normal appearance.   Cardiovascular:      Rate and Rhythm: Normal rate and regular rhythm.      Heart sounds: Normal heart sounds.   Pulmonary:      Effort: Pulmonary effort is normal.      Breath sounds: Normal breath sounds.   Neurological:      Mental Status: She is alert.         Lab Results - Last 18 Months   Lab Units 04/02/25  0852 05/07/24  0817   GLUCOSE mg/dL 170* 116*   BUN mg/dL 8 10   CREATININE mg/dL 1.07* 0.97   SODIUM mmol/L 137 140   POTASSIUM mmol/L 3.7 3.9   CHLORIDE mmol/L 101 105   CO2 mmol/L 24.0 25.0   TOTAL PROTEIN g/dL 6.7 6.6   ALBUMIN g/dL 4.1 4.3   ALT (SGPT) U/L 14 10   AST " (SGOT) U/L 31 18   ALK PHOS U/L 102 76   BILIRUBIN mg/dL 0.5 0.4   GLOBULINREF gm/dL 2.6 2.3       Lab Results - Last 18 Months   Lab Units 04/02/25  0852 05/07/24  0817   HEMOGLOBIN g/dL 15.0 15.6   HEMATOCRIT % 47.3* 48.0*   MCV fL 91.0 87.8   WBC 10*3/mm3 4.38 3.99   RDW % 15.9* 13.7   PLATELETS 10*3/mm3 229 246       Lab Results - Last 18 Months   Lab Units 04/02/25  0852 05/07/24  0819 05/07/24  0817 01/19/24  0851   TSH uIU/mL 57.300* 3.830  --  12.000*   FOLATE ng/mL 13.80  --  16.80  --           IMPRESSION/PLAN:    Assessment & Plan      Problem List Items Addressed This Visit       Hx of adenomatous colonic polyps - Primary    Overview   9/20/2021 Colonoscopy multiple adenomatous polyps resected         Relevant Orders    Case Request (Completed)    Follow Anesthesia Guidelines / Protocol    Family history of polyps in the colon    Overview   Sister age less than 60          Colonoscopy per Dr. Berry  Miralax Prep          ..The risks, benefits, and alternatives of colonoscopy were reviewed with the patient today.  Risks including perforation of the colon possibly requiring surgery or colostomy.  Additional risks include risk of bleeding from biopsies or removal of colon tissue.  There is also the risk of a drug reaction or problems with anesthesia.  This will be discussed with the further by the anesthesia team on the day of the procedure.  Lastly there is a possibility of missing a colon polyp or cancer.  The benefits include the diagnosis and management of disease of the colon and rectum.  Alternatives to colonoscopy include barium enema, laboratory testing, radiographic evaluation, or no intervention.  The patient verbalizes understanding and agrees.    In accordance with requirements under the Affordable Care Act, [facility] has provided pricing for all hospital services and items on each of its websites. However, a patient's actual cost may differ based on the services the patient receives to meet  individual healthcare needs and based on the benefits provided under the patient’s insurance coverage.        Rebecca Childs, APRN  07/02/25  14:31 CDT    Part of this note may be an electronic transcription/translation of spoken language to printed text.

## 2025-07-10 ENCOUNTER — OFFICE VISIT (OUTPATIENT)
Dept: FAMILY MEDICINE CLINIC | Facility: CLINIC | Age: 70
End: 2025-07-10
Payer: MEDICARE

## 2025-07-10 VITALS
OXYGEN SATURATION: 98 % | DIASTOLIC BLOOD PRESSURE: 63 MMHG | SYSTOLIC BLOOD PRESSURE: 103 MMHG | BODY MASS INDEX: 36.32 KG/M2 | HEIGHT: 65 IN | HEART RATE: 85 BPM | TEMPERATURE: 97.8 F | WEIGHT: 218 LBS

## 2025-07-10 DIAGNOSIS — R82.90 ABNORMAL URINALYSIS: ICD-10-CM

## 2025-07-10 DIAGNOSIS — R30.0 DIFFICULT OR PAINFUL URINATION: Primary | ICD-10-CM

## 2025-07-10 LAB
BILIRUB BLD-MCNC: NEGATIVE MG/DL
CLARITY, POC: CLEAR
COLOR UR: YELLOW
GLUCOSE UR STRIP-MCNC: ABNORMAL MG/DL
KETONES UR QL: NEGATIVE
LEUKOCYTE EST, POC: ABNORMAL
NITRITE UR-MCNC: NEGATIVE MG/ML
PH UR: 5.5 [PH] (ref 5–8)
PROT UR STRIP-MCNC: ABNORMAL MG/DL
RBC # UR STRIP: NEGATIVE /UL
SP GR UR: 1.01 (ref 1–1.03)
UROBILINOGEN UR QL: ABNORMAL

## 2025-07-10 PROCEDURE — 81003 URINALYSIS AUTO W/O SCOPE: CPT | Performed by: NURSE PRACTITIONER

## 2025-07-10 PROCEDURE — 3078F DIAST BP <80 MM HG: CPT | Performed by: NURSE PRACTITIONER

## 2025-07-10 PROCEDURE — 3051F HG A1C>EQUAL 7.0%<8.0%: CPT | Performed by: NURSE PRACTITIONER

## 2025-07-10 PROCEDURE — 3074F SYST BP LT 130 MM HG: CPT | Performed by: NURSE PRACTITIONER

## 2025-07-10 PROCEDURE — 99213 OFFICE O/P EST LOW 20 MIN: CPT | Performed by: NURSE PRACTITIONER

## 2025-07-10 PROCEDURE — 1126F AMNT PAIN NOTED NONE PRSNT: CPT | Performed by: NURSE PRACTITIONER

## 2025-07-10 RX ORDER — FLUCONAZOLE 150 MG/1
TABLET ORAL
Qty: 2 TABLET | Refills: 0 | Status: SHIPPED | OUTPATIENT
Start: 2025-07-10

## 2025-07-10 RX ORDER — BUMETANIDE 1 MG/1
1 TABLET ORAL DAILY
COMMUNITY
Start: 2025-07-06

## 2025-07-10 RX ORDER — NITROFURANTOIN 25; 75 MG/1; MG/1
100 CAPSULE ORAL 2 TIMES DAILY
Qty: 14 CAPSULE | Refills: 0 | Status: SHIPPED | OUTPATIENT
Start: 2025-07-10

## 2025-07-10 RX ORDER — PHENAZOPYRIDINE HYDROCHLORIDE 200 MG/1
200 TABLET, FILM COATED ORAL 3 TIMES DAILY PRN
Qty: 6 TABLET | Refills: 0 | Status: SHIPPED | OUTPATIENT
Start: 2025-07-10

## 2025-07-10 NOTE — PROGRESS NOTES
"CC: Dysuria    History:  Maribeth Isbell is a 69 y.o. female who presents today for evaluation of the above problems.        Urinary Tract Infection  Chronicity:  New  Onset:  Yesterday  Frequency:  Constantly  Progression since onset:  Worsening  Pain severity:  Severe  Fever:  No fever  Associated symptoms: frequency, hesitancy and urgency    Treatments tried:  Nothing  Improvement on treatment:  No relief    ROS:  Review of Systems   Genitourinary:  Positive for frequency, hesitancy and urgency.       Allergies   Allergen Reactions    Metformin GI Intolerance     Past Medical History:   Diagnosis Date    Atrial fibrillation     Bell's palsy     Cataract     Depression     E coli infection     Family history of colonic polyps     Gastroesophageal reflux disease 2018    Headache     History of adenomatous polyp of colon     History of colon polyps     Hyperlipidemia     Hypertension     Hyperthyroidism     Hypothyroidism     Macular degeneration     Obesity due to excess calories 2019    Sleep apnea     wears cpap    Tachycardia     Type 2 diabetes mellitus     Visual impairment      Past Surgical History:   Procedure Laterality Date    [facility]  2022    [facility]    BACK SURGERY  2024    BACK SURGERY  2025    [facility]  states she had \"cement put in her back\"     SECTION      COLONOSCOPY  06/10/2011    Dr. Mahmood-Internal hemorrhoids; Otherwise normal    COLONOSCOPY N/A 2016    Colon not done this day-see endo report 2016    COLONOSCOPY  2016    Dr. Mahmood-A single small hyperplastic polyp in the ascending colon    COLONOSCOPY N/A 2020    One 15mm tubular adenomatous polyp in the cecum; Two 7-10mm tubular adenomatous polyps in the ascending colon; Five 7-10mm tubular adenomatous polyps in the transverse colon; Two 5-12mm tubular adenomatous polyps in the descending colon; Repeat 1 year    COLONOSCOPY N/A 2021    Two 7-8mm tubular " adenomatous polyps in the transverse colon; Two 4-6mm tubular adenomatous polyps in the cecum; Repeat 3 years    CORONARY ANGIOPLASTY WITH STENT PLACEMENT  2020    DR MYERS    ENDOSCOPY N/A 12/01/2016    Normal esophagus; Normal stomach; Normal first part of the duodenum and 2nd part of the duodenum-biopsied    ENDOSCOPY N/A 07/30/2018    Normal esophagus; Normal stomach; Normal first portion of the duodenum and second portion of the duodenum-biopsied    HYSTERECTOMY      KNEE SURGERY Left     TUMOR REMOVAL       Family History   Problem Relation Age of Onset    Heart disease Mother     Diabetes Mother     Breast cancer Sister     Colon polyps Sister 30        < 60 years of age    Breast cancer Sister     Breast cancer Brother     No Known Problems Maternal Grandmother     No Known Problems Maternal Grandfather     No Known Problems Paternal Grandmother     No Known Problems Paternal Grandfather     Colon cancer Neg Hx     Esophageal cancer Neg Hx     Liver cancer Neg Hx     Rectal cancer Neg Hx     Stomach cancer Neg Hx     Liver disease Neg Hx       reports that she quit smoking about 10 years ago. Her smoking use included cigarettes. She started smoking about 30 years ago. She has a 20 pack-year smoking history. She has never used smokeless tobacco. She reports that she does not drink alcohol and does not use drugs.      Current Outpatient Medications:     alendronate (Fosamax) 70 MG tablet, Take 1 tablet by mouth Every 7 (Seven) Days., Disp: 12 tablet, Rfl: 3    aspirin 81 MG EC tablet, Take 1 tablet by mouth Daily., Disp: , Rfl:     bumetanide (BUMEX) 1 MG tablet, Take 1 tablet by mouth Daily., Disp: , Rfl:     cetirizine (zyrTEC) 10 MG tablet, Take 1 tablet by mouth Daily., Disp: 90 tablet, Rfl: 1    Continuous Blood Gluc Sensor (FreeStyle Ann-Marie 2 Sensor) misc, 1 each Every 14 (Fourteen) Days., Disp: 2 each, Rfl: 6    desvenlafaxine (PRISTIQ) 50 MG 24 hr tablet, Take 1 tablet by mouth Daily., Disp: 90 tablet,  Rfl: 1    empagliflozin (Jardiance) 10 MG tablet tablet, Take  by mouth Daily., Disp: , Rfl:     Finerenone 20 MG tablet, Take 20 mg by mouth Daily., Disp: 30 tablet, Rfl: 11    Fluticasone Furoate-Vilanterol (Breo Ellipta) 100-25 MCG/ACT aerosol powder , Inhale 1 puff Daily., Disp: 60 each, Rfl: 2    folic acid (FOLVITE) 400 MCG tablet, Take 1 tablet by mouth Daily., Disp: , Rfl:     Insulin Aspart, w/Niacinamide, (Fiasp FlexTouch) 100 UNIT/ML solution pen-injector, UP TO 40 UNITS QAC TID (Patient taking differently: 20 Units. UP TO 40 UNITS QAC TID), Disp: 108 mL, Rfl: 3    Insulin Degludec-Liraglutide (Xultophy) 100-3.6 UNIT-MG/ML solution pen-injector subcutaneous pen, Inject 50 Units under the skin into the appropriate area as directed Daily. PLEASE STOP TRULICITY (Patient taking differently: Inject 25 Units under the skin into the appropriate area as directed Every Night. PLEASE STOP TRULICITY), Disp: 45 mL, Rfl: 3    Insulin Pen Needle (Pen Needles) 32G X 4 MM misc, 1 each 4 (Four) Times a Day. Use 4 x daily, Disp: 120 each, Rfl: 11    levothyroxine (Synthroid) 112 MCG tablet, Take 1 tablet by mouth Every Morning Before Breakfast., Disp: 90 tablet, Rfl: 3    lisinopril (PRINIVIL,ZESTRIL) 40 MG tablet, TAKE 1 TABLET BY MOUTH EVERY DAY, Disp: 90 tablet, Rfl: 3    Multiple Vitamins-Minerals (VISION-KRISTAL PRESERVE PO), Take  by mouth Daily With Breakfast., Disp: , Rfl:     NIFEdipine CC (ADALAT CC) 60 MG 24 hr tablet, TAKE 1 TABLET BY MOUTH EVERY DAY, Disp: 90 tablet, Rfl: 3    omeprazole (priLOSEC) 40 MG capsule, TAKE 1 CAPSULE BY MOUTH EVERY DAY, Disp: 90 capsule, Rfl: 3    oxybutynin XL (Ditropan XL) 10 MG 24 hr tablet, Take 1 tablet by mouth Daily., Disp: 30 tablet, Rfl: 5    potassium bicarbonate (Klor-Con/EF) 25 MEQ disintegrating tablet, PLACE 1 TABLET IN 8 OZ OF WATER AND DRINK ONCE DAILY WITH LARGEST MEAL, Disp: 90 tablet, Rfl: 1    rosuvastatin (CRESTOR) 40 MG tablet, Take 1 tablet by mouth Every Night.,  "Disp: 90 tablet, Rfl: 3    Symbicort 160-4.5 MCG/ACT inhaler, TAKE 2 PUFFS BY MOUTH TWICE A DAY, Disp: 30.6 each, Rfl: 4    vitamin B-12 (CYANOCOBALAMIN) 1000 MCG tablet, Take 1 tablet by mouth Daily., Disp: , Rfl:     vitamin D (ERGOCALCIFEROL) 1.25 MG (50572 UT) capsule capsule, Take 1 capsule by mouth Every 7 (Seven) Days., Disp: 12 capsule, Rfl: 2    fluconazole (Diflucan) 150 MG tablet, Take one tablet. Wait three days and take 2nd tablet., Disp: 2 tablet, Rfl: 0    nitrofurantoin, macrocrystal-monohydrate, (Macrobid) 100 MG capsule, Take 1 capsule by mouth 2 (Two) Times a Day., Disp: 14 capsule, Rfl: 0    phenazopyridine (Pyridium) 200 MG tablet, Take 1 tablet by mouth 3 (Three) Times a Day As Needed for Bladder Spasms or Dysuria., Disp: 6 tablet, Rfl: 0    OBJECTIVE:  /63 (BP Location: Left arm, Patient Position: Sitting, Cuff Size: Large Adult)   Pulse 85   Temp 97.8 °F (36.6 °C) (Temporal)   Ht 165.1 cm (65\")   Wt 98.9 kg (218 lb)   LMP  (LMP Unknown)   SpO2 98%   BMI 36.28 kg/m²    Physical Exam  Vitals reviewed.   Constitutional:       Appearance: She is well-developed.   Cardiovascular:      Rate and Rhythm: Normal rate.   Pulmonary:      Effort: Pulmonary effort is normal.   Neurological:      Mental Status: She is alert and oriented to person, place, and time.   Psychiatric:         Behavior: Behavior normal.         Assessment/Plan    Diagnoses and all orders for this visit:    1. Difficult or painful urination (Primary)  -     POC Urinalysis Dipstick, Multipro  -     nitrofurantoin, macrocrystal-monohydrate, (Macrobid) 100 MG capsule; Take 1 capsule by mouth 2 (Two) Times a Day.  Dispense: 14 capsule; Refill: 0  -     fluconazole (Diflucan) 150 MG tablet; Take one tablet. Wait three days and take 2nd tablet.  Dispense: 2 tablet; Refill: 0  -     phenazopyridine (Pyridium) 200 MG tablet; Take 1 tablet by mouth 3 (Three) Times a Day As Needed for Bladder Spasms or Dysuria.  Dispense: 6 " tablet; Refill: 0    2. Abnormal urinalysis  -     Urine Culture - Urine, Urine, Clean Catch        An After Visit Summary was printed and given to the patient at discharge.  Return if symptoms worsen or fail to improve, for Next scheduled follow up.       YESSICA Mane 7/10/25    Electronically signed.

## 2025-07-12 LAB
BACTERIA UR CULT: NO GROWTH
BACTERIA UR CULT: NORMAL

## 2025-08-06 ENCOUNTER — OFFICE VISIT (OUTPATIENT)
Dept: FAMILY MEDICINE CLINIC | Facility: CLINIC | Age: 70
End: 2025-08-06
Payer: MEDICARE

## 2025-08-06 ENCOUNTER — CLINICAL SUPPORT (OUTPATIENT)
Dept: FAMILY MEDICINE CLINIC | Facility: CLINIC | Age: 70
End: 2025-08-06
Payer: MEDICARE

## 2025-08-06 ENCOUNTER — RESULTS FOLLOW-UP (OUTPATIENT)
Dept: FAMILY MEDICINE CLINIC | Facility: CLINIC | Age: 70
End: 2025-08-06

## 2025-08-06 VITALS
HEIGHT: 65 IN | SYSTOLIC BLOOD PRESSURE: 128 MMHG | WEIGHT: 221 LBS | DIASTOLIC BLOOD PRESSURE: 70 MMHG | OXYGEN SATURATION: 98 % | BODY MASS INDEX: 36.82 KG/M2 | TEMPERATURE: 98.4 F | HEART RATE: 75 BPM

## 2025-08-06 DIAGNOSIS — R82.90 ABNORMAL URINALYSIS: Primary | ICD-10-CM

## 2025-08-06 DIAGNOSIS — R30.0 DYSURIA: ICD-10-CM

## 2025-08-06 DIAGNOSIS — N30.00 ACUTE CYSTITIS WITHOUT HEMATURIA: Primary | ICD-10-CM

## 2025-08-06 DIAGNOSIS — E53.8 B12 DEFICIENCY: Primary | ICD-10-CM

## 2025-08-06 DIAGNOSIS — R30.0 DIFFICULT OR PAINFUL URINATION: ICD-10-CM

## 2025-08-06 LAB
BILIRUB BLD-MCNC: ABNORMAL MG/DL
CLARITY, POC: ABNORMAL
COLOR UR: ABNORMAL
GLUCOSE UR STRIP-MCNC: ABNORMAL MG/DL
KETONES UR QL: ABNORMAL
LEUKOCYTE EST, POC: ABNORMAL
NITRITE UR-MCNC: NEGATIVE MG/ML
PH UR: 6 [PH] (ref 5–8)
PROT UR STRIP-MCNC: NEGATIVE MG/DL
RBC # UR STRIP: ABNORMAL /UL
SP GR UR: 1.01 (ref 1–1.03)
UROBILINOGEN UR QL: ABNORMAL

## 2025-08-06 PROCEDURE — 3051F HG A1C>EQUAL 7.0%<8.0%: CPT | Performed by: NURSE PRACTITIONER

## 2025-08-06 PROCEDURE — 81003 URINALYSIS AUTO W/O SCOPE: CPT | Performed by: NURSE PRACTITIONER

## 2025-08-06 PROCEDURE — 3078F DIAST BP <80 MM HG: CPT | Performed by: NURSE PRACTITIONER

## 2025-08-06 PROCEDURE — 3074F SYST BP LT 130 MM HG: CPT | Performed by: NURSE PRACTITIONER

## 2025-08-06 PROCEDURE — 1125F AMNT PAIN NOTED PAIN PRSNT: CPT | Performed by: NURSE PRACTITIONER

## 2025-08-06 RX ORDER — CYANOCOBALAMIN 1000 UG/ML
1000 INJECTION, SOLUTION INTRAMUSCULAR; SUBCUTANEOUS
Status: SHIPPED | OUTPATIENT
Start: 2025-08-06

## 2025-08-06 RX ORDER — TAMSULOSIN HYDROCHLORIDE 0.4 MG/1
1 CAPSULE ORAL DAILY
Qty: 10 CAPSULE | Refills: 0 | Status: SHIPPED | OUTPATIENT
Start: 2025-08-06

## 2025-08-06 RX ADMIN — CYANOCOBALAMIN 1000 MCG: 1000 INJECTION, SOLUTION INTRAMUSCULAR; SUBCUTANEOUS at 09:53

## 2025-08-10 LAB
BACTERIA UR CULT: ABNORMAL
BACTERIA UR CULT: ABNORMAL
OTHER ANTIBIOTIC SUSC ISLT: ABNORMAL

## 2025-08-11 RX ORDER — CEFDINIR 300 MG/1
300 CAPSULE ORAL 2 TIMES DAILY
Qty: 14 CAPSULE | Refills: 0 | Status: SHIPPED | OUTPATIENT
Start: 2025-08-11

## 2025-08-12 ENCOUNTER — TELEPHONE (OUTPATIENT)
Dept: FAMILY MEDICINE CLINIC | Facility: CLINIC | Age: 70
End: 2025-08-12
Payer: COMMERCIAL

## 2025-08-12 DIAGNOSIS — R52 PAIN AGGRAVATED BY LIFTING: Primary | ICD-10-CM

## 2025-08-12 DIAGNOSIS — R52 PAIN: ICD-10-CM

## 2025-08-12 RX ORDER — TRAMADOL HYDROCHLORIDE 50 MG/1
50 TABLET ORAL EVERY 8 HOURS PRN
Qty: 15 TABLET | Refills: 0 | Status: SHIPPED | OUTPATIENT
Start: 2025-08-12 | End: 2025-08-17

## 2025-08-18 RX ORDER — FLUCONAZOLE 150 MG/1
TABLET ORAL
Qty: 2 TABLET | Refills: 0 | Status: SHIPPED | OUTPATIENT
Start: 2025-08-18

## 2025-08-27 ENCOUNTER — TELEPHONE (OUTPATIENT)
Dept: GASTROENTEROLOGY | Facility: CLINIC | Age: 70
End: 2025-08-27
Payer: COMMERCIAL

## (undated) DEVICE — THE SINGLE USE ETRAP – POLYP TRAP IS USED FOR SUCTION RETRIEVAL OF ENDOSCOPICALLY REMOVED POLYPS.: Brand: ETRAP

## (undated) DEVICE — YANKAUER,BULB TIP WITH VENT: Brand: ARGYLE

## (undated) DEVICE — ENDOGATOR AUXILIARY WATER JET CONNECTOR: Brand: ENDOGATOR

## (undated) DEVICE — FRCP BX RADJAW4 NDL 2.8 240 STD OG

## (undated) DEVICE — CONMED SCOPE SAVER BITE BLOCK, 20X27 MM: Brand: SCOPE SAVER

## (undated) DEVICE — CUFF,BP,DISP,1 TUBE,ADULT,HP: Brand: MEDLINE

## (undated) DEVICE — Device: Brand: DEFENDO AIR/WATER/SUCTION AND BIOPSY VALVE

## (undated) DEVICE — TBG SMPL FLTR LINE NASL 02/C02 A/ BX/100

## (undated) DEVICE — SNAR POLYP SENSATION MICRO OVL 13 240X40

## (undated) DEVICE — THE CHANNEL CLEANING BRUSH IS A NYLON FLEXI BRUSH ATTACHED TO A FLEXIBLE PLASTIC SHEATH DESIGNED TO SAFELY REMOVE DEBRIS FROM FLEXIBLE ENDOSCOPES.

## (undated) DEVICE — SENSR O2 OXIMAX FNGR A/ 18IN NONSTR

## (undated) DEVICE — MASK,OXYGEN,MED CONC,ADLT,7' TUB, UC: Brand: PENDING